# Patient Record
Sex: FEMALE | Race: WHITE | Employment: OTHER | URBAN - METROPOLITAN AREA
[De-identification: names, ages, dates, MRNs, and addresses within clinical notes are randomized per-mention and may not be internally consistent; named-entity substitution may affect disease eponyms.]

---

## 2017-01-26 ENCOUNTER — APPOINTMENT (OUTPATIENT)
Dept: MRI IMAGING | Facility: CLINIC | Age: 82
End: 2017-01-26
Attending: EMERGENCY MEDICINE
Payer: MEDICARE

## 2017-01-26 ENCOUNTER — APPOINTMENT (OUTPATIENT)
Dept: GENERAL RADIOLOGY | Facility: CLINIC | Age: 82
End: 2017-01-26
Attending: EMERGENCY MEDICINE
Payer: MEDICARE

## 2017-01-26 ENCOUNTER — HOSPITAL ENCOUNTER (EMERGENCY)
Facility: CLINIC | Age: 82
Discharge: HOME | End: 2017-01-26
Attending: FAMILY MEDICINE | Admitting: EMERGENCY MEDICINE
Payer: MEDICARE

## 2017-01-26 ENCOUNTER — CARE COORDINATION (OUTPATIENT)
Dept: CARE COORDINATION | Facility: CLINIC | Age: 82
End: 2017-01-26

## 2017-01-26 ENCOUNTER — TELEPHONE (OUTPATIENT)
Dept: INTERNAL MEDICINE | Facility: CLINIC | Age: 82
End: 2017-01-26

## 2017-01-26 VITALS
HEART RATE: 76 BPM | TEMPERATURE: 98 F | BODY MASS INDEX: 31.54 KG/M2 | OXYGEN SATURATION: 89 % | DIASTOLIC BLOOD PRESSURE: 96 MMHG | RESPIRATION RATE: 20 BRPM | SYSTOLIC BLOOD PRESSURE: 191 MMHG | WEIGHT: 178 LBS | HEIGHT: 63 IN

## 2017-01-26 DIAGNOSIS — H54.7 VISUAL LOSS: ICD-10-CM

## 2017-01-26 DIAGNOSIS — I10 BENIGN ESSENTIAL HYPERTENSION: ICD-10-CM

## 2017-01-26 DIAGNOSIS — E08.319 DIABETIC RETINOPATHY ASSOCIATED WITH DIABETES MELLITUS DUE TO UNDERLYING CONDITION, MACULAR EDEMA PRESENCE UNSPECIFIED, UNSPECIFIED RETINOPATHY SEVERITY: ICD-10-CM

## 2017-01-26 LAB
ALBUMIN SERPL-MCNC: 3.7 G/DL (ref 3.4–5)
ALP SERPL-CCNC: 62 U/L (ref 40–150)
ALT SERPL W P-5'-P-CCNC: 19 U/L (ref 0–50)
ANION GAP SERPL CALCULATED.3IONS-SCNC: 6 MMOL/L (ref 3–14)
AST SERPL W P-5'-P-CCNC: 28 U/L (ref 0–45)
BASOPHILS # BLD AUTO: 0 10E9/L (ref 0–0.2)
BASOPHILS NFR BLD AUTO: 0.4 %
BILIRUB SERPL-MCNC: 0.3 MG/DL (ref 0.2–1.3)
BUN SERPL-MCNC: 40 MG/DL (ref 7–30)
CALCIUM SERPL-MCNC: 9.4 MG/DL (ref 8.5–10.1)
CHLORIDE SERPL-SCNC: 99 MMOL/L (ref 94–109)
CO2 SERPL-SCNC: 35 MMOL/L (ref 20–32)
CREAT SERPL-MCNC: 1.79 MG/DL (ref 0.52–1.04)
CRP SERPL-MCNC: <2.9 MG/L (ref 0–8)
DIFFERENTIAL METHOD BLD: NORMAL
EOSINOPHIL # BLD AUTO: 0.2 10E9/L (ref 0–0.7)
EOSINOPHIL NFR BLD AUTO: 2.2 %
ERYTHROCYTE [DISTWIDTH] IN BLOOD BY AUTOMATED COUNT: 12.6 % (ref 10–15)
ERYTHROCYTE [SEDIMENTATION RATE] IN BLOOD BY WESTERGREN METHOD: 13 MM/H (ref 0–30)
GFR SERPL CREATININE-BSD FRML MDRD: 27 ML/MIN/1.7M2
GLUCOSE SERPL-MCNC: 140 MG/DL (ref 70–99)
HCT VFR BLD AUTO: 44.1 % (ref 35–47)
HGB BLD-MCNC: 14.8 G/DL (ref 11.7–15.7)
IMM GRANULOCYTES # BLD: 0 10E9/L (ref 0–0.4)
IMM GRANULOCYTES NFR BLD: 0 %
INR PPP: 0.93 (ref 0.86–1.14)
LYMPHOCYTES # BLD AUTO: 1.9 10E9/L (ref 0.8–5.3)
LYMPHOCYTES NFR BLD AUTO: 24.9 %
MCH RBC QN AUTO: 31.3 PG (ref 26.5–33)
MCHC RBC AUTO-ENTMCNC: 33.6 G/DL (ref 31.5–36.5)
MCV RBC AUTO: 93 FL (ref 78–100)
MONOCYTES # BLD AUTO: 0.7 10E9/L (ref 0–1.3)
MONOCYTES NFR BLD AUTO: 9.7 %
NEUTROPHILS # BLD AUTO: 4.8 10E9/L (ref 1.6–8.3)
NEUTROPHILS NFR BLD AUTO: 62.8 %
PLATELET # BLD AUTO: 160 10E9/L (ref 150–450)
POTASSIUM SERPL-SCNC: 4.3 MMOL/L (ref 3.4–5.3)
PROT SERPL-MCNC: 7.6 G/DL (ref 6.8–8.8)
RBC # BLD AUTO: 4.73 10E12/L (ref 3.8–5.2)
SODIUM SERPL-SCNC: 140 MMOL/L (ref 133–144)
WBC # BLD AUTO: 7.6 10E9/L (ref 4–11)

## 2017-01-26 PROCEDURE — 86140 C-REACTIVE PROTEIN: CPT | Performed by: EMERGENCY MEDICINE

## 2017-01-26 PROCEDURE — 85652 RBC SED RATE AUTOMATED: CPT | Performed by: EMERGENCY MEDICINE

## 2017-01-26 PROCEDURE — 70551 MRI BRAIN STEM W/O DYE: CPT

## 2017-01-26 PROCEDURE — 85610 PROTHROMBIN TIME: CPT | Performed by: EMERGENCY MEDICINE

## 2017-01-26 PROCEDURE — 99285 EMERGENCY DEPT VISIT HI MDM: CPT | Mod: 25 | Performed by: EMERGENCY MEDICINE

## 2017-01-26 PROCEDURE — 25000132 ZZH RX MED GY IP 250 OP 250 PS 637: Mod: GY | Performed by: EMERGENCY MEDICINE

## 2017-01-26 PROCEDURE — A9270 NON-COVERED ITEM OR SERVICE: HCPCS | Mod: GY | Performed by: EMERGENCY MEDICINE

## 2017-01-26 PROCEDURE — 71020 XR CHEST 2 VW: CPT | Mod: TC

## 2017-01-26 PROCEDURE — 93010 ELECTROCARDIOGRAM REPORT: CPT | Performed by: EMERGENCY MEDICINE

## 2017-01-26 PROCEDURE — 93005 ELECTROCARDIOGRAM TRACING: CPT

## 2017-01-26 PROCEDURE — 99285 EMERGENCY DEPT VISIT HI MDM: CPT | Mod: 25

## 2017-01-26 PROCEDURE — 80053 COMPREHEN METABOLIC PANEL: CPT | Performed by: EMERGENCY MEDICINE

## 2017-01-26 PROCEDURE — 85025 COMPLETE CBC W/AUTO DIFF WBC: CPT | Performed by: EMERGENCY MEDICINE

## 2017-01-26 PROCEDURE — 70544 MR ANGIOGRAPHY HEAD W/O DYE: CPT

## 2017-01-26 PROCEDURE — 70547 MR ANGIOGRAPHY NECK W/O DYE: CPT

## 2017-01-26 RX ORDER — CLONIDINE HYDROCHLORIDE 0.1 MG/1
0.1 TABLET ORAL ONCE
Status: COMPLETED | OUTPATIENT
Start: 2017-01-26 | End: 2017-01-26

## 2017-01-26 RX ADMIN — CLONIDINE HYDROCHLORIDE 0.1 MG: 0.1 TABLET ORAL at 20:17

## 2017-01-26 ASSESSMENT — ENCOUNTER SYMPTOMS
CONSTIPATION: 1
NAUSEA: 0
NECK PAIN: 0
WEAKNESS: 0
HEADACHES: 0
ABDOMINAL PAIN: 0
SPEECH DIFFICULTY: 0
DIARRHEA: 0

## 2017-01-26 NOTE — ED AVS SNAPSHOT
[facility] Emergency Department    [address] [place] [address]    Phone:  [phone]    Fax:  [phone]                                       Smiley Acuña   MRN: [id number]    Department:  [facility] Emergency Department   Date of Visit:  1/26/2017           After Visit Summary Signature Page     I have received my discharge instructions, and my questions have been answered. I have discussed any challenges I see with this plan with the nurse or doctor.    ..........................................................................................................................................  Patient/Patient Representative Signature      ..........................................................................................................................................  Patient Representative Print Name and Relationship to Patient    ..................................................               ................................................  Date                                            Time    ..........................................................................................................................................  Reviewed by Signature/Title    ...................................................              ..............................................  Date                                                            Time

## 2017-01-26 NOTE — ED PROVIDER NOTES
History     Chief Complaint   Patient presents with     Vision Loss Left Eye     The history is provided by the patient and the spouse.     Smiley Acuña is a 82 year old female with a history of Type II diabetes who presents to the emergency department with increased left vision loss. She states that 2 years ago her primary care provider diagnosed her legally blind from her diabetes. Patient reports that since that time she has had intermittent worsened vision loss. At times she can see shapes and colors but other times she cannot. Patient states she cannot read at all and glasses do not help her symptoms. She also notes constipation but denies headache, ringing ears, hearing loss, slurred speech, neck pain, chest pain, abdominal pain, nausea, diarrhea, urinary symptoms, change in weakness to arms or legs. Patient's  called her home health nurse and told her about these symptoms and was told to look for a facial droop.  believes he saw a minor left sided facial droop but believed this was normal. The home health nurse recommended they be seen. Patient states she is short of breath but says this is a chronic issue. Patient has not seen an eye doctor for 2-3 years. She denies history of stroke.  Patient denies slurred speech.  She is able ambulate in the department without assistance.  No history of TIA or CVA.  Patient is not on aspirin due to previous subdural bleed.    I have reviewed the Medications, Allergies, Past Medical and Surgical History, and Social History in the Epic system.    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)     Unspecified osteomyelitis, other specified site     Degenerative disc disease     Subdural hematoma (H)     Hyperlipidemia LDL goal <100     Estrogen deficiency     Osteoporosis     Advanced directives,  counseling/discussion     Peripheral autonomic neuropathy due to diabetes mellitus (H)     CKD (chronic kidney disease) stage 4, GFR 15-29 ml/min (H)     Femoral distal fracture (H)     Hip pain     Left upper arm pain     UTI (urinary tract infection)     Anemia     Thrombocytopenia (H)     Atelectasis     Hypoxia     Health Care Home     GERD (gastroesophageal reflux disease)     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)     Dermatophytosis of nail     Corns and callosities     History of amputation of lesser toe (H)     Obesity (BMI 30-39.9)     Primary insomnia     Essential hypertension with goal blood pressure less than 130/80     Past Medical History   Diagnosis Date     Unspecified essential hypertension      Other and unspecified hyperlipidemia      Type II or unspecified type diabetes mellitus without mention of complication, not stated as uncontrolled      Chronic ischemic heart disease, unspecified      2-vessel CAD     Renal failure, unspecified      chronic renal disease     Syncope and collapse 4/8/2005     Admit     Depressive disorder, not elsewhere classified      Anemia, unspecified      Unspecified sleep apnea      Septicemia due to Escherichia coli (E. coli) (H) 05/03/2007     Urinary tract infection, site not specified 4/4/2008     UTI with mild sepsis. Admitted.     Abnormality of gait      Degenerative disc disease      lumbar     Syncope and collapse 05/26/10     D/C 05/27/10 to [facility]     Closed fracture of patella 06/21/10     D/C 06/23/10     Traumatic subdural hematomas 05/27/10     Coronary artery disease      History of blood transfusion        Past Surgical History   Procedure Laterality Date     Hc amputation toe, mtp jt  2000     Second toe amputation     C appendectomy       C anesth,upper leg surgery        ugi endoscopy, simple exam  04/27/2005      colonoscopy w/wo brush/wash  12/05/2005     Internal hemorrhoids.  Diverticulosis-limited/left colon.   Otherwise normal to cecum.     Hc ugi endoscopy diag w or w/o brush/wash  12/05/2005     Hiatus hernia.  Otherwise normal.     C bx synovium interphalang jt       Craniotomy  05/28/10     [facility] repair rotator cuff,acute  11/21/2002     Rotator Cuff Repair; Right     Hc excision lesion/tendon-sheath/capsule, foot  04/30/07     right foot.     Hc excision lesion/tendon-sheath/capsule, foot  8/14/2007     Recurrent ganglion cyst - right ankle     Bunionectomy rt/lt  10/24/07     Bunionectomy right foot. Arthrodesis IP joint, right hallux.     C open tx femoral supracondylar fracture w extension  12/14/12     right LE       Family History   Problem Relation Age of Onset     DIABETES Mother      OSTEOPOROSIS Mother      DIABETES Brother      C.A.D. Brother      DIABETES Brother        Social History   Substance Use Topics     Smoking status: Never Smoker      Smokeless tobacco: Never Used     Alcohol Use: No        Immunization History   Administered Date(s) Administered     Hepatitis B 01/21/2004, 02/18/2004, 07/20/2004     Influenza (High Dose) 3 valent vaccine 11/19/2010, 10/26/2011, 09/11/2012, 11/05/2013, 10/15/2014, 10/21/2015, 09/07/2016     Influenza (IIV3) 11/19/1997, 10/19/1998, 10/04/1999, 11/24/2000, 11/06/2001, 10/28/2002, 10/31/2003, 10/20/2004, 11/03/2005, 11/14/2006, 11/19/2007, 11/19/2008, 12/02/2009     Pneumococcal 23 valent 01/21/2004     TD (ADULT, 7+) 07/19/1999          Allergies   Allergen Reactions     Lisinopril      upper resp symptoms, cough       Current Outpatient Prescriptions   Medication Sig Dispense Refill     traZODone (DESYREL) 50 MG tablet Take 1 tablet by mouth. at bedtime. 90 tablet 3     HYDROcodone-acetaminophen (NORCO) 5-325 MG per tablet Take 1 tablet by mouth every 6 hours as needed for pain 30 tablet 0     albuterol (PROAIR HFA, PROVENTIL HFA, VENTOLIN HFA) 108 (90 BASE) MCG/ACT inhaler Inhale 2 puffs into the lungs every 6 hours as needed for shortness of  "breath / dyspnea or wheezing Ventolin inhaler 1 Inhaler 5     hydrALAZINE (APRESOLINE) 25 MG tablet Take 1 tablet (25 mg) by mouth 3 times daily (Patient taking differently: Take 25 mg by mouth 3 times daily Spouse gives pt half a tablet if her BP is not elevated.) 270 tablet 1     insulin glargine (LANTUS) 100 UNIT/ML vial 18 units once a day 3 Month 3     insulin aspart (NOVOLOG VIAL) 100 UNITS/ML VIAL 5 units before breakfast, 7 units before lunch, 7 units before dinner 3 Month 3     cholecalciferol (VITAMIN D) 400 UNIT TABS Take 2 tablets by mouth daily.       MULTIVITAMINS OR TABS 1 TABLET DAILY 30 0     insulin syringe-needle U-100 (BD INSULIN SYRINGE ULTRAFINE) 30G X 1/2\" 0.5 ML Use one syringe 4 times daily or as directed. 100 each 3     gabapentin (NEURONTIN) 400 MG capsule Take 2 capsules (800 mg) by mouth 3 times daily (Patient taking differently: Take 400 mg by mouth 3 times daily Pt taking 400 mg in AM, 400 mg at noon and 800 mg at HS) 540 capsule 2     simvastatin (ZOCOR) 80 MG tablet Take 1 tablet (80 mg) by mouth At Bedtime at bedtime. 90 tablet 3     betamethasone dipropionate (DIPROSONE) 0.05 % lotion Apply topically to scalp once daily 60 mL 1     Spacer/Aero-Holding Chambers (OPTIHALER) BOGDAN As directed 1 Device 0     glucose blood VI test strips (ONE TOUCH TEST STRIPS) strip test 4x daily (has One Touch Ultra 2 machine 100 strip 1yr     LIFESCAN FINEPOINT LANCETS MISC 1 Device by Lancet route 4 times daily. 100 each prn     ASPIRIN NOT PRESCRIBED, INTENTIONAL, by Other route continuous prn. 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    0     Review of Systems   HENT: Negative for ear pain and hearing loss.    Eyes: Positive for visual disturbance.   Cardiovascular: Negative for chest pain.   Gastrointestinal: Positive for constipation. Negative for nausea, abdominal pain and diarrhea.   Genitourinary: Negative.    Musculoskeletal: Negative for neck pain.   Neurological: Negative for speech " "difficulty, weakness and headaches.   All other systems reviewed and are negative.      Physical Exam   BP: (!) 229/133 mmHg  Pulse: 78  Temp: 97  F (36.1  C)  Resp: 17  Height: 160 cm (5' 3\")  Weight: 80.74 kg (178 lb)  SpO2: 93 %  Physical Exam general pleasant and cooperative female in mild distress due to her visual issues.  HEENT shows pupils to be equal and reactive to light and accommodation.  Extraocular motions are intact.  Due to papillary constriction I cannot get a good funduscopic evaluation.  Patient does not have temporal tenderness.  Patient is able to make out shapes and identify number of fingers in front of her face.  There is a subtle droop of the left angle of her mouth otherwise no facial asymmetry.  Patient's uvula is midline.  Gag is intact.  No deviation of the tongue.  We were able to obtain a previous 's license from 2008 which showed a very similar facial asymmetry at the angle of her mouth.  Neck revealed no bruits or stridor.  There is no meningismus.  Lungs were clear without adventitious sounds.  Cardiac regular rate without murmur.  Abdomen was distended but had active bowel sounds and was nontender to palpation.  Extremities reveal no pitting edema, calf or thigh tenderness.  Neurologically cranial nerves II through XII were intact except smell which was not checked and her above-noted visual issues.  No focal motor findings are noted except her subtle weakness of the flexion of her right hip.  She attributes this to previous hip fracture and surgery ×2.  This is not worsened baseline.  No acute sensory changes are noted.  She was able ambulate without assistance.    ED Course   Procedures             EKG Interpretation:      Interpreted by Jaime Au  Time reviewed: 17:45  Symptoms at time of EKG: Visual change   Rhythm: normal sinus   Rate: Normal  Axis: Left Axis Deviation  Ectopy: none  Conduction: 1st degree AV block  ST Segments/ T Waves: No ST-T wave changes  Q " Waves: none  Comparison to prior: Unchanged from 12/12    Clinical Impression: normal EKG    Results for orders placed or performed during the hospital encounter of 01/26/17   XR Chest 2 Views    Narrative    CHEST TWO VIEWS    1/26/2017 7:46 PM     INDICATION: Possible CVA.    COMPARISON: 8/7/2015.      Impression    IMPRESSION: No acute infiltrates or significant change. There are a  few scattered linear opacities which are likely due to scarring or  fibrosis.  Heart size within normal limits. Calcified tortuous aorta.  Old rib fracture deformities. Degenerative changes and kyphosis  thoracic spine.    MR Head w/o Contrast Angiogram    Narrative    MRA ANGIOGRAM HEAD WITHOUT CONTRAST  1/26/2017  7:43 PM    HISTORY: Decreased visual acuity in left eye with left facial droop.    TECHNIQUE: 3D time-of-flight MR angiography was performed through the  [place] [facility] [place].    FINDINGS: The distal internal carotid arteries, basilar artery, and  proximal anterior, middle, and posterior cerebral arteries are patent.  There is no evidence for any large vessel occlusion or stenosis. There  is no evidence for a saccular aneurysm.      Impression    IMPRESSION: Negative MR angiography of the [place].    LARON HOWARD MD   MR Brain w/o Contrast    Narrative    MRI BRAIN WITHOUT CONTRAST  1/26/2017 7:42 PM    HISTORY:  Left eye decreased visual acuity with left facial droop.    TECHNIQUE:  Multiplanar, multisequence MRI of the brain without  gadolinium IV contrast material.    COMPARISON: 3/29/2012.    FINDINGS: There is some frontal metallic artifact consistent with  previous craniotomy. Moderate cerebral atrophy is present along with  some patchy white matter changes. These are not associated with any  mass effect. Diffusion-weighted images are normal. There is no  evidence for acute infarct or intracranial hemorrhage. No focal mass  lesions are evident. Vascular structures are patent at the skull base.      Impression     IMPRESSION:  1. Cerebral atrophy with chronic white matter changes most likely due  to chronic small vessel ischemic disease.  2. Previous frontal craniotomy.  3. No evidence for intracranial hemorrhage, acute infarct, or any  focal mass lesions.    LARON HOWARD MD   MRA Neck w/o Contrast Angiogram    Narrative    MRA ANGIOGRAM NECK WITHOUT CONTRAST 1/26/2017 7:43 PM     HISTORY: Evaluate for dissection, vertebrobasilar flow, carotid  stenosis.    TECHNIQUE: MR angiography was performed through the neck without  contrast. Carotid stenoses were evaluated by comparing the caliber of  the proximal internal carotid artery to the caliber of the distal  internal carotid artery.    FINDINGS: Both carotid bifurcations are widely patent without any  stenosis. There is no evidence for dissection. Both vertebral arteries  show antegrade flow and are also without evidence of stenosis or  dissection on this study.      Impression    IMPRESSION: Negative MR angiography of the neck without contrast.      LARON HOWARD MD     *Note: Due to a large number of results and/or encounters for the requested time period, some results have not been displayed. A complete set of results can be found in Results Review.                 Critical Care time:  none               Results for orders placed or performed during the hospital encounter of 01/26/17 (from the past 24 hour(s))   CBC with platelets differential   Result Value Ref Range    WBC 7.6 4.0 - 11.0 10e9/L    RBC Count 4.73 3.8 - 5.2 10e12/L    Hemoglobin 14.8 11.7 - 15.7 g/dL    Hematocrit 44.1 35.0 - 47.0 %    MCV 93 78 - 100 fl    MCH 31.3 26.5 - 33.0 pg    MCHC 33.6 31.5 - 36.5 g/dL    RDW 12.6 10.0 - 15.0 %    Platelet Count 160 150 - 450 10e9/L    Diff Method Automated Method     % Neutrophils 62.8 %    % Lymphocytes 24.9 %    % Monocytes 9.7 %    % Eosinophils 2.2 %    % Basophils 0.4 %    % Immature Granulocytes 0.0 %    Absolute Neutrophil 4.8 1.6 - 8.3 10e9/L    Absolute  Lymphocytes 1.9 0.8 - 5.3 10e9/L    Absolute Monocytes 0.7 0.0 - 1.3 10e9/L    Absolute Eosinophils 0.2 0.0 - 0.7 10e9/L    Absolute Basophils 0.0 0.0 - 0.2 10e9/L    Abs Immature Granulocytes 0.0 0 - 0.4 10e9/L   Comprehensive metabolic panel   Result Value Ref Range    Sodium 140 133 - 144 mmol/L    Potassium 4.3 3.4 - 5.3 mmol/L    Chloride 99 94 - 109 mmol/L    Carbon Dioxide 35 (H) 20 - 32 mmol/L    Anion Gap 6 3 - 14 mmol/L    Glucose 140 (H) 70 - 99 mg/dL    Urea Nitrogen 40 (H) 7 - 30 mg/dL    Creatinine 1.79 (H) 0.52 - 1.04 mg/dL    GFR Estimate 27 (L) >60 mL/min/1.7m2    GFR Estimate If Black 33 (L) >60 mL/min/1.7m2    Calcium 9.4 8.5 - 10.1 mg/dL    Bilirubin Total 0.3 0.2 - 1.3 mg/dL    Albumin 3.7 3.4 - 5.0 g/dL    Protein Total 7.6 6.8 - 8.8 g/dL    Alkaline Phosphatase 62 40 - 150 U/L    ALT 19 0 - 50 U/L    AST 28 0 - 45 U/L   INR   Result Value Ref Range    INR 0.93 0.86 - 1.14   CRP inflammation   Result Value Ref Range    CRP Inflammation <2.9 0.0 - 8.0 mg/L   Erythrocyte sedimentation rate auto   Result Value Ref Range    Sed Rate 13 0 - 30 mm/h   MR Brain w/o Contrast    Narrative    MRI BRAIN WITHOUT CONTRAST  1/26/2017 7:42 PM    HISTORY:  Left eye decreased visual acuity with left facial droop.    TECHNIQUE:  Multiplanar, multisequence MRI of the brain without  gadolinium IV contrast material.    COMPARISON: 3/29/2012.    FINDINGS: There is some frontal metallic artifact consistent with  previous craniotomy. Moderate cerebral atrophy is present along with  some patchy white matter changes. These are not associated with any  mass effect. Diffusion-weighted images are normal. There is no  evidence for acute infarct or intracranial hemorrhage. No focal mass  lesions are evident. Vascular structures are patent at the skull base.      Impression    IMPRESSION:  1. Cerebral atrophy with chronic white matter changes most likely due  to chronic small vessel ischemic disease.  2. Previous frontal  craniotomy.  3. No evidence for intracranial hemorrhage, acute infarct, or any  focal mass lesions.    LARON HOWARD MD   MR Head w/o Contrast Angiogram    Narrative    MRA ANGIOGRAM HEAD WITHOUT CONTRAST  1/26/2017  7:43 PM    HISTORY: Decreased visual acuity in left eye with left facial droop.    TECHNIQUE: 3D time-of-flight MR angiography was performed through the  [place] [facility] [place].    FINDINGS: The distal internal carotid arteries, basilar artery, and  proximal anterior, middle, and posterior cerebral arteries are patent.  There is no evidence for any large vessel occlusion or stenosis. There  is no evidence for a saccular aneurysm.      Impression    IMPRESSION: Negative MR angiography of the [place].    LARON HOWARD MD   MRA Neck w/o Contrast Angiogram    Narrative    MRA ANGIOGRAM NECK WITHOUT CONTRAST 1/26/2017 7:43 PM     HISTORY: Evaluate for dissection, vertebrobasilar flow, carotid  stenosis.    TECHNIQUE: MR angiography was performed through the neck without  contrast. Carotid stenoses were evaluated by comparing the caliber of  the proximal internal carotid artery to the caliber of the distal  internal carotid artery.    FINDINGS: Both carotid bifurcations are widely patent without any  stenosis. There is no evidence for dissection. Both vertebral arteries  show antegrade flow and are also without evidence of stenosis or  dissection on this study.      Impression    IMPRESSION: Negative MR angiography of the neck without contrast.      LARON HOWARD MD   XR Chest 2 Views    Narrative    CHEST TWO VIEWS    1/26/2017 7:46 PM     INDICATION: Possible CVA.    COMPARISON: 8/7/2015.      Impression    IMPRESSION: No acute infiltrates or significant change. There are a  few scattered linear opacities which are likely due to scarring or  fibrosis.  Heart size within normal limits. Calcified tortuous aorta.  Old rib fracture deformities. Degenerative changes and kyphosis  thoracic spine.      *Note: Due  to a large number of results and/or encounters for the requested time period, some results have not been displayed. A complete set of results can be found in Results Review.       Medications   cloNIDine (CATAPRES) tablet 0.1 mg (0.1 mg Oral Given 1/26/17 2017)     IV was established and blood was obtained.  Recheck for blood pressure showed improvement at 190/104  Patient's blood pressure rebounded over the 210-114 range.  She was then given oral clonidine.  Her blood pressure slowly decreased.  It was 204/89.  She had no recurrent symptomology or headache.  No monitor blood pressure home.  Assessments & Plan (with Medical Decision Making)   Patient is a 82-year-old female brought for evaluation with concerns for possible TIA or stroke.  Patient has had 2+ year history of waxing and waning visual difficulty.  Today the vision in her left eye was worsened. After consultation with the nurse line there was concerns for possible stroke is the patient had a slight droop of her mouth on the left.  Upon arrival this has persisted but  thinks this is normal for her.  She denies any change in her hearing.  She has no other facial droop or asymmetry.  No difficulty with speech or swallowing.  Able to obtain a 's license from 2008 and her facial asymmetry looks similar to then.  Neck reveals no meningismus or bruits.  Lungs reveal no difficult adventitious sounds.  Cardiac regular rate without murmur.  Abdomen was benign.  Neurologically her cranial nerves are intact except smell and her visual acuity described above.  Could not obtain a good funduscopic examination due to pupillary constriction.  Cannot rule out detachment.  She had no focal motor or sensory findings except slight weakness of her right lower extremity where she's had 2 previous surgeries.  She is able ambulate without assistance.  We did do an MRI/MRA of the head and neck.  This was done without contrast as she had renal insufficiency.  This did  not show any acute abnormality or stroke.  Patient is not on antiplatelet therapy on purpose due to previous subdural bleed.  Patient does have hypertension but typically controlled with oral meds.  She was initially hypertensive upon arrival but improved without treatment.  Patient is a diabetic, but her blood sugar was 140 so doubt hypoglycemia causing her symptoms.  I suspect she has diabetic retinopathy and she has not seen a ophthalmologist for 2-3 years so we'll have her follow up for reassessment and evaluation.  We did check an EKG which was unchanged from previously and the patient was not in A. fib or other arrhythmia.  She did not have temporal tenderness and had a normal sed rate and CRP so doubt temporal arteritis.  Patient is given a handout on diabetic retinopathy.  She'll follow up with ophthalmology.  Reasons to return for reassessment were discussed.  I have reviewed the nursing notes.    I have reviewed the findings, diagnosis, plan and need for follow up with the patient.    New Prescriptions    No medications on file       Final diagnoses:   Visual loss   Diabetic retinopathy associated with diabetes mellitus due to underlying condition, macular edema presence unspecified, unspecified retinopathy severity (H)   Benign essential hypertension   This document serves as a record of services personally performed by Jaime Au MD. It was created on their behalf by Beth Hampton, a trained medical scribe. The creation of this record is based on the provider's personal observations and the statements of the patient. This document has been checked and approved by the attending provider.     Note: Chart documentation done in part with Dragon Voice Recognition software. Although reviewed after completion, some word and grammatical errors may remain.    1/26/2017   [facility] EMERGENCY DEPARTMENT      Jaime Au MD  01/26/17 2010    Jaime Au MD  01/26/17 [id number]

## 2017-01-26 NOTE — PROGRESS NOTES
Clinic Care Coordination Contact 1/26/17   Care Team Conversations-Social Work    THis writer contacted the pt to inquire if the representative from [place] [employer] and the [place] [facility] were able to connect with her and set up a meeting time. Pt was slightly confused by this question and quickly clarified that she remembered what I was talking about it. She was unsure if they had contacted her and than explained that she isn't feeling well as her eyesight is poor today. She said she is not feeling well. This writer asked her if I could connect her with an RN to triage her symptoms. She was agreeable. Her  was home with her as well.    SW will plan on contacting her next week to discuss the referrals that were made last month.    Dorota Lee, [facility]  Care Coordinator Social Work    [facility]  [phone]  1/26/2017 4:31 PM

## 2017-01-26 NOTE — ED NOTES
Diagnosed 2 yrs ago with almost being legally blind but says her vision in her left eye is more painful and decreased from usual.  Home health nurse told her to come in.  Denies any other symptoms.  Chronic arthritis pain

## 2017-01-26 NOTE — ED AVS SNAPSHOT
[facility] Emergency Department    [address] [place] [address]    Phone:  [phone]    Fax:  [phone]                                       Smiley Acuña   MRN: [id number]    Department:  [facility] Emergency Department   Date of Visit:  1/26/2017           Patient Information     Date Of Birth          1935        Your diagnoses for this visit were:     Visual loss     Diabetic retinopathy associated with diabetes mellitus due to underlying condition, macular edema presence unspecified, unspecified retinopathy severity (H)     Benign essential hypertension        You were seen by Kye Flores MD and Jaime Au MD.      Follow-up Information     Follow up with Margarito Hoffman MD.    Specialty:  Internal Medicine    Why:  As needed    Contact information:    [facility]  [address] [place] [id number] [phone]        Discharge References/Attachments     DIABETIC RETINOPATHY (ENGLISH)      24 Hour Appointment Hotline       To make an appointment at any [facility], call [phone] ([phone]). If you don't have a family doctor or clinic, we will help you find one. [place] clinics are conveniently located to serve the needs of you and your family.             Review of your medicines      Our records show that you are taking the medicines listed below. If these are incorrect, please call your family doctor or clinic.        Dose / Directions Last dose taken    albuterol 108 (90 BASE) MCG/ACT Inhaler   Commonly known as:  PROAIR HFA/PROVENTIL HFA/VENTOLIN HFA   Dose:  2 puff   Quantity:  1 Inhaler        Inhale 2 puffs into the lungs every 6 hours as needed for shortness of breath / dyspnea or wheezing Ventolin inhaler   Refills:  5        ASPIRIN NOT PRESCRIBED   Commonly known as:  INTENTIONAL        by Other route continuous prn. 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   Refills:  0        betamethasone  "dipropionate 0.05 % lotion   Commonly known as:  DIPROSONE   Quantity:  60 mL        Apply topically to scalp once daily   Refills:  1        blood glucose monitoring test strip   Commonly known as:  ONE TOUCH TEST STRIPS   Quantity:  100 strip        test 4x daily (has One Touch Ultra 2 machine   Refills:  1yr        cholecalciferol 400 UNIT Tabs tablet   Commonly known as:  vitamin D   Dose:  2 tablet        Take 2 tablets by mouth daily.   Refills:  0        gabapentin 400 MG capsule   Commonly known as:  NEURONTIN   Dose:  800 mg   Quantity:  540 capsule        Take 2 capsules (800 mg) by mouth 3 times daily   Refills:  2        hydrALAZINE 25 MG tablet   Commonly known as:  APRESOLINE   Dose:  25 mg   Quantity:  270 tablet        Take 1 tablet (25 mg) by mouth 3 times daily   Refills:  1        HYDROcodone-acetaminophen 5-325 MG per tablet   Commonly known as:  NORCO   Dose:  1 tablet   Quantity:  30 tablet        Take 1 tablet by mouth every 6 hours as needed for pain   Refills:  0        insulin aspart 100 UNITS/ML injection   Commonly known as:  NovoLOG VIAL   Quantity:  3 Month        5 units before breakfast, 7 units before lunch, 7 units before dinner   Refills:  3        insulin glargine 100 UNIT/ML injection   Commonly known as:  LANTUS   Quantity:  3 Month        18 units once a day   Refills:  3        insulin syringe-needle U-100 30G X 1/2\" 0.5 ML   Commonly known as:  BD insulin syringe ULTRAFINE   Quantity:  100 each        Use one syringe 4 times daily or as directed.   Refills:  3        LIFESCAN FINEPOINT LANCETS Misc   Dose:  1 Device   Quantity:  100 each        1 Device by Lancet route 4 times daily.   Refills:  prn        multivitamin per tablet   Quantity:  30        1 TABLET DAILY   Refills:  0        OPTIHALER Kerry   Quantity:  1 Device        As directed   Refills:  0        simvastatin 80 MG tablet   Commonly known as:  ZOCOR   Dose:  80 mg   Quantity:  90 tablet        Take 1 tablet " "(80 mg) by mouth At Bedtime at bedtime.   Refills:  3        traZODone 50 MG tablet   Commonly known as:  DESYREL   Quantity:  90 tablet        Take 1 tablet by mouth. at bedtime.   Refills:  3                Procedures and tests performed during your visit     CBC with platelets differential    CRP inflammation    Comprehensive metabolic panel    EKG 12-lead, tracing only    Erythrocyte sedimentation rate auto    INR    MR Brain w/o Contrast    MR Head w/o Contrast Angiogram    MRA Neck w/o Contrast Angiogram    XR Chest 2 Views      Orders Needing Specimen Collection     None      Pending Results     Date and Time Order Name Status Description    2017 1730 XR Chest 2 Views Preliminary             Pending Culture Results     No orders found from 2017 to 2017.            Thank you for choosing [facility]       Thank you for choosing [facility] for your care. Our goal is always to provide you with excellent care. Hearing back from our patients is one way we can continue to improve our services. Please take a few minutes to complete the written survey that you may receive in the mail after you visit with us. Thank you!        [employer] Information     [employer] lets you send messages to your doctor, view your test results, renew your prescriptions, schedule appointments and more. To sign up, go to [email or web address] . Click on \"Log in\" on the left side of the screen, which will take you to the Welcome page. Then click on \"Sign up Now\" on the right side of the page.     You will be asked to enter the access code listed below, as well as some personal information. Please follow the directions to create your username and password.     Your access code is: [email or web address]  Expires: 3/5/2017  2:01 PM     Your access code will  in 90 days. If you need help or a new code, please call your [facility] clinic or [phone].        Care EveryWhere ID     This is your Care EveryWhere ID. This could be used by other " organizations to access your [place] medical records  [id number]        After Visit Summary       This is your record. Keep this with you and show to your community pharmacist(s) and doctor(s) at your next visit.

## 2017-01-26 NOTE — TELEPHONE ENCOUNTER
Dorota, Care Coordinator, is calling RN due to concern that patient is slurring her speech, having confusion and getting angry with herself which is not her normal behavior.  She would like RN to call and triage patient.    NURSING ASSESSMENT:  Description:  RN calls to talk to patient.  Her speech is a bit slurred.  She is reporting she cannot see out of her left eye.  Phone is passed to .  When she is asked to smile, he is reporting the left side of her mouth is lower than the right side.  He states she is fine, he does not want to take her to the ED.  He states she is like this off and on.  Phone is passed back to patient and she is informed RN can call for an ambulance if  does not want to take her to the ED.  She states no, she thinks she can get him to take her to the ED.  She is informed these are all signs of a stroke and she needs to be seen right away.  Onset/duration:  today  Precip. factors:  none    Last exam/Treatment:  12/5/16  Allergies:   Allergies   Allergen Reactions     Lisinopril      upper resp symptoms, cough       NURSING PLAN: Nursing advice to patient to ED for further evaluation    RECOMMENDED DISPOSITION:  To ED, another person to drive - Unsure if  will drive her in.  Will route to late nurse to check if she comes in.  If not, we may need to do a welfare check by the police  Will comply with recommendation: No- Barriers to comply with plan of care see above.  If further questions/concerns or if symptoms do not improve, worsen or new symptoms develop, call your PCP or [facility] as soon as possible.      Guideline used:  confusion  Telephone Triage Protocols for Nurses, Fifth Edition, Melissa Han RN

## 2017-01-27 DIAGNOSIS — E11.49 DIABETES MELLITUS TYPE 2 WITH NEUROLOGICAL MANIFESTATIONS (H): Primary | ICD-10-CM

## 2017-01-27 RX ORDER — INSULIN GLARGINE 100 [IU]/ML
INJECTION, SOLUTION SUBCUTANEOUS
Qty: 30 ML | Refills: 3 | Status: ON HOLD | OUTPATIENT
Start: 2017-01-27 | End: 2018-01-24

## 2017-01-27 NOTE — TELEPHONE ENCOUNTER
[place]         Last Written Prescription Date: 1/11/2016   Last Fill Quantity: 3 months, # refills: 3  Last Office Visit with [facility], [facility] or  Health prescribing provider:  12/5/2016        BP Readings from Last 3 Encounters:   01/26/17 191/96   12/05/16 132/66   09/07/16 152/66     MICROL      131   1/5/2016  No results found for this basename: microalbumin  CREATININE   Date Value Ref Range Status   01/26/2017 1.79* 0.52 - 1.04 mg/dL Final   07/13/2005 1.77* 0.60 - 1.60 mg/dL Final   ]  GFR ESTIMATE   Date Value Ref Range Status   01/26/2017 27* >60 mL/min/1.7m2 Final     Comment:     Non  GFR Calc   09/07/2016 27* >60 mL/min/1.7m2 Final     Comment:     Non  GFR Calc   09/01/2016 25* >60 mL/min/1.7m2 Final     Comment:     Non  GFR Calc     GFR ESTIMATE IF BLACK   Date Value Ref Range Status   01/26/2017 33* >60 mL/min/1.7m2 Final     Comment:      GFR Calc   09/07/2016 33* >60 mL/min/1.7m2 Final     Comment:      GFR Calc   09/01/2016 30* >60 mL/min/1.7m2 Final     Comment:      GFR Calc     CHOL      147   3/29/2016  HDL       54   3/29/2016  LDL       62   3/29/2016  TRIG      156   3/29/2016  CHOLHDLRATIO      3.0   3/11/2014  AST       28   1/26/2017  ALT       19   1/26/2017  A1C      7.0   9/7/2016  A1C      6.6   3/2/2016  A1C      6.9   8/5/2015  A1C      7.7   11/3/2014  A1C      7.1   6/24/2014  POTASSIUM   Date Value Ref Range Status   01/26/2017 4.3 3.4 - 5.3 mmol/L Final     Novolog         Last Written Prescription Date: 1/11/2016  Last Fill Quantity: 3 months, # refills: 3  Last Office Visit with [facility], [facility] or [facility] prescribing provider:  12/5/2016        BP Readings from Last 3 Encounters:   01/26/17 191/96   12/05/16 132/66   09/07/16 152/66     MICROL      131   1/5/2016  No results found for this basename: microalbumin  CREATININE   Date Value Ref Range Status   01/26/2017 1.79* 0.52 - 1.04 mg/dL  Final   07/13/2005 1.77* 0.60 - 1.60 mg/dL Final   ]  GFR ESTIMATE   Date Value Ref Range Status   01/26/2017 27* >60 mL/min/1.7m2 Final     Comment:     Non  GFR Calc   09/07/2016 27* >60 mL/min/1.7m2 Final     Comment:     Non  GFR Calc   09/01/2016 25* >60 mL/min/1.7m2 Final     Comment:     Non  GFR Calc     GFR ESTIMATE IF BLACK   Date Value Ref Range Status   01/26/2017 33* >60 mL/min/1.7m2 Final     Comment:      GFR Calc   09/07/2016 33* >60 mL/min/1.7m2 Final     Comment:      GFR Calc   09/01/2016 30* >60 mL/min/1.7m2 Final     Comment:      GFR Calc     CHOL      147   3/29/2016  HDL       54   3/29/2016  LDL       62   3/29/2016  TRIG      156   3/29/2016  CHOLHDLRATIO      3.0   3/11/2014  AST       28   1/26/2017  ALT       19   1/26/2017  A1C      7.0   9/7/2016  A1C      6.6   3/2/2016  A1C      6.9   8/5/2015  A1C      7.7   11/3/2014  A1C      7.1   6/24/2014  POTASSIUM   Date Value Ref Range Status   01/26/2017 4.3 3.4 - 5.3 mmol/L Final     Unable to approve per medication refill protocol. Forwarded to prescriber due to blood pressure.  Patient was in Emergency room last night.    Nancy Cochran [facility]  [phone]

## 2017-01-30 ENCOUNTER — CARE COORDINATION (OUTPATIENT)
Dept: CARE COORDINATION | Facility: CLINIC | Age: 82
End: 2017-01-30

## 2017-01-30 NOTE — PROGRESS NOTES
Clinic Care Coordination Contact 1/30/17  Care Team Conversations-Social Work    F/u call made to pt to see how she was feeling after her ED visit. Pt states she is feeling okay. We talked about the ref[place] this writer placed approx 1 month ago [place] and [place] [facility] [place]. Pt states she [place] Priya from [place] [facility] [place] but did not talk extensively with her as she was not feeling well at the time. She said that a person from the [facility] came to their home and talked with them about available services, however her  took notes and has not followed up with any of it. She said he has become increasingly forgetful and doesn't have great follow through anymore.     This writer explained that she will contact both parties and have them reach out to the pt again. We also will be scheduling an apt with her PCP to check in with him about her decrease in vision. SW will f/u with the pt in approx 1 month.     MARILYNN Nieves  Care Coordinator Social Work    [facility] [place] [facility] [place]  [phone]  1/30/2017 11:43 AM

## 2017-01-30 NOTE — PROGRESS NOTES
Clinic Care Coordination Contact 1/30/17  Care Team Conversations-Social Work    Spoke with Guadalupe Flores from [facility] who states the pt has turned in the appropriate paper work to apply for MA. It is currently being processed. Phone call also made to Priya with [place] [facility] who states she will call the pt today as they have not had a chance to meet yet.     Dorota Lee [facility]  Care Coordinator Social Work    [facility] [place] [facility] [place]  [phone]  1/30/2017 12:00 PM

## 2017-01-31 DIAGNOSIS — M54.6 PAIN IN THORACIC SPINE: Primary | ICD-10-CM

## 2017-01-31 RX ORDER — HYDROCODONE BITARTRATE AND ACETAMINOPHEN 5; 325 MG/1; MG/1
1 TABLET ORAL EVERY 6 HOURS PRN
Qty: 30 TABLET | Refills: 0 | Status: SHIPPED | OUTPATIENT
Start: 2017-01-31 | End: 2017-03-25

## 2017-01-31 NOTE — TELEPHONE ENCOUNTER
[place]  Last Written Prescription Date: 11/28/16  Last Fill Quantity: 30 # refills: 0  Last Office Visit with [facility], [facility] or  Health prescribing provider: 12/5/16                                  Next 5 appointments (look out 90 days)     Feb 03, 2017  1:30 PM   [facility]    [address] [place] [id number] [phone]                  Keesha Chatterjee  Pharmacy [facility].  [facility]  [phone]

## 2017-02-01 ENCOUNTER — CARE COORDINATION (OUTPATIENT)
Dept: CARE COORDINATION | Facility: CLINIC | Age: 82
End: 2017-02-01

## 2017-02-01 NOTE — PROGRESS NOTES
Clinic Care Coordination Contact 2/1/17  [facility]-Social Work    Referral Source: Care Team  Clinical Data: Care Coordinator Outreach  Outreach attempted x 1.  No answer on pt's phone. This writer attempted to contact her to remind her of her upcoming apt as there were two apt's scheduled by this writer and the pt.   Plan: Care Coordinator will try to reach patient again in 1-2 business days.    MARILYNN Nieves  Care Coordinator Social Work    [facility]  [phone]  2/1/2017 1:38 PM

## 2017-02-03 ENCOUNTER — OFFICE VISIT (OUTPATIENT)
Dept: INTERNAL MEDICINE | Facility: CLINIC | Age: 82
End: 2017-02-03
Payer: MEDICARE

## 2017-02-03 VITALS
BODY MASS INDEX: 31.54 KG/M2 | HEART RATE: 80 BPM | TEMPERATURE: 97.6 F | WEIGHT: 178 LBS | DIASTOLIC BLOOD PRESSURE: 84 MMHG | RESPIRATION RATE: 16 BRPM | OXYGEN SATURATION: 93 % | SYSTOLIC BLOOD PRESSURE: 136 MMHG

## 2017-02-03 DIAGNOSIS — H54.7 VISION LOSS: ICD-10-CM

## 2017-02-03 DIAGNOSIS — E11.319 DIABETIC RETINOPATHY ASSOCIATED WITH TYPE 2 DIABETES MELLITUS, MACULAR EDEMA PRESENCE UNSPECIFIED, UNSPECIFIED RETINOPATHY SEVERITY: ICD-10-CM

## 2017-02-03 DIAGNOSIS — I10 ESSENTIAL HYPERTENSION WITH GOAL BLOOD PRESSURE LESS THAN 130/80: Primary | ICD-10-CM

## 2017-02-03 PROCEDURE — 99214 OFFICE O/P EST MOD 30 MIN: CPT | Performed by: INTERNAL MEDICINE

## 2017-02-03 ASSESSMENT — PAIN SCALES - GENERAL: PAINLEVEL: NO PAIN (0)

## 2017-02-03 NOTE — MR AVS SNAPSHOT
"              After Visit Summary   2/3/2017    Smiley Acuña    MRN: [id number]           Patient Information     Date Of Birth          1935        Visit Information        Provider Department      2/3/2017 1:30 PM Margarito Hoffman MD [facility]         Follow-ups after your visit        Your next 10 appointments already scheduled     Feb 03, 2017  1:30 PM   Office Visit with Margarito Hoffman MD   [facility] ([facility])    [address] [place] [id number] [phone]           Bring a current list of meds and any records pertaining to this visit.  For Physicals, please bring immunization records and any forms needing to be filled out.  Please arrive 10 minutes early to complete paperwork.              Who to contact     If you have questions or need follow up information about today's clinic visit or your schedule please contact [facility] directly at [phone].  Normal or non-critical lab and imaging results will be communicated to you by MyChart, letter or phone within 4 business days after the clinic has received the results. If you do not hear from us within 7 days, please contact the clinic through [employer] or phone. If you have a critical or abnormal lab result, we will notify you by phone as soon as possible.  Submit refill requests through [employer] or call your pharmacy and they will forward the refill request to us. Please allow 3 business days for your refill to be completed.          Additional Information About Your Visit        [employer] Information     [employer] lets you send messages to your doctor, view your test results, renew your prescriptions, schedule appointments and more. To sign up, go to [email or web address] . Click on \"Log in\" on the left side of the screen, which will take you to the Welcome page. Then click on \"Sign up Now\" on the right side of the page.     You will be asked to enter the access " code listed below, as well as some personal information. Please follow the directions to create your username and password.     Your access code is: [email or web address]  Expires: 3/5/2017  2:01 PM     Your access code will  in 90 days. If you need help or a new code, please call your [place] clinic or [phone].        Care EveryWhere ID     This is your Care EveryWhere ID. This could be used by other organizations to access your [place] medical records  [id number]        Your Vitals Were     Pulse Temperature Respirations Pulse Oximetry          80 97.6  F (36.4  C) (Temporal) 16 93%         Blood Pressure from Last 3 Encounters:   17 136/84   17 191/96   16 132/66    Weight from Last 3 Encounters:   17 178 lb (80.74 kg)   17 178 lb (80.74 kg)   16 179 lb (81.194 kg)              Today, you had the following     No orders found for display         Today's Medication Changes          These changes are accurate as of: 2/3/17  1:27 PM.  If you have any questions, ask your nurse or doctor.               These medicines have changed or have updated prescriptions.        Dose/Directions    gabapentin 400 MG capsule   Commonly known as:  NEURONTIN   This may have changed:    - how much to take  - additional instructions   Used for:  Pain in thoracic spine        Dose:  800 mg   Take 2 capsules (800 mg) by mouth 3 times daily   Quantity:  540 capsule   Refills:  2       hydrALAZINE 25 MG tablet   Commonly known as:  APRESOLINE   This may have changed:  additional instructions   Used for:  Hypertension goal BP (blood pressure) < 130/80        Dose:  25 mg   Take 1 tablet (25 mg) by mouth 3 times daily   Quantity:  270 tablet   Refills:  1                Primary Care Provider Office Phone # Fax #    Margarito Hoffman [phone]       [facility] [address] [place] [address]        Thank you!     Thank you for choosing [facility] [place]   "for your care. Our goal is always to provide you with excellent care. Hearing back from our patients is one way we can continue to improve our services. Please take a few minutes to complete the written survey that you may receive in the mail after your visit with us. Thank you!             Your Updated Medication List - Protect others around you: Learn how to safely use, store and throw away your medicines at www.disposemymeds.org.          This list is accurate as of: 2/3/17  1:27 PM.  Always use your most recent med list.                   Brand Name Dispense Instructions for use    albuterol 108 (90 BASE) MCG/ACT Inhaler    PROAIR HFA/PROVENTIL HFA/VENTOLIN HFA    1 Inhaler    Inhale 2 puffs into the lungs every 6 hours as needed for shortness of breath / dyspnea or wheezing Ventolin inhaler       ASPIRIN NOT PRESCRIBED    INTENTIONAL     by Other route continuous prn. 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       betamethasone dipropionate 0.05 % lotion    DIPROSONE    60 mL    Apply topically to scalp once daily       blood glucose monitoring test strip    ONE TOUCH TEST STRIPS    100 strip    test 4x daily (has One Touch Ultra 2 machine       cholecalciferol 400 UNIT Tabs tablet    vitamin D     Take 2 tablets by mouth daily.       gabapentin 400 MG capsule    NEURONTIN    540 capsule    Take 2 capsules (800 mg) by mouth 3 times daily       hydrALAZINE 25 MG tablet    APRESOLINE    270 tablet    Take 1 tablet (25 mg) by mouth 3 times daily       HYDROcodone-acetaminophen 5-325 MG per tablet    NORCO    30 tablet    Take 1 tablet by mouth every 6 hours as needed for pain       insulin aspart 100 UNITS/ML injection    NovoLOG VIAL    30 mL    5 units before breakfast, 7 units before lunch, 7 units before dinner       insulin glargine 100 UNIT/ML injection    LANTUS    30 mL    18 units once a day       insulin syringe-needle U-100 30G X 1/2\" 0.5 ML    BD insulin syringe ULTRAFINE    100 each    Use one " syringe 4 times daily or as directed.       ImpactRx LANCETS Misc     100 each    1 Device by Lancet route 4 times daily.       multivitamin per tablet     30    1 TABLET DAILY       JEOVANNYER Kerry     1 Device    As directed       simvastatin 80 MG tablet    ZOCOR    90 tablet    Take 1 tablet (80 mg) by mouth At Bedtime at bedtime.       traZODone 50 MG tablet    DESYREL    90 tablet    Take 1 tablet by mouth. at bedtime.

## 2017-02-03 NOTE — PROGRESS NOTES
SUBJECTIVE:                                                    Smiley Acuña is a 82 year old female who presents to clinic today for the following health issues:      ED/UC Followup:    Facility:  [facility]  Date of visit: 1/26/17  Reason for visit: vision loss  Current Status: follow up     She had an ER visit for loss of vision and question of a facial droop as well.  Nurse had called her and thought her speech was slurred but maybe was just the phone. She was evaluated and had a high blood pressure, been ok otherwise. She had a MRI but no MRA with the kidney disease. She was nervous at the ER.   thought she was fine.      She is feeling fine.  Left eye visual loss is not new. Doesn't want to see the eye doctor as she didn't think there were options.      Sugars are up and  range.     Past Medical History   Diagnosis Date     Unspecified essential hypertension      Other and unspecified hyperlipidemia      Type II or unspecified type diabetes mellitus without mention of complication, not stated as uncontrolled      Chronic ischemic heart disease, unspecified      2-vessel CAD     Renal failure, unspecified      chronic renal disease     Syncope and collapse 4/8/2005     Admit     Depressive disorder, not elsewhere classified      Anemia, unspecified      Unspecified sleep apnea      Septicemia due to Escherichia coli (E. coli) (H) 05/03/2007     Urinary tract infection, site not specified 4/4/2008     UTI with mild sepsis. Admitted.     Abnormality of gait      Degenerative disc disease      lumbar     Syncope and collapse 05/26/10     D/C 05/27/10 to [facility]     Closed fracture of patella 06/21/10     D/C 06/23/10     Traumatic subdural hematomas 05/27/10     Coronary artery disease      History of blood transfusion      Current Outpatient Prescriptions   Medication     HYDROcodone-acetaminophen (NORCO) 5-325 MG per tablet     insulin glargine (LANTUS) 100 UNIT/ML injection      "insulin aspart (NOVOLOG VIAL) 100 UNITS/ML injection     traZODone (DESYREL) 50 MG tablet     gabapentin (NEURONTIN) 400 MG capsule     simvastatin (ZOCOR) 80 MG tablet     albuterol (PROAIR HFA, PROVENTIL HFA, VENTOLIN HFA) 108 (90 BASE) MCG/ACT inhaler     hydrALAZINE (APRESOLINE) 25 MG tablet     betamethasone dipropionate (DIPROSONE) 0.05 % lotion     cholecalciferol (VITAMIN D) 400 UNIT TABS     MULTIVITAMINS OR TABS     insulin syringe-needle U-100 (BD INSULIN SYRINGE ULTRAFINE) 30G X 1/2\" 0.5 ML     Spacer/Aero-Holding Chambers (OPTIHALER) BOGDAN     glucose blood VI test strips (ONE TOUCH TEST STRIPS) strip     [employer] LANCETS MISC     ASPIRIN NOT PRESCRIBED, INTENTIONAL,     No current facility-administered medications for this visit.     Social History   Substance Use Topics     Smoking status: Never Smoker      Smokeless tobacco: Never Used     Alcohol Use: No     Review of Systems  Constitutional-No fevers, chills, or weight changes..  Eyes-chronic blindness-nothing new.  ENT-No earpain, sore throat, voice changes or rhinitis.  Cardiac-No chest pain or palpitations.  Respiratory-No cough, sob, or hemoptysis.  GI-No nausea, vomitting, diarrhea, constipation, or blood in the stool.  Neuro-No numbness, tingling, or weakness.    Physical Exam  /84 mmHg  Pulse 80  Temp(Src) 97.6  F (36.4  C) (Temporal)  Resp 16  Wt 178 lb (80.74 kg)  SpO2 93%  General Appearance-healthy, alert, no distress  Head-Normocephalic. No masses, lesions, tenderness or abnormalities  Cardiac-regular rate and rhythm  with normal S1, S2 ; no murmur, rub or gallops  Lungs-clear to auscultation  Neurological-Cranial nerves 2-12 intact    ASSESSMENT:  Patient with multiple medical issues, she does have diabetic retinopathy and was told she was legally blind, therefore never went back to opthmalogy.  Now she was sent to the ER over the phone for some slurred speech. They mentioned vision loss, she had a negative MRI and " was other wise ok. BP was up at first. No findings for a stroke. Vision is chronically been gone, will see opthamology here once more to make sure there is nothing else to be done. BP is ok today. Sugars have been good or stable at home.  No other changes.      Electronically signed by Margarito Hoffman MD      Electronically signed by Margarito Hoffman MD

## 2017-02-03 NOTE — NURSING NOTE
"Chief Complaint   Patient presents with     ER F/U       Initial /84 mmHg  Pulse 80  Temp(Src) 97.6  F (36.4  C) (Temporal)  Resp 16  Wt 178 lb (80.74 kg)  SpO2 93% Estimated body mass index is 31.54 kg/(m^2) as calculated from the following:    Height as of 1/26/17: 5' 3\" (1.6 m).    Weight as of this encounter: 178 lb (80.74 kg).  BP completed using cuff size: aysha Veronica MA    "

## 2017-02-14 DIAGNOSIS — I10 HYPERTENSION GOAL BP (BLOOD PRESSURE) < 130/80: ICD-10-CM

## 2017-02-15 NOTE — TELEPHONE ENCOUNTER
Hydralazine      Last Written Prescription Date: 2/3/16  Last Fill Quantity: 270,  # refills: 1   Last Office Visit with G, [facility] or [facility] prescribing provider: 2/3/17

## 2017-02-17 RX ORDER — HYDRALAZINE HYDROCHLORIDE 25 MG/1
25 TABLET, FILM COATED ORAL 3 TIMES DAILY
Qty: 270 TABLET | Refills: 1 | Status: ON HOLD | OUTPATIENT
Start: 2017-02-17 | End: 2018-01-24

## 2017-02-17 NOTE — TELEPHONE ENCOUNTER
Prescription approved per [facility].  Marleen Veronica RN

## 2017-02-27 ENCOUNTER — CARE COORDINATION (OUTPATIENT)
Dept: FAMILY MEDICINE | Facility: CLINIC | Age: 82
End: 2017-02-27

## 2017-02-27 NOTE — PROGRESS NOTES
Clinic Care Coordination Contact 2/27/17  Care Team Conversations-    This writer contacted the pt this afternoon. Pt states she is feeling well. We discussed if she had heard anyting back from [facility] yet as an application was being processed as of 1/30/17. Pt states she has not heard anything. Phone call made to Guadalupe Flores [phone] with [facility] to inquire on the pt's MA application. Guadalupe states they are missing a few documents from the pt. They mailed a letter a while back and have not heard anything. This writer called the pt back and updated her with this info. She was given the phone number to [facility] and directed to call and speak with the Financial worker to ask directly what items were needed to complete her application. Pt is agreeable. This writer asked her if she felt it would be helpful to have her children helping with some of her medical information, etc. She stated she did, but wondered how much help they could offer. She will discuss this with her kids and let me know the next time we talk if they want to. We will have them sign an JONO if so.     Pt states she has made an eye apt at a place in [place] for a couple of weeks from now. She states she has heard good things about them and is feeling hopeful they can help her to see a bit better than what she currently is. Pt also has an apt with Priya, with [facility], next month.     SS will plan on contacting the pt in a couple of weeks to hear how her eye apt went and if she was able to get paperwork in to the [facility] as well as talking with her kids.     Dorota Lee, [facility]  Care Coordinator Social Work    [facility] [place] and [place]  [phone]  2/27/2017 3:13 PM

## 2017-02-28 DIAGNOSIS — N18.4 CHRONIC KIDNEY DISEASE, STAGE IV (SEVERE) (H): Primary | ICD-10-CM

## 2017-02-28 LAB
ALBUMIN SERPL-MCNC: 3.6 G/DL (ref 3.4–5)
ANION GAP SERPL CALCULATED.3IONS-SCNC: 6 MMOL/L (ref 3–14)
BUN SERPL-MCNC: 39 MG/DL (ref 7–30)
CALCIUM SERPL-MCNC: 9.5 MG/DL (ref 8.5–10.1)
CHLORIDE SERPL-SCNC: 102 MMOL/L (ref 94–109)
CO2 SERPL-SCNC: 34 MMOL/L (ref 20–32)
CREAT SERPL-MCNC: 1.73 MG/DL (ref 0.52–1.04)
CREAT UR-MCNC: 34 MG/DL
GFR SERPL CREATININE-BSD FRML MDRD: 28 ML/MIN/1.7M2
HGB BLD-MCNC: 14.2 G/DL (ref 11.7–15.7)
MICROALBUMIN UR-MCNC: 127 MG/L
MICROALBUMIN/CREAT UR: 375.74 MG/G CR (ref 0–25)
PHOSPHATE SERPL-MCNC: 3.3 MG/DL (ref 2.5–4.5)
POTASSIUM SERPL-SCNC: 4.1 MMOL/L (ref 3.4–5.3)
PROT UR-MCNC: 0.19 G/L
PROT/CREAT 24H UR: 0.56 G/G CR (ref 0–0.2)
PTH-INTACT SERPL-MCNC: 34 PG/ML (ref 12–72)
SODIUM SERPL-SCNC: 142 MMOL/L (ref 133–144)

## 2017-02-28 PROCEDURE — 84156 ASSAY OF PROTEIN URINE: CPT | Performed by: INTERNAL MEDICINE

## 2017-02-28 PROCEDURE — 82310 ASSAY OF CALCIUM: CPT | Performed by: INTERNAL MEDICINE

## 2017-02-28 PROCEDURE — 84100 ASSAY OF PHOSPHORUS: CPT | Performed by: INTERNAL MEDICINE

## 2017-02-28 PROCEDURE — 36415 COLL VENOUS BLD VENIPUNCTURE: CPT | Performed by: INTERNAL MEDICINE

## 2017-02-28 PROCEDURE — 84520 ASSAY OF UREA NITROGEN: CPT | Performed by: INTERNAL MEDICINE

## 2017-02-28 PROCEDURE — 83970 ASSAY OF PARATHORMONE: CPT | Performed by: INTERNAL MEDICINE

## 2017-02-28 PROCEDURE — 85018 HEMOGLOBIN: CPT | Performed by: INTERNAL MEDICINE

## 2017-02-28 PROCEDURE — 80051 ELECTROLYTE PANEL: CPT | Performed by: INTERNAL MEDICINE

## 2017-02-28 PROCEDURE — 82040 ASSAY OF SERUM ALBUMIN: CPT | Performed by: INTERNAL MEDICINE

## 2017-02-28 PROCEDURE — 82565 ASSAY OF CREATININE: CPT | Performed by: INTERNAL MEDICINE

## 2017-02-28 PROCEDURE — 82043 UR ALBUMIN QUANTITATIVE: CPT | Performed by: INTERNAL MEDICINE

## 2017-03-03 ENCOUNTER — TRANSFERRED RECORDS (OUTPATIENT)
Dept: HEALTH INFORMATION MANAGEMENT | Facility: CLINIC | Age: 82
End: 2017-03-03

## 2017-03-07 ENCOUNTER — TRANSFERRED RECORDS (OUTPATIENT)
Dept: HEALTH INFORMATION MANAGEMENT | Facility: CLINIC | Age: 82
End: 2017-03-07

## 2017-03-17 ENCOUNTER — CARE COORDINATION (OUTPATIENT)
Dept: CARE COORDINATION | Facility: CLINIC | Age: 82
End: 2017-03-17

## 2017-03-17 NOTE — PROGRESS NOTES
"Clinic Care Coordination Contact 3/17/17  Care Team Conversations-Social Work    Phone call made to pt to f/u with our previous conversation. Pt states she had her eye apt on 3/3/17 and said it made her feel worse than before she went to it. The news she received was not what she wanted to hear as there was no \"solution\" to her visual impairment. Discussion about the paperwork needed to complete her MA application. She said at this time she is not interested in additional help in the home. Her primary focus was to get some adaptive equipment and resources to use and she was able to get them through the [facility]. She said they gave her a talking watch which she is really happy about. She explained that her  helps her a lot in the home as well as her sister and neighbors.     Discussion about whether or not she wanted CC to continue to follow her and assist her as needed. She asked that I contact he monthly. CC will continue to follow.     MARILYNN Nieves  Care Coordinator Social Work    [facility] [place]  [phone]  3/17/2017 2:54 PM            "

## 2017-03-25 DIAGNOSIS — M54.6 PAIN IN THORACIC SPINE: ICD-10-CM

## 2017-03-25 NOTE — TELEPHONE ENCOUNTER
Norco       Last Written Prescription Date: 01/31/2017  Last Fill Quantity: 30,  # refills: 0   Last Office Visit with [facility], [facility] or [facility] prescribing provider: 02/03/2017    Thanks  Quiana Ross [facility]   [phone]

## 2017-03-27 RX ORDER — HYDROCODONE BITARTRATE AND ACETAMINOPHEN 5; 325 MG/1; MG/1
1 TABLET ORAL EVERY 6 HOURS PRN
Qty: 30 TABLET | Refills: 0 | Status: SHIPPED | OUTPATIENT
Start: 2017-03-27 | End: 2017-05-30

## 2017-05-09 DIAGNOSIS — E11.9 TYPE 2 DIABETES, HBA1C GOAL < 8% (H): ICD-10-CM

## 2017-05-22 DIAGNOSIS — E78.5 HYPERLIPIDEMIA LDL GOAL <100: ICD-10-CM

## 2017-05-22 RX ORDER — SIMVASTATIN 80 MG
80 TABLET ORAL AT BEDTIME
Qty: 90 TABLET | Refills: 3 | Status: ON HOLD | OUTPATIENT
Start: 2017-05-22 | End: 2018-01-24

## 2017-05-22 NOTE — TELEPHONE ENCOUNTER
Simvastatin        Last Written Prescription Date: 05/31/2016  Last Fill Quantity: 90, # refills: 3    Last Office Visit with [facility], P or [facility] prescribing provider:  02/03/2017   Future Office Visit:      Cholesterol   Date Value Ref Range Status   03/29/2016 147 <200 mg/dL Final     HDL Cholesterol   Date Value Ref Range Status   03/29/2016 54 >49 mg/dL Final     LDL Cholesterol Calculated   Date Value Ref Range Status   03/29/2016 62 <100 mg/dL Final     Comment:     Desirable:       <100 mg/dl     Triglycerides   Date Value Ref Range Status   03/29/2016 156 (H) <150 mg/dL Final     Comment:     Borderline high:  150-199 mg/dl   High:             200-499 mg/dl   Very high:       >499 mg/dl   Fasting specimen       Cholesterol/HDL Ratio   Date Value Ref Range Status   03/11/2014 3.0 0.0 - 5.0 Final     ALT   Date Value Ref Range Status   01/26/2017 19 0 - 50 U/L Final    thanks  Quiana Ross [facility]   [phone]

## 2017-05-30 DIAGNOSIS — M54.6 PAIN IN THORACIC SPINE: ICD-10-CM

## 2017-05-30 NOTE — TELEPHONE ENCOUNTER
Hydrocodone/APAP 5/325mg      Last Written Prescription Date: 3/27/2017  Last Fill Quantity: 30,  # refills: 0   Last Office Visit with [facility], [facility] or [facility] prescribing provider: 2/3/2017                                             Please bring signed prescription to [facility] if approved.    Thank you,  Nancy Cochran, [facility]

## 2017-05-31 RX ORDER — HYDROCODONE BITARTRATE AND ACETAMINOPHEN 5; 325 MG/1; MG/1
1 TABLET ORAL EVERY 6 HOURS PRN
Qty: 30 TABLET | Refills: 0 | Status: SHIPPED | OUTPATIENT
Start: 2017-05-31 | End: 2017-07-10

## 2017-06-22 ENCOUNTER — CARE COORDINATION (OUTPATIENT)
Dept: CARE COORDINATION | Facility: CLINIC | Age: 82
End: 2017-06-22

## 2017-06-22 NOTE — PROGRESS NOTES
Clinic Care Coordination Contact 6/22/17  Care Team Conversations-SW    Phone call made to pt to discuss how she has been doing. Pt reports she and her  are doing well and have been getting out for walks. Discussion about her eyes and how she has been seeing lately. She said they are okay but has come to terms with that. She does not want to pursue any further eye apts as her last apt was disappointing to her.     [facility] will no longer follow. Pt was encouraged to reach out to me in the future should she ever need anything or have questions.    Dorota Lee, [facility]  Care Coordinator Social Work    [facility]  [phone]  6/22/2017 11:16 AM

## 2017-07-10 DIAGNOSIS — M54.6 PAIN IN THORACIC SPINE: ICD-10-CM

## 2017-07-10 RX ORDER — HYDROCODONE BITARTRATE AND ACETAMINOPHEN 5; 325 MG/1; MG/1
1 TABLET ORAL EVERY 6 HOURS PRN
Qty: 30 TABLET | Refills: 0 | Status: SHIPPED | OUTPATIENT
Start: 2017-07-10 | End: 2017-08-16

## 2017-07-10 NOTE — TELEPHONE ENCOUNTER
Norco      Last Written Prescription Date: 5/31/17  Last Fill Quantity: 30,  # refills: 0   Last Office Visit with G, P or [facility] prescribing provider: 2/3/17

## 2017-07-17 ENCOUNTER — TELEPHONE (OUTPATIENT)
Dept: FAMILY MEDICINE | Facility: CLINIC | Age: 82
End: 2017-07-17

## 2017-07-17 ENCOUNTER — OFFICE VISIT (OUTPATIENT)
Dept: INTERNAL MEDICINE | Facility: CLINIC | Age: 82
End: 2017-07-17
Payer: MEDICARE

## 2017-07-17 VITALS
HEART RATE: 84 BPM | BODY MASS INDEX: 32.24 KG/M2 | DIASTOLIC BLOOD PRESSURE: 82 MMHG | OXYGEN SATURATION: 89 % | RESPIRATION RATE: 16 BRPM | SYSTOLIC BLOOD PRESSURE: 138 MMHG | TEMPERATURE: 97.3 F | WEIGHT: 182 LBS

## 2017-07-17 DIAGNOSIS — I10 ESSENTIAL HYPERTENSION WITH GOAL BLOOD PRESSURE LESS THAN 130/80: ICD-10-CM

## 2017-07-17 DIAGNOSIS — N18.4 CKD (CHRONIC KIDNEY DISEASE) STAGE 4, GFR 15-29 ML/MIN (H): ICD-10-CM

## 2017-07-17 DIAGNOSIS — E11.49 DIABETES MELLITUS TYPE 2 WITH NEUROLOGICAL MANIFESTATIONS (H): ICD-10-CM

## 2017-07-17 DIAGNOSIS — B35.1 FUNGAL NAIL INFECTION: ICD-10-CM

## 2017-07-17 DIAGNOSIS — L84 CORNS AND CALLOSITIES: Primary | ICD-10-CM

## 2017-07-17 LAB
ANION GAP SERPL CALCULATED.3IONS-SCNC: 5 MMOL/L (ref 3–14)
BUN SERPL-MCNC: 35 MG/DL (ref 7–30)
CALCIUM SERPL-MCNC: 10 MG/DL (ref 8.5–10.1)
CHLORIDE SERPL-SCNC: 106 MMOL/L (ref 94–109)
CO2 SERPL-SCNC: 36 MMOL/L (ref 20–32)
CREAT SERPL-MCNC: 1.88 MG/DL (ref 0.52–1.04)
GFR SERPL CREATININE-BSD FRML MDRD: 26 ML/MIN/1.7M2
GLUCOSE SERPL-MCNC: 76 MG/DL (ref 70–99)
HBA1C MFR BLD: 6.3 % (ref 4.3–6)
POTASSIUM SERPL-SCNC: 4.5 MMOL/L (ref 3.4–5.3)
SODIUM SERPL-SCNC: 147 MMOL/L (ref 133–144)

## 2017-07-17 PROCEDURE — 83036 HEMOGLOBIN GLYCOSYLATED A1C: CPT | Performed by: INTERNAL MEDICINE

## 2017-07-17 PROCEDURE — 99213 OFFICE O/P EST LOW 20 MIN: CPT | Performed by: INTERNAL MEDICINE

## 2017-07-17 PROCEDURE — 36415 COLL VENOUS BLD VENIPUNCTURE: CPT | Performed by: INTERNAL MEDICINE

## 2017-07-17 PROCEDURE — 80048 BASIC METABOLIC PNL TOTAL CA: CPT | Performed by: INTERNAL MEDICINE

## 2017-07-17 ASSESSMENT — PAIN SCALES - GENERAL: PAINLEVEL: EXTREME PAIN (8)

## 2017-07-17 NOTE — NURSING NOTE
"Chief Complaint   Patient presents with     Toenail     check toes/toenails       Initial /82  Pulse 84  Temp 97.3  F (36.3  C) (Temporal)  Resp 16  Wt 182 lb (82.6 kg)  SpO2 (!) 89%  BMI 32.24 kg/m2 Estimated body mass index is 32.24 kg/(m^2) as calculated from the following:    Height as of 1/26/17: 5' 3\" (1.6 m).    Weight as of this encounter: 182 lb (82.6 kg).  Medication Reconciliation: complete  "

## 2017-07-17 NOTE — PROGRESS NOTES
SUBJECTIVE:                                                    Smiley Acuña is a 82 year old female who presents to clinic today for the following health issues:      Chief Complaint   Patient presents with     Toenail     check toes/toenails       Toenail with inflammation for the weekend, chronic issues. She can't see them.     tried to pare On some of the callus and clean the nails.  Worsening redness earlier, now improved    Past Medical History:   Diagnosis Date     Abnormality of gait      Anemia, unspecified      Chronic ischemic heart disease, unspecified     2-vessel CAD     Closed fracture of patella 06/21/10    D/C 06/23/10     Coronary artery disease      Degenerative disc disease     lumbar     Depressive disorder, not elsewhere classified      E coli septicemia 05/03/2007     History of blood transfusion      Other and unspecified hyperlipidemia      Renal failure, unspecified     chronic renal disease     Syncope and collapse 4/8/2005    Admit     Syncope and collapse 05/26/10    D/C 05/27/10 to [facility]     Traumatic subdural hematomas 05/27/10     Type II or unspecified type diabetes mellitus without mention of complication, not stated as uncontrolled      Unspecified essential hypertension      Unspecified sleep apnea      Urinary tract infection, site not specified 4/4/2008    UTI with mild sepsis. Admitted.     Current Outpatient Prescriptions   Medication     simvastatin (ZOCOR) 80 MG tablet     hydrALAZINE (APRESOLINE) 25 MG tablet     insulin glargine (LANTUS) 100 UNIT/ML injection     insulin aspart (NOVOLOG VIAL) 100 UNITS/ML injection     traZODone (DESYREL) 50 MG tablet     gabapentin (NEURONTIN) 400 MG capsule     albuterol (PROAIR HFA, PROVENTIL HFA, VENTOLIN HFA) 108 (90 BASE) MCG/ACT inhaler     cholecalciferol (VITAMIN D) 400 UNIT TABS     MULTIVITAMINS OR TABS     HYDROcodone-acetaminophen (NORCO) 5-325 MG per tablet     insulin syringe-needle U-100 (BD  "INSULIN SYRINGE ULTRAFINE) 30G X 1/2\" 0.5 ML     betamethasone dipropionate (DIPROSONE) 0.05 % lotion     Spacer/Aero-Holding Chambers (OPTIHALER) BOGDAN     glucose blood VI test strips (ONE TOUCH TEST STRIPS) strip     [employer] LANCETS MISC     ASPIRIN NOT PRESCRIBED, INTENTIONAL,     No current facility-administered medications for this visit.      Physical Exam  /82  Pulse 84  Temp 97.3  F (36.3  C) (Temporal)  Resp 16  Wt 182 lb (82.6 kg)  SpO2 (!) 89%  BMI 32.24 kg/m2  General Appearance-healthy, alert, no distress  Mild swelling of the toes and feet, severe thickened fungal nails, third nail and tip of the toe has callus formation      ASSESSMENT:  Patient who has chronic renal failure and diabetes. 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 She has severe onychomycosis and calluses on her feet. Has no obvious infection at this time. I did pare down a callous On her third toe from a callus and her  is done a lot of work on her toes.  We will check her A1c today which has been good in the 6-7 range and will refer her to podiatry for more definitive care of her feet especially with her being blind and compounded by her diabetes.    Electronically signed by Margarito Hoffman MD    "

## 2017-07-17 NOTE — MR AVS SNAPSHOT
"              After Visit Summary   2017    Smiley Acuña    MRN: [id number]           Patient Information     Date Of Birth          1935        Visit Information        Provider Department      2017 2:30 PM Margarito Hoffman MD [facility]         Follow-ups after your visit        Who to contact     If you have questions or need follow up information about today's clinic visit or your schedule please contact [facility] directly at [phone].  Normal or non-critical lab and imaging results will be communicated to you by MyChart, letter or phone within 4 business days after the clinic has received the results. If you do not hear from us within 7 days, please contact the clinic through [employer] or phone. If you have a critical or abnormal lab result, we will notify you by phone as soon as possible.  Submit refill requests through [employer] or call your pharmacy and they will forward the refill request to us. Please allow 3 business days for your refill to be completed.          Additional Information About Your Visit        [facility] Information     [employer] lets you send messages to your doctor, view your test results, renew your prescriptions, schedule appointments and more. To sign up, go to [email or web address] . Click on \"Log in\" on the left side of the screen, which will take you to the Welcome page. Then click on \"Sign up Now\" on the right side of the page.     You will be asked to enter the access code listed below, as well as some personal information. Please follow the directions to create your username and password.     Your access code is: [email or web address]  Expires: 10/15/2017  2:41 PM     Your access code will  in 90 days. If you need help or a new code, please call your [facility] or [phone].        Care EveryWhere ID     This is your Care EveryWhere ID. This could be used by other organizations to access your [facility] medical " records  [id number]        Your Vitals Were     Pulse Temperature Respirations Pulse Oximetry BMI (Body Mass Index)       84 97.3  F (36.3  C) (Temporal) 16 89% 32.24 kg/m2        Blood Pressure from Last 3 Encounters:   07/17/17 138/82   02/03/17 136/84   01/26/17 (!) 191/96    Weight from Last 3 Encounters:   07/17/17 182 lb (82.6 kg)   02/03/17 178 lb (80.7 kg)   01/26/17 178 lb (80.7 kg)              Today, you had the following     No orders found for display         Today's Medication Changes          These changes are accurate as of: 7/17/17  2:41 PM.  If you have any questions, ask your nurse or doctor.               These medicines have changed or have updated prescriptions.        Dose/Directions    gabapentin 400 MG capsule   Commonly known as:  NEURONTIN   This may have changed:    - how much to take  - additional instructions   Used for:  Pain in thoracic spine        Dose:  800 mg   Take 2 capsules (800 mg) by mouth 3 times daily   Quantity:  540 capsule   Refills:  2                Primary Care Provider Office Phone # Fax #    Margarito Hoffman [phone]       [facility] [address] [place] [address]        Equal Access to Services     KRISTIE ASHER AH: Hadii michaela whitaker hadasho Soomaali, waaxda luqadaha, qaybta kaalmada adeegyada, aracely oates. So [facility] [phone].    ATENCIÓN: Si habla español, tiene a rodriguez disposición servicios gratuitos de asistencia lingüística. Llame al [phone].    We comply with applicable federal civil rights laws and [place] laws. We do not discriminate on the basis of race, color, national origin, age, disability sex, sexual orientation or gender identity.            Thank you!     Thank you for choosing [facility]  for your care. Our goal is always to provide you with excellent care. Hearing back from our patients is one way we can continue to improve our services. Please take a few minutes  to complete the written survey that you may receive in the mail after your visit with us. Thank you!             Your Updated Medication List - Protect others around you: Learn how to safely use, store and throw away your medicines at www.disposemymeds.org.          This list is accurate as of: 7/17/17  2:41 PM.  Always use your most recent med list.                   Brand Name Dispense Instructions for use Diagnosis    albuterol 108 (90 BASE) MCG/ACT Inhaler    PROAIR HFA/PROVENTIL HFA/VENTOLIN HFA    1 Inhaler    Inhale 2 puffs into the lungs every 6 hours as needed for shortness of breath / dyspnea or wheezing Ventolin inhaler    Bronchitis       ASPIRIN NOT PRESCRIBED    INTENTIONAL     by Other route continuous prn. 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    Type 2 diabetes, HbA1C goal < 8% (H)       betamethasone dipropionate 0.05 % lotion    DIPROSONE    60 mL    Apply topically to scalp once daily    Itching       blood glucose monitoring test strip    ONE TOUCH TEST STRIPS    100 strip    test 4x daily (has One Touch Ultra 2 machine    Type 2 diabetes, HbA1C goal < 8% (H)       cholecalciferol 400 UNIT Tabs tablet    vitamin D     Take 2 tablets by mouth daily.        gabapentin 400 MG capsule    NEURONTIN    540 capsule    Take 2 capsules (800 mg) by mouth 3 times daily    Pain in thoracic spine       hydrALAZINE 25 MG tablet    APRESOLINE    270 tablet    Take 1 tablet (25 mg) by mouth 3 times daily    Hypertension goal BP (blood pressure) < 130/80       HYDROcodone-acetaminophen 5-325 MG per tablet    NORCO    30 tablet    Take 1 tablet by mouth every 6 hours as needed for pain    Pain in thoracic spine       insulin aspart 100 UNITS/ML injection    NovoLOG VIAL    30 mL    5 units before breakfast, 7 units before lunch, 7 units before dinner    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)       insulin glargine 100 UNIT/ML injection    LANTUS    30 mL    18 units once a day     "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)       insulin syringe-needle U-100 30G X 1/2\" 0.5 ML    BD insulin syringe ULTRAFINE    100 each    Use one syringe 4 times daily or as directed.    Type 2 diabetes, HbA1C goal < 8% (H)       LIFESCAN FINEPOINT LANCETS Misc     100 each    1 Device by Lancet route 4 times daily.    Type 2 diabetes, HbA1C goal < 8% (H)       multivitamin per tablet     30    1 TABLET DAILY        OPTIHALER Kerry     1 Device    As directed    Bronchitis, Cough, SOB (shortness of breath)       simvastatin 80 MG tablet    ZOCOR    90 tablet    Take 1 tablet (80 mg) by mouth At Bedtime at bedtime.    Hyperlipidemia LDL goal <100       traZODone 50 MG tablet    DESYREL    90 tablet    Take 1 tablet by mouth. at bedtime.    Primary insomnia         "

## 2017-07-19 ENCOUNTER — OFFICE VISIT (OUTPATIENT)
Dept: PODIATRY | Facility: CLINIC | Age: 82
End: 2017-07-19
Payer: MEDICARE

## 2017-07-19 VITALS — TEMPERATURE: 97.2 F | BODY MASS INDEX: 35.44 KG/M2 | HEIGHT: 63 IN | WEIGHT: 200 LBS

## 2017-07-19 DIAGNOSIS — Z89.422 HISTORY OF AMPUTATION OF LESSER TOE, LEFT (H): ICD-10-CM

## 2017-07-19 DIAGNOSIS — B35.1 DERMATOPHYTOSIS OF NAIL: Primary | ICD-10-CM

## 2017-07-19 DIAGNOSIS — E11.40 TYPE 2 DIABETES MELLITUS WITH DIABETIC NEUROPATHY, WITHOUT LONG-TERM CURRENT USE OF INSULIN (H): ICD-10-CM

## 2017-07-19 PROCEDURE — 11721 DEBRIDE NAIL 6 OR MORE: CPT | Mod: Q7 | Performed by: PODIATRIST

## 2017-07-19 PROCEDURE — 99213 OFFICE O/P EST LOW 20 MIN: CPT | Mod: 25 | Performed by: PODIATRIST

## 2017-07-19 ASSESSMENT — PAIN SCALES - GENERAL: PAINLEVEL: NO PAIN (0)

## 2017-07-19 NOTE — NURSING NOTE
"Chief Complaint   Patient presents with     Consult     callus medial Right 3rd toe, plantar Left met, thick toenails; new issue     Diabetes     type 2       Initial Temp 97.2  F (36.2  C) (Temporal)  Ht 5' 3\" (1.6 m)  Wt 200 lb (90.7 kg)  BMI 35.43 kg/m2 Estimated body mass index is 35.43 kg/(m^2) as calculated from the following:    Height as of this encounter: 5' 3\" (1.6 m).    Weight as of this encounter: 200 lb (90.7 kg).  BP completed using cuff size: NA (Not Taken)  Medication Reconciliation: complete    Maite Colin CMA, July 19, 2017    "

## 2017-07-19 NOTE — PROGRESS NOTES
"HPI:  Smiley Acuña is a 82 year old female who is seen in consultation at the request of Margarito Hoffman MD.    Pt presents for eval of:   (Onset, Location, L/R, Character, Treatments, Injury if yes)    DM Type 2 and patient is legally blind     Ongoing over the years, thick toenails, discolored, callus medial Right 3rd toe and plantar Left met   trims nails but getting to hard for him to cut    Retired.    Weight management plan: Patient was referred to their PCP to discuss a diet and exercise plan.    PATIENT HISTORY:  Smiley Acuña is a 79 year old female who presents with 10 painful thick long yellow nails and painful callous plantar left foot. Also complains a previous amputation and previous hyperkeratotic skin.      EXAM:Vitals: Temp 97.2  F (36.2  C) (Temporal)  Ht 5' 3\" (1.6 m)  Wt 200 lb (90.7 kg)  BMI 35.43 kg/m2  BMI= Body mass index is 35.43 kg/(m^2).    General appearance: Patient is alert and fully cooperative with history & exam.  No sign of distress is noted during the visit.     Psychiatric: Affect is pleasant & appropriate.  Patient appears motivated to improve health.     Respiratory: Breathing is regular & unlabored while sitting.     HEENT: Hearing is intact to spoken word.  Speech is clear.  No gross evidence of visual impairment that would impact ambulation.     Vascular: DP 1/4 & PT 0/4 left & right.  CFT delayed with dependent rubor noted about the digits.  Diminished hair growth distal to mid tibia and no hair about the foot and toes.  Temperature changes noted, warm to cool proximal to distal.  Hemosiderin pigmentation noted with multiple varicosities legs and feet bilateral. Generalized edema bilateral legs and feet.  Pt denies claudication history.     Neurologic: Normal plantar response bilateral.   Loss of protective threshold plus 0/10 applications of a 5.07 monofilament.  Pt admits burning and paraesthesias about the feet and toes with palpation.   "   Dermatologic:  Diminished texture turgor and tone about the integument.  Skin is thin.  Previous ulceration is resolved at this time. All 10 nails are thick and elongated lysing and painful with palpation and debridement.  No ulcerations are noted at this time.     Musculoskeletal: Patient is ambulatory without assistive device or brace.  History of left hallux amputation.    Hemoglobin A1C (%)   Date Value   07/17/2017 6.3 (H)   09/07/2016 7.0 (H)   03/02/2016 6.6 (H)   08/05/2015 6.9 (H)   11/03/2014 7.7 (H)   06/24/2014 7.1 (H)   12/10/2013 7.8 (H)   04/16/2013 6.7 (H)     Creatinine (mg/dL)   Date Value   07/17/2017 1.88 (H)   02/28/2017 1.73 (H)   01/26/2017 1.79 (H)   09/07/2016 1.79 (H)   09/01/2016 1.93 (H)   01/05/2016 1.58 (H)         ASSESSMENT:       ICD-10-CM    1. Dermatophytosis of nail B35.1    2. History of amputation of lesser toe, left (H) Z89.422    3. Type 2 diabetes mellitus with diabetic neuropathy, without long-term current use of insulin (H) E11.40         PLAN:      1/22/15 RX for new DM shoes   4/15 new ulceration plantar left first metatarsal head resolved quickly  7/9/15  I debrided all 10 nails manually and mechanically.  I debrided one symptomatic hyperkeratotic lesion to the level of the dermis. No dressing applied  Follow-up 3 months or as needed.    10/26/15  I debrided all 10 nails manually and mechanically  Diabetic shoes are in good repair  Follow-up in 3 months or as needed with history of diabetic neuropathy and amputation     2/1/16  I debrided all 10 nails manually and mechanically  Diabetic shoes are in good repair  Follow-up in 3 months or as needed with history of diabetic neuropathy and amputation     7/19/2017  I debrided 8 nails   Diabetic shoes in good repair last replaced 2/17  Diabetic foot exam performed today. Evaluated diabetic shoe gear. Reviewed potential risks and complications for her.  Written diabetic foot care instructions dispensed.   All questions were  answered  Follow up once yearly or as needed.     Martin Mix DPM

## 2017-07-19 NOTE — PATIENT INSTRUCTIONS
"DIABETES AND YOUR FEET    What effect does diabetes have on the feet?  Diabetes can result in several problems in the feet including contractures of the tendons leading to deformities and reduced function of the bones, skin ulcers or open sores on pressure points or prominent deformities, reduced sensation, reduced blood flow and thus reduced oxygen and immune cells to the tiny vessels in our feet. This all leads to higher risk of hospitalization, infections, and amputations.     What is neuropathy?  Neuropathy is a term used to describe a loss of nerve function.  Patients with diabetes are at risk of developing neuropathy if their sugars continue to run high and are above the normal value of 140.  The elevated blood sugar in the body enters the nerves causing it to swell and impair nerve function.  The higher the blood sugar and the longer it is elevated, the more damage is done to nerves.  This damage is permanent and irreversible.  These damaged sensory nerves can then cause reduced feeling or cause pain.  Damaged motor nerves can reduce blood flow and white blood cells into into your foot, skin and bones reducing your ability to heal a small problem. And neuropathy can cause tendons to become unbalanced and contribute to the formation of deformity and contractures in our feet. Often times, neuropathy can be prevented by controlling your blood sugar.  Your risk of developing neuropathy goes up dramatically as your hemoglobin A1C raises above 7.5.      How do I know if I have neuropathy?  When a person develops neuropathy, they usually begin to feel numbness or tingling in their feet and sometimes in their legs.  Other symptoms may include painful burning or hot feet, tingling, electrical sensations or feeling like insects or ants are crawling on your feet or legs.  If blood sugar remains above 140  for long periods of time, neuropathy can also occur in the hands.  When a person loses their \"protective threshold\" " or ability to detect a 5.07 [place] [employer] monofilament is when they have elevated risk for developing foot deformity, contractures, foot infections, amputations, Charcot arthropathy, or other complications. Keep your hemoglobin A1C below 7.5 to reduce this risk.    What is vascular disease?  Peripheral vascular disease is a term used to describe a loss or decrease in circulation (blood flow).  There is a problem in getting blood, immune cells, and oxygen to areas that need it.  Similar to neuropathy, sugars can build up in the walls of the arteries (blood vessels) and cause them to become swollen, thickened and hardened.  This decreases the amount of blood that can go to an area that needs it.  Though this is common in the legs of diabetic patients, it can also affect other arteries (blood vessels) in the body such as in the heart, kidney, eyes, and the blood flow into bones.  It is often seen first in the small vessels of her body notably our feet and toes.    How do I know if I have vascular disease?  In the legs, vascular disease usually results in cramping.  Patients who develop leg cramps after walking the same distance every time (i.e. One block, half a mile, ect.) need to let their doctors know so that their circulation may be checked.  Cramps causing severe pain in the feet and/or legs while sleeping and the cramps go away when you stand or hang your legs off the side of the bed, may also be a sign of poor blood circulation.  Occasional cramping in cold weather or on rare occasions with activity may not be due to poor circulation, but you should inform your doctor.    How can these problems be prevented?  The key to prevention is good blood sugar control all day every day.  Inadequate blood sugar control is the most common way patients experience these problems. Reducing, controlling and measuring your daily consumption of sugar or carbohydrates is essential to understanding and managing diabetes.   Physical activity (exercise) is a very good way to help decrease your blood sugars.  Exercise can lower your blood sugar, blood pressure, and cholesterol.  It also reduces your risk for heart disease and stroke, relieves stress, and strengthens your heart, muscles and bones. Physical activity also increases your balance and reduces development of contractures and foot deformities over time. In addition, regular activity helps insulin work better, improves your blood circulation, and keeps your joints flexible.  If you're trying to lose weight, a combination of exercise and wise food choices can help you reach your target weight and maintain it.  Activity and exercise alone can not make up for poor diet choices, eating too much, or eating too many sugars or carbohydrates.  Ask your doctor for help when you are not meeting your blood sugar goals. Changes or increases in medication are powerful tools in reducing your blood sugar.    Know your blood sugar and hemoglobin A1C trend.  Upon first diagnosis or during acute illness, checking your blood sugar 4 times a day can help you understand how your diet, activity, and lifestyle affect your blood sugar.  Monitoring your hemoglobin A1C can help you understand how well you are managing blood sugar over the long run.  Your hemoglobin A1C tells you what your blood sugar averages all day, every day, over the past 90 days.       To experience the lowest risk of complications associated with diabetes such as neuropathy, loss of blood flow, bone or joint infection, charcot arthropathy, or amputation, the American Diabetes Association recommends a target hemoglobin A1C of less than 7.0%, while the American Association of Clinical Endocrinologists' recommendation is 6.5% or less.  Both organizations advise that the goals be individualized based on patient factors such as other health conditions, history of hypoglycemia, education, and life expectancy.  A patients risk of  experiencing complications associated with diabetes is only slightly elevated with a hemoglobin A1C above 6.0.  However, this risk goes up exponentially when the hemoglobin A1C is above 7.5.  The longer the hemoglobin A1C is elevated, the more risk that patient will experience in their lifetime. The damage that occurs to nerves, blood vessels, tendons, bones and body organs, while their hemoglobin A1C is elevated is mostly irreversible and worsens with each additional time period of elevated hemoglobin A1C.     You must understand and manage your disease.  Your health insurance or medical team cannot manage this disease for you.  When you take responsibility for understanding and managing your disease, you can expect to experience fewer problems associated with diabetes in your lifetime.  You will  Also experience a higher quality of life and health and reduced cost of health care.    Diabetic Foot Care Recommendations  The following are recommendations for avoiding serious foot problems or injury    DO'S  1. Be aware of your hemoglobin A1C and continue to follow up with your medical team for adjustments in your lifestyle and medication until your reach your A1C goal.  Keep this below 7.5 to reduce your risk of developing complications associated with diabetes.    2.  Wash your feet with lukewarm water and a mild soap and then dry them thoroughly, especially between the toes.  Gently floss your towel or washcloth between each toe at every bath.  Soaking your feet in water cannot clean dead skin, debris, and bacteria from your feet and is not necessary.   3. Examine your feet daily looking for cuts, corns, blisters, cracks, ect..., especially after wearing new shoes or increased or changed activities.  Make sure to look between your toes.  If you cannot see the bottom of your feet, set a mirror on the floor and hold your foot over it, or ask a family member to examine your feet for you daily.  Contact your doctor  immediately if new problems are noted or if sores are not healing.  4.  Immediately apply moisturizer cream such as Cetaphil to the tops and bottoms of your feet, avoiding areas between the toes.  Apply sunscreen or cover your feet if they will be exposed to extended sunlight.  5.Use clean comfortable shoes.  Socks should not have thick seams or cut off the circulation around the leg.  Break in new shoes slowly and rotate with older shoes until broken in.  Check the inside of your shoes with your hand to look for areas of irritation or objects that may have fallen into your shoes.    6. Keep slippers by the side of your bed for use during the night.  7. Shoes should be fitted by a professional and should not cause areas of irritation.  Check your feet regularly when wearing a new pair of shoes and replace them as needed.  8.  Talk to your doctor about proper exercise.  Exercise and stretching stimulate blood flow to your feet and maintain proper glucose levels.  Use it or lose it!  9.  Monitor your blood glucose level and your hemoglobin A1C.  Notify your doctor immediately if your blood sugar is abnormally high or low.  10.  Cut your nails straight across, but then gently round any sharp edges with a nail file.  If you have neuropathy, peripheral vascular disease or cannot see that well to trim your own toenails, see a medical professional for care.    DONT'S  1.  Do not soak your feet if you have an open sore or your provider has informed you that you have neuropathy or loss of protective threshold.  Use only lukewarm water and always check the temperature with your hand as hot water can easily burn your feet.    2.  Never use a hot water bottle or heating pad on your feet.  Also do not apply hot or cold compresses to your feet.  With decreased sensation, you could burn or freeze your feet.  Do not rest your feet near a heat source such as a heater or heat register.    3.  Do not apply any of these to your  feet:    - over the counter medicine for corns or warts    -  Harsh chemicals like boric acid    -  Do not self-treat corns, cuts, blisters or infections.  Always consult your doctor.   4.  Do not wear sandals, slippers or walk barefoot, especially on harsh surfaces.  5.  If you smoke, stop!!!

## 2017-07-19 NOTE — MR AVS SNAPSHOT
After Visit Summary   7/19/2017    Smiley Acuña    MRN: [id number]           Patient Information     Date Of Birth          1935        Visit Information        Provider Department      7/19/2017 2:30 PM Martin Mix DPM [facility]        Care Instructions    DIABETES AND YOUR FEET    What effect does diabetes have on the feet?  Diabetes can result in several problems in the feet including contractures of the tendons leading to deformities and reduced function of the bones, skin ulcers or open sores on pressure points or prominent deformities, reduced sensation, reduced blood flow and thus reduced oxygen and immune cells to the tiny vessels in our feet. This all leads to higher risk of hospitalization, infections, and amputations.     What is neuropathy?  Neuropathy is a term used to describe a loss of nerve function.  Patients with diabetes are at risk of developing neuropathy if their sugars continue to run high and are above the normal value of 140.  The elevated blood sugar in the body enters the nerves causing it to swell and impair nerve function.  The higher the blood sugar and the longer it is elevated, the more damage is done to nerves.  This damage is permanent and irreversible.  These damaged sensory nerves can then cause reduced feeling or cause pain.  Damaged motor nerves can reduce blood flow and white blood cells into into your foot, skin and bones reducing your ability to heal a small problem. And neuropathy can cause tendons to become unbalanced and contribute to the formation of deformity and contractures in our feet. Often times, neuropathy can be prevented by controlling your blood sugar.  Your risk of developing neuropathy goes up dramatically as your hemoglobin A1C raises above 7.5.      How do I know if I have neuropathy?  When a person develops neuropathy, they usually begin to feel numbness or tingling in their feet and sometimes in their  "legs.  Other symptoms may include painful burning or hot feet, tingling, electrical sensations or feeling like insects or ants are crawling on your feet or legs.  If blood sugar remains above 140  for long periods of time, neuropathy can also occur in the hands.  When a person loses their \"protective threshold\" or ability to detect a 5.07 [place] Lexi monofilament is when they have elevated risk for developing foot deformity, contractures, foot infections, amputations, Charcot arthropathy, or other complications. Keep your hemoglobin A1C below 7.5 to reduce this risk.    What is vascular disease?  Peripheral vascular disease is a term used to describe a loss or decrease in circulation (blood flow).  There is a problem in getting blood, immune cells, and oxygen to areas that need it.  Similar to neuropathy, sugars can build up in the walls of the arteries (blood vessels) and cause them to become swollen, thickened and hardened.  This decreases the amount of blood that can go to an area that needs it.  Though this is common in the legs of diabetic patients, it can also affect other arteries (blood vessels) in the body such as in the heart, kidney, eyes, and the blood flow into bones.  It is often seen first in the small vessels of her body notably our feet and toes.    How do I know if I have vascular disease?  In the legs, vascular disease usually results in cramping.  Patients who develop leg cramps after walking the same distance every time (i.e. One block, half a mile, ect.) need to let their doctors know so that their circulation may be checked.  Cramps causing severe pain in the feet and/or legs while sleeping and the cramps go away when you stand or hang your legs off the side of the bed, may also be a sign of poor blood circulation.  Occasional cramping in cold weather or on rare occasions with activity may not be due to poor circulation, but you should inform your doctor.    How can these problems be " prevented?  The key to prevention is good blood sugar control all day every day.  Inadequate blood sugar control is the most common way patients experience these problems. Reducing, controlling and measuring your daily consumption of sugar or carbohydrates is essential to understanding and managing diabetes.  Physical activity (exercise) is a very good way to help decrease your blood sugars.  Exercise can lower your blood sugar, blood pressure, and cholesterol.  It also reduces your risk for heart disease and stroke, relieves stress, and strengthens your heart, muscles and bones. Physical activity also increases your balance and reduces development of contractures and foot deformities over time. In addition, regular activity helps insulin work better, improves your blood circulation, and keeps your joints flexible.  If you're trying to lose weight, a combination of exercise and wise food choices can help you reach your target weight and maintain it.  Activity and exercise alone can not make up for poor diet choices, eating too much, or eating too many sugars or carbohydrates.  Ask your doctor for help when you are not meeting your blood sugar goals. Changes or increases in medication are powerful tools in reducing your blood sugar.    Know your blood sugar and hemoglobin A1C trend.  Upon first diagnosis or during acute illness, checking your blood sugar 4 times a day can help you understand how your diet, activity, and lifestyle affect your blood sugar.  Monitoring your hemoglobin A1C can help you understand how well you are managing blood sugar over the long run.  Your hemoglobin A1C tells you what your blood sugar averages all day, every day, over the past 90 days.       To experience the lowest risk of complications associated with diabetes such as neuropathy, loss of blood flow, bone or joint infection, charcot arthropathy, or amputation, the American Diabetes Association recommends a target hemoglobin A1C of  less than 7.0%, while the American Association of Clinical Endocrinologists' recommendation is 6.5% or less.  Both organizations advise that the goals be individualized based on patient factors such as other health conditions, history of hypoglycemia, education, and life expectancy.  A patients risk of experiencing complications associated with diabetes is only slightly elevated with a hemoglobin A1C above 6.0.  However, this risk goes up exponentially when the hemoglobin A1C is above 7.5.  The longer the hemoglobin A1C is elevated, the more risk that patient will experience in their lifetime. The damage that occurs to nerves, blood vessels, tendons, bones and body organs, while their hemoglobin A1C is elevated is mostly irreversible and worsens with each additional time period of elevated hemoglobin A1C.     You must understand and manage your disease.  Your health insurance or medical team cannot manage this disease for you.  When you take responsibility for understanding and managing your disease, you can expect to experience fewer problems associated with diabetes in your lifetime.  You will  Also experience a higher quality of life and health and reduced cost of health care.    Diabetic Foot Care Recommendations  The following are recommendations for avoiding serious foot problems or injury    DO'S  1. Be aware of your hemoglobin A1C and continue to follow up with your medical team for adjustments in your lifestyle and medication until your reach your A1C goal.  Keep this below 7.5 to reduce your risk of developing complications associated with diabetes.    2.  Wash your feet with lukewarm water and a mild soap and then dry them thoroughly, especially between the toes.  Gently floss your towel or washcloth between each toe at every bath.  Soaking your feet in water cannot clean dead skin, debris, and bacteria from your feet and is not necessary.   3. Examine your feet daily looking for cuts, corns, blisters,  cracks, ect..., especially after wearing new shoes or increased or changed activities.  Make sure to look between your toes.  If you cannot see the bottom of your feet, set a mirror on the floor and hold your foot over it, or ask a family member to examine your feet for you daily.  Contact your doctor immediately if new problems are noted or if sores are not healing.  4.  Immediately apply moisturizer cream such as Cetaphil to the tops and bottoms of your feet, avoiding areas between the toes.  Apply sunscreen or cover your feet if they will be exposed to extended sunlight.  5.Use clean comfortable shoes.  Socks should not have thick seams or cut off the circulation around the leg.  Break in new shoes slowly and rotate with older shoes until broken in.  Check the inside of your shoes with your hand to look for areas of irritation or objects that may have fallen into your shoes.    6. Keep slippers by the side of your bed for use during the night.  7. Shoes should be fitted by a professional and should not cause areas of irritation.  Check your feet regularly when wearing a new pair of shoes and replace them as needed.  8.  Talk to your doctor about proper exercise.  Exercise and stretching stimulate blood flow to your feet and maintain proper glucose levels.  Use it or lose it!  9.  Monitor your blood glucose level and your hemoglobin A1C.  Notify your doctor immediately if your blood sugar is abnormally high or low.  10.  Cut your nails straight across, but then gently round any sharp edges with a nail file.  If you have neuropathy, peripheral vascular disease or cannot see that well to trim your own toenails, see a medical professional for care.    DONT'S  1.  Do not soak your feet if you have an open sore or your provider has informed you that you have neuropathy or loss of protective threshold.  Use only lukewarm water and always check the temperature with your hand as hot water can easily burn your feet.    2.   "Never use a hot water bottle or heating pad on your feet.  Also do not apply hot or cold compresses to your feet.  With decreased sensation, you could burn or freeze your feet.  Do not rest your feet near a heat source such as a heater or heat register.    3.  Do not apply any of these to your feet:    - over the counter medicine for corns or warts    -  Harsh chemicals like boric acid    -  Do not self-treat corns, cuts, blisters or infections.  Always consult your doctor.   4.  Do not wear sandals, slippers or walk barefoot, especially on harsh surfaces.  5.  If you smoke, stop!!!          Follow-ups after your visit        Who to contact     If you have questions or need follow up information about today's clinic visit or your schedule please contact [facility] directly at [phone].  Normal or non-critical lab and imaging results will be communicated to you by [facility], letter or phone within 4 business days after the clinic has received the results. If you do not hear from us within 7 days, please contact the clinic through [facility] or phone. If you have a critical or abnormal lab result, we will notify you by phone as soon as possible.  Submit refill requests through [employer] or call your pharmacy and they will forward the refill request to us. Please allow 3 business days for your refill to be completed.          Additional Information About Your Visit        [employer] Information     [employer] lets you send messages to your doctor, view your test results, renew your prescriptions, schedule appointments and more. To sign up, go to [email or web address] . Click on \"Log in\" on the left side of the screen, which will take you to the Welcome page. Then click on \"Sign up Now\" on the right side of the page.     You will be asked to enter the access code listed below, as well as some personal information. Please follow the directions to create your username and password.     Your access code is: " [email or web address]  Expires: 10/15/2017  2:41 PM     Your access code will  in 90 days. If you need help or a new code, please call your [place] clinic or [phone].        Care EveryWhere ID     This is your Care EveryWhere ID. This could be used by other organizations to access your [place] medical records  [id number]        Your Vitals Were     Temperature Height BMI (Body Mass Index)             97.2  F (36.2  C) (Temporal) 1.6 m (5' 3\") 35.43 kg/m2          Blood Pressure from Last 3 Encounters:   17 138/82   17 136/84   17 (!) 191/96    Weight from Last 3 Encounters:   17 90.7 kg (200 lb)   17 82.6 kg (182 lb)   17 80.7 kg (178 lb)              Today, you had the following     No orders found for display         Today's Medication Changes          These changes are accurate as of: 17  2:58 PM.  If you have any questions, ask your nurse or doctor.               These medicines have changed or have updated prescriptions.        Dose/Directions    gabapentin 400 MG capsule   Commonly known as:  NEURONTIN   This may have changed:    - how much to take  - additional instructions   Used for:  Pain in thoracic spine        Dose:  800 mg   Take 2 capsules (800 mg) by mouth 3 times daily   Quantity:  540 capsule   Refills:  2                Primary Care Provider Office Phone # Fax #    Margarito Hoffman [phone]       [place] [id number] [place] [address]        Equal Access to Services     ERIC ASHER AH: Hadii michaela raeo Sosophy, waaxda luqadaha, qaybta kaalmada aracely huff . So [facility] [phone].    ATENCIÓN: Si habla español, tiene a rodriguez disposición servicios gratuitos de asistencia lingüística. Llame al [phone].    We comply with applicable federal civil rights laws and [place] laws. We do not discriminate on the basis of race, color, national origin, age, disability sex, " sexual orientation or gender identity.            Thank you!     Thank you for choosing [facility]  for your care. Our goal is always to provide you with excellent care. Hearing back from our patients is one way we can continue to improve our services. Please take a few minutes to complete the written survey that you may receive in the mail after your visit with us. Thank you!             Your Updated Medication List - Protect others around you: Learn how to safely use, store and throw away your medicines at www.disposemymeds.org.          This list is accurate as of: 7/19/17  2:58 PM.  Always use your most recent med list.                   Brand Name Dispense Instructions for use Diagnosis    albuterol 108 (90 BASE) MCG/ACT Inhaler    PROAIR HFA/PROVENTIL HFA/VENTOLIN HFA    1 Inhaler    Inhale 2 puffs into the lungs every 6 hours as needed for shortness of breath / dyspnea or wheezing Ventolin inhaler    Bronchitis       ASPIRIN NOT PRESCRIBED    INTENTIONAL     by Other route continuous prn. 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    Type 2 diabetes, HbA1C goal < 8% (H)       betamethasone dipropionate 0.05 % lotion    DIPROSONE    60 mL    Apply topically to scalp once daily    Itching       blood glucose monitoring test strip    ONE TOUCH TEST STRIPS    100 strip    test 4x daily (has One Touch Ultra 2 machine    Type 2 diabetes, HbA1C goal < 8% (H)       cholecalciferol 400 UNIT Tabs tablet    vitamin D     Take 2 tablets by mouth daily.        gabapentin 400 MG capsule    NEURONTIN    540 capsule    Take 2 capsules (800 mg) by mouth 3 times daily    Pain in thoracic spine       hydrALAZINE 25 MG tablet    APRESOLINE    270 tablet    Take 1 tablet (25 mg) by mouth 3 times daily    Hypertension goal BP (blood pressure) < 130/80       HYDROcodone-acetaminophen 5-325 MG per tablet    NORCO    30 tablet    Take 1 tablet by mouth every 6 hours as needed for pain    Pain in thoracic spine  "      insulin aspart 100 UNITS/ML injection    NovoLOG VIAL    30 mL    5 units before breakfast, 7 units before lunch, 7 units before dinner    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    Type 2 diabetes, HbA1C goal < 8% (H)       [employer] FINEPOINT LANCETS Misc     100 each    1 Device by Lancet route 4 times daily.    Type 2 diabetes, HbA1C goal < 8% (H)       multivitamin per tablet     30    1 TABLET DAILY        OPTIHALER Kerry     1 Device    As directed    Bronchitis, Cough, SOB (shortness of breath)       simvastatin 80 MG tablet    ZOCOR    90 tablet    Take 1 tablet (80 mg) by mouth At Bedtime at bedtime.    Hyperlipidemia LDL goal <100       traZODone 50 MG tablet    DESYREL    90 tablet    Take 1 tablet by mouth. at bedtime.    Primary insomnia         "

## 2017-08-16 DIAGNOSIS — M54.6 PAIN IN THORACIC SPINE: ICD-10-CM

## 2017-08-16 RX ORDER — HYDROCODONE BITARTRATE AND ACETAMINOPHEN 5; 325 MG/1; MG/1
1 TABLET ORAL EVERY 6 HOURS PRN
Qty: 30 TABLET | Refills: 0 | Status: SHIPPED | OUTPATIENT
Start: 2017-08-16 | End: 2017-09-27

## 2017-08-16 NOTE — TELEPHONE ENCOUNTER
Norco       Last Written Prescription Date: 07/10/2017  Last Fill Quantity: 30,  # refills: 0   Last Office Visit with [facility], [facility] or [facility] prescribing provider: 07/17/2017    Thanks  [facility]   [phone]

## 2017-09-27 DIAGNOSIS — M54.6 PAIN IN THORACIC SPINE: ICD-10-CM

## 2017-09-27 RX ORDER — HYDROCODONE BITARTRATE AND ACETAMINOPHEN 5; 325 MG/1; MG/1
1 TABLET ORAL EVERY 6 HOURS PRN
Qty: 30 TABLET | Refills: 0 | Status: SHIPPED | OUTPATIENT
Start: 2017-09-27 | End: 2017-11-08

## 2017-09-27 NOTE — TELEPHONE ENCOUNTER
Norco      Last Written Prescription Date: 8-16-17  Last Fill Quantity: 30,  # refills: 0   Last Office Visit with [facility], [facility] or [facility] prescribing provider: 7-17-17    Donovan Cisneros  Ph[facility]  On Behalf of [facility]

## 2017-10-09 ENCOUNTER — TELEPHONE (OUTPATIENT)
Dept: INTERNAL MEDICINE | Facility: CLINIC | Age: 82
End: 2017-10-09

## 2017-10-09 DIAGNOSIS — M54.6 PAIN IN THORACIC SPINE: ICD-10-CM

## 2017-10-09 RX ORDER — GABAPENTIN 400 MG/1
CAPSULE ORAL
Qty: 540 CAPSULE | Refills: 2 | Status: ON HOLD | OUTPATIENT
Start: 2017-10-09 | End: 2018-01-24

## 2017-10-09 NOTE — TELEPHONE ENCOUNTER
Would have her try a suppository to stimulate a BM, make sure she is still passing flatus and eating.  Can have next available opening schedule, sooner or ER if no BM with above measures.

## 2017-10-09 NOTE — TELEPHONE ENCOUNTER
Reason for call:  Patient reporting a symptom    Symptom or request: weakness in hands and legs. Pt requesting to speak to PL nurse.    Duration (how long have symptoms been present): 1 wk    Have you been treated for this before? No    Additional comments:     Phone Number patient can be reached at:      Best Time:      Can we leave a detailed message on this number:  YES    Call taken on 10/9/2017 at 9:12 AM by Kenya Dhillon

## 2017-10-09 NOTE — TELEPHONE ENCOUNTER
": 1935  PHONE #'s: [phone] (home)     PRESENTING PROBLEM:  Feels like she needs to poop.    NURSING ASSESSMENT  Description:  Denies rock hard or swollen abdomen. NO nausea, vomiting. Is hearing active bowels sounds\" My stomach gurgles.\" . Has hard time passing gas, but not eating much because she doesn't feel good. NO fever.   Onset/duration:  Feeling constipated for a few days, maybe 5 now??  Precip. factors:  Not active , NOT eating much. : I think I drink plenty of water. \"   Assoc. Sx:   NONE.   Improves/worsens Sx:  same  Pain scale (1-10)   3/10  Sx specific meds:  none  LMP/preg/breast feeding:  NA  Last exam/Tx:  Has NOT been seen for this.     RECOMMENDED DISPOSITION:  Home care advice - Dr. Hoffman would suggest you try a suppository. Lay on your left side with Right knee slightly pulled up , inserted the glycerin suppository with gloved finger. Insert as far up as finger length. Lay on left side for an additional 15 to 30 minutes. Give it time to dissolve.  Encouraged to drink warm water or beverage and eat high fiber foods, Try getting a little walk,even if inside to get things moving. She is in agreement with plan. Will call back  If no improvement or if she has fever, increased abdominal pain, swelling, NO active bowel sounds.   Will comply with recommendation: YES   If further questions/concerns or if Sx do not improve, worsen or new Sx develop, call your PCP or [facility] as soon as possible.    NOTES:  Disposition was determined by the first positive assessment question, therefore all previous assessment questions were negative.  Informed to check provider manual or call insurance company to assure coverage.    Guideline used:* Make sure diet is adequate in volume( quantity), bulk( high fiber), and fluids (6 to 8 [ 8ounce] glasses a day)  * Establish a regular time for privacy and elimination each day.  * Increase physical activity/exercise  * Try drinking a hot beverage " each morning , such as coffee, tea, or hot water with lemon.   *Adults may try OTC laxatives ( Metamucil, Milk of Magnesia, glycerin suppositories) or an enema if other measures are unsuccessful. Follow instructions on the label.  .  * For rectal pain due to constipation, sit in a warm bath for 20 minutes    Telephone Triage Protocols for Nurses, Fifth Edition, Melissa Osman RN

## 2017-10-09 NOTE — TELEPHONE ENCOUNTER
gabapentin (NEURONTIN) 400 MG capsule      Last Written Prescription Date:  9/20/16  Last Fill Quantity: 540,   # refills: 2  Last Office Visit with [facility], [facility] or [facility] prescribing provider: 7/17/17  Future Office visit:       Routing refill request to provider for review/approval because:  Drug not on the [facility], [facility] or [facility] refill protocol or controlled substance

## 2017-10-09 NOTE — TELEPHONE ENCOUNTER
"Smiley Acuña is a 82 year old female who calls with leg and arm pain.    NURSING ASSESSMENT:  Description:  Patient is reporting she has leg and hand pain for quite a while that comes and goes, but yesterday she states she could barely get up and walk around yesterday.  She states she went about 2 weeks without having a BM, then she had one, and now has not been able to have a BM for the past 5 days.  She states she is not weak, but just unable to get up and walk around due to the pain.  Patient is denying the following: abdominal pain, swelling, fever, vomiting, chest pain, problems breathing, inability to walk, redness, warmth to the area,  she has had no improvement with home care measures.  She states she has been taking OTC stool softeners and been drinking \"plenty of water\" to help this medication.  She is informed this message will be sent to PCP as she should be seen in 24 hours and PCP has no openings.  Patient understands and agrees to this plan.      Onset/duration:  1 week  Precip. factors:  Has had 1 BM in the past 2.5 weeks  Associated symptoms:  Pain in hands and legs - see bajamal  Improves/worsens symptoms:  worsening  Pain scale (0-10)   8/10  Last exam/Treatment:  7/17/17  Allergies:   Allergies   Allergen Reactions     Lisinopril      upper resp symptoms, cough       NURSING PLAN: Routed to provider Yes    RECOMMENDED DISPOSITION:  See in 24 hours   Will comply with recommendation: Yes  If further questions/concerns or if symptoms do not improve, worsen or new symptoms develop, call your PCP or [facility] as soon as possible.      Guideline used:  Weakness, Leg Pain/Swelling, & Constipation  Telephone Triage Protocols for Nurses, Fifth Edition, Melissa Han RN    "

## 2017-11-08 ENCOUNTER — ALLIED HEALTH/NURSE VISIT (OUTPATIENT)
Dept: FAMILY MEDICINE | Facility: CLINIC | Age: 82
End: 2017-11-08
Payer: MEDICARE

## 2017-11-08 DIAGNOSIS — M54.6 PAIN IN THORACIC SPINE: ICD-10-CM

## 2017-11-08 DIAGNOSIS — Z23 NEED FOR PROPHYLACTIC VACCINATION AND INOCULATION AGAINST INFLUENZA: Primary | ICD-10-CM

## 2017-11-08 PROCEDURE — G0008 ADMIN INFLUENZA VIRUS VAC: HCPCS

## 2017-11-08 PROCEDURE — 90662 IIV NO PRSV INCREASED AG IM: CPT

## 2017-11-08 RX ORDER — HYDROCODONE BITARTRATE AND ACETAMINOPHEN 5; 325 MG/1; MG/1
1 TABLET ORAL EVERY 6 HOURS PRN
Qty: 30 TABLET | Refills: 0 | Status: SHIPPED | OUTPATIENT
Start: 2017-11-08 | End: 2018-01-07

## 2017-11-08 NOTE — PROGRESS NOTES
Injectable Influenza Immunization Documentation    1.  Is the person to be vaccinated sick today?   No    2. Does the person to be vaccinated have an allergy to a component   of the vaccine?   No  Egg Allergy Algorithm Link    3. Has the person to be vaccinated ever had a serious reaction   to influenza vaccine in the past?   No    4. Has the person to be vaccinated ever had Guillain-Barré syndrome?   No    Form completed by Marisela Muñoz CMA   Prior to injection verified patient identity using patient's name and date of birth.

## 2017-11-08 NOTE — MR AVS SNAPSHOT
"              After Visit Summary   11/8/2017    Smiley Acuña    MRN: [id number]           Patient Information     Date Of Birth          1935        Visit Information        Provider Department      11/8/2017 1:00 PM NL FLOAT TEAM D [facility]        Today's Diagnoses     Need for prophylactic vaccination and inoculation against influenza    -  1       Follow-ups after your visit        Your next 10 appointments already scheduled     Nov 08, 2017  1:00 PM CST   Nurse Only with BIN GRANADO TEAM MURI[facility] ([facility])    [address] [place] [id number] [phone]              Who to contact     If you have questions or need follow up information about today's clinic visit or your schedule please contact [facility] directly at [phone].  Normal or non-critical lab and imaging results will be communicated to you by Plexisofthart, letter or phone within 4 business days after the clinic has received the results. If you do not hear from us within 7 days, please contact the clinic through Plexisofthart or phone. If you have a critical or abnormal lab result, we will notify you by phone as soon as possible.  Submit refill requests through rVita or call your pharmacy and they will forward the refill request to us. Please allow 3 business days for your refill to be completed.          Additional Information About Your Visit        MyChart Information     rVita lets you send messages to your doctor, view your test results, renew your prescriptions, schedule appointments and more. To sign up, go to [email or web address] . Click on \"Log in\" on the left side of the screen, which will take you to the Welcome page. Then click on \"Sign up Now\" on the right side of the page.     You will be asked to enter the access code listed below, as well as some personal information. Please follow the directions to create your username and " password.     Your access code is: [email or web address]  Expires: 2018 12:19 PM     Your access code will  in 90 days. If you need help or a new code, please call your [facility] or [phone].        Care EveryWhere ID     This is your Care EveryWhere ID. This could be used by other organizations to access your [facility] medical records  [id number]         Blood Pressure from Last 3 Encounters:   17 138/82   17 136/84   17 (!) 191/96    Weight from Last 3 Encounters:   17 200 lb (90.7 kg)   17 182 lb (82.6 kg)   17 178 lb (80.7 kg)              We Performed the Following     ADMIN INFLUENZA (For MEDICARE Patients ONLY) []     FLU VACCINE, INCREASED ANTIGEN, PRESV FREE, AGE 65+ [96894]     Vaccine Administration, Initial [87615]        Primary Care Provider Office Phone # Fax #    Margarito Hoffman [phone]       [facility] [address] [place] [address]        Equal Access to Services     [facility] : Hadii aad ku hadasho Soomaali, waaxda luqadaha, qaybta kaalmada adeegyada, aracely chanelin hayquentinn annelise hodge . So [facility] [phone].    ATENCIÓN: Si habla español, tiene a rodriguez disposición servicios gratuitos de asistencia lingüística. Llame al [phone].    We comply with applicable federal civil rights laws and [place] laws. We do not discriminate on the basis of race, color, national origin, age, disability, sex, sexual orientation, or gender identity.            Thank you!     Thank you for choosing [facility]  for your care. Our goal is always to provide you with excellent care. Hearing back from our patients is one way we can continue to improve our services. Please take a few minutes to complete the written survey that you may receive in the mail after your visit with us. Thank you!             Your Updated Medication List - Protect others around you: Learn how to safely use, store and throw away  "your medicines at www.disposemymeds.org.          This list is accurate as of: 11/8/17 12:19 PM.  Always use your most recent med list.                   Brand Name Dispense Instructions for use Diagnosis    albuterol 108 (90 BASE) MCG/ACT Inhaler    PROAIR HFA/PROVENTIL HFA/VENTOLIN HFA    1 Inhaler    Inhale 2 puffs into the lungs every 6 hours as needed for shortness of breath / dyspnea or wheezing Ventolin inhaler    Bronchitis       ASPIRIN NOT PRESCRIBED    INTENTIONAL     by Other route continuous prn. 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    Type 2 diabetes, HbA1C goal < 8% (H)       betamethasone dipropionate 0.05 % lotion    DIPROSONE    60 mL    Apply topically to scalp once daily    Itching       blood glucose monitoring test strip    ONETOUCH TEST STRIPS    100 strip    test 4x daily (has One Touch Ultra 2 machine    Type 2 diabetes, HbA1C goal < 8% (H)       cholecalciferol 400 UNIT Tabs tablet    vitamin D3     Take 2 tablets by mouth daily.        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    Type 2 " diabetes, HbA1C goal < 8% (H)       LIFESCAN FINEPOINT LANCETS Misc     100 each    1 Device by Lancet route 4 times daily.    Type 2 diabetes, HbA1C goal < 8% (H)       multivitamin per tablet     30    1 TABLET DAILY        OPTIHALER Kerry     1 Device    As directed    Bronchitis, Cough, SOB (shortness of breath)       simvastatin 80 MG tablet    ZOCOR    90 tablet    Take 1 tablet (80 mg) by mouth At Bedtime at bedtime.    Hyperlipidemia LDL goal <100       traZODone 50 MG tablet    DESYREL    90 tablet    Take 1 tablet by mouth. at bedtime.    Primary insomnia

## 2017-11-08 NOTE — TELEPHONE ENCOUNTER
norco   Last Written Prescription Date:  9/27/17  Last Fill Quantity: 30,  # refills: 0   Last Office Visit with G, P or [facility] prescribing provider: 7/8/17

## 2017-11-10 DIAGNOSIS — E11.49 DIABETES MELLITUS TYPE 2 WITH NEUROLOGICAL MANIFESTATIONS (H): Primary | ICD-10-CM

## 2017-11-12 RX ORDER — LANCETS 28 GAUGE
EACH MISCELLANEOUS
Qty: 100 EACH | Refills: 2 | Status: SHIPPED | OUTPATIENT
Start: 2017-11-12 | End: 2019-09-04

## 2017-11-13 ENCOUNTER — TELEPHONE (OUTPATIENT)
Dept: INTERNAL MEDICINE | Facility: CLINIC | Age: 82
End: 2017-11-13

## 2017-11-13 NOTE — TELEPHONE ENCOUNTER
Reason for Call:  Other call back    Detailed comments: Xin is calling inquiring about getting a handicap sticker for Smiley. Told Xin that she can print it off online and  the form at [facility], fill out their portion and then drop it off at clinic for doctor fill his portion. She stated why can't the doctor print off, fill out their portion. Please call Xin and advise if this can be done or not. Or does pt need a visit? Pt has appt 11/28 with Dalton.     Phone Number Patient can be reached at: Other phone number:      Best Time: anytime    Can we leave a detailed message on this number? NO    Call taken on 11/13/2017 at 1:19 PM by Josy Prasad

## 2017-12-08 ENCOUNTER — OFFICE VISIT (OUTPATIENT)
Dept: INTERNAL MEDICINE | Facility: CLINIC | Age: 82
End: 2017-12-08
Payer: MEDICARE

## 2017-12-08 VITALS
DIASTOLIC BLOOD PRESSURE: 70 MMHG | RESPIRATION RATE: 16 BRPM | SYSTOLIC BLOOD PRESSURE: 136 MMHG | OXYGEN SATURATION: 91 % | BODY MASS INDEX: 31.18 KG/M2 | HEART RATE: 84 BPM | TEMPERATURE: 97.4 F | WEIGHT: 176 LBS

## 2017-12-08 DIAGNOSIS — Z23 NEED FOR PNEUMOCOCCAL VACCINATION: ICD-10-CM

## 2017-12-08 DIAGNOSIS — L57.0 AK (ACTINIC KERATOSIS): ICD-10-CM

## 2017-12-08 DIAGNOSIS — E11.49 DIABETES MELLITUS TYPE 2 WITH NEUROLOGICAL MANIFESTATIONS (H): Primary | ICD-10-CM

## 2017-12-08 DIAGNOSIS — I10 ESSENTIAL HYPERTENSION WITH GOAL BLOOD PRESSURE LESS THAN 130/80: ICD-10-CM

## 2017-12-08 DIAGNOSIS — N18.4 CKD (CHRONIC KIDNEY DISEASE) STAGE 4, GFR 15-29 ML/MIN (H): ICD-10-CM

## 2017-12-08 LAB
ALBUMIN SERPL-MCNC: 3.5 G/DL (ref 3.4–5)
ALP SERPL-CCNC: 57 U/L (ref 40–150)
ALT SERPL W P-5'-P-CCNC: 20 U/L (ref 0–50)
ANION GAP SERPL CALCULATED.3IONS-SCNC: 4 MMOL/L (ref 3–14)
AST SERPL W P-5'-P-CCNC: 22 U/L (ref 0–45)
BILIRUB SERPL-MCNC: 0.4 MG/DL (ref 0.2–1.3)
BUN SERPL-MCNC: 40 MG/DL (ref 7–30)
CALCIUM SERPL-MCNC: 9.7 MG/DL (ref 8.5–10.1)
CHLORIDE SERPL-SCNC: 103 MMOL/L (ref 94–109)
CO2 SERPL-SCNC: 33 MMOL/L (ref 20–32)
CREAT SERPL-MCNC: 2.03 MG/DL (ref 0.52–1.04)
GFR SERPL CREATININE-BSD FRML MDRD: 23 ML/MIN/1.7M2
GLUCOSE SERPL-MCNC: 85 MG/DL (ref 70–99)
HBA1C MFR BLD: 6.2 % (ref 4.3–6)
POTASSIUM SERPL-SCNC: 4.3 MMOL/L (ref 3.4–5.3)
PROT SERPL-MCNC: 7.7 G/DL (ref 6.8–8.8)
SODIUM SERPL-SCNC: 140 MMOL/L (ref 133–144)

## 2017-12-08 PROCEDURE — 99214 OFFICE O/P EST MOD 30 MIN: CPT | Mod: 25 | Performed by: INTERNAL MEDICINE

## 2017-12-08 PROCEDURE — 80053 COMPREHEN METABOLIC PANEL: CPT | Performed by: INTERNAL MEDICINE

## 2017-12-08 PROCEDURE — 17003 DESTRUCT PREMALG LES 2-14: CPT | Performed by: INTERNAL MEDICINE

## 2017-12-08 PROCEDURE — 90670 PCV13 VACCINE IM: CPT | Performed by: INTERNAL MEDICINE

## 2017-12-08 PROCEDURE — 83036 HEMOGLOBIN GLYCOSYLATED A1C: CPT | Performed by: INTERNAL MEDICINE

## 2017-12-08 PROCEDURE — G0009 ADMIN PNEUMOCOCCAL VACCINE: HCPCS | Performed by: INTERNAL MEDICINE

## 2017-12-08 PROCEDURE — 17000 DESTRUCT PREMALG LESION: CPT | Performed by: INTERNAL MEDICINE

## 2017-12-08 PROCEDURE — 36415 COLL VENOUS BLD VENIPUNCTURE: CPT | Performed by: INTERNAL MEDICINE

## 2017-12-08 RX ORDER — INSULIN GLARGINE 100 [IU]/ML
18 INJECTION, SOLUTION SUBCUTANEOUS EVERY MORNING
Qty: 15 ML | Refills: 3 | Status: ON HOLD | OUTPATIENT
Start: 2017-12-08 | End: 2018-01-24

## 2017-12-08 ASSESSMENT — PAIN SCALES - GENERAL: PAINLEVEL: NO PAIN (0)

## 2017-12-08 NOTE — MR AVS SNAPSHOT
"              After Visit Summary   12/8/2017    Smiley Acuña    MRN: [id number]           Patient Information     Date Of Birth          1935        Visit Information        Provider Department      12/8/2017 9:30 AM Margarito Hoffman MD [facility]         Follow-ups after your visit        Your next 10 appointments already scheduled     Dec 08, 2017  9:30 AM CST   Office Visit with Margarito Hoffman MD   [facility] ([facility])    [address] [place] [id number] [phone]           Bring a current list of meds and any records pertaining to this visit. For Physicals, please bring immunization records and any forms needing to be filled out. Please arrive 10 minutes early to complete paperwork.              Who to contact     If you have questions or need follow up information about today's clinic visit or your schedule please contact [facility] directly at [phone].  Normal or non-critical lab and imaging results will be communicated to you by MyChart, letter or phone within 4 business days after the clinic has received the results. If you do not hear from us within 7 days, please contact the clinic through Endocytehart or phone. If you have a critical or abnormal lab result, we will notify you by phone as soon as possible.  Submit refill requests through [facility] or call your pharmacy and they will forward the refill request to us. Please allow 3 business days for your refill to be completed.          Additional Information About Your Visit        Endocytehart Information     [facility] lets you send messages to your doctor, view your test results, renew your prescriptions, schedule appointments and more. To sign up, go to [email or web address] . Click on \"Log in\" on the left side of the screen, which will take you to the Welcome page. Then click on \"Sign up Now\" on the right side of the page.     You will be asked to enter the " access code listed below, as well as some personal information. Please follow the directions to create your username and password.     Your access code is: [email or web address]  Expires: 2018 12:19 PM     Your access code will  in 90 days. If you need help or a new code, please call your [facility] or [phone].        Care EveryWhere ID     This is your Care EveryWhere ID. This could be used by other organizations to access your [facility] medical records  [id number]        Your Vitals Were     Pulse Temperature Respirations Pulse Oximetry BMI (Body Mass Index)       84 97.4  F (36.3  C) (Temporal) 16 91% 31.18 kg/m2        Blood Pressure from Last 3 Encounters:   17 164/84   17 138/82   17 136/84    Weight from Last 3 Encounters:   17 176 lb (79.8 kg)   17 200 lb (90.7 kg)   17 182 lb (82.6 kg)              Today, you had the following     No orders found for display       Primary Care Provider Office Phone # Fax #    Margarito Hoffman [phone]       [facility] [address] [place] [address]        Equal Access to Services     [facility]: Hadii aad ku hadasho Soomaali, waaxda luqadaha, qaybta kaalmada adeegyada, waxay idiin hayquentinn annelise gonzalez lagume oates. So [facility] [phone].    ATENCIÓN: Si habla español, tiene a rodriguez disposición servicios gratuitos de asistencia lingüística. Llame al [phone].    We comply with applicable federal civil rights laws and [place] laws. We do not discriminate on the basis of race, color, national origin, age, disability, sex, sexual orientation, or gender identity.            Thank you!     Thank you for choosing [facility]  for your care. Our goal is always to provide you with excellent care. Hearing back from our patients is one way we can continue to improve our services. Please take a few minutes to complete the written survey that you may receive in the mail after your visit  with us. Thank you!             Your Updated Medication List - Protect others around you: Learn how to safely use, store and throw away your medicines at www.disposemymeds.org.          This list is accurate as of: 12/8/17  9:25 AM.  Always use your most recent med list.                   Brand Name Dispense Instructions for use Diagnosis    albuterol 108 (90 BASE) MCG/ACT Inhaler    PROAIR HFA/PROVENTIL HFA/VENTOLIN HFA    1 Inhaler    Inhale 2 puffs into the lungs every 6 hours as needed for shortness of breath / dyspnea or wheezing Ventolin inhaler    Bronchitis       ASPIRIN NOT PRESCRIBED    INTENTIONAL     by Other route continuous prn. 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    Type 2 diabetes, HbA1C goal < 8% (H)       betamethasone dipropionate 0.05 % lotion    DIPROSONE    60 mL    Apply topically to scalp once daily    Itching       * blood glucose monitoring test strip    ONETOUCH TEST STRIPS    100 strip    test 4x daily (has One Touch Ultra 2 machine    Type 2 diabetes, HbA1C goal < 8% (H)       * blood glucose monitoring test strip    no brand specified    100 each    Use to test blood sugar two times daily or as directed.  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    Type 2 diabetes, HbA1C goal < 8% (H)       * LIFESCAN FINEPOINT LANCETS Misc     100 each    1 Device by Lancet route 4 times daily.    Type 2 diabetes, HbA1C goal < 8% (H)       * blood glucose monitoring lancets     100 each    Use to test blood sugar two times daily or as directed.    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    Hyperlipidemia LDL goal <100       traZODone 50 MG tablet    DESYREL    90 tablet    Take 1 tablet by mouth. at bedtime.    Primary insomnia       * Notice:  This list has 4 medication(s) that are the same as other medications prescribed for you. Read the directions carefully, and ask your doctor or other care provider to review them with you.      "

## 2017-12-08 NOTE — PROGRESS NOTES
SUBJECTIVE:   Smiley Acuña is a 82 year old female who presents to clinic today for the following health issues:    Chief Complaint   Patient presents with     Recheck Medication     Diabetes  Lipids  CKD     She has been busy doing stuff.  Right leg she has broken twice and it bothers her quite a bit.  Taking some norco once in a while, 1-2 a week.  Uses tylenol.      Sugars have been pretty good.  Taking lantus and novolog now.  Insurance wants to change her to Basaglar in pen form.      Blood pressure is low at home was 90 range today they say. She is on hydralazine, varies hydralazine from half to whole depending on the reading. No pills today so bp could be up from that.    Past Medical History:   Diagnosis Date     Abnormality of gait      Anemia, unspecified      Chronic ischemic heart disease, unspecified     2-vessel CAD     Closed fracture of patella 06/21/10    D/C 06/23/10     Coronary artery disease      Degenerative disc disease     lumbar     Depressive disorder, not elsewhere classified      History of blood transfusion      Other and unspecified hyperlipidemia      Renal failure, unspecified     chronic renal disease     Septicemia due to Escherichia coli (E. coli)(038.42) (H) 05/03/2007     Syncope and collapse 4/8/2005    Admit     Syncope and collapse 05/26/10    D/C 05/27/10 to [facility]     Traumatic subdural hematomas 05/27/10     Type II or unspecified type diabetes mellitus without mention of complication, not stated as uncontrolled      Unspecified essential hypertension      Unspecified sleep apnea      Urinary tract infection, site not specified 4/4/2008    UTI with mild sepsis. Admitted.     Current Outpatient Prescriptions   Medication     BASAGLAR 100 UNIT/ML injection     insulin pen needle (B-D U/F) 31G X 8 MM     HYDROcodone-acetaminophen (NORCO) 5-325 MG per tablet     gabapentin (NEURONTIN) 400 MG capsule     simvastatin (ZOCOR) 80 MG tablet     hydrALAZINE  "(APRESOLINE) 25 MG tablet     insulin glargine (LANTUS) 100 UNIT/ML injection     insulin aspart (NOVOLOG VIAL) 100 UNITS/ML injection     traZODone (DESYREL) 50 MG tablet     albuterol (PROAIR HFA, PROVENTIL HFA, VENTOLIN HFA) 108 (90 BASE) MCG/ACT inhaler     betamethasone dipropionate (DIPROSONE) 0.05 % lotion     cholecalciferol (VITAMIN D) 400 UNIT TABS     MULTIVITAMINS OR TABS     blood glucose monitoring (NO BRAND SPECIFIED) test strip     blood glucose monitoring (PRODIGY TWIST TOP 28G) lancets     insulin syringe-needle U-100 (BD INSULIN SYRINGE ULTRAFINE) 30G X 1/2\" 0.5 ML     Spacer/Aero-Holding Chambers (OPTIHALER) BOGDAN     glucose blood VI test strips (ONE TOUCH TEST STRIPS) strip     [employer] FINEPOINT LANCETS MISC     ASPIRIN NOT PRESCRIBED, INTENTIONAL,     No current facility-administered medications for this visit.      Social History   Substance Use Topics     Smoking status: Never Smoker     Smokeless tobacco: Never Used     Alcohol use No     Review of Systems  Constitutional-No fevers, chills, or weight changes..  Cardiac-No chest pain or palpitations.  Respiratory-No cough, sob, or hemoptysis.  GI-No nausea, vomitting, diarrhea, constipation, or blood in the stool.  Musculoskeletal-chronic right leg pains    Physical Exam  /70  Pulse 84  Temp 97.4  F (36.3  C) (Temporal)  Resp 16  Wt 176 lb (79.8 kg)  SpO2 91%  BMI 31.18 kg/m2  General Appearance-healthy, alert, no distress  Cardiac-regular rate and rhythm  with normal S1, S2 ; no murmur, rub or gallops  Lungs-clear to auscultation  GI-Soft, nontender.  Normal bowel sounds.  No hepatosplenomegaly or abnormal masses  Extremities-no peripheral edema, peripheral pulses normal  Skin-multiple actinic keratosis on her face, nose and one on the left arm.    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 Due to insurance will " change from lantus to Basaglar for insulin, will have to learn how to use the kwik pen, needles prescribed as well.    Plan: BASAGLAR 100 UNIT/ML injection, insulin pen         needle (B-D U/F) 31G X 8 MM, Hemoglobin A1c,         Comprehensive metabolic panel            (N18.4) CKD (chronic kidney disease) stage 4, GFR 15-29 ml/min (H)  Comment: will check renal function, has chronic kidney disease stable at stage 4.   Plan: Hemoglobin A1c, Comprehensive metabolic panel            (I10) Essential hypertension with goal blood pressure less than 130/80  Comment: better on recheck, no medication changes, she has followed and had low readings at home   Plan: Hemoglobin A1c, Comprehensive metabolic panel            (Z23) Need for pneumococcal vaccination  Comment: updated today.   Plan: Pneumococcal vaccine 13 valent PCV13 IM         (Prevnar) [09872], ADMIN MEDICARE: Pneumococcal        Vaccine ()            (L57.0) AK (actinic keratosis)  Comment: premalignat lesions on her face, will treat with cryotherapy on the face and left arm.   Plan: DESTRUCT PREMALIGNANT LESION, FIRST, DESTRUCT         PREMALIGNANT LESION, 2-14            Procedure Note-cryotherapy  Verbal consent  Using liquid nitrogen I froze two lesions on her forehead, two lesions on her nose and right cheek and 1 lesion on her left arm, all actinic kerratosis.  No complications,   Aftercare instructions given to the patient.      .

## 2017-12-08 NOTE — PROGRESS NOTES
Screening Questionnaire for Adult Immunization    Are you sick today?   No   Do you have allergies to medications, food, a vaccine component or latex?   Yes   Have you ever had a serious reaction after receiving a vaccination?   No   Do you have a long-term health problem with heart disease, lung disease, asthma, kidney disease, metabolic disease (e.g. diabetes), anemia, or other blood disorder?   Yes   Do you have cancer, leukemia, HIV/AIDS, or any other immune system problem?   No   In the past 3 months, have you taken medications that affect  your immune system, such as prednisone, other steroids, or anticancer drugs; drugs for the treatment of rheumatoid arthritis, Crohn s disease, or psoriasis; or have you had radiation treatments?   No   Have you had a seizure, or a brain or other nervous system problem?   No   During the past year, have you received a transfusion of blood or blood     products, or been given immune (gamma) globulin or antiviral drug?   No   For women: Are you pregnant or is there a chance you could become        pregnant during the next month?   No   Have you received any vaccinations in the past 4 weeks?   No     Immunization questionnaire answers were all negative.        Per orders of Dr. Margarito Hoffman, injection of prevnar given by Vy Veronica. Patient instructed to remain in clinic for 15 minutes afterwards, and to report any adverse reaction to me immediately.       Screening performed by Vy Veronica on 12/8/2017 at 9:55 AM.

## 2017-12-08 NOTE — NURSING NOTE
"Chief Complaint   Patient presents with     Recheck Medication     Diabetes  Lipids  CKD       Initial /84  Pulse 84  Temp 97.4  F (36.3  C) (Temporal)  Resp 16  Wt 176 lb (79.8 kg)  SpO2 91%  BMI 31.18 kg/m2 Estimated body mass index is 31.18 kg/(m^2) as calculated from the following:    Height as of 7/19/17: 5' 3\" (1.6 m).    Weight as of this encounter: 176 lb (79.8 kg).  Medication Reconciliation: complete    "

## 2018-01-07 DIAGNOSIS — M54.6 PAIN IN THORACIC SPINE: ICD-10-CM

## 2018-01-08 RX ORDER — HYDROCODONE BITARTRATE AND ACETAMINOPHEN 5; 325 MG/1; MG/1
1 TABLET ORAL EVERY 6 HOURS PRN
Qty: 30 TABLET | Refills: 0 | Status: ON HOLD | OUTPATIENT
Start: 2018-01-08 | End: 2018-01-24

## 2018-01-08 NOTE — TELEPHONE ENCOUNTER
Signed original RX delivered to [facility]. Abi,

## 2018-01-08 NOTE — TELEPHONE ENCOUNTER
KATINACO      Last Written Prescription Date:  11/08/17  Last Fill Quantity: 30,   # refills: 0  Last Office Visit: 12/08/17  Future Office visit:       Routing refill request to provider for review/approval because:  Drug not on the [facility], [facility] or [facility] refill protocol or controlled substance

## 2018-01-21 ENCOUNTER — HOSPITAL ENCOUNTER (INPATIENT)
Facility: CLINIC | Age: 83
LOS: 3 days | Discharge: NURSING, REHAB OR LONG-TERM CARE | DRG: 689 | End: 2018-01-24
Attending: PHYSICIAN ASSISTANT | Admitting: FAMILY MEDICINE
Payer: MEDICARE

## 2018-01-21 ENCOUNTER — APPOINTMENT (OUTPATIENT)
Dept: GENERAL RADIOLOGY | Facility: CLINIC | Age: 83
DRG: 689 | End: 2018-01-21
Attending: PHYSICIAN ASSISTANT
Payer: MEDICARE

## 2018-01-21 DIAGNOSIS — R09.02 HYPOXIA: ICD-10-CM

## 2018-01-21 DIAGNOSIS — J18.9 PNEUMONIA DUE TO INFECTIOUS ORGANISM, UNSPECIFIED LATERALITY, UNSPECIFIED PART OF LUNG: ICD-10-CM

## 2018-01-21 DIAGNOSIS — M54.6 PAIN IN THORACIC SPINE: ICD-10-CM

## 2018-01-21 DIAGNOSIS — N30.00 ACUTE CYSTITIS WITHOUT HEMATURIA: ICD-10-CM

## 2018-01-21 DIAGNOSIS — N39.0 E. COLI UTI: Primary | ICD-10-CM

## 2018-01-21 DIAGNOSIS — I10 HYPERTENSION GOAL BP (BLOOD PRESSURE) < 130/80: ICD-10-CM

## 2018-01-21 DIAGNOSIS — R53.1 WEAKNESS: ICD-10-CM

## 2018-01-21 DIAGNOSIS — R29.6 RECURRENT FALLS: ICD-10-CM

## 2018-01-21 DIAGNOSIS — B96.20 E. COLI UTI: Primary | ICD-10-CM

## 2018-01-21 DIAGNOSIS — E78.5 HYPERLIPIDEMIA LDL GOAL <100: ICD-10-CM

## 2018-01-21 DIAGNOSIS — J18.9 PNEUMONIA: ICD-10-CM

## 2018-01-21 DIAGNOSIS — E11.49 DIABETES MELLITUS TYPE 2 WITH NEUROLOGICAL MANIFESTATIONS (H): ICD-10-CM

## 2018-01-21 DIAGNOSIS — N18.4 CKD (CHRONIC KIDNEY DISEASE) STAGE 4, GFR 15-29 ML/MIN (H): ICD-10-CM

## 2018-01-21 PROBLEM — M62.81 GENERALIZED MUSCLE WEAKNESS: Status: ACTIVE | Noted: 2018-01-21

## 2018-01-21 LAB
ALBUMIN SERPL-MCNC: 3.1 G/DL (ref 3.4–5)
ALBUMIN UR-MCNC: 30 MG/DL
ALP SERPL-CCNC: 59 U/L (ref 40–150)
ALT SERPL W P-5'-P-CCNC: 17 U/L (ref 0–50)
ANION GAP SERPL CALCULATED.3IONS-SCNC: 4 MMOL/L (ref 3–14)
APPEARANCE UR: ABNORMAL
AST SERPL W P-5'-P-CCNC: 23 U/L (ref 0–45)
BACTERIA #/AREA URNS HPF: ABNORMAL /HPF
BASE EXCESS BLDV CALC-SCNC: 10 MMOL/L
BASOPHILS # BLD AUTO: 0 10E9/L (ref 0–0.2)
BASOPHILS NFR BLD AUTO: 0.5 %
BILIRUB SERPL-MCNC: 0.4 MG/DL (ref 0.2–1.3)
BILIRUB UR QL STRIP: NEGATIVE
BUN SERPL-MCNC: 44 MG/DL (ref 7–30)
CALCIUM SERPL-MCNC: 9.5 MG/DL (ref 8.5–10.1)
CHLORIDE SERPL-SCNC: 101 MMOL/L (ref 94–109)
CO2 SERPL-SCNC: 34 MMOL/L (ref 20–32)
COLOR UR AUTO: YELLOW
CREAT SERPL-MCNC: 1.95 MG/DL (ref 0.52–1.04)
CRP SERPL-MCNC: 103 MG/L (ref 0–8)
DIFFERENTIAL METHOD BLD: NORMAL
EOSINOPHIL # BLD AUTO: 0.3 10E9/L (ref 0–0.7)
EOSINOPHIL NFR BLD AUTO: 3.8 %
ERYTHROCYTE [DISTWIDTH] IN BLOOD BY AUTOMATED COUNT: 12.9 % (ref 10–15)
FLUAV+FLUBV AG SPEC QL: NEGATIVE
FLUAV+FLUBV AG SPEC QL: NEGATIVE
GFR SERPL CREATININE-BSD FRML MDRD: 25 ML/MIN/1.7M2
GLUCOSE BLDC GLUCOMTR-MCNC: 85 MG/DL (ref 70–99)
GLUCOSE SERPL-MCNC: 86 MG/DL (ref 70–99)
GLUCOSE UR STRIP-MCNC: NEGATIVE MG/DL
HBA1C MFR BLD: 6.2 % (ref 4.3–6)
HCO3 BLDV-SCNC: 37 MMOL/L (ref 21–28)
HCT VFR BLD AUTO: 42.4 % (ref 35–47)
HGB BLD-MCNC: 13.4 G/DL (ref 11.7–15.7)
HGB UR QL STRIP: NEGATIVE
HYALINE CASTS #/AREA URNS LPF: 4 /LPF (ref 0–2)
IMM GRANULOCYTES # BLD: 0 10E9/L (ref 0–0.4)
IMM GRANULOCYTES NFR BLD: 0.3 %
KETONES UR STRIP-MCNC: NEGATIVE MG/DL
LACTATE BLD-SCNC: 1 MMOL/L (ref 0.7–2)
LEUKOCYTE ESTERASE UR QL STRIP: ABNORMAL
LYMPHOCYTES # BLD AUTO: 1.7 10E9/L (ref 0.8–5.3)
LYMPHOCYTES NFR BLD AUTO: 22.7 %
MCH RBC QN AUTO: 30.5 PG (ref 26.5–33)
MCHC RBC AUTO-ENTMCNC: 31.6 G/DL (ref 31.5–36.5)
MCV RBC AUTO: 96 FL (ref 78–100)
MONOCYTES # BLD AUTO: 0.6 10E9/L (ref 0–1.3)
MONOCYTES NFR BLD AUTO: 7.9 %
NEUTROPHILS # BLD AUTO: 4.9 10E9/L (ref 1.6–8.3)
NEUTROPHILS NFR BLD AUTO: 64.8 %
NITRATE UR QL: POSITIVE
NT-PROBNP SERPL-MCNC: 582 PG/ML (ref 0–1800)
O2/TOTAL GAS SETTING VFR VENT: 28 %
PCO2 BLDV: 59 MM HG (ref 40–50)
PH BLDV: 7.4 PH (ref 7.32–7.43)
PH UR STRIP: 5 PH (ref 5–7)
PLATELET # BLD AUTO: 154 10E9/L (ref 150–450)
PO2 BLDV: 28 MM HG (ref 25–47)
POTASSIUM SERPL-SCNC: 4.7 MMOL/L (ref 3.4–5.3)
PROT SERPL-MCNC: 7.5 G/DL (ref 6.8–8.8)
RBC # BLD AUTO: 4.4 10E12/L (ref 3.8–5.2)
RBC #/AREA URNS AUTO: 1 /HPF (ref 0–2)
SODIUM SERPL-SCNC: 139 MMOL/L (ref 133–144)
SOURCE: ABNORMAL
SP GR UR STRIP: 1.01 (ref 1–1.03)
SPECIMEN SOURCE: NORMAL
SQUAMOUS #/AREA URNS AUTO: 1 /HPF (ref 0–1)
UROBILINOGEN UR STRIP-MCNC: 0 MG/DL (ref 0–2)
WBC # BLD AUTO: 7.6 10E9/L (ref 4–11)
WBC #/AREA URNS AUTO: 35 /HPF (ref 0–2)
WBC CLUMPS #/AREA URNS HPF: PRESENT /HPF

## 2018-01-21 PROCEDURE — 99285 EMERGENCY DEPT VISIT HI MDM: CPT | Mod: 25 | Performed by: EMERGENCY MEDICINE

## 2018-01-21 PROCEDURE — 87086 URINE CULTURE/COLONY COUNT: CPT | Performed by: PHYSICIAN ASSISTANT

## 2018-01-21 PROCEDURE — 81001 URINALYSIS AUTO W/SCOPE: CPT | Performed by: PHYSICIAN ASSISTANT

## 2018-01-21 PROCEDURE — 94640 AIRWAY INHALATION TREATMENT: CPT | Performed by: EMERGENCY MEDICINE

## 2018-01-21 PROCEDURE — 12000000 ZZH R&B MED SURG/OB

## 2018-01-21 PROCEDURE — 71045 X-RAY EXAM CHEST 1 VIEW: CPT | Mod: TC

## 2018-01-21 PROCEDURE — A9270 NON-COVERED ITEM OR SERVICE: HCPCS | Mod: GY | Performed by: FAMILY MEDICINE

## 2018-01-21 PROCEDURE — 25000132 ZZH RX MED GY IP 250 OP 250 PS 637: Mod: GY | Performed by: FAMILY MEDICINE

## 2018-01-21 PROCEDURE — 96367 TX/PROPH/DG ADDL SEQ IV INF: CPT | Performed by: EMERGENCY MEDICINE

## 2018-01-21 PROCEDURE — 83036 HEMOGLOBIN GLYCOSYLATED A1C: CPT | Performed by: FAMILY MEDICINE

## 2018-01-21 PROCEDURE — 86140 C-REACTIVE PROTEIN: CPT | Performed by: PHYSICIAN ASSISTANT

## 2018-01-21 PROCEDURE — 72170 X-RAY EXAM OF PELVIS: CPT | Mod: TC

## 2018-01-21 PROCEDURE — 83880 ASSAY OF NATRIURETIC PEPTIDE: CPT | Performed by: PHYSICIAN ASSISTANT

## 2018-01-21 PROCEDURE — 83605 ASSAY OF LACTIC ACID: CPT | Performed by: PHYSICIAN ASSISTANT

## 2018-01-21 PROCEDURE — 87040 BLOOD CULTURE FOR BACTERIA: CPT | Performed by: PHYSICIAN ASSISTANT

## 2018-01-21 PROCEDURE — 87804 INFLUENZA ASSAY W/OPTIC: CPT | Performed by: PHYSICIAN ASSISTANT

## 2018-01-21 PROCEDURE — 82803 BLOOD GASES ANY COMBINATION: CPT | Performed by: PHYSICIAN ASSISTANT

## 2018-01-21 PROCEDURE — 96365 THER/PROPH/DIAG IV INF INIT: CPT | Performed by: EMERGENCY MEDICINE

## 2018-01-21 PROCEDURE — 96361 HYDRATE IV INFUSION ADD-ON: CPT | Performed by: EMERGENCY MEDICINE

## 2018-01-21 PROCEDURE — 80053 COMPREHEN METABOLIC PANEL: CPT | Performed by: PHYSICIAN ASSISTANT

## 2018-01-21 PROCEDURE — 25000128 H RX IP 250 OP 636: Performed by: PHYSICIAN ASSISTANT

## 2018-01-21 PROCEDURE — 25000125 ZZHC RX 250: Performed by: PHYSICIAN ASSISTANT

## 2018-01-21 PROCEDURE — 87186 SC STD MICRODIL/AGAR DIL: CPT | Performed by: PHYSICIAN ASSISTANT

## 2018-01-21 PROCEDURE — 00000146 ZZHCL STATISTIC GLUCOSE BY METER IP

## 2018-01-21 PROCEDURE — 99207 ZZC CDG-MDM COMPONENT: MEETS MODERATE - UP CODED: CPT | Performed by: FAMILY MEDICINE

## 2018-01-21 PROCEDURE — 85025 COMPLETE CBC W/AUTO DIFF WBC: CPT | Performed by: PHYSICIAN ASSISTANT

## 2018-01-21 PROCEDURE — 87088 URINE BACTERIA CULTURE: CPT | Performed by: PHYSICIAN ASSISTANT

## 2018-01-21 PROCEDURE — 99223 1ST HOSP IP/OBS HIGH 75: CPT | Mod: AI | Performed by: FAMILY MEDICINE

## 2018-01-21 RX ORDER — ACETAMINOPHEN 325 MG/1
650 TABLET ORAL EVERY 4 HOURS PRN
Status: DISCONTINUED | OUTPATIENT
Start: 2018-01-21 | End: 2018-01-24 | Stop reason: HOSPADM

## 2018-01-21 RX ORDER — TRAZODONE HYDROCHLORIDE 50 MG/1
50 TABLET, FILM COATED ORAL AT BEDTIME
Status: DISCONTINUED | OUTPATIENT
Start: 2018-01-21 | End: 2018-01-24 | Stop reason: HOSPADM

## 2018-01-21 RX ORDER — LIDOCAINE 40 MG/G
CREAM TOPICAL
Status: DISCONTINUED | OUTPATIENT
Start: 2018-01-21 | End: 2018-01-24 | Stop reason: HOSPADM

## 2018-01-21 RX ORDER — HYDRALAZINE HYDROCHLORIDE 25 MG/1
25 TABLET, FILM COATED ORAL 3 TIMES DAILY
Status: DISCONTINUED | OUTPATIENT
Start: 2018-01-21 | End: 2018-01-22

## 2018-01-21 RX ORDER — ONDANSETRON 2 MG/ML
4 INJECTION INTRAMUSCULAR; INTRAVENOUS EVERY 6 HOURS PRN
Status: DISCONTINUED | OUTPATIENT
Start: 2018-01-21 | End: 2018-01-24 | Stop reason: HOSPADM

## 2018-01-21 RX ORDER — ONDANSETRON 4 MG/1
4 TABLET, ORALLY DISINTEGRATING ORAL EVERY 6 HOURS PRN
Status: DISCONTINUED | OUTPATIENT
Start: 2018-01-21 | End: 2018-01-24 | Stop reason: HOSPADM

## 2018-01-21 RX ORDER — DEXTROSE MONOHYDRATE 25 G/50ML
25-50 INJECTION, SOLUTION INTRAVENOUS
Status: DISCONTINUED | OUTPATIENT
Start: 2018-01-21 | End: 2018-01-24 | Stop reason: HOSPADM

## 2018-01-21 RX ORDER — HYDROCODONE BITARTRATE AND ACETAMINOPHEN 5; 325 MG/1; MG/1
1 TABLET ORAL EVERY 6 HOURS PRN
Status: DISCONTINUED | OUTPATIENT
Start: 2018-01-21 | End: 2018-01-24 | Stop reason: HOSPADM

## 2018-01-21 RX ORDER — IPRATROPIUM BROMIDE AND ALBUTEROL SULFATE 2.5; .5 MG/3ML; MG/3ML
3 SOLUTION RESPIRATORY (INHALATION) ONCE
Status: COMPLETED | OUTPATIENT
Start: 2018-01-21 | End: 2018-01-21

## 2018-01-21 RX ORDER — SODIUM CHLORIDE 9 MG/ML
1000 INJECTION, SOLUTION INTRAVENOUS CONTINUOUS
Status: DISCONTINUED | OUTPATIENT
Start: 2018-01-21 | End: 2018-01-22

## 2018-01-21 RX ORDER — NALOXONE HYDROCHLORIDE 0.4 MG/ML
.1-.4 INJECTION, SOLUTION INTRAMUSCULAR; INTRAVENOUS; SUBCUTANEOUS
Status: DISCONTINUED | OUTPATIENT
Start: 2018-01-21 | End: 2018-01-24 | Stop reason: HOSPADM

## 2018-01-21 RX ORDER — GABAPENTIN 400 MG/1
400 CAPSULE ORAL 2 TIMES DAILY
Status: DISCONTINUED | OUTPATIENT
Start: 2018-01-22 | End: 2018-01-23

## 2018-01-21 RX ORDER — NICOTINE POLACRILEX 4 MG
15-30 LOZENGE BUCCAL
Status: DISCONTINUED | OUTPATIENT
Start: 2018-01-21 | End: 2018-01-24 | Stop reason: HOSPADM

## 2018-01-21 RX ORDER — GABAPENTIN 400 MG/1
800 CAPSULE ORAL AT BEDTIME
Status: DISCONTINUED | OUTPATIENT
Start: 2018-01-21 | End: 2018-01-23

## 2018-01-21 RX ORDER — ALBUTEROL SULFATE 0.83 MG/ML
2.5 SOLUTION RESPIRATORY (INHALATION)
Status: DISCONTINUED | OUTPATIENT
Start: 2018-01-21 | End: 2018-01-24 | Stop reason: HOSPADM

## 2018-01-21 RX ADMIN — CEFTRIAXONE 1 G: 1 INJECTION, SOLUTION INTRAVENOUS at 18:55

## 2018-01-21 RX ADMIN — HYDRALAZINE HYDROCHLORIDE 25 MG: 25 TABLET ORAL at 22:20

## 2018-01-21 RX ADMIN — TRAZODONE HYDROCHLORIDE 50 MG: 50 TABLET ORAL at 22:20

## 2018-01-21 RX ADMIN — IPRATROPIUM BROMIDE AND ALBUTEROL SULFATE 3 ML: .5; 3 SOLUTION RESPIRATORY (INHALATION) at 18:58

## 2018-01-21 RX ADMIN — AZITHROMYCIN MONOHYDRATE 500 MG: 500 INJECTION, POWDER, LYOPHILIZED, FOR SOLUTION INTRAVENOUS at 19:45

## 2018-01-21 RX ADMIN — SODIUM CHLORIDE 1000 ML: 9 INJECTION, SOLUTION INTRAVENOUS at 17:42

## 2018-01-21 RX ADMIN — SODIUM CHLORIDE 1000 ML: 9 INJECTION, SOLUTION INTRAVENOUS at 19:17

## 2018-01-21 RX ADMIN — GABAPENTIN 800 MG: 400 CAPSULE ORAL at 22:20

## 2018-01-21 ASSESSMENT — ACTIVITIES OF DAILY LIVING (ADL)
DRESS: 0-->INDEPENDENT
RETIRED_EATING: 0-->INDEPENDENT
RETIRED_COMMUNICATION: 0-->UNDERSTANDS/COMMUNICATES WITHOUT DIFFICULTY
FALL_HISTORY_WITHIN_LAST_SIX_MONTHS: YES
SWALLOWING: 0-->SWALLOWS FOODS/LIQUIDS WITHOUT DIFFICULTY
TOILETING: 1-->ASSISTIVE EQUIPMENT
TRANSFERRING: 1-->ASSISTIVE EQUIPMENT
NUMBER_OF_TIMES_PATIENT_HAS_FALLEN_WITHIN_LAST_SIX_MONTHS: 1
BATHING: 0-->INDEPENDENT
COGNITION: 0 - NO COGNITION ISSUES REPORTED
WHICH_OF_THE_ABOVE_FUNCTIONAL_RISKS_HAD_A_RECENT_ONSET_OR_CHANGE?: AMBULATION
AMBULATION: 1-->ASSISTIVE EQUIPMENT

## 2018-01-21 ASSESSMENT — ENCOUNTER SYMPTOMS
CONSTIPATION: 1
SHORTNESS OF BREATH: 1
FEVER: 0
ABDOMINAL PAIN: 0
COUGH: 1
MYALGIAS: 1
VOMITING: 0
CHILLS: 1
NAUSEA: 0
NUMBNESS: 0
WEAKNESS: 1

## 2018-01-21 NOTE — IP AVS SNAPSHOT
"[address] MEDICAL SURGICAL: [phone]                                              INTERAGENCY TRANSFER FORM - PHYSICIAN ORDERS   2018                    Hospital Admission Date: 2018  [address]   : 1935  Sex: Female        Attending Provider: Jose Horvath MD     Allergies:  Lisinopril    Infection:  None   Service:  HOSPITALIST    Ht:  1.626 m (5' 4\")   Wt:  82 kg (180 lb 12.4 oz)   Admission Wt:  78.8 kg (173 lb 11.6 oz)    BMI:  31.03 kg/m 2   BSA:  1.92 m 2            Patient PCP Information     Provider PCP Type    Margarito Hoffman MD General      ED Clinical Impression     Diagnosis Description Comment Added By Time Added    Pneumonia [J18.9] Pneumonia [J18.9]  Daly Flores PA-C 2018  6:39 PM    Hypoxia [R09.02] Hypoxia [R09.02]  Daly Flores PA-C 2018  6:39 PM    Acute cystitis without hematuria [N30.00] Acute cystitis without hematuria [N30.00]  Daly Flores PA-C 2018  6:39 PM    Weakness [R53.1] Weakness [R53.1]  Daly Flores PA-C 2018  6:40 PM    Recurrent falls [R29.6] Recurrent falls [R29.6]  Daly Flores PA-C 2018  6:40 PM      Hospital Problems as of 2018              Priority Class Noted POA    Hyperlipidemia LDL goal <100 Low  10/31/2010 Yes    Advanced directives, counseling/discussion Low  3/27/2012 Yes    Peripheral autonomic neuropathy due to diabetes mellitus (H) Medium  2012 Yes    Hypercapnia High  2012 Yes    CKD (chronic kidney disease) stage 4, GFR 15-29 ml/min (H) Low  2012 Yes    Thrombocytopenia (H) Medium  2012 Yes    Acute encephalopathy Low  2012 Yes    Atelectasis - left base Medium  2012 Yes    Hypoxia Medium  2012 Yes    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 coli UTI Medium  1/21/2018 Yes    Generalized muscle weakness Medium  1/21/2018 Yes    Dementia - suspected Medium  1/23/2018 Yes    Hypoglycemia Medium  1/23/2018 No      Non-Hospital Problems as of 1/24/2018              Priority Class Noted    Generalized osteoarthrosis, unspecified site Medium  7/20/2004    Renal failure Medium  9/9/2004    Atherosclerosis of native arteries of the extremities with ulceration (H) Medium  7/14/2005    Unspecified osteomyelitis, other specified site Medium  8/31/2005    Degenerative disc disease Medium  Unknown    Subdural hematoma (H) Medium  6/18/2010    Estrogen deficiency Medium  12/12/2011    Osteoporosis Medium  12/22/2011    Femoral distal fracture (H) High  12/12/2012    Hip pain Low  12/12/2012    Left upper arm pain Low  12/12/2012    Anemia Medium  12/13/2012    Health Care Home Medium  12/6/2013    GERD (gastroesophageal reflux disease) Medium  12/31/2013    Dermatophytosis of nail Medium  2/6/2014    Corns and callosities Medium  2/6/2014    History of amputation of lesser toe (H) Medium  2/6/2014    Obesity (BMI 30-39.9) Medium  10/28/2015    Primary insomnia Medium  1/11/2016      Code Status History     Date Active Date Inactive Code Status Order ID Comments User Context    1/24/2018  8:36 AM  Full Code [id number]  Fermin Florentino MD Outpatient    1/21/2018  9:10 PM 1/24/2018  8:36 AM Full Code [id number]  Jose Horvath MD Inpatient    12/28/2012  1:05 PM 12/29/2012  2:46 PM DNR/DNI [id number]  Martha Zavala RN Inpatient    12/28/2012 12:31 PM 12/28/2012  1:05 PM DNR/DNI [id number]  Jacinto Morales MD Outpatient    12/21/2012  1:48 PM 12/28/2012 12:31 PM DNR/DNI [id number]  Letty Quigley MD Inpatient    12/12/2012 12:14 PM 12/19/2012  5:06 PM Full Code [id number]  Katie Dawson RN Inpatient         Medication Review      START taking        Dose / Directions Comments    acetaminophen 325 MG tablet   Commonly known as:  TYLENOL    Used for:  Pain in thoracic spine        Dose:  650 mg   Take 2 tablets (650 mg) by mouth every 4 hours as needed for mild pain   Quantity:  100 tablet   Refills:  0        amLODIPine 5 MG tablet   Commonly known as:  NORVASC   Used for:  Hypertension goal BP (blood pressure) < 130/80        Dose:  5 mg   Take 1 tablet (5 mg) by mouth daily   Quantity:  30 tablet   Refills:  0        cefdinir 300 MG capsule   Commonly known as:  OMNICEF   Indication:  Urinary Tract Infection        Dose:  300 mg   Start taking on:  1/25/2018   Take 1 capsule (300 mg) by mouth daily for 7 days   Refills:  0        insulin aspart 100 UNIT/ML injection   Commonly known as:  NovoLOG PEN   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  For  - 239 give 2 units.  For  - 289 give 3 units.  For  - 339 give 4 units.  For - 399 give 5 units.  For -449 give 6 units For  or higher give 7 units.   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 If we are uncertain of the size of tablets/capsules you have at home, strength may be listed as something that might have changed.        Dose / Directions Comments    gabapentin 300 MG capsule    Commonly known as:  NEURONTIN   This may have changed:  See the new instructions.   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)        Dose:  300 mg   Take 1 capsule (300 mg) by mouth 2 times daily   Quantity:  90 capsule   Refills:  0        hydrALAZINE 25 MG tablet   Commonly known as:  APRESOLINE   This may have changed:  how much to take   Used for:  Hypertension goal BP (blood pressure) < 130/80        Dose:  50 mg   Take 2 tablets (50 mg) by mouth 3 times daily   Quantity:  270 tablet   Refills:  1        HYDROcodone-acetaminophen 5-325 MG per tablet   Commonly known as:  NORCO   This may have changed:  reasons to take this   Used for:  Pain in thoracic spine        Dose:  1 tablet   Take 1 tablet by mouth every 6 hours as needed for moderate to severe pain   Quantity:  30 tablet   Refills:  0        simvastatin 80 MG tablet   Commonly known as:  ZOCOR   This may have changed:  additional instructions   Used for:  Hyperlipidemia LDL goal <100        Dose:  80 mg   Take 1 tablet (80 mg) by mouth At Bedtime   Quantity:  90 tablet   Refills:  3          CONTINUE these medications which have NOT CHANGED        Dose / Directions Comments    albuterol 108 (90 BASE) MCG/ACT Inhaler   Commonly known as:  PROAIR HFA/PROVENTIL HFA/VENTOLIN HFA   Used for:  Bronchitis        Dose:  2 puff   Inhale 2 puffs into the lungs every 6 hours as needed for shortness of breath / dyspnea or wheezing Ventolin inhaler   Quantity:  1 Inhaler   Refills:  5    Profile       ASPIRIN NOT PRESCRIBED   Commonly known as:  INTENTIONAL   Used for:  Type 2 diabetes, HbA1C goal < 8% (H)        by Other route continuous prn. 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  [employer] Autocode test strips   Quantity:  100 each   Refills:  2        cholecalciferol 400 UNIT Tabs tablet   Commonly known as:  vitamin D3        Dose:  2 tablet   Take 2 tablets by mouth daily.   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   Quantity:  100 each   Refills:  prn        insulin syringe-needle U-100 30G X 1/2\" 0.5 ML   Commonly known as:  BD insulin syringe ULTRAFINE   Used for:  Type 2 diabetes, HbA1C goal < 8% (H)        Use one syringe 4 times daily or as directed.   Quantity:  100 each   Refills:  11        * AtaxionCAN FINEPOINT LANCETS Misc   Used for:  Type 2 diabetes, HbA1C goal < 8% (H)        Dose:  1 Device   1 Device by Lancet route 4 times daily.   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   Quantity:  100 each   Refills:  2        multivitamin per tablet        1 TABLET DAILY   Quantity:  30   Refills:  0        OPTIHALER Kerry   Used for:  Bronchitis, Cough, SOB (shortness of breath)        As directed   Quantity:  1 Device   Refills:  0        traZODone 50 MG tablet   Commonly known as:  DESYREL   Used for:  Primary insomnia        TAKE ONE TABLET BY MOUTH AT BEDTIME   Quantity:  90 tablet   Refills:  3        * Notice:  This list has 4 medication(s) " that are the same as other medications prescribed for you. Read the directions carefully, and ask your doctor or other care provider to review them with you.      STOP taking     BASAGLAR 100 UNIT/ML injection           insulin aspart 100 UNITS/ML injection   Commonly known as:  NovoLOG VIAL   Replaced by:  insulin aspart 100 UNIT/ML injection           insulin glargine 100 UNIT/ML injection   Commonly known as:  LANTUS   Replaced by:  insulin glargine 100 UNIT/ML injection                   After Care     Activity - Up with assistive device           Advance Diet as Tolerated       Follow this diet upon discharge: Orders Placed This Encounter      Moderate Consistent CHO Diet       General info for SNF       Length of Stay Estimate: Short Term Care: Estimated # of Days <30  Condition at Discharge: Stable  Level of care:skilled   Rehabilitation Potential: Fair  Admission H&P remains valid and up-to-date: Yes  Recent Chemotherapy: N/A  Use Nursing Home Standing Orders: Yes       Glucose monitor nursing POCT       Before meals and at bedtime             Procedures     Oxygen - Nasal cannula       2 Lpm by nasal cannula to keep O2 sats 90% or greater.             Referrals     Occupational Therapy Adult Consult       Evaluate and treat as clinically indicated.    Reason:  E coli UTI, weakness, confusion, ?dementia       Physical Therapy Adult Consult       Evaluate and treat as clinically indicated.    Reason:  E coli UTI, weakness             Follow-Up Appointment Instructions     Future Labs/Procedures    Follow Up and recommended labs and tests     Comments:    [facility] with [facility].  The following labs/tests are recommended: gabapentin level in 1 week (due to dose reduction during hospital stay).      Follow-Up Appointment Instructions     Follow Up and recommended labs and tests       Follow up with jail physician.  The following labs/tests are recommended: gabapentin level in 1 week (due  to dose reduction during hospital stay).             Statement of Approval     Ordered          01/24/18 0846  I have reviewed and agree with all the recommendations and orders detailed in this document.  EFFECTIVE NOW     Approved and electronically signed by:  Fermin Florentino MD

## 2018-01-21 NOTE — IP AVS SNAPSHOT
"` `           [address] MEDICAL SURGICAL: [phone]                                              INTERAGENCY TRANSFER FORM - NURSING   2018                    Hospital Admission Date: 2018  [address]   : 1935  Sex: Female        Attending Provider: Jose Horvath MD     Allergies:  Lisinopril    Infection:  None   Service:  HOSPITALIST    Ht:  1.626 m (5' 4\")   Wt:  82 kg (180 lb 12.4 oz)   Admission Wt:  78.8 kg (173 lb 11.6 oz)    BMI:  31.03 kg/m 2   BSA:  1.92 m 2            Patient PCP Information     Provider PCP Type    Margarito Hoffman MD General      Current Code Status     Date Active Code Status Order ID Comments User Context       Prior      Code Status History     Date Active Date Inactive Code Status Order ID Comments User Context    2018  8:36 AM  Full Code [id number]  Fermin Florentino MD Outpatient    2018  9:10 PM 2018  8:36 AM Full Code [id number]  Jose Horvath MD Inpatient    2012  1:05 PM 2012  2:46 PM DNR/DNI [id number]  Martha Zavala RN Inpatient    2012 12:31 PM 2012  1:05 PM DNR/DNI [id number]  Jacinto Morales MD Outpatient    2012  1:48 PM 2012 12:31 PM DNR/DNI [id number]  Letty Quigley MD Inpatient    2012 12:14 PM 2012  5:06 PM Full Code [id number]  Katie Dawson, PRATIMA Inpatient      Advance Directives        Scanned docmt in ACP Activity?           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 coli UTI Medium  1/21/2018 Yes    Generalized muscle weakness Medium  1/21/2018 Yes    Dementia - suspected Medium  1/23/2018 Yes    Hypoglycemia Medium  1/23/2018 No      Non-Hospital Problems as of 1/24/2018              Priority Class Noted    Generalized osteoarthrosis, unspecified site Medium  7/20/2004    Renal failure Medium  9/9/2004    Atherosclerosis of native arteries of the extremities with ulceration (H) Medium  7/14/2005    Unspecified osteomyelitis, other specified site Medium  8/31/2005    Degenerative disc disease Medium  Unknown    Subdural hematoma (H) Medium  6/18/2010    Estrogen deficiency Medium  12/12/2011    Osteoporosis Medium  12/22/2011    Femoral distal fracture (H) High  12/12/2012    Hip pain Low  12/12/2012    Left upper arm pain Low  12/12/2012    Anemia Medium  12/13/2012    Health Care Home Medium  12/6/2013    GERD (gastroesophageal reflux disease) Medium  12/31/2013    Dermatophytosis of nail Medium  2/6/2014    Corns and callosities Medium  2/6/2014    History of amputation of lesser toe (H) Medium  2/6/2014    Obesity (BMI 30-39.9) Medium  10/28/2015    Primary insomnia Medium  1/11/2016      Immunizations     Name Date      HepB 07/20/04     HepB 02/18/04     HepB 01/21/04     Influenza (High Dose) 3 valent vaccine 11/08/17     Influenza (High Dose) 3 valent vaccine 09/07/16     Influenza (High Dose) 3 valent vaccine 10/21/15     Influenza (High Dose) 3 valent vaccine 10/15/14     Influenza (High Dose) 3 valent vaccine 11/05/13     Influenza (High Dose) 3 valent vaccine 09/11/12     Influenza (High Dose) 3 valent vaccine 10/26/11     Influenza (High Dose) 3 valent vaccine 11/19/10     Influenza (IIV3) PF 12/02/09      Influenza (IIV3) PF 11/19/08     Influenza (IIV3) PF 11/19/07     Influenza (IIV3) PF 11/14/06     Influenza (IIV3) PF 11/03/05     Influenza (IIV3) PF 10/20/04     Influenza (IIV3) PF 10/31/03     Influenza (IIV3) PF 10/28/02     Influenza (IIV3) PF 11/06/01     Influenza (IIV3) PF 11/24/00     Influenza (IIV3) PF 10/04/99     Influenza (IIV3) PF 10/19/98     Influenza (IIV3) PF 11/19/97     Pneumo Conj 13-V (2010&after) 12/08/17     Pneumococcal 23 valent 01/21/04     TD (ADULT, 7+) 07/19/99          END      ASSESSMENT     Discharge Profile Flowsheet     EXPECTED DISCHARGE     Last Bowel Movement  01/23/18 01/23/18 1609    Expected Discharge Date  01/24/18 01/24/18 1025   Passing flatus  yes 01/24/18 0122    DISCHARGE NEEDS ASSESSMENT     COMMUNICATION ASSESSMENT      Concerns To Be Addressed  denies needs/concerns at this time 06/22/17 1112   Patient's communication style  spoken language (English or Bilingual) 01/21/18 1703    Concerns Comments  Patient happy to be going to [facility] for rehabilitation 06/22/17 1112   FINAL RESOURCES      Patient/family verbalizes understanding of discharge plan recommendations?  Yes 01/24/18 1025   Resources List  Skilled Nursing Facility;Home Care 01/22/18 0958    Medical Team notified of plan?  yes 01/24/18 1025   Skilled Nursing Facility  [facility] [place] [phone], Fax: [phone] 01/23/18 1401    Readmission Within The Last 30 Days  no previous admission in last 30 days 01/22/18 0958   PAS Number  [id number] 01/23/18 1439    Anticipated Changes Related to Illness  inability to care for self 01/22/18 0958   SKIN      Equipment Currently Used at Home  walker, rolling 01/23/18 1035   Inspection of bony prominences  Full 01/24/18 0122    Transportation Available  van, wheelchair accessible ([employer] ) 01/24/18 1025   Skin WDL  ex;characteristics 01/24/18 0122    # of Referrals Placed by [facility]  Internal Clinic Care Coordination 01/22/18 0958   Skin  "Temperature  warm 01/24/18 1029    Key Recommendations  Short term rehab placement  01/24/18 1025   Skin Moisture  dry 01/24/18 1029    Does Patient Need a Referral for Clinic CC  Yes 01/22/18 0958   Skin Elasticity  slow return to original state 01/23/18 1609    Confirm patient is eligible for [facility] telemonitoring  No - b/c of payor 01/22/18 0958   Skin Integrity  bruise(s) 01/24/18 1029    Discussed w/pt use of [facility] telemonitoring and told [facility] staff would call within 72 hours  No 01/22/18 0958   SAFETY      Is patient in another telemonitoring program  No 01/22/18 0958   Safety WDL  ex 01/24/18 1029    Equipment Used at Home  rolling walker;standard walker (multiple walkers which she uses, cane - not used) 12/24/12 1057   All Alarms  alarm(s) activated and audible 01/24/18 1029    GASTROINTESTINAL (ADULT,PEDIATRIC,OB)     Additional Documentation  -- (fall risk) 01/21/18 2306    GI WDL  WDL 01/24/18 1029                      Assessment WDL (Within Defined Limits) Definitions           Safety WDL     Effective: 09/28/15    Row Information: <b>WDL Definition:</b> Bed in low position, wheels locked; call light in reach; upper side rails up x 2; ID band on<br> <font color=\"gray\"><i>Item=AS safety wdl>>List=AS safety wdl>>Version=F14</i></font>      Skin WDL     Effective: 09/28/15    Row Information: <b>WDL Definition:</b> Warm; dry; intact; elastic; without discoloration; pressure points without redness<br> <font color=\"gray\"><i>Item=AS skin wdl>>List=AS skin wdl>>Version=F14</i></font>      Vitals     Vital Signs Flowsheet     VITAL SIGNS     Comfort  negligible pain 01/21/18 2129    Temp  98.7  F (37.1  C) 01/24/18 0731   HEIGHT AND WEIGHT      Temp src  Oral 01/24/18 0731   Height  1.626 m (5' 4\") 01/21/18 2115    Resp  18 01/24/18 0731   Height Method  Stated 01/21/18 2115    Pulse  79 01/24/18 0731   Height Method  Estimated 01/21/18 1708    Heart Rate  79 01/24/18 0731   Weight  82 kg (180 lb 12.4 oz) " 01/21/18 2129    Pulse/Heart Rate Source  Monitor 01/24/18 0731   Weight Method  Bed scale 01/21/18 2129    BP  182/88 01/24/18 0731   BSA (Calculated - sq m)  1.92 01/21/18 2129    BP Location  Right arm 01/24/18 0731   BMI (Calculated)  31.1 01/21/18 2129    OXYGEN THERAPY     POSITIONING      SpO2  92 % 01/24/18 0731   Body Position  independently positioning 01/24/18 1029    O2 Device  Nasal cannula 01/24/18 0731   Chair  Upright in chair 01/23/18 1609    Oxygen Delivery  2 LPM 01/24/18 0731   DAILY CARE      PAIN/COMFORT     Activity Management  activity adjusted per tolerance 01/24/18 1029    Patient Currently in Pain  denies 01/23/18 1140   Activity Assistance Provided  assistance, 1 person 01/24/18 1029    Preferred Pain Scale  CAPA (Clinically Aligned Pain Assessment) ([facility] Adults Only) 01/22/18 0352   Additional Documentation  Activity Device Assistance (Row) 01/22/18 0041    CLINICALLY ALIGNED PAIN ASSESSMENT (CAPA) ([facility] ADULTS ONLY)                   Patient Lines/Drains/Airways Status    Active LINES/DRAINS/AIRWAYS     None            Patient Lines/Drains/Airways Status    Active PICC/CVC     None            Intake/Output Detail Report     Date Intake     Output Net    Shift P.O. I.V. IV Piggyback Total Urine Total       Noc 01/22/18 2300 - 01/23/18 0659 120 -- -- 120 -- -- 120    Day 01/23/18 0700 - 01/23/18 1459 240 -- -- 240 -- -- 240    Lizz 01/23/18 1500 - 01/23/18 2259 250 -- -- 250 -- -- 250    Noc 01/23/18 2300 - 01/24/18 0659 -- -- -- -- -- -- 0    Day 01/24/18 0700 - 01/24/18 1459 240 -- -- 240 250 250 -10      Last Void/BM       Most Recent Value    Urine Occurrence 1 at 01/24/2018 0502    Stool Occurrence 1 at 01/22/2018 1950      Case Management/Discharge Planning     Case Management/Discharge Planning Flowsheet     REFERRAL INFORMATION     Expected Discharge Date  01/24/18 01/24/18 1025    Admission Type  inpatient 01/22/18 0958    ASSESSMENT/CONCERNS TO BE ADDRESSED      Arrived From  admitted as an inpatient 01/22/18 0958   Concerns To Be Addressed  denies needs/concerns at this time 06/22/17 1112    Referral Source  interdisciplinary rounds 01/22/18 0958   Concerns Comments  Patient happy to be going to [facility] for rehabilitation 06/22/17 1112    # of Referrals Placed by CTS  Internal Clinic Care Coordination 01/22/18 0958   DISCHARGE PLANNING      Reason For Consult  discharge planning 01/22/18 0958   Patient/family verbalizes understanding of discharge plan recommendations?  Yes 01/24/18 1025    Record Reviewed  clinical discipline documentation;history and physical;medical record;patient profile;plan of care 01/22/18 0958   Medical Team notified of plan?  yes 01/24/18 1025    CTS Assigned to Case  [place] 01/22/18 0958   Readmission Within The Last 30 Days  no previous admission in last 30 days 01/22/18 0958    Primary Care [facility] Name  [facility] 01/22/18 0958   Anticipated Changes Related to Illness  inability to care for self 01/22/18 0958    Primary Care MD Name  Dr Hoffman 01/22/18 0958   Transportation Available  van, wheelchair accessible ([employer] ) 01/24/18 1025    LIVING ENVIRONMENT     Key Recommendations  Short term rehab placement  01/24/18 1025    Lives With  spouse 01/23/18 1030   Does Patient Need a Referral for Clinic CC  Yes 01/22/18 0958    Living Arrangements  house 01/23/18 1030   Skilled Nursing Facility  [facility] 12/19/12 1118    Provides Primary Care For  no one, unable/limited ability to care for self 01/22/18 0958   Equipment Used at Home  rolling walker;standard walker (multiple walkers which she uses, cane - not used) 12/24/12 1057    Primary Care Provided By  spouse/significant other 01/22/18 0958   FINAL NOTE      Quality Of Family Relationships  supportive;involved 01/22/18 0958   Final Note  Patient d/c to [facility] per [facility]  01/24/18 1025    Able to Return to Prior Living  Arrangements  other (see comments) (need PT eval for mobility) 01/22/18 0958   FINAL RESOURCES      HOME SAFETY     Equipment Currently Used at Home  walker, rolling 01/23/18 1035    Patient Feels Safe Living in Home?  yes 01/22/18 0958   Resources List  Skilled Nursing Facility;Home Care 01/22/18 0958    ASSESSMENT OF FAMILY/SOCIAL SUPPORT     Skilled Nursing Facility  [facility] [phone], Fax: [phone] 01/23/18 1408    Marital Status   01/22/18 0958   PAS Number  [id number] 01/23/18 1439    Who is your support system?   01/22/18 0958   ABUSE RISK SCREEN      Spouse's Name  Fermin 01/22/18 0958   QUESTION TO PATIENT:  Has a member of your family or a partner(now or in the past) intimidated, hurt, manipulated, or controlled you in any way?  patient declined to answer or is unable to answer (family present) 01/21/18 1711    Description of Support System  Supportive;Involved 01/22/18 0958   QUESTION TO PATIENT: Do you feel safe going back to the place where you are living?  patient declined to answer or is unable to answer 01/21/18 1711    Support Assessment  Lacks adequate physical care (patient need to be mobile to home) 01/22/18 0958   OBSERVATION: Is there reason to believe there has been maltreatment of a vulnerable adult (ie. Physical/Sexual/Emotional abuse, self neglect, lack of adequate food, shelter, medical care, or financial exploitation)?  no 01/21/18 1711    Quality of Family Relationships  supportive;involved 01/22/18 0958   (R) MENTAL HEALTH SUICIDE RISK      COPING/STRESS     Are you depressed or being treated for depression?  No 01/21/18 2127    Major Change/Loss/Stressor  none 01/21/18 2123   HOMICIDE RISK      EXPECTED DISCHARGE     Feels Like Hurting Others  no 01/21/18 1711

## 2018-01-21 NOTE — IP AVS SNAPSHOT
MRN:[id number]                      After Visit Summary   1/21/2018    Smiley Acuña    MRN: [id number]           Thank you!     Thank you for choosing [facility] for your care. Our goal is always to provide you with excellent care. Hearing back from our patients is one way we can continue to improve our services. Please take a few minutes to complete the written survey that you may receive in the mail after you visit with us. Thank you!        Patient Information     Date Of Birth          1935        Designated Caregiver       Most Recent Value    Caregiver    Will someone help with your care after discharge? no      About your hospital stay     You were admitted on:  January 21, 2018 You last received care in the:  [address] [facility]    You were discharged on:  January 24, 2018       Who to Call     For medical emergencies, please call 911.  For non-urgent questions about your medical care, please call your primary care provider or clinic, [phone]          Attending Provider     Provider Specialty    Daly Flores PA-C Physician Assistant    Jose Horvath MD [facility]       Primary Care Provider Office Phone # Fax #    Margarito Hoffman [phone]      After Care Instructions     Activity - Up with assistive device           Advance Diet as Tolerated       Follow this diet upon discharge: Orders Placed This Encounter      Moderate Consistent CHO Diet            General info for SNF       Length of Stay Estimate: Short Term Care: Estimated # of Days <30  Condition at Discharge: Stable  Level of care:skilled   Rehabilitation Potential: Fair  Admission H&P remains valid and up-to-date: Yes  Recent Chemotherapy: N/A  Use Nursing Home Standing Orders: Yes            Glucose monitor nursing POCT       Before meals and at bedtime            Oxygen - Nasal cannula       2 Lpm by nasal cannula to keep O2 sats 90% or greater.       "            Follow-up Appointments     Follow Up and recommended labs and tests       Follow up with [facility] physician.  The following labs/tests are recommended: gabapentin level in 1 week (due to dose reduction during hospital stay).                  Additional Services     Occupational Therapy Adult Consult       Evaluate and treat as clinically indicated.    Reason:  E coli UTI, weakness, confusion, ?dementia            Physical Therapy Adult Consult       Evaluate and treat as clinically indicated.    Reason:  E coli UTI, weakness                  Pending Results     Date and Time Order Name Status Description    1/23/2018 0001 Gabapentin level In process     1/21/2018 1723 Blood culture Preliminary     1/21/2018 1723 Blood culture Preliminary             Statement of Approval     Ordered          01/24/18 0846  I have reviewed and agree with all the recommendations and orders detailed in this document.  EFFECTIVE NOW     Approved and electronically signed by:  Fermin Florentino MD             Admission Information     Date & Time Provider Department Dept. Phone    1/21/2018 Jose Horvath MD [address] [facility] [phone]      Your Vitals Were     Blood Pressure Pulse Temperature Respirations Height Weight    182/88 (BP Location: Right arm) 79 98.7  F (37.1  C) (Oral) 18 1.626 m (5' 4\") 82 kg (180 lb 12.4 oz)    Pulse Oximetry BMI (Body Mass Index)                92% 31.03 kg/m2          [employer] Information     [employer] lets you send messages to your doctor, view your test results, renew your prescriptions, schedule appointments and more. To sign up, go to [email or web address] . Click on \"Log in\" on the left side of the screen, which will take you to the Welcome page. Then click on \"Sign up Now\" on the right side of the page.     You will be asked to enter the access code listed below, as well as some personal information. Please follow the directions to create your username " and password.     Your access code is: [email or web address]  Expires: 2018 12:19 PM     Your access code will  in 90 days. If you need help or a new code, please call your [facility] clinic or [phone].        Care EveryWhere ID     This is your Care EveryWhere ID. This could be used by other organizations to access your [facility] medical records  [id number]        Equal Access to Services     KRISTIE ASHER : Hadii michaela ku hadasho Soomaali, waaxda luqadaha, qaybta kaalmada adeegyada, aracely cummings alparosy castelan jhonkaylyn hodge . So [facility] [phone].    ATENCIÓN: Si habla espruby, tiene a rodriguez disposición servicios gratuitos de asistencia lingüística. Llame al [phone].    We comply with applicable federal civil rights laws and [place] laws. We do not discriminate on the basis of race, color, national origin, age, disability, sex, sexual orientation, or gender identity.               Review of your medicines      START taking        Dose / Directions    acetaminophen 325 MG tablet   Commonly known as:  TYLENOL   Used for:  Pain in thoracic spine        Dose:  650 mg   Take 2 tablets (650 mg) by mouth every 4 hours as needed for mild pain   Quantity:  100 tablet   Refills:  0       amLODIPine 5 MG tablet   Commonly known as:  NORVASC   Used for:  Hypertension goal BP (blood pressure) < 130/80        Dose:  5 mg   Take 1 tablet (5 mg) by mouth daily   Quantity:  30 tablet   Refills:  0       cefdinir 300 MG capsule   Commonly known as:  OMNICEF   Indication:  Urinary Tract Infection        Dose:  300 mg   Start taking on:  2018   Take 1 capsule (300 mg) by mouth daily for 7 days   Refills:  0       insulin aspart 100 UNIT/ML injection   Commonly known as:  NovoLOG PEN   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  For   - 239 give 2 units.  For  - 289 give 3 units.  For  - 339 give 4 units.  For - 399 give 5 units.  For -449 give 6 units For  or higher give 7 units.   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 If we are uncertain of the size of tablets/capsules you have at home, strength may be listed as something that might have changed.        Dose / Directions    gabapentin 300 MG capsule   Commonly known as:  NEURONTIN   This may have changed:  See the new instructions.   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)        Dose:  300 mg   Take 1 capsule (300 mg) by mouth 2 times daily   Quantity:  90 capsule   Refills:  0       hydrALAZINE 25 MG tablet   Commonly known as:  APRESOLINE   This may have changed:  how much to take   Used for:  Hypertension goal BP (blood pressure) < 130/80        Dose:  50 mg   Take 2 tablets (50 mg) by mouth 3 times daily   Quantity:  270 tablet   Refills:  1       HYDROcodone-acetaminophen 5-325 MG per tablet   Commonly known as:  NORCO   This may have changed:  reasons to take this   Used for:  Pain in thoracic spine        Dose:  1 tablet   Take 1 tablet by mouth every 6 hours as needed for moderate to severe pain   Quantity:  30 tablet   Refills:  0       simvastatin 80 MG tablet   Commonly known as:  ZOCOR   This may have changed:  additional  "instructions   Used for:  Hyperlipidemia LDL goal <100        Dose:  80 mg   Take 1 tablet (80 mg) by mouth At Bedtime   Quantity:  90 tablet   Refills:  3         CONTINUE these medicines which have NOT CHANGED        Dose / Directions    albuterol 108 (90 BASE) MCG/ACT Inhaler   Commonly known as:  PROAIR HFA/PROVENTIL HFA/VENTOLIN HFA   Used for:  Bronchitis        Dose:  2 puff   Inhale 2 puffs into the lungs every 6 hours as needed for shortness of breath / dyspnea or wheezing Ventolin inhaler   Quantity:  1 Inhaler   Refills:  5       ASPIRIN NOT PRESCRIBED   Commonly known as:  INTENTIONAL   Used for:  Type 2 diabetes, HbA1C goal < 8% (H)        by Other route continuous prn. 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  Prodigy Autocode test strips   Quantity:  100 each   Refills:  2       cholecalciferol 400 UNIT Tabs tablet   Commonly known as:  vitamin D3        Dose:  2 tablet   Take 2 tablets by mouth daily.   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   Quantity:  100 each   Refills:  prn       insulin syringe-needle U-100 30G X 1/2\" 0.5 ML   Commonly known as:  BD insulin syringe ULTRAFINE   Used for:  Type 2 diabetes, HbA1C " goal < 8% (H)        Use one syringe 4 times daily or as directed.   Quantity:  100 each   Refills:  11       * LIFESCAN FINEPOINT LANCETS Misc   Used for:  Type 2 diabetes, HbA1C goal < 8% (H)        Dose:  1 Device   1 Device by Lancet route 4 times daily.   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   Quantity:  100 each   Refills:  2       multivitamin per tablet        1 TABLET DAILY   Quantity:  30   Refills:  0       OPTIHALER Kerry   Used for:  Bronchitis, Cough, SOB (shortness of breath)        As directed   Quantity:  1 Device   Refills:  0       traZODone 50 MG tablet   Commonly known as:  DESYREL   Used for:  Primary insomnia        TAKE ONE TABLET BY MOUTH AT BEDTIME   Quantity:  90 tablet   Refills:  3       * Notice:  This list has 4 medication(s) that are the same as other medications prescribed for you. Read the directions carefully, and ask your doctor or other care provider to review them with you.      STOP taking     BASAGLAR 100 UNIT/ML injection           insulin aspart 100 UNITS/ML injection   Commonly known as:  NovoLOG VIAL   Replaced by:  insulin aspart 100 UNIT/ML injection           insulin glargine 100 UNIT/ML injection   Commonly known as:  LANTUS   Replaced by:  insulin glargine 100 UNIT/ML injection                Where to get your medicines      Some of these will need a paper prescription and others can be bought over the counter. Ask your nurse if you have questions.     Bring a paper prescription for each of these medications     HYDROcodone-acetaminophen 5-325 MG per tablet       You don't need a prescription for these medications     acetaminophen 325 MG tablet    amLODIPine 5 MG tablet    cefdinir 300 MG capsule    gabapentin 300 MG capsule    hydrALAZINE 25 MG tablet    insulin aspart 100 UNIT/ML injection     insulin glargine 100 UNIT/ML injection    simvastatin 80 MG tablet    torsemide 10 MG tablet                Protect others around you: Learn how to safely use, store and throw away your medicines at www.disposemymeds.org.        ANTIBIOTIC INSTRUCTION     You've Been Prescribed an Antibiotic - Now What?  Your healthcare team thinks that you or your loved one might have an infection. Some infections can be treated with antibiotics, which are powerful, life-saving drugs. Like all medications, antibiotics have side effects and should only be used when necessary. There are some important things you should know about your antibiotic treatment.      Your healthcare team may run tests before you start taking an antibiotic.    Your team may take samples (e.g., from your blood, urine or other areas) to run tests to look for bacteria. These test can be important to determine if you need an antibiotic at all and, if you do, which antibiotic will work best.      Within a few days, your healthcare team might change or even stop your antibiotic.    Your team may start you on an antibiotic while they are working to find out what is making you sick.    Your team might change your antibiotic because test results show that a different antibiotic would be better to treat your infection.    In some cases, once your team has more information, they learn that you do not need an antibiotic at all. They may find out that you don't have an infection, or that the antibiotic you're taking won't work against your infection. For example, an infection caused by a virus can't be treated with antibiotics. Staying on an antibiotic when you don't need it is more likely to be harmful than helpful.      You may experience side effects from your antibiotic.    Like all medications, antibiotics have side effects. Some of these can be serious.    Let you healthcare team know if you have any known allergies when you are admitted to the hospital.    One  significant side effect of nearly all antibiotics is the risk of severe and sometimes deadly diarrhea caused by Clostridium difficile (C. Difficile). This occurs when a person takes antibiotics because some good germs are destroyed. Antibiotic use allows C. diificile to take over, putting patients at high risk for this serious infection.    As a patient or caregiver, it is important to understand your or your loved one's antibiotic treatment. It is especially important for caregivers to speak up when patients can't speak for themselves. Here are some important questions to ask your healthcare team.    What infection is this antibiotic treating and how do you know I have that infection?    What side effects might occur from this antibiotic?    How long will I need to take this antibiotic?    Is it safe to take this antibiotic with other medications or supplements (e.g., vitamins) that I am taking?     Are there any special directions I need to know about taking this antibiotic? For example, should I take it with food?    How will I be monitored to know whether my infection is responding to the antibiotic?    What tests may help to make sure the right antibiotic is prescribed for me?      Information provided by:  www.cdc.gov/getsmart  U.S. Department of Health and Human Services  Centers for disease Control and Prevention  National Center for Emerging and Zoonotic Infectious Diseases  Division of Healthcare Quality Promotion        Information about OPIOIDS     PRESCRIPTION OPIOIDS: WHAT YOU NEED TO KNOW    Prescription opioids can be used to help relieve moderate to severe pain and are often prescribed following a surgery or injury, or for certain health conditions. These medications can be an important part of treatment but also come with serious risks. It is important to work with your health care provider to make sure you are getting the safest, most effective care.    WHAT ARE THE RISKS AND SIDE EFFECTS OF  OPIOID USE?  Prescription opioids carry serious risks of addiction and overdose, especially with prolonged use. An opioid overdose, often marked by slowed breathing can cause sudden death. The use of prescription opioids can have a number of side effects as well, even when taken as directed:      Tolerance - meaning you might need to take more of a medication for the same pain relief    Physical dependence - meaning you have symptoms of withdrawal when a medication is stopped    Increased sensitivity to pain    Constipation    Nausea, vomiting, and dry mouth    Sleepiness and dizziness    Confusion    Depression    Low levels of testosterone that can result in lower sex drive, energy, and strength    Itching and sweating    RISKS ARE GREATER WITH:    History of drug misuse, substance use disorder, or overdose    Mental health conditions (such as depression or anxiety)    Sleep apnea    Older age (65 years or older)    Pregnancy    Avoid alcohol while taking prescription opioids.   Also, unless specifically advised by your health care provider, medications to avoid include:    Benzodiazepines (such as Xanax or Valium)    Muscle relaxants (such as Soma or Flexeril)    Hypnotics (such as Ambien or Lunesta)    Other prescription opioids    KNOW YOUR OPTIONS:  Talk to your health care provider about ways to manage your pain that do not involve prescription opioids. Some of these options may actually work better and have fewer risks and side effects:    Pain relievers such as acetaminophen, ibuprofen, and naproxen    Some medications that are also used for depression or seizures    Physical therapy and exercise    Cognitive behavioral therapy, a psychological, goal-directed approach, in which patients learn how to modify physical, behavioral, and emotional triggers of pain and stress    IF YOU ARE PRESCRIBED OPIOIDS FOR PAIN:    Never take opioids in greater amounts or more often than prescribed    Follow up with your  primary health care provider and work together to create a plan on how to manage your pain.    Talk about ways to help manage your pain that do not involve prescription opioids    Talk about all concerns and side effects    Help prevent misuse and abuse    Never sell or share prescription opioids    Never use another person's prescription opioids    Store prescription opioids in a secure place and out of reach of others (this may include visitors, children, friends, and family)    Visit www.cdc.gov/drugoverdose to learn about risks of opioid abuse and overdose    If you believe you may be struggling with addiction, tell your health care provider and ask for guidance or call [facility]'s National Helpline at 3-468-778-HELP    LEARN MORE / www.cdc.gov/drugoverdose/prescribing/guideline.html    Safely dispose of unused prescription opioids: Find your local drug take-back programs and more information about the importance of safe disposal at www.doseofreality.mn.gov             Medication List: This is a list of all your medications and when to take them. Check marks below indicate your daily home schedule. Keep this list as a reference.      Medications           Morning Afternoon Evening Bedtime As Needed    acetaminophen 325 MG tablet   Commonly known as:  TYLENOL   Take 2 tablets (650 mg) by mouth every 4 hours as needed for mild pain                                albuterol 108 (90 BASE) MCG/ACT Inhaler   Commonly known as:  PROAIR HFA/PROVENTIL HFA/VENTOLIN HFA   Inhale 2 puffs into the lungs every 6 hours as needed for shortness of breath / dyspnea or wheezing Ventolin inhaler                                amLODIPine 5 MG tablet   Commonly known as:  NORVASC   Take 1 tablet (5 mg) by mouth daily   Last time this was given:  5 mg on 1/24/2018 10:00 AM                                ASPIRIN NOT PRESCRIBED   Commonly known as:  INTENTIONAL   by Other route continuous prn. Not prescribed due to subdural hematoma  history.                                betamethasone dipropionate 0.05 % lotion   Commonly known as:  DIPROSONE   Apply topically to scalp once daily                                * blood glucose monitoring test strip   Commonly known as:  ONETOUCH TEST STRIPS   test 4x daily (has One Touch Ultra 2 machine                                * blood glucose monitoring test strip   Commonly known as:  no brand specified   Use to test blood sugar two times daily or as directed.  [employer] Autocode test strips                                cefdinir 300 MG capsule   Commonly known as:  OMNICEF   Take 1 capsule (300 mg) by mouth daily for 7 days   Start taking on:  1/25/2018   Last time this was given:  300 mg on 1/24/2018 10:00 AM                                cholecalciferol 400 UNIT Tabs tablet   Commonly known as:  vitamin D3   Take 2 tablets by mouth daily.                                gabapentin 300 MG capsule   Commonly known as:  NEURONTIN   Take 1 capsule (300 mg) by mouth 2 times daily   Last time this was given:  300 mg on 1/24/2018 10:01 AM                                hydrALAZINE 25 MG tablet   Commonly known as:  APRESOLINE   Take 2 tablets (50 mg) by mouth 3 times daily   Last time this was given:  50 mg on 1/24/2018 10:01 AM                                HYDROcodone-acetaminophen 5-325 MG per tablet   Commonly known as:  NORCO   Take 1 tablet by mouth every 6 hours as needed for moderate to severe pain                                insulin aspart 100 UNIT/ML injection   Commonly known as:  NovoLOG PEN   Inject 1-7 Units Subcutaneous 3 times daily (before meals) For  - 189 give 1 unit.  For  - 239 give 2 units.  For  - 289 give 3 units.  For  - 339 give 4 units.  For - 399 give 5 units.  For -449 give 6 units For  or higher give 7 units.   Last time this was given:  7 Units on 1/23/2018 12:19 PM                                insulin glargine 100 UNIT/ML  "injection   Commonly known as:  LANTUS   Inject 10 Units Subcutaneous every morning   Last time this was given:  10 Units on 1/24/2018  8:00 AM                                insulin pen needle 31G X 8 MM   Commonly known as:  B-D U/F   Use 1 daily or as directed.                                insulin syringe-needle U-100 30G X 1/2\" 0.5 ML   Commonly known as:  BD insulin syringe ULTRAFINE   Use one syringe 4 times daily or as directed.                                * LIFESCAN FINEPOINT LANCETS Misc   1 Device by Lancet route 4 times daily.                                * blood glucose monitoring lancets   Use to test blood sugar two times daily or as directed.                                multivitamin per tablet   1 TABLET DAILY                                OPTIHALER Kerry   As directed                                simvastatin 80 MG tablet   Commonly known as:  ZOCOR   Take 1 tablet (80 mg) by mouth At Bedtime                                torsemide 10 MG tablet   Commonly known as:  DEMADEX   Take 1 tablet (10 mg) by mouth daily   Last time this was given:  10 mg on 1/24/2018 10:02 AM                                traZODone 50 MG tablet   Commonly known as:  DESYREL   TAKE ONE TABLET BY MOUTH AT BEDTIME   Last time this was given:  50 mg on 1/23/2018  9:43 PM                                * Notice:  This list has 4 medication(s) that are the same as other medications prescribed for you. Read the directions carefully, and ask your doctor or other care provider to review them with you.              More Information        Pneumonia (Adult)  Pneumonia is an infection deep within the lungs. It is in the small air sacs (alveoli). Pneumonia may be caused by a virus or bacteria. Pneumonia caused by bacteria is usually treated with an antibiotic. Severe cases may need to be treated in the hospital. Milder cases can be treated at home. Symptoms usually start to get better during the first 2 days of " treatment.    Home care  Follow these guidelines when caring for yourself at home:    Rest at home for the first 2 to 3 days, or until you feel stronger. Don t let yourself get overly tired when you go back to your activities.    Stay away from cigarette smoke - yours or other people s.    You may use acetaminophen or ibuprofen to control fever or pain, unless another medicine was prescribed. If you have chronic liver or kidney disease, talk with your healthcare provider before using these medicines. Also talk with your provider if you ve had a stomach ulcer or gastrointestinal bleeding. Don t give aspirin to anyone younger than 18 years of age who is ill with a fever. It may cause severe liver damage.    Your appetite may be poor, so a light diet is fine.    Drink 6 to 8 glasses of fluids every day to make sure you are getting enough fluids. Beverages can include water, sport drinks, sodas without caffeine, juices, tea, or soup. Fluids will help loosen secretions in the lung. This will make it easier for you to cough up the phlegm (sputum). If you also have heart or kidney disease, check with your healthcare provider before you drink extra fluids.    Take antibiotic medicine prescribed until it is all gone, even if you are feeling better after a few days.  Follow-up care  Follow up with your healthcare provider in the next 2 to 3 days, or as advised. This is to be sure the medicine is helping you get better.  If you are 65 or older, you should get a pneumococcal vaccine and a yearly flu (influenza) shot. You should also get these vaccines if you have chronic lung disease like asthma, emphysema, or COPD. Recently, a second type of pneumonia vaccine has become available for everyone over 65 years old. This is in addition to the previous vaccine. Ask your provider about this.  When to seek medical advice  Call your healthcare provider right away if any of these occur:    You don t get better within the first 48 hours  of treatment    Shortness of breath gets worse    Rapid breathing (more than 25 breaths per minute)    Coughing up blood    Chest pain gets worse with breathing    Fever of 100.4 F (38 C) or higher that doesn t get better with fever medicine    Weakness, dizziness, or fainting that gets worse    Thirst or dry mouth that gets worse    Sinus pain, headache, or a stiff neck    Chest pain not caused by coughing  Date Last Reviewed: 1/1/2017 2000-2017 [employer]. [address] [place] [address]. All rights reserved. This information is not intended as a substitute for professional medical care. Always follow your healthcare professional's instructions.

## 2018-01-21 NOTE — LETTER
Transition Communication Hand-off for Care Transitions to Next Level of Care Provider    Name: Smiley Acuña  MRN #: [id number]  Primary Care Provider: Margarito Hoffman  Primary Care MD Name: Dr Hoffman  Primary Clinic: [facility] [address] [place] [address]  Primary Care Clinic Name: [facility] [place]  Reason for Hospitalization:  Pneumonia [J18.9]  Weakness [R53.1]  Hypoxia [R09.02]  Acute cystitis without hematuria [N30.00]  Recurrent falls [R29.6]  Admit Date/Time: 1/21/2018  5:01 PM  Discharge Date: 1/24/2018  Payor Source: Payor: MEDICARE / Plan: MEDICARE / Product Type: Medicare /     Readmission Assessment Measure (CARLINE) Risk Score/category:  Average     Reason for Communication Hand-off Referral: Admission diagnoses: PN  Fragility  Other Patient going to [facility] [place] for short trem rehab     Discharge Plan:  Discharge Plan:       Most Recent Value    Disposition Comments [facility] [place]            Concern for non-adherence with plan of care:   Y/N: None   Discharge Needs Assessment:  Needs       Most Recent Value    Anticipated Changes Related to Illness inability to care for self    Equipment Currently Used at Home walker, rolling    Transportation Available van, wheelchair accessible [Handi- Van ]    # of Referrals Placed by [facility] Care Coordination    Skilled Nursing Facility [facility] [phone], Fax: [phone]    PAS Number [id number]          Already enrolled in Tele-monitoring program and name of program:  No  Follow-up specialty is recommended: No    Follow-up plan:  No future appointments.    Any outstanding tests or procedures:    Procedures     Future Labs/Procedures    Oxygen - Nasal cannula     Comments:    2 Lpm by nasal cannula to keep O2 sats 90% or greater.          Referrals     Future Labs/Procedures    Occupational Therapy Adult Consult     Comments:    Evaluate and treat as clinically indicated.    Reason:  E coli UTI, weakness, confusion, ?dementia     Physical Therapy Adult Consult     Comments:    Evaluate and treat as clinically indicated.    Reason:  E coli UTI, weakness            Key Recommendations: Key Recommendations: Short term rehab placement     Zoraida Yang  Social Work student     Sandra Byrne   Co-signer     AVS/Discharge Summary is the source of truth; this is a helpful guide for improved communication of patient story

## 2018-01-21 NOTE — IP AVS SNAPSHOT
` `     [address] SURGICAL: [phone]            Medication Administration Report for Smiley Acuña as of 01/24/18 1056   Legend:    Given Hold Not Given Due Canceled Entry Other Actions    Time Time (Time) Time  Time-Action       Inactive    Active    Linked        Medications 01/18/18 01/19/18 01/20/18 01/21/18 01/22/18 01/23/18 01/24/18    acetaminophen (TYLENOL) tablet 650 mg  Dose: 650 mg  Freq: EVERY 4 HOURS PRN Route: PO  PRN Reason: mild pain  Start: 01/21/18 2110   Admin Instructions: Alternate ibuprofen (if ordered) with acetaminophen.  Maximum acetaminophen dose from all sources = 75 mg/kg/day not to exceed 4 grams/day.               albuterol neb solution 2.5 mg  Dose: 2.5 mg  Freq: EVERY 2 HOURS PRN Route: NEBULIZATION  PRN Reason: other  PRN Comment: dyspnea  Start: 01/21/18 2110        0401 (2.5 mg)-Given             amLODIPine (NORVASC) tablet 5 mg  Dose: 5 mg  Freq: DAILY Route: PO  Start: 01/23/18 0900   Admin Instructions: Hold for SBP < 100          0823 (5 mg)-Given        1000 (5 mg)-Given           cefdinir (OMNICEF) capsule 300 mg  Dose: 300 mg  Freq: DAILY Route: PO  Indications of Use: URINARY TRACT INFECTION  Start: 01/23/18 0930         0941 (300 mg)-Given        1000 (300 mg)-Given           gabapentin (NEURONTIN) capsule 300 mg  Dose: 300 mg  Freq: 2 TIMES DAILY Route: PO  Start: 01/24/18 0900          1001 (300 mg)-Given       [ ] 1200           glucose 40 % gel 15-30 g  Dose: 15-30 g  Freq: EVERY 15 MIN PRN Route: PO  PRN Reason: low blood sugar  Start: 01/21/18 2110   Admin Instructions: Give 15 g for BG 51 to 69 mg/dL IF patient is conscious and able to swallow. Give 30 g for BG less than or equal to 50 mg/dL IF patient is conscious and able to swallow. Do NOT give glucose gel via enteral tube.  IF patient has enteral tube: give apple juice 120 mL (4 oz or 15 g of CHO) via enteral tube for BG 51 to 69 mg/dL.  Give apple juice 240 mL (8 oz or 30 g of  CHO) via enteral tube for BG less than or equal to 50 mg/dL.    ~Oral gel is preferable for conscious and able to swallow patient.   ~IF gel unavailable or patient refuses may provide apple juice 120 mL (4 oz or 15 g of CHO). Document juice on I and O flowsheet.         2052 (30 g)-Given       2120 (15 g)-Given            Or  dextrose 50 % injection 25-50 mL  Dose: 25-50 mL  Freq: EVERY 15 MIN PRN Route: IV  PRN Reason: low blood sugar  Start: 01/21/18 2110   Admin Instructions: Use if have IV access, BG less than 70 mg/dL and meet dose criteria below:  Dose if conscious and alert (or disorientated) and NPO = 25 mL  Dose if unconscious / not alert = 50 mL  Vesicant. For ordered doses up to 25 mg, give IV Push undiluted. Give each 5g over 1 minute.                            Or  glucagon injection 1 mg  Dose: 1 mg  Freq: EVERY 15 MIN PRN Route: SC  PRN Reason: low blood sugar  PRN Comment: May repeat x 1 only  Start: 01/21/18 2110   Admin Instructions: May give SQ or IM. ONLY use glucagon IF patient has NO IV access AND is UNABLE to swallow AND blood glucose is LESS than or EQUAL to 50 mg/dL.  Give IV Push over 1 minute. Reconstitute with 1mL sterile water.                             hydrALAZINE (APRESOLINE) tablet 50 mg  Dose: 50 mg  Freq: 3 TIMES DAILY Route: PO  Start: 01/22/18 2100 2009 (50 mg)-Given        0824 (50 mg)-Given       1411 (50 mg)-Given       2143 (50 mg)-Given        1001 (50 mg)-Given       [ ] 1400       [ ] 2100           HYDROcodone-acetaminophen (NORCO) 5-325 MG per tablet 1 tablet  Dose: 1 tablet  Freq: EVERY 6 HOURS PRN Route: PO  PRN Reason: moderate to severe pain  Start: 01/21/18 2110   Admin Instructions: Maximum acetaminophen dose from all sources= 75 mg/kg/day not to exceed 4 grams               insulin aspart (NovoLOG) inj (RAPID ACTING)  Dose: 1-5 Units  Freq: AT BEDTIME Route: SC  Start: 01/21/18 2115   Admin Instructions: MEDIUM INSULIN RESISTANCE DOSING    Do Not give  Bedtime Correction Insulin if BG less than  200.   For  - 249 give 1 units.   For  - 299 give 2 units.   For  - 349 give 3 units.   For  -399 give 4 units.   For BG greater than or equal to 400 give 5 units.  Notify provider if glucose greater than or equal to 350 mg/dL after administration of correction dose.  If given at mealtime, must be administered 5 min before meal or immediately after.        (2218)-Not Given [C]        (2242)-Not Given        (2138)-Not Given [C]        [ ] 2100           insulin aspart (NovoLOG) inj (RAPID ACTING)  Dose: 1-7 Units  Freq: 3 TIMES DAILY BEFORE MEALS Route: SC  Start: 01/22/18 0800   Admin Instructions: Correction Scale - MEDIUM INSULIN RESISTANCE DOSING     Do Not give Correction Insulin if Pre-Meal BG less than 140.   For Pre-Meal  - 189 give 1 unit.   For Pre-Meal  - 239 give 2 units.   For Pre-Meal  - 289 give 3 units.   For Pre-Meal  - 339 give 4 units.   For Pre-Meal - 399 give 5 units.   For Pre-Meal -449 give 6 units  For Pre-Meal BG greater than or equal to 450 give 7 units.   To be given with prandial insulin, and based on pre-meal blood glucose.    Notify provider if glucose greater than or equal to 350 mg/dL after administration of correction dose.  If given at mealtime, must be administered 5 min before meal or immediately after.         (0722)-Not Given       1217 (2 Units)-Given       (1652)-Not Given        (0734)-Not Given       (1143)-Not Given       (1700)-Not Given        (0756)-Not Given       [ ] 1200       [ ] 1700           insulin glargine (LANTUS) injection 10 Units  Dose: 10 Units  Freq: EVERY MORNING BEFORE BREAKFAST Route: SC  Start: 01/23/18 0745         0937 (10 Units)-Given        0800 (10 Units)-Given           lidocaine (LMX4) kit  Freq: EVERY 1 HOUR PRN Route: Top  PRN Reason: pain  PRN Comment: with VAD insertion or accessing implanted port.  Start: 01/21/18 2110   Admin  "Instructions: Do NOT give if patient has a history of allergy to any local anesthetic or any \"emily\" product.   Apply 30 minutes prior to VAD insertion or port access.  MAX Dose:  2.5 g (  of 5 g tube)               lidocaine 1 % 1 mL  Dose: 1 mL  Freq: EVERY 1 HOUR PRN Route: OTHER  PRN Comment: mild pain with VAD insertion or accessing implanted port  Start: 01/21/18 2110   Admin Instructions: Do NOT give if patient has a history of allergy to any local anesthetic or any \"emily\" product. MAX dose 1 mL subcutaneous OR intradermal in divided doses.               melatonin tablet 1 mg  Dose: 1 mg  Freq: AT BEDTIME PRN Route: PO  PRN Reason: sleep  Start: 01/21/18 2110   Admin Instructions: Do not give unless at least 6 hours of uninterrupted sleep is expected.               naloxone (NARCAN) injection 0.1-0.4 mg  Dose: 0.1-0.4 mg  Freq: EVERY 2 MIN PRN Route: IV  PRN Reason: opioid reversal  Start: 01/21/18 2110   Admin Instructions: For respiratory rate LESS than or EQUAL to 8.  Partial reversal dose:  0.1 mg titrated q 2 minutes for Analgesia Side Effects Monitoring Sedation Level of 3 (frequently drowsy, arousable, drifts to sleep during conversation).Full reversal dose:  0.4 mg bolus for Analgesia Side Effects Monitoring Sedation Level of 4 (somnolent, minimal or no response to stimulation).  For ordered doses up to 2mg give IVP. Give each 0.4mg over 15 seconds in emergency situations. For non-emergent situations further dilute in 9mL of NS to facilitate titration of response.               ondansetron (ZOFRAN-ODT) ODT tab 4 mg  Dose: 4 mg  Freq: EVERY 6 HOURS PRN Route: PO  PRN Reasons: nausea,vomiting  Start: 01/21/18 2110   Admin Instructions: This is Step 1 of nausea and vomiting management.  If nausea not resolved in 15 minutes, go to Step 2 prochlorperazine (COMPAZINE). Do not push through foil backing. Peel back foil and gently remove. Place on tongue immediately. Administration with liquid " unnecessary  With dry hands, peel back foil backing and gently remove tablet; do not push oral disintegrating tablet through foil backing; administer immediately on tongue and oral disintegrating tablet dissolves in seconds; then swallow with saliva; liquid not required.              Or  ondansetron (ZOFRAN) injection 4 mg  Dose: 4 mg  Freq: EVERY 6 HOURS PRN Route: IV  PRN Reasons: nausea,vomiting  Start: 01/21/18 2110   Admin Instructions: This is Step 1 of nausea and vomiting management.  If nausea not resolved in 15 minutes, go to Step 2 prochlorperazine (COMPAZINE).  Irritant. For ordered doses up to 4 mg, give IV Push undiluted over 2-5 minutes.               sodium chloride (PF) 0.9% PF flush 3 mL  Dose: 3 mL  Freq: EVERY 8 HOURS Route: IK  Start: 01/21/18 2115   Admin Instructions: And Q1H PRN, to lock peripheral IV dormant line.        (2209)-Not Given        (0428)-Not Given       (1218)-Not Given       1530 (3 mL)-Given       2008 (3 mL)-Given       (2242)-Not Given [C]        0404 (3 mL)-Given       (0627)-Not Given [C]       1413 (3 mL)-Given       2144 (3 mL)-Given               [ ] 1400       [ ] 2200           sodium chloride (PF) 0.9% PF flush 3 mL  Dose: 3 mL  Freq: EVERY 1 HOUR PRN Route: IK  PRN Reason: line flush  PRN Comment: for peripheral IV flush post IV meds  Start: 01/21/18 2110              torsemide (DEMADEX) tablet 10 mg  Dose: 10 mg  Freq: DAILY Route: PO  Start: 01/22/18 1530        1531 (10 mg)-Given        0824 (10 mg)-Given        1002 (10 mg)-Given           traZODone (DESYREL) tablet 50 mg  Dose: 50 mg  Freq: AT BEDTIME Route: PO  Start: 01/21/18 2115       2220 (50 mg)-Given        2009 (50 mg)-Given        2143 (50 mg)-Given        [ ] 2100          Completed Medications  Medications 01/18/18 01/19/18 01/20/18 01/21/18 01/22/18 01/23/18 01/24/18         Dose: 1,000 mL  Freq: ONCE Route: IV  Last Dose: Stopped (01/21/18 1854)  Start: 01/21/18 1724   End: 01/21/18 1854       1747  (1,000 mL)-New Bag       1854-Stopped                Dose: 3 mL  Freq: ONCE Route: NEBULIZATION  Start: 01/21/18 1841   End: 01/21/18 1858   Admin Instructions: For shortness of breath        1858 (3 mL)-Given             Discontinued Medications  Medications 01/18/18 01/19/18 01/20/18 01/21/18 01/22/18 01/23/18 01/24/18         Rate: 125 mL/hr Dose: 1000 mL  Freq: CONTINUOUS Route: IV  Last Dose: Stopped (01/22/18 1500)  Start: 01/21/18 1724   End: 01/22/18 1224   Admin Instructions: Administer after the bolus.        1917 (1,000 mL)-New Bag       2054-ED Infusing on Admission/transfer        0401 (1,000 mL)-New Bag       1210 (1,000 mL)-New Bag       1224-Med Discontinued  1500-Stopped       1224-Med Discontinued  1521-Stopped               Dose: 500 mg  Freq: EVERY 24 HOURS Route: IV  Indications of Use: COMMUNITY ACQUIRED PNEUMONIA  Last Dose: Stopped (01/21/18 2045)  Start: 01/21/18 1842   End: 01/22/18 1224       1945 (500 mg)-New Bag       2045-Stopped [C]        1224-Med Discontinued           Dose: 1 g  Freq: EVERY 24 HOURS Route: IV  Indications of Use: COMMUNITY ACQUIRED PNEUMONIA,URINARY TRACT INFECTION  Last Dose: 1 g (01/22/18 1803)  Start: 01/21/18 1825   End: 01/23/18 0917       1855 (1 g)-New Bag       1925-Stopped        1803 (1 g)-New Bag        0917-Med Discontinued          Dose: 400 mg  Freq: 2 TIMES DAILY Route: PO  Start: 01/22/18 0900   End: 01/23/18 1423        0859 (400 mg)-Given       1217 (400 mg)-Given        0823 (400 mg)-Given       1153 (400 mg)-Given       1423-Med Discontinued          Dose: 800 mg  Freq: AT BEDTIME Route: PO  Start: 01/21/18 2115   End: 01/23/18 1423       2220 (800 mg)-Given        2008 (800 mg)-Given        1423-Med Discontinued          Dose: 25 mg  Freq: 3 TIMES DAILY Route: PO  Start: 01/21/18 2115   End: 01/22/18 1515       2220 (25 mg)-Given        0859 (25 mg)-Given       1431 (25 mg)-Given       1515-Med Discontinued           Dose: 7 Units  Freq: DAILY  WITH SUPPER Route: SC  Start: 01/21/18 2115   End: 01/23/18 1705   Admin Instructions: Do not give if pre-prandial glucose is less than 60 mg/dL.  If given at mealtime, must be administered 5 min before meal or immediately after.        (2209)-Not Given [C]        1758 (7 Units)-Given        1705-Med Discontinued  (1706)-Not Given              Dose: 7 Units  Freq: DAILY WITH LUNCH Route: SC  Start: 01/22/18 1200   End: 01/23/18 1705   Admin Instructions: Do not give if pre-prandial glucose is less than 60 mg/dL.  If given at mealtime, must be administered 5 min before meal or immediately after.         1218 (7 Units)-Given        1219 (7 Units)-Given       1705-Med Discontinued          Dose: 5 Units  Freq: EVERY MORNING BEFORE BREAKFAST Route: SC  Start: 01/22/18 0730   End: 01/23/18 1705   Admin Instructions: Do not give if pre-prandial glucose is less than 60 mg/dL.  If given at mealtime, must be administered 5 min before meal or immediately after.         1017 (5 Units)-Given        0942 (5 Units)-Given       1705-Med Discontinued          Dose: 18 Units  Freq: EVERY MORNING BEFORE BREAKFAST Route: SC  Start: 01/22/18 0730   End: 01/23/18 0740        0900 (18 Units)-Given        (0730)-Not Given       0740-Med Discontinued     Medications 01/18/18 01/19/18 01/20/18 01/21/18 01/22/18 01/23/18 01/24/18

## 2018-01-21 NOTE — IP AVS SNAPSHOT
` ` Patient Information     Patient Name Sex     Smiley Acuña ([id number]) Female 1935       Room Bed    [address] [facility]-      Patient Demographics     Address Phone    [address]  [place] [place] [address] [phone] (Home)      Patient Ethnicity & Race     Ethnic Group Patient Race    American White      Emergency Contact(s)     Name Relation Home Work Mobile    BETTY Acuña Spouse [phone]      GRISELDA THOMPSON Daughter [phone]      Documents on File        Status Date Received Description       Documents for the Patient    Privacy Notice - [place] Received 08     Insurance Card Received () 10/25/10     Face Sheet Received () 10/09/08     Clinical Unknown   TRANSFERRED RECORDS    Insurance Card Received () 06     Insurance Card Received () 10/25/10     External Medication Information Consent Accepted () 09     Insurance Card Received () 09     Face Sheet Received () 09     External Medication Information Consent Accepted () 07/07/10     Patient ID Received 11 lifetime [place] ID     Consent for Services - Hospital/Clinic Received () 11/19/10     HIM JONO Authorization   DR. DUBON - 10/28/10    HIM JONO Authorization  12/17/10 [facility] 9-28-10    [facility] JONO Authorization - File Only  11 GREAT STEPS - 11    HIM JONO Authorization  11 PRGX    Consent for Services - Hospital/Clinic Received () 11     Other Received 11 [employer] cob    Insurance Card Received () 11     Other Received 11 BCBS COB FORM    External Medication Information Consent Accepted () 10/26/11     HIM JONO Authorization   MARCIAL STEPS 12    Consent for Services - Hospital/Clinic Received () 12     Consent for Services - Hospital/Clinic Received () 12     Advance Directives and Living Will Received 03     [facility] JONO  Authorization  12 RAC - 8/15/12    External Medication Information Consent Accepted 12     Insurance Card Received () 12 bcbs    Consent to Communicate   PHI - 12    Advance Directives and Living Will Received 12 ADVANCED PLANNING QRRX-15-29-12    Consent for EHR Access  13 Copied from existing Consent for services - C/HOD collected on 2012    [facility] Specified Other       Insurance Card Received () 13 bcbs    Insurance Card Received () 13 medicare    Speech Therapy Certification Received 13     Business/Insurance/Care Coordination/Health Form - Patient.1  13 ADULT REHABILITATION ATTENDANCE POLICY    Consent for Services - Hospital/Clinic Received () 13     Insurance Card Received () 14 BCBS    Consent for Services - Hospital/Clinic Received () 14     Consent for Services - Hospital/Clinic Received () 16     Consent for Services/Privacy Notice - Hospital/Clinic Received () 16     Insurance Card Received () 16 bcbs    Physical Therapy Certification Received 16     Business/Insurance/Care Coordination/Health Form - Patient  10/11/16 ADULT REHABILITATION ATTENDANCE POLICY    HIM JONO Authorization  17 [facility]    Insurance Card Received 17 Medicare    Insurance Card Received 17 BCBS    Insurance Card Received 17 RX Cards    Consent for Services/Privacy Notice - Hospital/Clinic Received 17     Care Everywhere Prospective Auth Received 17     Insurance Card Received 18 BCBS    Insurance Card Received 18 RX Card    Insurance Card  (Deleted)         Documents for the Encounter    CMS IM for Patient Signature Received 18     Discharge Attachment   ADULT, PNEUMONIA (ENGLISH)      Admission Information     Attending Provider Admitting Provider Admission Type Admission Date/Time    Yuliet  MD Yuliet Kern Wilfred Edwin, MD Emergency 01/21/18  1701    Discharge Date Hospital Service Auth/Cert Status Service Area     Hospitalist [facility]    Unit Room/Bed Admission Status       PH 2A MEDICAL SURGICAL 251/251-01 Admission (Confirmed)       Admission     Complaint    UTI (urinary tract infection)      Hospital Account     Name Acct ID Class Status Primary Coverage    Smiley Acuña [id number] Inpatient Open MEDICARE - MEDICARE            Guarantor Account (for Hospital Account #[id number])     Name Relation to Pt Service Area Active? Acct Type    Smiley Acuña  FCS Yes Personal/Family    Address Phone          [address] [address] [phone](H)              Coverage Information (for Hospital Account #[id number])     1. MEDICARE/MEDICARE     F/O Payor/Plan Precert #    MEDICARE/MEDICARE     Subscriber Subscriber #    Smiley Acuña BRISEYDA [id number]    Address Phone    [employer]  [address]  [place] [address] [phone]          2. BC[place]     F/O Payor/Plan Precert #    [place]     Subscriber Subscriber #    Smiley Acuña BRISEYDA [id number]    Address Phone    [address] [address]  [place], [place] [id number] [phone]

## 2018-01-21 NOTE — IP AVS SNAPSHOT
"          [address] SURGICAL: [phone]                                              INTERAGENCY TRANSFER FORM - LAB / IMAGING / EKG / EMG RESULTS   2018                    Hospital Admission Date: 2018  [address]   : 1935  Sex: Female        Attending Provider: Jose Horvath MD     Allergies:  Lisinopril    Infection:  None   Service:  HOSPITALIST    Ht:  1.626 m (5' 4\")   Wt:  82 kg (180 lb 12.4 oz)   Admission Wt:  78.8 kg (173 lb 11.6 oz)    BMI:  31.03 kg/m 2   BSA:  1.92 m 2            Patient PCP Information     Provider PCP Type    Margarito Hoffman MD General         Lab Results - 3 Days      Glucose by meter [[id number]] (Abnormal)  Resulted: 18 075, Result status: Final result    Ordering provider: Jose Horvath MD  18 075 Resulting lab: POINT OF CARE TEST, GLUCOSE    Specimen Information    Type Source Collected On     18 075          Components       Value Reference Range Flag Lab   Glucose 117 70 - 99 mg/dL H 170            Blood culture [[id number]]  Resulted: 18, Result status: Preliminary result    Ordering provider: Daly Flores PA-C  18 1723 Resulting lab: [facility]    Specimen Information    Type Source Collected On   Blood Arm, Forearm Only, Right 18 1735   Comment:  Right Arm          Components       Value Reference Range Flag Lab   Specimen Description Blood Right Arm      Culture Micro No growth after 3 days   [facility]            Blood culture [[id number]]  Resulted: 18, Result status: Preliminary result    Ordering provider: Daly Flores PA-C  18 1723 Resulting lab: [facility]    Specimen Information    Type Source Collected On   Blood Arm, Forearm Only, Left 18 1750   Comment:  Left Arm          Components       Value Reference Range Flag Lab   Specimen Description Blood Left Arm      Culture " Micro No growth after 3 days   [facility]            Basic metabolic panel [[id number]] (Abnormal)  Resulted: 01/24/18 0622, Result status: Final result    Ordering provider: Fermin Florentino MD  01/24/18 0000 Resulting lab: [facility]    Specimen Information    Type Source Collected On   Blood  01/24/18 0552          Components       Value Reference Range Flag Lab   Sodium 142 133 - 144 mmol/L  [facility]   Potassium 4.0 3.4 - 5.3 mmol/L  [facility]   Chloride 103 94 - 109 mmol/L  [facility]   Carbon Dioxide 31 20 - 32 mmol/L  [facility]   Anion Gap 8 3 - 14 mmol/L  [facility]   Glucose 97 70 - 99 mg/dL  [facility]   Urea Nitrogen 38 7 - 30 mg/dL H [facility]   Creatinine 1.62 0.52 - 1.04 mg/dL H [facility]   GFR Estimate 30 >60 mL/min/1.7m2 L [facility]   Comment:  Non  GFR Calc   GFR Estimate If Black 37 >60 mL/min/1.7m2 L [facility]   Comment:  African American GFR Calc   Calcium 8.6 8.5 - 10.1 mg/dL  [facility]            Glucose by meter [[id number]] (Abnormal)  Resulted: 01/24/18 0126, Result status: Final result    Ordering provider: Jose Horvath MD  01/24/18 0122 Resulting lab: POINT OF CARE TEST, GLUCOSE    Specimen Information    Type Source Collected On     01/24/18 0122          Components       Value Reference Range Flag Lab   Glucose 121 70 - 99 mg/dL H 170            Glucose by meter [[id number]] (Abnormal)  Resulted: 01/23/18 2040, Result status: Final result    Ordering provider: Jose Horvath MD  01/23/18 2036 Resulting lab: POINT OF CARE TEST, GLUCOSE    Specimen Information    Type Source Collected On     01/23/18 2036          Components       Value Reference Range Flag Lab   Glucose 165 70 - 99 mg/dL H 170            Glucose by meter [[id number]]  Resulted: 01/23/18 1626, Result status: Final result    Ordering provider: Jose Horvath MD  01/23/18 1622 Resulting lab: POINT OF CARE TEST, GLUCOSE    Specimen Information    Type Source Collected On     01/23/18 1622           Components       Value Reference Range Flag Lab   Glucose 81 70 - 99 mg/dL  170            Respiratory Virus Panel by PCR [[id number]]  Resulted: 01/23/18 1456, Result status: Final result    Ordering provider: Jose Horvath MD  01/21/18 2110 Resulting lab: [facility]    Specimen Information    Type Source Collected On   Nares  01/22/18 1115          Components       Value Reference Range Flag Lab   Respiratory Virus Source Nares   [facility]   Influenza A Negative NEG^Negative  75   Influenza A, H1 Negative NEG^Negative  75   Influenza A, H3 Negative NEG^Negative  75   Influenza A 2009 H1N1 Negative NEG^Negative  75   Influenza B Negative NEG^Negative  75   Respiratory Syncytial Virus A Negative NEG^Negative  75   Respiratory Syncytial Virus B Negative NEG^Negative  75   Parainfluenza Virus 1 Negative NEG^Negative  75   Parainfluenza Virus 2 Negative NEG^Negative  75   Parainfluenza Virus 3 Negative NEG^Negative  75   Human Metapneumovirus Negative NEG^Negative  75   Human Rhinovirus Negative NEG^Negative  75   Adenovirus Species B/E Negative NEG^Negative  75   Adenovirus Species C Negative NEG^Negative  75   Respiratory Virus Comment --   75   Comment:         The test detects Influenza A (H1 and H3), Influenza A 2009 H1N1, Influenza B,   Respiratory Syncytial Virus A, Respiratory Syncytial Virus B, Parainfluenza   Virus (1, 2 and 3), Human Metapneumovirus, Human Rhinovirus (HRV), Adenovirus   B/E and Adenovirus C.  The assay has received FDA approval for the testing of nasopharyngeal (NP)   swabs only. The [facility] at [facility] [place], has validated the performance   characteristics of the [employer] Respiratory Viral Panel for NP swabs, bronchial   alveolar lavage/wash, nasopharyngeal aspirate/wash and nasal wash.      Result:         The [employer] Respiratory Viral Panel (RVP) is a qualitative, multiplex,  diagnostic test for   simultaneous detection and identification of multiple respiratory viral nucleic acids in   individuals exhibiting signs and symptoms of respiratory infection. It uses innovative   eSensor technology to provide sensitive and specific respiratory virus detection.              Glucose by meter [[id number]] (Abnormal)  Resulted: 01/23/18 1145, Result status: Final result    Ordering provider: Jose Horvath MD  01/23/18 1140 Resulting lab: POINT OF CARE TEST, GLUCOSE    Specimen Information    Type Source Collected On     01/23/18 1140          Components       Value Reference Range Flag Lab   Glucose 130 70 - 99 mg/dL H 170            Glucose by meter [[id number]] (Abnormal)  Resulted: 01/23/18 0735, Result status: Final result    Ordering provider: Jose Horvath MD  01/23/18 0733 Resulting lab: POINT OF CARE TEST, GLUCOSE    Specimen Information    Type Source Collected On     01/23/18 0733          Components       Value Reference Range Flag Lab   Glucose 116 70 - 99 mg/dL H 170            Basic metabolic panel [[id number]] (Abnormal)  Resulted: 01/23/18 0607, Result status: Final result    Ordering provider: Fermin Florentino MD  01/23/18 0001 Resulting lab: [facility]    Specimen Information    Type Source Collected On   Blood  01/23/18 0527          Components       Value Reference Range Flag Lab   Sodium 142 133 - 144 mmol/L  [facility]   Potassium 4.4 3.4 - 5.3 mmol/L  [facility]   Chloride 106 94 - 109 mmol/L  [facility]   Carbon Dioxide 31 20 - 32 mmol/L  [facility]   Anion Gap 5 3 - 14 mmol/L  [facility]   Glucose 78 70 - 99 mg/dL  [facility]   Urea Nitrogen 30 7 - 30 mg/dL  [facility]   Creatinine 1.60 0.52 - 1.04 mg/dL H [facility]   GFR Estimate 31 >60 mL/min/1.7m2 L [facility]   Comment:  Non  GFR Calc   GFR Estimate If Black 37 >60 mL/min/1.7m2 L [facility]   Comment:  African American GFR Calc   Calcium 8.0 8.5 - 10.1 mg/dL L [facility]            Gabapentin level  [[id number]]  Resulted: 01/23/18 0545, Result status: In process    Ordering provider: Fermin Florentino MD  01/23/18 0001 Resulting lab: [facility]    Specimen Information    Type Source Collected On   Blood  01/23/18 0527            Glucose by meter [[id number]]  Resulted: 01/23/18 0410, Result status: Final result    Ordering provider: Jose Horvath MD  01/23/18 0403 Resulting lab: POINT OF CARE TEST, GLUCOSE    Specimen Information    Type Source Collected On     01/23/18 0403          Components       Value Reference Range Flag Lab   Glucose 94 70 - 99 mg/dL  170            Glucose by meter [[id number]]  Resulted: 01/23/18 0211, Result status: Final result    Ordering provider: Jose Horvath MD  01/23/18 0201 Resulting lab: POINT OF CARE TEST, GLUCOSE    Specimen Information    Type Source Collected On     01/23/18 0201          Components       Value Reference Range Flag Lab   Glucose 92 70 - 99 mg/dL  170            Methicillin resistant staph aureus cult [[id number]]  Resulted: 01/23/18 0108, Result status: Final result    Ordering provider: Jose Horvath MD  01/22/18 0056 Resulting lab: [facility]    Specimen Information    Type Source Collected On   Nose  01/22/18 0059          Components       Value Reference Range Flag Lab   Specimen Description Nose      Culture Micro No MRSA isolated   [facility] Lab            Glucose by meter [[id number]] (Abnormal)  Resulted: 01/22/18 2146, Result status: Final result    Ordering provider: Jose Horvath MD  01/22/18 2139 Resulting lab: POINT OF CARE TEST, GLUCOSE    Specimen Information    Type Source Collected On     01/22/18 2139          Components       Value Reference Range Flag Lab   Glucose 145 70 - 99 mg/dL H 170            Urine Culture [[id number]] (Abnormal)  Resulted: 01/22/18 2137, Result status: Final result    Ordering provider: Daly Flores PA-C  01/21/18 1723 Resulting lab: [facility]  [facility]    Specimen Information    Type Source Collected On   Catheterized Urine Urine catheter 01/21/18 1800          Components       Value Reference Range Flag Lab   Specimen Description Catheterized Urine      Special Requests Specimen received in preservative   75   Culture Micro --  A 75   Result:         >100,000 colonies/mL  Escherichia coli              Glucose by meter [[id number]] (Abnormal)  Resulted: 01/22/18 2121, Result status: Final result    Ordering provider: Jose Horvath MD  01/22/18 2115 Resulting lab: POINT OF CARE TEST, GLUCOSE    Specimen Information    Type Source Collected On     01/22/18 2115          Components       Value Reference Range Flag Lab   Glucose 67 70 - 99 mg/dL L 170            Glucose by meter [[id number]] (Abnormal)  Resulted: 01/22/18 2121, Result status: Final result    Ordering provider: Jose Horvath MD  01/22/18 2049 Resulting lab: POINT OF CARE TEST, GLUCOSE    Specimen Information    Type Source Collected On     01/22/18 2049          Components       Value Reference Range Flag Lab   Glucose 46 70 - 99 mg/dL    Comment:  /RN Notified            Glucose by meter [[id number]] (Abnormal)  Resulted: 01/22/18 2121, Result status: Final result    Ordering provider: Jose Horvath MD  01/22/18 1637 Resulting lab: POINT OF CARE TEST, GLUCOSE    Specimen Information    Type Source Collected On     01/22/18 1637          Components       Value Reference Range Flag Lab   Glucose 109 70 - 99 mg/dL H 170            Glucose by meter [[id number]] (Abnormal)  Resulted: 01/22/18 1131, Result status: Final result    Ordering provider: Jose Horvath MD  01/22/18 1126 Resulting lab: POINT OF CARE TEST, GLUCOSE    Specimen Information    Type Source Collected On     01/22/18 1126          Components       Value Reference Range Flag Lab   Glucose 209 70 - 99 mg/dL H 170            Glucose by meter [[id number]]  Resulted: 01/22/18 0725, Result  status: Final result    Ordering provider: Jose Horvath MD  01/22/18 0720 Resulting lab: POINT OF CARE TEST, GLUCOSE    Specimen Information    Type Source Collected On     01/22/18 0720          Components       Value Reference Range Flag Lab   Glucose 95 70 - 99 mg/dL  170            Basic metabolic panel [[id number]] (Abnormal)  Resulted: 01/22/18 0658, Result status: Final result    Ordering provider: Jose Horvath MD  01/22/18 0000 Resulting lab: [facility]    Specimen Information    Type Source Collected On   Blood  01/22/18 0628          Components       Value Reference Range Flag Lab   Sodium 142 133 - 144 mmol/L  [facility]   Potassium 4.3 3.4 - 5.3 mmol/L  [facility]   Chloride 107 94 - 109 mmol/L  [facility]   Carbon Dioxide 30 20 - 32 mmol/L  [facility]   Anion Gap 5 3 - 14 mmol/L  [facility]   Glucose 95 70 - 99 mg/dL  [facility]   Urea Nitrogen 35 7 - 30 mg/dL H [facility]   Creatinine 1.64 0.52 - 1.04 mg/dL H [facility]   GFR Estimate 30 >60 mL/min/1.7m2 L [facility]   Comment:  Non  GFR Calc   GFR Estimate If Black 36 >60 mL/min/1.7m2 L [facility]   Comment:  African American GFR Calc   Calcium 8.2 8.5 - 10.1 mg/dL L [facility]            Blood gas venous [[id number]] (Abnormal)  Resulted: 01/22/18 0646, Result status: Final result    Ordering provider: Jose Horvath MD  01/22/18 0000 Resulting lab: [facility]    Specimen Information    Type Source Collected On   Blood  01/22/18 0628          Components       Value Reference Range Flag Lab   Ph Venous 7.41 7.32 - 7.43 pH  [facility]   PCO2 Venous 50 40 - 50 mm Hg  [facility]   PO2 Venous 51 25 - 47 mm Hg H [facility]   Bicarbonate Venous 31 21 - 28 mmol/L H [facility]   Base Excess Venous 5.5 mmol/L  [facility]   Comment:  Abnormal Result, Ref range: -7.7 to 1.9   FIO2 35   [facility]            CBC with platelets [[id number]] (Abnormal)  Resulted: 01/22/18 0642, Result status: Final result    Ordering provider:  Jose Horvath MD  01/22/18 0000 Resulting lab: [facility]    Specimen Information    Type Source Collected On   Blood  01/22/18 0628          Components       Value Reference Range Flag Lab   WBC 7.5 4.0 - 11.0 10e9/L  [facility]   RBC Count 3.87 3.8 - 5.2 10e12/L  [facility]   Hemoglobin 11.9 11.7 - 15.7 g/dL  [facility]   Hematocrit 37.6 35.0 - 47.0 %  [facility]   MCV 97 78 - 100 fl  [facility]   MCH 30.7 26.5 - 33.0 pg  [facility]   MCHC 31.6 31.5 - 36.5 g/dL  [facility]   RDW 12.8 10.0 - 15.0 %  [facility]   Platelet Count 134 150 - 450 10e9/L L [facility]            Glucose by meter [[id number]]  Resulted: 01/22/18 0246, Result status: Final result    Ordering provider: Jose Horvath MD  01/22/18 0243 Resulting lab: POINT OF CARE TEST, GLUCOSE    Specimen Information    Type Source Collected On     01/22/18 0243          Components       Value Reference Range Flag Lab   Glucose 78 70 - 99 mg/dL  170            Glucose by meter [[id number]]  Resulted: 01/21/18 2231, Result status: Final result    Ordering provider: Jose Horvath MD  01/21/18 2216 Resulting lab: POINT OF CARE TEST, GLUCOSE    Specimen Information    Type Source Collected On     01/21/18 2216          Components       Value Reference Range Flag Lab   Glucose 85 70 - 99 mg/dL  170            Hemoglobin A1c [[id number]] (Abnormal)  Resulted: 01/21/18 2121, Result status: Final result    Ordering provider: Jose Horvath MD  01/21/18 2110 Resulting lab: [facility]    Specimen Information    Type Source Collected On   Blood  01/21/18 1735          Components       Value Reference Range Flag Lab   Hemoglobin A1C 6.2 4.3 - 6.0 % H [facility]            Nt probnp inpatient (BNP) [[id number]]  Resulted: 01/21/18 1932, Result status: Final result    Ordering provider: Daly Flores PA-C  01/21/18 1912 Resulting lab: [facility]    Specimen Information    Type Source Collected On   Blood   01/21/18 1735          Components       Value Reference Range Flag Lab   N-Terminal Pro BNP Inpatient 582 0 - 1800 pg/mL  59   Comment:            Reference range shown and results flagged as abnormal are suggested inpatient   cut points for confirming diagnosis if CHF in an acute setting. Establishing a   baseline value for each individual patient is useful for follow-up. An   inpatient or emergency department NT-proPBNP <300 pg/mL effectively rules out   acute CHF, with 99% negative predictive value.  The outpatient non-acute reference range for ruling out CHF is:   0-125 pg/mL (age 18 to less than 75)   0-450 pg/mL (age 75 yrs and older)              UA with Microscopic [[id number]] (Abnormal)  Resulted: 01/21/18 1819, Result status: Final result    Ordering provider: Daly Flores PA-C  01/21/18 1723 Resulting lab: [facility]    Specimen Information    Type Source Collected On   Catheterized Urine Urine catheter 01/21/18 1800          Components       Value Reference Range Flag Lab   Color Urine Yellow   [facility]   Appearance Urine Slightly Cloudy   [facility]   Glucose Urine Negative NEG^Negative mg/dL  [facility]   Bilirubin Urine Negative NEG^Negative  [facility]   Ketones Urine Negative NEG^Negative mg/dL  [facility]   Specific [place] Urine 1.013 1.003 - 1.035  [facility]   Blood Urine Negative NEG^Negative  [facility]   pH Urine 5.0 5.0 - 7.0 pH  [facility]   Protein Albumin Urine 30 NEG^Negative mg/dL A [facility]   Urobilinogen mg/dL 0.0 0.0 - 2.0 mg/dL  [facility]   Nitrite Urine Positive NEG^Negative A [facility]   Leukocyte Esterase Urine Moderate NEG^Negative A [facility]   Source Catheterized Urine   [facility]   WBC Urine 35 0 - 2 /HPF H [facility]   RBC Urine 1 0 - 2 /HPF  [facility]   WBC Clumps Present NEG^Negative /HPF A [facility]   Bacteria Urine Many NEG^Negative /HPF A [facility]   Squamous Epithelial /HPF Urine 1 0 - 1 /HPF  [facility]   Hyaline Casts 4 0 - 2 /LPF H [facility]            Influenza A/B antigen  [[id number]]  Resulted: 01/21/18 1806, Result status: Final result    Ordering provider: Daly Flores PA-C  01/21/18 1723 Resulting lab: [facility]    Specimen Information    Type Source Collected On   Nares Nasopharyngeal 01/21/18 1739          Components       Value Reference Range Flag Lab   Influenza A/B Agn Specimen Nares   [facility]   Influenza A Negative NEG^Negative  [facility]   Influenza B Negative NEG^Negative  [facility]   Comment:         Test results must be correlated with clinical data. If necessary, results   should be confirmed by a molecular assay or viral culture.              Comprehensive metabolic panel [[id number]] (Abnormal)  Resulted: 01/21/18 1800, Result status: Final result    Ordering provider: Daly Flores PA-C  01/21/18 1723 Resulting lab: [facility]    Specimen Information    Type Source Collected On   Blood  01/21/18 1735          Components       Value Reference Range Flag Lab   Sodium 139 133 - 144 mmol/L  [facility]   Potassium 4.7 3.4 - 5.3 mmol/L  [facility]   Chloride 101 94 - 109 mmol/L  [facility]   Carbon Dioxide 34 20 - 32 mmol/L H [facility]   Anion Gap 4 3 - 14 mmol/L  [facility]   Glucose 86 70 - 99 mg/dL  [facility]   Urea Nitrogen 44 7 - 30 mg/dL H [facility]   Creatinine 1.95 0.52 - 1.04 mg/dL H [facility]   GFR Estimate 25 >60 mL/min/1.7m2 L [facility]   Comment:  Non  GFR Calc   GFR Estimate If Black 30 >60 mL/min/1.7m2 L [facility]   Comment:  African American GFR Calc   Calcium 9.5 8.5 - 10.1 mg/dL  [facility]   Bilirubin Total 0.4 0.2 - 1.3 mg/dL  [facility]   Albumin 3.1 3.4 - 5.0 g/dL L [facility]   Protein Total 7.5 6.8 - 8.8 g/dL  [facility]   Alkaline Phosphatase 59 40 - 150 U/L  [facility]   ALT 17 0 - 50 U/L  [facility]   AST 23 0 - 45 U/L  [facility]            CRP inflammation [[id number]] (Abnormal)  Resulted: 01/21/18 1800, Result status: Final result    Ordering provider: Daly Flores PA-C  01/21/18 1723 Resulting lab:  [facility]    Specimen Information    Type Source Collected On   Blood  01/21/18 1735          Components       Value Reference Range Flag Lab   CRP Inflammation 103.0 0.0 - 8.0 mg/L H [facility]            CBC with platelets differential [[id number]]  Resulted: 01/21/18 1743, Result status: Final result    Ordering provider: Daly Flores PA-C  01/21/18 1723 Resulting lab: [facility]    Specimen Information    Type Source Collected On   Blood  01/21/18 1735          Components       Value Reference Range Flag Lab   WBC 7.6 4.0 - 11.0 10e9/L  [facility]   RBC Count 4.40 3.8 - 5.2 10e12/L  [facility]   Hemoglobin 13.4 11.7 - 15.7 g/dL  [facility]   Hematocrit 42.4 35.0 - 47.0 %  [facility]   MCV 96 78 - 100 fl  [facility]   MCH 30.5 26.5 - 33.0 pg  [facility]   MCHC 31.6 31.5 - 36.5 g/dL  [facility]   RDW 12.9 10.0 - 15.0 %  [facility]   Platelet Count 154 150 - 450 10e9/L  [facility]   Diff Method Automated Method   [facility]   % Neutrophils 64.8 %  [facility]   % Lymphocytes 22.7 %  [facility]   % Monocytes 7.9 %  [facility]   % Eosinophils 3.8 %  [facility]   % Basophils 0.5 %  [facility]   % Immature Granulocytes 0.3 %  [facility]   Absolute Neutrophil 4.9 1.6 - 8.3 10e9/L  [facility]   Absolute Lymphocytes 1.7 0.8 - 5.3 10e9/L  [facility]   Absolute Monocytes 0.6 0.0 - 1.3 10e9/L  [facility]   Absolute Eosinophils 0.3 0.0 - 0.7 10e9/L  [facility]   Absolute Basophils 0.0 0.0 - 0.2 10e9/L  [facility]   Abs Immature Granulocytes 0.0 0 - 0.4 10e9/L  [facility]            Lactic acid whole blood [[id number]]  Resulted: 01/21/18 1742, Result status: Final result    Ordering provider: Daly Flores PA-C  01/21/18 1723 Resulting lab: [facility]    Specimen Information    Type Source Collected On   Blood  01/21/18 2522          Components       Value Reference Range Flag Lab   Lactic Acid 1.0 0.7 - 2.0 mmol/L  [facility]            Blood gas venous [[id number]] (Abnormal)  Resulted: 01/21/18 1742, Result  status: Final result    Ordering provider: Daly Flores PA-C  01/21/18 1723 Resulting lab: [facility]    Specimen Information    Type Source Collected On   Blood  01/21/18 1735          Components       Value Reference Range Flag Lab   Ph Venous 7.40 7.32 - 7.43 pH  [facility]   PCO2 Venous 59 40 - 50 mm Hg H [facility]   PO2 Venous 28 25 - 47 mm Hg  [facility]   Bicarbonate Venous 37 21 - 28 mmol/L H [facility]   Base Excess Venous 10.0 mmol/L  [facility]   Comment:  Abnormal Result, Ref range: -7.7 to 1.9   FIO2 28   [facility]            Testing Performed By     Lab - Abbreviation Name Director Address Valid Date Range    22 - FN Lab [facility] Unknown [address] [place] [address] 05/08/15 1057 - Present    45 - [id number] MISYS Unknown Unknown 01/28/02 0000 - Present    59 - Unknown [facility] Unknown [address] [id number] 12/31/14 1006 - Present    75 - Unknown [facility] Unknown [address] [address] 01/15/15 1019 - Present    89 - Unknown POINT OF CARE TEST, GLUCOSE Unknown Unknown 10/31/11 1114 - Present            Unresulted Labs     None         Imaging Results - 3 Days      XR Pelvis Port 1/2 Views [[id number]]  Resulted: 01/21/18 1906, Result status: Final result    Ordering provider: Daly Flores PA-C  01/21/18 1723 Resulted by: Jarred Stover MD    Performed: 01/21/18 1823 - 01/21/18 1824 Resulting lab: RADIOLOGY RESULTS    Narrative:       PELVIS PORTABLE ONE TO TWO VIEWS  1/21/2018 6:24 PM     HISTORY: Pain after fall.    COMPARISON: Pelvis x-ray 4/27/2016.      Impression:       IMPRESSION: AP view of the pelvis is performed. Pelvic ring appears  intact. Intramedullary anay right femur is unchanged. Hips bilaterally  are located without evidence of fracture on this single view.    JARRED STOVER MD      XR Chest Port 1 View [[id number]]  Resulted: 01/21/18 1906,  Result status: Final result    Ordering provider: Daly Flores PA-C  01/21/18 1723 Resulted by: Jarred Stover MD    Performed: 01/21/18 1822 - 01/21/18 1823 Resulting lab: RADIOLOGY RESULTS    Narrative:       CHEST PORTABLE ONE VIEW  1/21/2018 6:23 PM     HISTORY: Cough.    COMPARISON: Chest x-ray 1/26/2017.      Impression:       IMPRESSION: Portable chest. Atelectasis lateral left lung base is  stable. Lungs are otherwise clear. Possible trace left pleural  effusion. Heart is normal in size. No pneumothorax.         JARRED STOVER MD      Testing Performed By     Lab - Abbreviation Name Director Address Valid Date Range    104 - Rad Rslts RADIOLOGY RESULTS Unknown Unknown 02/16/05 1553 - Present            Encounter-Level Documents:     There are no encounter-level documents.      Order-Level Documents:     There are no order-level documents.

## 2018-01-21 NOTE — IP AVS SNAPSHOT
` `     [address] SURGICAL: [phone]                 INTERAGENCY TRANSFER FORM - NOTES (H&P, Discharge Summary, Consults, Procedures, Therapies)   2018                    Hospital Admission Date: 2018  CHASE ACUÑA   : 1935  Sex: Female        Patient PCP Information     Provider PCP Type    Margarito Hoffman MD General         History & Physicals      H&P by Jose Horvath MD at 2018  7:30 PM     Author:  Jose Horvath MD Service:  Hospitalist Author Type:  Physician    Filed:  2018  8:30 PM Date of Service:  2018  7:30 PM Creation Time:  2018  7:55 PM    Status:  Signed :  Jose Horvath MD (Physician)         [facility]    History and Physical  Hospitalist       Date of Admission:  2018  Date of Service (when I saw the patient):[WG1.1] 18[WG1.2]    Assessment & Plan   Chase Acuña is a 83 year old female who presents with[WG1.1] developing cough over the past 2 weeks associated with increased weakness.  Has fallen twice over the past 2 weeks and comes to the ER today with increased dyspnea.  On arrival she was hypertensive, afebrile with oxygen saturations at 87%.  Workup is showing a normal CBC, chest x-ray with some left lower lobe atelectasis and a UA with moderate LET and 35 white cells.  History significant for diabetes on insulin, CKD with stable renal function and hypertension secondary to diabetes and renal disease.  Patient will be admitted to inpatient status for treatment of her UTI with IV antibiotics with cultures of blood and urine pending.  On exam she is wheezing so we will add some nebulization treatments and will check a nasal swab with respiratory virus panel.  IV fluids tonight, supplemental oxygen as necessary.  Expect she will be in hospital for at least 2 days.[WG1.2]    Active Problems:    Acute respiratory failure with hypoxia (H) and  hypercapnea    Assessment:[WG1.3] Presenting with increased dyspnea associated with hypoxemia on arrival and blood gas showing hypercapnia.  No previous history of COPD and has used albuterol in the past with upper respiratory infections, but nothing chronically.  Chest x-ray is not showing an obvious infiltrate.  Clinically she sounds like she is having a viral process.[WG1.2]    Plan:[WG1.3] We will check a nasal swab for respiratory virus panel.  She will be covered for bacterial process with Rocephin and azithromycin pending culture results.  Will order nebulization treatments as needed and will supplement oxygen as needed.[WG1.2]    Acute cystitis without hematuria    Assessment:[WG1.3] Asymptomatic, his history of stress incontinence.  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  Consider PT consult later[WG1.2]    Hyperlipidemia LDL goal <100    Assessment:[WG1.3] Taking Zocor[WG1.2]    Plan:[WG1.3] Restart at discharge[WG1.2]     Advanced directives, counseling/discussion    Assessment:[WG1.3] Full code[WG1.2]    Plan:[WG1.3] Honor[WG1.2]  DVT Prophylaxis:[WG1.1] Pneumatic Compression Devices[WG1.2]  Code Status:[WG1.1] Full Code[WG1.2]    Disposition: Expected discharge in[WG1.1] 2-3[WG1.2] days once[WG1.1] feeling better, oxygen back to normal[WG1.2].    Jose Horvath MD    Primary Care Physician   Margarito Hoffman    Chief Complaint[WG1.1]   83-year-old female with increasing cough and shortness of breath associated with weakness    History is obtained from the patient, electronic health record and emergency department physician[WG1.2]    History of Present Illness   Smiley Acuña is a 83 year old female who presents with[WG1.1] increasing cough over the past 2 weeks.  It has been a dry, nonproductive cough.  It is been associated with increasing weakness with 2 spells of falling.  Having some pelvic pain as a result of one fall.  Denies head injury.  She has had chills without fever.  Denies chest pain, nausea, vomiting or diarrhea.  Oral intake has been diminished.  Denies urinary tract symptoms.  History significant for diabetes taking insulin.  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  She lives at home with her , normally uses a walker to get around.  Tonight after nearly falling, they called EMS and she was brought to the hospital.[WG1.2]    Past Medical History    I have reviewed this patient's medical history and updated it with pertinent information if needed.   Past Medical History:   Diagnosis Date     Abnormality of gait      Anemia, unspecified      Chronic ischemic heart disease, unspecified     2-vessel CAD     Closed fracture of patella 06/21/10    D/C 06/23/10     Coronary artery disease      Degenerative disc disease     lumbar     Depressive disorder, not elsewhere  classified      History of blood transfusion      Other and unspecified hyperlipidemia      Renal failure, unspecified     chronic renal disease     Septicemia due to Escherichia coli (E. coli)(038.42) (H) 05/03/2007     Syncope and collapse 4/8/2005    Admit     Syncope and collapse 05/26/10    D/C 05/27/10 to [facility]     Traumatic subdural hematomas 05/27/10     Type II or unspecified type diabetes mellitus without mention of complication, not stated as uncontrolled      Unspecified essential hypertension      Unspecified sleep apnea      Urinary tract infection, site not specified 4/4/2008    UTI with mild sepsis. Admitted.       Past Surgical History   I have reviewed this patient's surgical history and updated it with pertinent information if needed.  Past Surgical History:   Procedure Laterality Date     BUNIONECTOMY RT/LT  10/24/07    Bunionectomy right foot. Arthrodesis IP joint, right hallux.     C ANESTH,UPPER LEG SURGERY       C APPENDECTOMY       C BX SYNOVIUM INTERPHALANG JT       C OPEN TX FEMORAL SUPRACONDYLAR FRACTURE W EXTENSION  12/14/12    right LE     CRANIOTOMY  05/28/10    [facility]     HC AMPUTATION TOE, MTP JT  2000    Second toe amputation     HC COLONOSCOPY W/WO BRUSH/WASH  12/05/2005    Internal hemorrhoids.  Diverticulosis-limited/left colon.  Otherwise normal to cecum.     HC EXCISION LESION/TENDON-SHEATH/CAPSULE, FOOT  04/30/07    right foot.     HC EXCISION LESION/TENDON-SHEATH/CAPSULE, FOOT  8/14/2007    Recurrent ganglion cyst - right ankle      REPAIR ROTATOR CUFF,ACUTE  11/21/2002    Rotator Cuff Repair; Right     HC UGI ENDOSCOPY DIAG W OR W/O BRUSH/WASH  12/05/2005    Hiatus hernia.  Otherwise normal.      UGI ENDOSCOPY, SIMPLE EXAM  04/27/2005       Prior to Admission Medications   Prior to Admission Medications   Prescriptions Last Dose Informant Patient Reported? Taking?   ASPIRIN NOT PRESCRIBED, INTENTIONAL,   Yes No   Sig: by Other route continuous prn. Not  prescribed due to subdural hematoma history.     BASAGLAR 100 UNIT/ML injection   No No   Sig: Inject 18 Units Subcutaneous every morning   HYDROcodone-acetaminophen (NORCO) 5-325 MG per tablet 2018 at 2100  No Yes   Sig: Take 1 tablet by mouth every 6 hours as needed for pain   LIFESCAN FINEPOINT LANCETS MISC   No No   Si Device by Lancet route 4 times daily.   MULTIVITAMINS OR TABS 2018 at 0800  Yes Yes   Si TABLET DAILY   Spacer/Aero-Holding Chambers (OPTIHALER) BOGDAN   No No   Sig: As directed   albuterol (PROAIR HFA, PROVENTIL HFA, VENTOLIN HFA) 108 (90 BASE) MCG/ACT inhaler More than a month at Unknown time  No No   Sig: Inhale 2 puffs into the lungs every 6 hours as needed for shortness of breath / dyspnea or wheezing Ventolin inhaler   betamethasone dipropionate (DIPROSONE) 0.05 % lotion More than a month at Unknown time  No No   Sig: Apply topically to scalp once daily   blood glucose monitoring (NO BRAND SPECIFIED) test strip   No No   Sig: Use to test blood sugar two times daily or as directed.  Prodigy Autocode test strips   blood glucose monitoring (PRODIGY TWIST TOP 28G) lancets   No No   Sig: Use to test blood sugar two times daily or as directed.   cholecalciferol (VITAMIN D) 400 UNIT TABS 2018 at 0800  Yes Yes   Sig: Take 2 tablets by mouth daily.   gabapentin (NEURONTIN) 400 MG capsule 2018 at 1400  No Yes   Sig: TAKE TWO CAPSULES BY MOUTH THREE TIMES A DAY   glucose blood VI test strips (ONE TOUCH TEST STRIPS) strip   No No   Sig: test 4x daily (has One Touch Ultra 2 machine   hydrALAZINE (APRESOLINE) 25 MG tablet 2018 at 1200  No Yes   Sig: Take 1 tablet (25 mg) by mouth 3 times daily   insulin aspart (NOVOLOG VIAL) 100 UNITS/ML injection 2018 at 1200  No Yes   Si units before breakfast, 7 units before lunch, 7 units before dinner   insulin glargine (LANTUS) 100 UNIT/ML injection 2018 at 0800  No Yes   Si units once a day   insulin pen needle  "(B-D U/F) 31G X 8 MM   No No   Sig: Use 1 daily or as directed.   insulin syringe-needle U-100 (BD INSULIN SYRINGE ULTRAFINE) 30G X 1/2\" 0.5 ML   No No   Sig: Use one syringe 4 times daily or as directed.   simvastatin (ZOCOR) 80 MG tablet 1/20/2018 at 2100  No Yes   Sig: Take 1 tablet (80 mg) by mouth At Bedtime at bedtime.   traZODone (DESYREL) 50 MG tablet 1/20/2018 at 2100  No Yes   Sig: TAKE ONE TABLET BY MOUTH AT BEDTIME      Facility-Administered Medications: None     Allergies   Allergies   Allergen Reactions     Lisinopril      upper resp symptoms, cough       Social History   I have reviewed this patient's social history and updated it with pertinent information if needed. Smiley Acuña  reports that she has never smoked. She has never used smokeless tobacco. She reports that she does not drink alcohol or use illicit drugs.    Family History   I have reviewed this patient's family history and updated it with pertinent information if needed.   Family History   Problem Relation Age of Onset     DIABETES Brother      C.A.D. Brother      DIABETES Brother      DIABETES Mother      OSTEOPOROSIS Mother        Review of Systems[WG1.1]   The 10 point Review of Systems is negative other than noted in the HPI or here.[WG1.2]     Physical Exam   Temp: 98.9  F (37.2  C) Temp src: Oral BP: (!) 192/106   Heart Rate: 78 Resp: 20 SpO2: 97 % O2 Device: Nasal cannula Oxygen Delivery: 4 LPM  Vital Signs with Ranges  Temp:  [98.9  F (37.2  C)] 98.9  F (37.2  C)  Heart Rate:  [78-86] 78  Resp:  [20] 20  BP: (184-217)/() 192/106  SpO2:  [87 %-97 %] 97 %  173 lbs 11.56 oz    EXAM:[WG1.1]  Constitutional: alert, mild distress, cooperative and occasional dry cough, seems mildly dyspneic  Cardiovascular: PMI normal. No lifts, heaves, or thrills. RRR. No murmurs, clicks gallops or rub  Respiratory: Wheezes on each side, question crackles on the  left  Psychiatric: mentation appears normal and judgment and insight " intact  Head: Normocephalic. No masses, lesions, tenderness or abnormalities  Neck: Neck supple. No adenopathy. Thyroid symmetric, normal size,  ENT: ENT exam normal, no neck nodes or sinus tenderness  Abdomen: Abdomen soft, non-tender. BS normal. No masses, organomegaly  NEURO: Nonfocal, diminished sensation in the lower legs.  SKIN: no suspicious lesions or rashes  LYMPH: Normal cervical lymph nodes  JOINT/EXTREMITIES: extremities normal- no gross deformities noted and normal muscle tone[WG1.2]     Data   Data reviewed today:  I personally reviewed[WG1.1] the chest x-ray image(s) showing Possible atelectasis left lower lobe, no obvious infiltrate and the X-ray of the pelvis image(s) showing No fractures[WG1.2].    Recent Labs  Lab 01/21/18  1735   WBC 7.6   HGB 13.4   MCV 96         POTASSIUM 4.7   CHLORIDE 101   CO2 34*   BUN 44*   CR 1.95*   ANIONGAP 4   KEANU 9.5   GLC 86   ALBUMIN 3.1*   PROTTOTAL 7.5   BILITOTAL 0.4   ALKPHOS 59   ALT 17   AST 23       Recent Results (from the past 24 hour(s))   XR Chest Port 1 View    Narrative    CHEST PORTABLE ONE VIEW  1/21/2018 6:23 PM     HISTORY: Cough.    COMPARISON: Chest x-ray 1/26/2017.      Impression    IMPRESSION: Portable chest. Atelectasis lateral left lung base is  stable. Lungs are otherwise clear. Possible trace left pleural  effusion. Heart is normal in size. No pneumothorax.         CARMITA STOVER MD   XR Pelvis Port 1/2 Views    Narrative    PELVIS PORTABLE ONE TO TWO VIEWS  1/21/2018 6:24 PM     HISTORY: Pain after fall.    COMPARISON: Pelvis x-ray 4/27/2016.      Impression    IMPRESSION: AP view of the pelvis is performed. Pelvic ring appears  intact. Intramedullary anay right femur is unchanged. Hips bilaterally  are located without evidence of fracture on this single view.    CARMITA STOVER MD[WG1.1]          Revision History        User Key Date/Time User Provider Type Action    > WG1.3 1/21/2018  8:30 PM Jose Horvath MD Physician Sign      WG1.2 1/21/2018  8:17 PM Jose Horvath MD Physician      WG1.1 1/21/2018  7:55 PM Jose Horvath MD Physician                      Discharge Summaries      Discharge Summaries by Fermin Florentino MD at 1/24/2018  8:25 AM     Author:  Fermin Florentino MD Service:  Hospitalist Author Type:  Physician    Filed:  1/24/2018  9:16 AM Date of Service:  1/24/2018  8:25 AM Creation Time:  1/24/2018  8:46 AM    Status:  Signed :  Fermin Florentino MD (Physician)         [facility]    Discharge Summary  Hospitalist    Date of Admission:  1/21/2018  Date of Discharge:  1/24/2018  Discharging Provider: Fermin Florentino  Date of Service (when I saw the patient): 01/24/18    Discharge Diagnoses     E. 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 *      History of Present Illness   Smiley Acuña is an 83 year old female with[JK1.1] multiple complicated chronic medical problems including diabetes with severe stage IV chronic kidney disease[JK1.2] who presented with[JK1.1] history of worsening cough, shortness of breath, weakness, and 2 recent falls at home[JK1.2].[JK1.1]  Due to initial concern for possible infection and respiratory failure, she was hospitalized[JK1.2]. See admission history and physical for details.    Hospital Course   Smiley Acuña was admitted on 1/21/2018.  The following problems were addressed during her hospitalization:[JK1.1]    Problem  #1 E. coli urinary tract infection.[JK1.2]  Urinalysis demonstrated pyuria and urine culture grew E. coli.  She was regularly incontinent of urine but otherwise asymptomatic without overt UTI symptoms.  She did not have fever or leukocytosis.  Blood cultures were negative during her hospital stay.  Nasal culture for MRSA was negative.  Respiratory viral panel PCR testing was negative.  Chest x-ray demonstrated radiographic findings most consistent with atelectasis but no definite infiltrate.  After investigation, respiratory tract infection was not suspected.  She did not have clinical course concerning for sepsis.    Problem #2 hypoxia and hypercapnia and possible chronic respiratory failure due to obesity hypoventilation, atelectasis, and sedentary status.  She was severely hypoxic, and blood gas testing demonstrated hypercapnia although no acidosis due to compensatory metabolic alkalosis.  She did not exhibit respiratory distress although was confused.  She was treated with oxygen and maintained adequate oxygen saturations with oxygen therapy.  She did not require assisted ventilation.  She had some initial wheezing at presentation that was treated as needed with bronchodilators, but she does not carry a previous diagnosis of obstructive lung disease and her clinical course was not suspicious for COPD or asthma exacerbation.  After investigation, hypoxia and hypercapnia due to obesity hypoventilation, sedentary status, and atelectasis was suspected, possibly consistent with chronic respiratory failure, but her clinical course was not concerning for acute respiratory failure.    Problem #3 probable dementia and possible acute encephalopathy.  She was obviously confused throughout her hospital stay, and confusion was present at the time of admission.  From additional history of long-standing and progressively worsening memory problems provided by the patient and her family during her hospital stay, there was strong  clinical suspicion for previously undiagnosed dementia.  Memory problems persisted throughout her hospital stay such as difficulty for the patient to recognize close family members including her spouse.  Other signs of confusion did seem to improve as her acute and chronic medical problems including infection hypoglycemia,,  and hypoxia were treated.  She did not have obvious focal neurologic deficits or seizures.  Her behavior was cooperative.  After investigation, underlying dementia was strongly suspected.  She may have had a superimposed acute encephalopathy associated with UTI, hypoxia and possibly gabapentin toxicity.    Problem #4 diabetes with hypoglycemia.  Hemoglobin A1c was 6.2.  She developed significant hypoglycemia late in the day on her usual chronic doses of Lantus and NovoLog insulin.  Dose of Lantus insulin was decreased, and prandial dosing of NovoLog was discontinued and substituted with a correction scale.  After these dose adjustments in insulin therapy, blood sugars had ranged from  for 24 hours prior to discharge.    Problem #5 accelerated hypertension.  Systolic blood pressures were initially severely elevated.  She was not overtly symptomatic although mentation was difficult to assess because of suspected dementia and her possible acute encephalopathy and vision was difficult to assess because of her chronic visual disturbance.  Her dose of hydralazine was increased.  Amlodipine and torsemide were added because of her severe chronic kidney disease.  Blood pressures improved and ranged between 132//88 for the 24 hours prior to discharge after these adjustments.    Problem #6 possible acute kidney injury gabapentin toxicity, and severe stage IV chronic kidney disease.  Creatinine dropped from 1.95 to 1.62 during her hospital stay.  Renal function appeared to improve coinciding with treatment of UTI severe hypertension, and hypoxia.  Because of her severely impaired renal function,  "her previous high dose of gabapentin was decreased.  A serum gabapentin level was sent while she had been taking her previous high doses of gabapentin, but those test results are pending at the time of discharge.  A repeat gabapentin level in a week after this dose reduction was made was recommended after discharge.  It was possible that she had acute kidney injury due to UTI, accelerated hypertension, and hypoxia superimposed upon her stage IV chronic kidney disease.  She may also have had possible gabapentin toxicity contributing to acute encephalopathy although she did not have obvious symptoms of a movement disturbance.    Problem #7 generalized weakness.  She was quite weak.  She was evaluated and treated by physical therapy as she was treated for her acute medical problems.  Strength trended toward improvement, but discharged to a TCU for a trial of short-term rehabilitation was recommended recognizing that suspected dementia may limit her ability to undergo rehabilitation effectively.[JK1.3]    Fermin Florentino    Significant Results and Procedures   No procedures performed during this admission    Pending Results   These results will be followed up by NH provider  Unresulted Labs Ordered in the Past 30 Days of this Admission     Date and Time Order Name Status Description    1/23/2018 0001 Gabapentin level In process     1/21/2018 1723 Blood culture Preliminary     1/21/2018 1723 Blood culture Preliminary           Code Status   Full Code       Primary Care Physician   Margarito Hoffman    Physical Exam   180 lbs 12.44 oz  /88 (BP Location: Right arm)  Pulse 79  Temp 98.7  F (37.1  C) (Oral)  Resp 18  Ht 1.626 m (5' 4\")  Wt 82 kg (180 lb 12.4 oz)  SpO2 92%  BMI 31.03 kg/m2[JK1.1]    No acute distress sitting in a chair  Normal respiratory effort, severely diminished breath sounds throughout, few inspiratory crackles on the left, no wheezing  Regular heart rate and rhythm, good capillary " refill  Alert and maintains wakefulness and attention, memory problems apparent, explains that she did not recognize her brother last evening when he visited, able to follow simple instructions, speech is understandable, moving all limbs purposefully without involuntary movements[JK1.3]    Discharge Disposition   Discharged to nursing home  Condition at discharge: Stable    Consultations This Hospital Stay   CARE TRANSITION RN/SW IP CONSULT  PHYSICAL THERAPY ADULT IP CONSULT  PHYSICAL THERAPY ADULT IP CONSULT  OCCUPATIONAL THERAPY ADULT IP CONSULT    Time Spent on this Encounter[JK1.1]   I, Fermin Florentino, personally saw the patient today and spent greater than 30 minutes discharging this patient.[JK1.3]    Discharge Orders     General info for SNF   Length of Stay Estimate: Short Term Care: Estimated # of Days <30  Condition at Discharge: Stable  Level of care:skilled   Rehabilitation Potential: Fair  Admission H&P remains valid and up-to-date: Yes  Recent Chemotherapy: N/A  Use Nursing Home Standing Orders: Yes     Glucose monitor nursing POCT   Before meals and at bedtime     Follow Up and recommended labs and tests   Follow up with [facility] physician.  The following labs/tests are recommended: gabapentin level in 1 week (due to dose reduction during hospital stay).     Activity - Up with assistive device     Full Code     Physical Therapy Adult Consult   Evaluate and treat as clinically indicated.    Reason:  E coli UTI, weakness     Occupational Therapy Adult Consult   Evaluate and treat as clinically indicated.    Reason:  E coli UTI, weakness, confusion, ?dementia     Oxygen - Nasal cannula   2 Lpm by nasal cannula to keep O2 sats 90% or greater.     Advance Diet as Tolerated   Follow this diet upon discharge: Orders Placed This Encounter     Moderate Consistent CHO Diet       Discharge Medications   Current Discharge Medication List      START taking these medications    Details   acetaminophen  (TYLENOL) 325 MG tablet Take 2 tablets (650 mg) by mouth every 4 hours as needed for mild pain  Qty: 100 tablet    Associated Diagnoses: Pain in thoracic spine      insulin aspart (NOVOLOG PEN) 100 UNIT/ML injection Inject 1-7 Units Subcutaneous 3 times daily (before meals) For  - 189 give 1 unit.   For  - 239 give 2 units.   For  - 289 give 3 units.   For  - 339 give 4 units.   For - 399 give 5 units.   For -449 give 6 units  For  or higher give 7 units.    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 coli UTI      torsemide (DEMADEX) 10 MG tablet Take 1 tablet (10 mg) by mouth daily    Associated Diagnoses: Hypertension goal BP (blood pressure) < 130/80; CKD (chronic kidney disease) stage 4, GFR 15-29 ml/min (H)         CONTINUE these medications which have CHANGED    Details   HYDROcodone-acetaminophen (NORCO) 5-325 MG per tablet Take 1 tablet by mouth every 6 hours as needed for moderate to severe pain  Qty: 30 tablet, Refills: 0    Associated Diagnoses: Pain in thoracic spine      insulin glargine (LANTUS) 100 UNIT/ML injection Inject 10 Units Subcutaneous every morning    Associated Diagnoses: 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)      simvastatin (ZOCOR) 80 MG tablet Take 1 tablet (80 mg) by mouth At Bedtime  Qty: 90 tablet, Refills: 3    Associated Diagnoses: Hyperlipidemia LDL goal <100      hydrALAZINE (APRESOLINE) 25 MG tablet Take 2 tablets (50 mg) by mouth 3 times daily  Qty: 270 tablet, Refills: 1    Associated Diagnoses: Hypertension goal BP (blood pressure) < 130/80      gabapentin (NEURONTIN) 300 MG capsule  "Take 1 capsule (300 mg) by mouth 2 times daily  Qty: 90 capsule    Associated Diagnoses: 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      MULTIVITAMINS OR TABS 1 TABLET DAILY  Qty: 30, Refills: 0      insulin pen needle (B-D U/F) 31G X 8 MM Use 1 daily or as directed.  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 blood glucose monitoring (NO BRAND SPECIFIED) test strip Use to test blood sugar two times daily or as directed.  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 blood glucose monitoring (PRODIGY TWIST TOP 28G) lancets Use to test blood sugar two times daily or as directed.  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  Qty: 100 each, Refills: 11    Associated Diagnoses: Type 2 diabetes, HbA1C goal < 8% (H)      albuterol (PROAIR HFA, PROVENTIL HFA, VENTOLIN HFA) 108 (90 BASE) MCG/ACT inhaler Inhale 2 puffs into the lungs every 6 hours as needed for shortness of breath / dyspnea or wheezing Ventolin inhaler  Qty: 1 Inhaler, Refills: 5    Comments: Profile  Associated Diagnoses: Bronchitis      betamethasone dipropionate (DIPROSONE) 0.05 % lotion Apply topically to scalp once daily  Qty: 60 mL, Refills: 1    Associated Diagnoses: Itching      Spacer/Aero-Holding Chambers (OPTIHALER) BOGDAN As directed  Qty: 1 Device, Refills: 0    " Associated Diagnoses: Bronchitis; Cough; SOB (shortness of breath)      !! glucose blood VI test strips (ONE TOUCH TEST STRIPS) strip test 4x daily (has One Touch Ultra 2 machine  Qty: 100 strip, Refills: 1yr    Associated Diagnoses: Type 2 diabetes, HbA1C goal < 8% (H)      !! LIFESCAN FINEPOINT LANCETS MISC 1 Device by Lancet route 4 times daily.  Qty: 100 each, Refills: prn    Comments: Has One Touch Ultra 2 machine  Associated Diagnoses: Type 2 diabetes, HbA1C goal < 8% (H)      ASPIRIN NOT PRESCRIBED, INTENTIONAL, by Other route continuous prn. 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    Refills: 0    Associated Diagnoses: Type 2 diabetes, HbA1C goal < 8% (H)       !! - Potential duplicate medications found. Please discuss with provider.      STOP taking these medications       insulin aspart (NOVOLOG VIAL) 100 UNITS/ML injection Comments:   Reason for Stopping:             Allergies   Allergies   Allergen Reactions     Lisinopril      upper resp symptoms, cough     Data   Most Recent 3 CBC's:  Recent Labs   Lab Test  01/22/18   0628  01/21/18   1735  02/28/17   1015  01/26/17   1745   WBC  7.5  7.6   --   7.6   HGB  11.9  13.4  14.2  14.8   MCV  97  96   --   93   PLT  134*  154   --   160      Most Recent 3 BMP's:  Recent Labs   Lab Test  01/24/18   0552  01/23/18   0527  01/22/18   0628   NA  142  142  142   POTASSIUM  4.0  4.4  4.3   CHLORIDE  103  106  107   CO2  31  31  30   BUN  38*  30  35*   CR  1.62*  1.60*  1.64*   ANIONGAP  8  5  5   KEANU  8.6  8.0*  8.2*   GLC  97  78  95     Most Recent 2 LFT's:  Recent Labs   Lab Test  01/21/18   1735  12/08/17   1009   AST  23  22   ALT  17  20   ALKPHOS  59  57   BILITOTAL  0.4  0.4     Most Recent 6 Bacteria Isolates From Any Culture (See EPIC Reports for Culture Details):  Recent Labs   Lab Test  01/22/18   0059  01/21/18   1800  01/21/18   1750  01/21/18   1735  04/21/15   1642  07/31/14   1006   CULT  No MRSA isolated  >100,000  colonies/mL  Escherichia coli  *  No growth after 3 days  No growth after 3 days  Lab accident - specimen processed incorrectly  Urine may have been QNS for culture, was never received at [facility] to be set   up    <10,000 colonies/mL Mixed gram positive tulio     Most Recent TSH, T4 and A1c Labs:  Recent Labs   Lab Test  01/21/18   1735   03/02/16   1502   TSH   --    --   1.54   A1C  6.2*   < >  6.6*    < > = values in this interval not displayed.     Results for orders placed or performed during the hospital encounter of 01/21/18   XR Chest Port 1 View    Narrative    CHEST PORTABLE ONE VIEW  1/21/2018 6:23 PM     HISTORY: Cough.    COMPARISON: Chest x-ray 1/26/2017.      Impression    IMPRESSION: Portable chest. Atelectasis lateral left lung base is  stable. Lungs are otherwise clear. Possible trace left pleural  effusion. Heart is normal in size. No pneumothorax.         CARMITA STOVER MD   XR Pelvis Port 1/2 Views    Narrative    PELVIS PORTABLE ONE TO TWO VIEWS  1/21/2018 6:24 PM     HISTORY: Pain after fall.    COMPARISON: Pelvis x-ray 4/27/2016.      Impression    IMPRESSION: AP view of the pelvis is performed. Pelvic ring appears  intact. Intramedullary anay right femur is unchanged. Hips bilaterally  are located without evidence of fracture on this single view.    CARMITA STOVER MD     *Note: Due to a large number of results and/or encounters for the requested time period, some results have not been displayed. A complete set of results can be found in Results Review.[JK1.1]          Revision History        User Key Date/Time User Provider Type Action    > JK1.3 1/24/2018  9:16 AM Fermin Florentino MD Physician Sign     JK1.2 1/24/2018  8:49 AM Fermin Florentino MD Physician      JK1.1 1/24/2018  8:46 AM Fermin Florentino MD Physician                      Consult Notes      Consults by Guerline Cochran, RN at 1/22/2018  9:59 AM     Author:  Guerline Cochran, RN Service:  Nursing Author Type:      Filed:   1/22/2018 10:06 AM Date of Service:  1/22/2018  9:59 AM Creation Time:  1/22/2018  9:59 AM    Status:  Signed :  Guerline Cochran RN ()     Consult Orders:    1. Care Transition RN/SW IP Consult [[id number]] ordered by Fermin Florentino MD at 01/22/18 0959                Care Transition Initial Assessment - RN  Reason For Consult: discharge planning   Met with: Patient and Family.    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       Resources List: Skilled Nursing Facility, Home Care     Lives With: spouse  Living Arrangements: house  Quality Of Family Relationships: supportive, involved  Description of Support System: Supportive, Involved   Who is your support system?:    Support Assessment: Lacks adequate physical care (patient need to be mobile to home)   Insurance Concerns: No Insurance issues identified          This writer met with pt and her  Fermin in the room, introduced self and role. Patient currently lives at home with  in a house he is her primary care giver but unable to care for her is she can't get from point A to point B patient will have a PT eval today to see if we are looking at a TCU or HHC. Patient is pleasantly confused which according to  she is like that at home sometimes too. He  provides her with her medications when they are due, helps her with cares and getting to the bathroom. Patient appears to be very dependent on her  and does better when he is around. Will give a list of home care agencies and local TCU's after PT eval      PLAN    CTS to follow for TCU placement versus [facility]      Guerline Cochran CTS RN [facility] [phone] [facility] [phone]    Discharge Planner   Discharge Plans in progress: TBA  Barriers to discharge plan: mobilty  Follow up plan: Clinic follow up CC referral.       Entered by: Guerline Cochran 01/22/2018 9:59 AM[SN1.1]            Revision History        User Key Date/Time User Provider Type Action    > SN1.1 1/22/2018 10:06 AM Guerline Cochran RN Case Manager Sign                     Progress Notes - Physician (Notes from 01/21/18 through 01/24/18)      Progress Notes by Yohana Robert RN at 1/24/2018 10:41 AM     Author:  Yohana Robert, RN Service:  (none) Author Type:  Registered Nurse    Filed:  1/24/2018 10:52 AM Date of Service:  1/24/2018 10:41 AM Creation Time:  1/24/2018 10:41 AM    Status:  Signed :  Yohana Robert RN (Registered Nurse)         S-(situation): Patient discharged to [facility] via wheelchair with Handivan    B-(background): Admitted to hospital on 1/21/18 with Pneumonia and UTI    A-(assessment): Lungs diminished.  Up to chair with one assist and gait belt.  Denies pain.  Remains on oxygen 2L per NC.  Vitals stable.  Nursing report called to nurse Yulia at [facility].    R-(recommendations): Discharge instructions reviewed with [facility] nurse. Listed belongings gathered and returned to patient.          Discharge Nursing Criteria:     Care Plan and Patient education resolved: Yes    New Medications- pt has been educated about purpose and side effects: yes    Vaccines  Pneumonia Vaccine verified at discharge: Yes  Influenza status verified at discharge:  Yes      Intentionally Retained Items  Patient was  sent home with no retained items left in place.     MISC  Prescriptions if needed, hard copies sent with patient  NA  Home and hospital aquired medications returned to patient: NA  Medication Bin checked and emptied on discharge Yes  Patient reports post-discharge pain management plan is effective: Yes[KJ1.1]    Yohana Robert[KJ1.2]       Revision History        User Key Date/Time User Provider Type Action    > KJ1.2 1/24/2018 10:52 AM Yohana Robert, RN Registered Nurse Sign     KJ1.1 1/24/2018 10:41 AM Yohana Robert, RN Registered Nurse             Progress Notes by Guerline Cochran RN at 1/23/2018  1:52 PM     Author:  Dimas, Guerline, RN Service:  Nursing Author Type:      Filed:  1/23/2018  2:39 PM Date of Service:  1/23/2018  1:52 PM Creation Time:  1/23/2018  1:52 PM    Status:  Addendum :  Guerline Cochran RN ()         CTS update: Patient has been accepted at [facility] and will transport there when medically stable patient and  notified. YVETTE Cochran CTS RN[SN1.1]  PAS-RR    Per DHS regulation, CTS team completed and submitted PAS-RR to [place] [employer] on Aging Direct Connect via the [employer]. CTS team advised SNF and they are aware a PAS-RR has been submitted.     CTS team reviewed with pt or health care agent that they may be contacted for a follow up appointment within 10 days of hospital discharge if SNF stay is <30 days. Contact information for [employer] was also provided.     Pt or health care agent verbalized understanding.     PAS-RR # [id number][SN1.2]     Revision History        User Key Date/Time User Provider Type Action    > SN1.2 1/23/2018  2:39 PM Guerline Cochran RN Case Manager Addend     SN1.1 1/23/2018  1:54 PM Guerline Cochran RN Case Manager Sign            Progress Notes by Fermin Florentino MD at 1/23/2018 11:16 AM     Author:  Fermin Florentino MD Service:  Hospitalist Author Type:  Physician    Filed:  1/23/2018  2:24 PM Date of Service:  1/23/2018  11:16 AM Creation Time:  1/23/2018 11:16 AM    Status:  Addendum :  Fermin Florentino MD (Physician)         [facility]    Hospitalist Progress Note    Date of Service (when I saw the patient): 01/23/2018    Assessment & Plan   Smiley Acuña is a 83 year old female who was admitted on 1/21/2018.[JK1.1]  E. coli UTI with possible acute encephalopathy is suspected.  However, there is also increasing suspicion that she has underlying advanced dementia although it does not appear as though she has been formally diagnosed with dementia previously, so the extent to which she has an acute encephalopathy is more difficult to determine.  Pneumonia is not suspected.  Rather, atelectasis with hypoxia appears more likely.  She also had hypercapnia but did not have respiratory acidosis, so this is probably chronic, and acute respiratory failure is not suspected.  She has severe chronic kidney disease with stable renal function so far.  She had an episode of hypoglycemia late yesterday despite receiving her usual dose of Lantus insulin yesterday morning.  She is chronically mildly thrombocytopenic and is not having any bleeding.  She continues to be quite weak concerning for an acute functional decline associated with this acute illness.    Principal Problem:    E. 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 coli  UTI  Decrease morning Lantus insulin dose from 18 units to 10 units today and follow blood sugars closely, may need to adjust doses of NovoLog to optimize postprandial blood sugars[JK1.2]  Increase hydralazine dose from 25 to 50 mg 3 times a day  Add amlodipine 5 mg daily  Reduce gabapentin dose to 300 mg once or twice daily because of severe chronic kidney disease while awaiting serum gabapentin level based on her previous dosing[JK1.3]  Wean oxygen as tolerated to keep saturations 90% on higher  Physical therapy evaluation to assist with disposition planning, anticipate possible discharge to TCU for short-term rehabilitation although there is some question as to how well she would be able to participate in a rehabilitation program because of suspicion for significant underlying dementia[JK1.2]    DVT Prophylaxis:[JK1.1] Pneumatic Compression Devices[JK1.2]  Code Status: Full Code    Disposition: Expected discharge[JK1.1] tomorrow possibly to TCU[JK1.2] For attempted rehabilitation.    Fermin Florentino    Interval History[JK1.1]   Patient offers no complaints today.  She denies cough or dyspnea.  She denies abdominal pain.  She denies headache or chest pain.  She has been afebrile.  Vital signs have been stable except for severely elevated blood pressure readings.  Oxygenation has been stable although she continues to require oxygen.  She is voiding spontaneously remains incontinent of urine.  She is tolerating oral intake.  She continues to be very weak needing assistance to even stand.  Nursing reports that she appeared even more confused overnight last night and that her  reported that she is often confused at home, particularly at night.  While interviewing the patient with her  today, the patient turns to her  and asks him the name of her spouse that she does not appear to recognize him as her .  However, when he reminds her that he is her , she then redirects.[JK1.2]    -Data  reviewed today: I reviewed all new labs and imaging results over the last 24 hours. I personally reviewed[JK1.1] no images or EKG's today[JK1.2].    Physical Exam   Temp: 97  F (36.1  C) Temp src: Oral BP: (!) 189/93 Pulse: 74 Heart Rate: 76 Resp: 18 SpO2: 94 % O2 Device: Nasal cannula Oxygen Delivery: 2 LPM  Vitals:    01/21/18 1705 01/21/18 2115   Weight: 78.8 kg (173 lb 11.6 oz) 82 kg (180 lb 12.4 oz)     Vital Signs with Ranges  Temp:  [97  F (36.1  C)-98.6  F (37  C)] 97  F (36.1  C)  Pulse:  [74-80] 74  Heart Rate:  [76-82] 76  Resp:  [16-20] 18  BP: (146-195)/(60-99) 189/93  SpO2:  [85 %-96 %] 94 %  I/O last 3 completed shifts:  In: 2120 [P.O.:1020; I.V.:1100]  Out: -     Constitutional:[JK1.1] No acute distress sitting in a chair[JK1.2]  Respiratory:[JK1.1] Diminished breath sounds throughout, clear lungs[JK1.2]  Cardiovascular:[JK1.1] Regular rate and rhythm, normal capillary refill[JK1.2]  Neuro:[JK1.1] Alert and maintains wakefulness, obvious memory problems that she does not initially even recognize her [JK1.2]    Medications        insulin glargine  10 Units Subcutaneous QAM AC     amLODIPine  5 mg Oral Daily     cefdinir  300 mg Oral Daily     hydrALAZINE  50 mg Oral TID     torsemide  10 mg Oral Daily     gabapentin  400 mg Oral BID     gabapentin  800 mg Oral At Bedtime     traZODone  50 mg Oral At Bedtime     insulin aspart  5 Units Subcutaneous QAM AC     insulin aspart  7 Units Subcutaneous Daily with lunch     insulin aspart  7 Units Subcutaneous Daily with supper     insulin aspart  1-7 Units Subcutaneous TID AC     insulin aspart  1-5 Units Subcutaneous At Bedtime     sodium chloride (PF)  3 mL Intracatheter Q8H       Data   Data reviewed today:  I personally reviewed[JK1.1] no images or EKG's today[JK1.2].    Recent Labs  Lab 01/23/18  0527 01/22/18  0628 01/21/18  1735   WBC  --  7.5 7.6   HGB  --  11.9 13.4   MCV  --  97 96   PLT  --  134* 154    142 139   POTASSIUM 4.4 4.3 4.7    CHLORIDE 106 107 101   CO2 31 30 34*   BUN 30 35* 44*   CR 1.60* 1.64* 1.95*   ANIONGAP 5 5 4   KEANU 8.0* 8.2* 9.5   GLC 78 95 86   ALBUMIN  --   --  3.1*   PROTTOTAL  --   --  7.5   BILITOTAL  --   --  0.4   ALKPHOS  --   --  59   ALT  --   --  17   AST  --   --  23[JK1.1]     Urine culture grew E. coli susceptible to all antibiotics tested including cephalosporins  Blood cultures are negative so far  Respiratory virus PCR panel is pending  Nasal culture for MRSA was negative  Serum gabapentin level is pending  Blood sugars ranged from  over the last 24 hours[JK1.2]       Revision History        User Key Date/Time User Provider Type Action    > JK1.3 1/23/2018  2:24 PM Fermin Florentino MD Physician Addend     JK1.2 1/23/2018  2:22 PM Fermin Florentino MD Physician Sign     JK1.1 1/23/2018 11:16 AM Fermin Florentino MD Physician             Progress Notes by Guerline Cochran RN at 1/23/2018 11:35 AM     Author:  Dimas, Guerline, RN Service:  Nursing Author Type:      Filed:  1/23/2018 11:37 AM Date of Service:  1/23/2018 11:35 AM Creation Time:  1/23/2018 11:35 AM    Status:  Signed :  Guerline Cochran RN ()         CTS update; Patient has been accepted at [facility] for tomorrow private room in the [facility] and will be moved to TCU when a bed opens Writer informed patient,  and son Vernon about discharge plan for tomorrow. Family would still prefer [facility] if a bed opens up but is willing to go to [place]. Patient will need van transport and CTS will arrange. YVETTE Cochran CTS RN[SN1.1]     Revision History        User Key Date/Time User Provider Type Action    > SN1.1 1/23/2018 11:37 AM Guerline Cochran RN Case Manager Sign            Progress Notes by Ana Tran PT at 1/23/2018 11:09 AM     Author:  Ana Tran PT Service:  (none) Author Type:  Physical Therapist    Filed:  1/23/2018 11:10 AM Date of Service:  1/23/2018 11:09 AM Creation Time:  1/23/2018  11:09 AM    Status:  Signed :  Ana Tran, PT (Physical Therapist)          01/23/18 1028   Quick Adds   Type of Visit Initial PT Evaluation   Living Environment   Lives With spouse   Living Arrangements house   Home Accessibility stairs to enter home   Number of Stairs to Enter Home 7   Number of Stairs Within Home 0   Stair Railings at Home outside, present on right side   Living Environment Comment There is a gap between railing entering the house.  hand rails next to toilet.     Self-Care   Usual Activity Tolerance poor   Equipment Currently Used at Home walker, rolling   Activity/Exercise/Self-Care Comment [place] helps her get in/out of the shower/tub.   Functional Level Prior   Ambulation 1-->assistive equipment   Transferring 1-->assistive equipment   Toileting 1-->assistive equipment   Bathing 3-->assistive equipment and person   Dressing 2-->assistive person   Eating 0-->independent   Fall history within last six months yes   Number of times patient has fallen within last six months 3   Which of the above functional risks had a recent onset or change? ambulation;transferring;toileting;bathing   General Information   Onset of Illness/Injury or Date of Surgery - Date 01/21/18   Referring Physician Dr. Florentino   Patient/Family Goals Statement wants to get back to walking   Pertinent History of Current Problem (include personal factors and/or comorbidities that impact the POC) progressive illness with progressive weakness; hasn't been walking well for the last 4-5 weeks.  For the last week, spouse has helped her with transfers and had her sit on 4WW seat to get around the house.     Precautions/Limitations fall precautions   General Observations seated in recliner chair; chair alarm; spouse at side.    Cognitive Status Examination   Orientation person   Level of Consciousness confused   Pain Assessment   Patient Currently in Pain No   Range of Motion (ROM)   ROM Quick Adds No deficits were identified    Strength   Strength Comments 4/5 UE strength; LE Quad 3+/5   Transfer Skills   Transfer Transfer Skill: Sit to Stand;Transfer Safety Analysis Sit/Stand;Transfer Skill:  Stand to Sit;Transfer Safety Analysis Stand/Sit   Transfer Comments verbal cues to use arm rests and sequence transfer   Transfer Skill:  Sit to Stand   Level of [place]: Sit/Stand contact guard   Physical Assist/Nonphysical Assist: Sit/Stand supervision;verbal cues   Weightbearing Restrictions: Sit/Stand full weight-bearing   Assistive Device for Transfer: Sit/Stand standard walker   Transfer Safety Analysis Sit to Stand   Impairments Contributing to Impaired Transfers: Sit to Stand decreased strength   Transfer Skill: Stand to Sit   Level of [place]: Stand/Sit contact guard   Physical Assist/Nonphysical Assist: Stand/Sit verbal cues   Weight-Bearing Restrictions: Stand/Sit full weight-bearing   Assistive Device: Stand to Sit standard walker   Transfer Safety Analysis Stand To Sit   Transfer Safety Concerns Noted: Stand to Sit decreased sequencing ability   Impairments Contributing to Impaired Transfers: Stand to Sit decreased strength   Gait   Gait Gait Skill;Gait Analysis   Gait Comments standing at edge of chair march in place trial was successful with CGA, able to progress to few steps forward to assess weight shift and step length; pt needs assist sequencing walker and steps; pt has wheeled walker at home and standard walker was in the hospital room.    Gait Skills   Level of [place]: Gait minimum assist (75% patients effort)   Physical Assist/Nonphysical Assist: Gait verbal cues;1 person assist   Weight-Bearing Restrictions: Gait full weight-bearing   Assistive Device for Transfer: Gait standard walker   Gait Distance other (see comment)  (Pt took 5 steps forward; 5 steps back)   Gait Analysis   Gait Pattern Used swing-to gait   Gait Deviations Noted decreased step length;increased time in double stance;decreased  "weight-shifting ability   Impairments Contributing to Gait Deviations decreased strength   Balance   Balance Comments seated balance independent; standing balance requires assistive device   General Therapy Interventions   Planned Therapy Interventions gait training;strengthening;transfer training;progressive activity/exercise   Clinical Impression   Criteria for Skilled Therapeutic Intervention yes, treatment indicated   PT Diagnosis gait dysfunction; unsafe gait, unsafe transfers   Influenced by the following impairments decreased strength, confusion, poor sequencing ability   Functional limitations due to impairments safe independent transfer from chair or bed/chair transfer; safe independent functional gait distance   Clinical Presentation Stable/Uncomplicated   Clinical Presentation Rationale general weakness, confusion, decreased activity tolerance but able to perform activity with assist   Clinical Decision Making (Complexity) Low complexity   Therapy Frequency` daily   Predicted Duration of Therapy Intervention (days/wks) 2 days   Anticipated Discharge Disposition Transitional Care Facility   Risk & Benefits of therapy have been explained Yes   Patient, Family & other staff in agreement with plan of care Yes   Clinical Impression Comments spouse present during evaluation, helping to assist in answering questions about home environment   [facility] TM \"6 Clicks\"   2016, Trustees of [facility], under license to [employer].  All rights reserved.   6 Clicks Short Forms Basic Mobility Inpatient Short Form   [facility] AM-PAC  \"6 Clicks\" V.2 Basic Mobility Inpatient Short Form   1. Turning from your back to your side while in a flat bed without using bedrails? 3 - A Little   2. Moving from lying on your back to sitting on the side of a flat bed without using bedrails? 3 - A Little   3. Moving to and from a bed to a chair (including a wheelchair)? 3 - A Little   4. Standing up from a " chair using your arms (e.g., wheelchair, or bedside chair)? 3 - A Little   5. To walk in hospital room? 2 - A Lot   6. Climbing 3-5 steps with a railing? 1 - Total   Basic Mobility Raw Score (Score out of 24.Lower scores equate to lower levels of function) 15   Total Evaluation Time   Total Evaluation Time (Minutes) 25[NM1.1]        Revision History        User Key Date/Time User Provider Type Action    > NM1.1 1/23/2018 11:10 AM Ana Tran PT Physical Therapist Sign            Progress Notes by Miladys Bustillos RN at 1/22/2018  4:19 PM     Author:  Miladys Bustillos RN Service:  (none) Author Type:  Registered Nurse    Filed:  1/22/2018  4:21 PM Date of Service:  1/22/2018  4:19 PM Creation Time:  1/22/2018  4:19 PM    Status:  Signed :  Miladys Bustillos RN (Registered Nurse)         S-(situation): Note    B-(background): Resp    A-(assessment): Pt very confused at this time, trying to get out of her chair, not keeping her oxygen on, wanting to find her , wanting to leave.    R-(recommendations): Cont poc as ordered.[BK1.1]     Revision History        User Key Date/Time User Provider Type Action    > BK1.1 1/22/2018  4:21 PM Miladys Bustillos, RN Registered Nurse Sign            Progress Notes by Fermin Florentino MD at 1/22/2018 12:19 PM     Author:  Fermin Florentino MD Service:  Hospitalist Author Type:  Physician    Filed:  1/22/2018  2:33 PM Date of Service:  1/22/2018 12:19 PM Creation Time:  1/22/2018 12:19 PM    Status:  Signed :  Fermin Florentino MD (Physician)         [facility]    Hospitalist Progress Note    Date of Service (when I saw the patient): 01/22/2018    Assessment & Plan   Smiley Acuña is a 83 year old female who was admitted on 1/21/2018[JK1.1] with weakness and confusion consistent with an acute encephalopathy most likely due to infection.  She had pyuria on urinalysis with ongoing urinary incontinence, and urine culture is growing  gram-negative rods suspicious for UTI.  Although cough persists, pneumonia is neither suspected clinically nor radiographically.  Respiratory symptoms, mild hypercapnia without acute respiratory acidosis, and hypoxia without dyspnea along with radiographic findings are more suspicious for atelectasis rather than respiratory tract infection.  Clinical course has not been concerning for acute respiratory failure so far.  Blood sugars are running in the normal range on her usual doses of insulin.  Renal function is stable so far with underlying severe chronic kidney disease.    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)    Generalized muscle weakness    Hyperlipidemia LDL goal <100    Advanced directives, counseling/discussion    Essential hypertension with goal blood pressure less than 130/80    Continue empiric ceftriaxone but discontinue Zithromax due to decreased suspicion for pneumonia or other respiratory tract infection  Continue insulin at her usual doses  Continue chronic antihypertensive medications at their usual doses  Advance diet and activity as tolerated  PT and OT evaluations to assist with disposition planning[JK1.2]    DVT Prophylaxis:[JK1.1] Pneumatic Compression Devices[JK1.2]  Code Status: Full Code    Disposition: Expected discharge in[JK1.1] 1-2[JK1.2] days once[JK1.1] improved and culture results are available[JK1.2].    Fermin Florentino    Interval History[JK1.1]   Patient offers no complaints today.  She denies any pain or dyspnea.  She continues to cough which is mostly nonproductive.  Nursing reports that she has been quite confused intermittently and her  said that  this confusion is new and a change from her usual baseline.  She has been frequently incontinent of urine.  She has not been febrile.  Vital signs have been generally stable except for severely elevated blood pressure readings intermittently.  She also remains on oxygen therapy although her oxygen requirement appears to be improving.  She is tolerating good oral intake.  She has not yet been up and ambulating and seems quite weak per nursing.[JK1.2]    -Data reviewed today: I reviewed all new labs and imaging results over the last 24 hours. I personally reviewed[JK1.1] no images or EKG's today[JK1.2].    Physical Exam   Temp: 97.6  F (36.4  C) Temp src: Oral BP: 194/81 Pulse: 79 Heart Rate: 75 Resp: 20 SpO2: 91 % O2 Device: Nasal cannula Oxygen Delivery: 2 LPM  Vitals:    01/21/18 1705 01/21/18 2115   Weight: 78.8 kg (173 lb 11.6 oz) 82 kg (180 lb 12.4 oz)     Vital Signs with Ranges  Temp:  [97.5  F (36.4  C)-99.1  F (37.3  C)] 97.6  F (36.4  C)  Pulse:  [20-83] 79  Heart Rate:  [75-86] 75  Resp:  [16-20] 20  BP: (152-217)/() 194/81  SpO2:  [87 %-97 %] 91 %  I/O last 3 completed shifts:  In: 2285 [P.O.:60; I.V.:2225]  Out: -     Constitutional:[JK1.1] No acute distress well sitting in her chair[JK1.2]  Respiratory:[JK1.1] Diminished breath sounds at the bases, clear lung fields[JK1.2]  Cardiovascular:[JK1.1] Regular rate and rhythm[JK1.2]  Neuro:[JK1.1] Alert and maintains wakefulness and attention[JK1.2]    Medications     NaCl 1,000 mL (01/22/18 1210)       cefTRIAXone  1 g Intravenous Q24H     azithromycin  500 mg Intravenous Q24H     gabapentin  400 mg Oral BID     gabapentin  800 mg Oral At Bedtime     hydrALAZINE  25 mg Oral TID     traZODone  50 mg Oral At Bedtime     insulin glargine  18 Units Subcutaneous QAM AC     insulin aspart  5 Units Subcutaneous QAM AC     insulin aspart  7 Units Subcutaneous Daily with lunch     insulin aspart  7 Units Subcutaneous Daily with supper     insulin aspart  1-7  Units Subcutaneous TID AC     insulin aspart  1-5 Units Subcutaneous At Bedtime     sodium chloride (PF)  3 mL Intracatheter Q8H       Data   Data reviewed today:  I personally reviewed[JK1.1] no images or EKG's today[JK1.2].    Recent Labs  Lab 01/22/18  0628 01/21/18  1735   WBC 7.5 7.6   HGB 11.9 13.4   MCV 97 96   * 154    139   POTASSIUM 4.3 4.7   CHLORIDE 107 101   CO2 30 34*   BUN 35* 44*   CR 1.64* 1.95*   ANIONGAP 5 4   KEANU 8.2* 9.5   GLC 95 86   ALBUMIN  --  3.1*   PROTTOTAL  --  7.5   BILITOTAL  --  0.4   ALKPHOS  --  59   ALT  --  17   AST  --  23       Recent Results (from the past 24 hour(s))   XR Chest Port 1 View    Narrative    CHEST PORTABLE ONE VIEW  1/21/2018 6:23 PM     HISTORY: Cough.    COMPARISON: Chest x-ray 1/26/2017.      Impression    IMPRESSION: Portable chest. Atelectasis lateral left lung base is  stable. Lungs are otherwise clear. Possible trace left pleural  effusion. Heart is normal in size. No pneumothorax.         CARMITA STOVER MD   XR Pelvis Port 1/2 Views    Narrative    PELVIS PORTABLE ONE TO TWO VIEWS  1/21/2018 6:24 PM     HISTORY: Pain after fall.    COMPARISON: Pelvis x-ray 4/27/2016.      Impression    IMPRESSION: AP view of the pelvis is performed. Pelvic ring appears  intact. Intramedullary anay right femur is unchanged. Hips bilaterally  are located without evidence of fracture on this single view.    CARMITA STOVER MD[JK1.1]     Blood cultures are negative so far at less than 24 hours   urine culture is growing greater than 100,000 colonies per mL gram-negative rods on preliminary results  Viral respiratory panel by PCR is pending  Blood sugars have ranged 78-95 overnight[JK1.2]       Revision History        User Key Date/Time User Provider Type Action    > JK1.2 1/22/2018  2:33 PM Fermin Florentino MD Physician Sign     JK1.1 1/22/2018 12:19 PM Fermin Florentino MD Physician             Progress Notes by Guerline Cochran RN at 1/22/2018  1:46 PM     Author:   Guerline Cochran, RN Service:  Nursing Author Type:      Filed:  1/22/2018  1:50 PM Date of Service:  1/22/2018  1:46 PM Creation Time:  1/22/2018  1:46 PM    Status:  Signed :  Guerline Cochran RN ()         Writer spoke with patient's son Vernon with their permission about discharge plans and Dain @ [phone] feels that his Mom would benefit from a TCU stay to get stronger so his Dad is able to care for her. Choices are [place] [facility] (Main Phone: [phone] Admissions Phone: [phone] Fax: [phone]) [facility] [place]  (Phone: [phone] Fax: [phone]) [place] [facility] (Admissions Phone: [phone] Main Phone: [phone] Fax: [phone]) Writer will send referrals and await PT eval. CTS to follow aubrey Cochran CTS RN[SN1.1]       Revision History        User Key Date/Time User Provider Type Action    > SN1.1 1/22/2018  1:50 PM Guerline Cochran RN Case Manager Sign            Progress Notes by Sudihr Shearer at 1/22/2018 11:05 AM     Author:  Sudhir Shearer Service:  Spiritual Health Author Type:      Filed:  1/22/2018 11:06 AM Date of Service:  1/22/2018 11:05 AM Creation Time:  1/22/2018 11:05 AM    Status:  Signed :  Sudhir Shearer ()         SPIRITUAL HEALTH SERVICES  SPIRITUAL ASSESSMENT Progress Note  [facility]      During Rounding,  introduced himself to Smiley Acuña and informed her of his availability.    Sudhir Shearer M.Div., [facility]  Staff   Office tel: [phone][JV1.1]       Revision History        User Key Date/Time User Provider Type Action    > JV1.1 1/22/2018 11:06 AM Sudhir Shearer Sign            Progress Notes by Khang Morin RN at 1/21/2018  9:20 PM     Author:  Khang Morin RN Service:  (none) Author Type:  Registered Nurse    Filed:  1/21/2018  9:32 PM Date of Service:  1/21/2018  9:20 PM Creation Time:  1/21/2018  9:29 PM    Status:   Signed :  Khang Morin, RN (Registered Nurse)         Pt admitted to room 251 via cart from ED. VSS. Resp even et unlabored.[JL1.1]  Temp: 97.5  F (36.4  C) Temp src: Oral BP: 179/87 Pulse: 78 Heart Rate: 81 Resp: 19 SpO2: 91 % O2 Device: Nasal cannula Oxygen Delivery: 3 LPM[JL1.2]  No signs of distress noted.[JL1.1]        Revision History        User Key Date/Time User Provider Type Action    > JL1.2 1/21/2018  9:32 PM Khang Morin, RN Registered Nurse Sign     JL1.1 1/21/2018  9:29 PM Khang Morin RN Registered Nurse             ED Provider Notes by Daly Flores PA-C at 1/21/2018  5:01 PM     Author:  Daly Flores PA-C Service:  Emergency Medicine Author Type:  Physician Assistant    Filed:  1/21/2018  7:32 PM Date of Service:  1/21/2018  5:01 PM Creation Time:  1/21/2018  5:01 PM    Status:  Attested :  Daly Flores PA-C (Physician Assistant)    Cosigner:  Eduarda Ceballos MD at 1/21/2018  9:17 PM        Attestation signed by Eduarda Ceballos MD at 1/21/2018  9:17 PM        Physician Attestation   IEduarda, saw and evaluated Smiley Acuña as part of a shared visit.  I have reviewed and discussed with the advanced practice provider their history, physical and plan.    I personally reviewed the vital signs, medications, labs and imaging.    My key history or physical exam findings: This is an 83-year-old female with history of type 2 diabetes and chronic kidney disease presenting to the ED with cough.  Patient noted to be increasingly more weak also.  She was brought in by her .  On exam, patient noted to have decreased oxygen saturations, increased work of breathing, crackles and tight breath sounds.  Patient very fatigued.  Hypertension.  After nebulizer treatment, she had increasing rhonchi.  Labs and chest x-ray done.  Labs unremarkable except for evidence of possible early UTI.  Renal insufficiency near baseline.  Very  elevated CRP.  Chest x-ray without evidence for infiltrate.  However, patient seems dry and with history concerning for pneumonia.  Currently receiving some hydration.    Key management decisions made by me: Patient seen, evaluated, appropriately managed by Daly Flores PA-C.  Antibiotics were started, discussion with hospitalist for weakness and likely community-acquired pneumonia.  Patient stable in the ED.    Eduarda Ceballos  Date of Service (when I saw the patient): 01/21/18                                   History[RA1.1]     Chief Complaint   Patient presents with     Cough[RA1.2]     HPI  Smiley Acuña is a 83 year old female with a history of type 2 diabetes, CKD, who presents to the emergency department via EMS for a cough.  Patient is here with her .  She explains that for the last several weeks she has had a cough and nasal congestion.  She notes body aches and chills as well but no documented fever at home.  He notes shortness of breath related to coughing fits but otherwise is breathing okay.  She denies chest pain or chest tightness.  She denies abdominal pain, nausea or vomiting.  She is constipated, has not had a bowel movement in 3-4 days.  Over the last few days she is grown more weak and unsteady on her feet.  She has fallen while walking with her walker down to her buttocks a couple times. Denies hitting her head.  witnessed these falls. She has barely been able to stand from her chair to get up and move to sit on her walker.  Because of this worsening weakness, her  called EMS and they brought her here.  She denies underlying lung disease.    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 Provider to review       Dermatophytosis of nail 02/06/2014     Priority: Medium     Corns and callosities 02/06/2014     Priority: Medium     History of amputation of lesser toe (H) 02/06/2014     Priority: Medium     GERD (gastroesophageal reflux disease) 12/31/2013     Priority: Medium     Health Care Home 12/06/2013     Priority: Medium     State Tier Level:  unknown  Status:  Closed  Care Coordinator:  Carine Vasques RN BSN    See Letters for [facility]           Hypoxia 12/21/2012     Priority: Medium     Atelectasis 12/19/2012     Priority: Medium     Anemia 12/13/2012     Priority: Medium     Thrombocytopenia (H) 12/13/2012     Priority: Medium     Peripheral autonomic neuropathy due to diabetes mellitus (H) 07/13/2012     Priority: Medium     Osteoporosis 12/22/2011     Priority: Medium     Estrogen deficiency 12/12/2011     Priority: Medium     Subdural hematoma (H) 06/18/2010     Priority: Medium     Degenerative disc disease      Priority: Medium     lumbar       Unspecified osteomyelitis, other specified site 08/31/2005     Priority: Medium     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 Provider to review       Renal failure 09/09/2004     Priority: Medium     Problem list name updated by automated process. Provider to review       Generalized osteoarthrosis, unspecified site 07/20/2004     Priority: Medium     UTI (urinary tract infection) 12/13/2012     Priority: Low     CKD (chronic kidney disease) stage 4, GFR 15-29 ml/min (H) 12/12/2012     Priority: Low     Hip pain 12/12/2012     Priority: Low     Left upper arm pain 12/12/2012     Priority: Low     Advanced directives, counseling/discussion  03/27/2012     Priority: Low     Patient states has Advance Directive and will bring in a copy to clinic. 3/27/2012          Hyperlipidemia LDL goal <100 10/31/2010     Priority: Low[RA1.2]        Past Medical History:[RA1.1]    Past Medical History:   Diagnosis Date     Abnormality of gait      Anemia, unspecified      Chronic ischemic heart disease, unspecified      Closed fracture of patella 06/21/10     Coronary artery disease      Degenerative disc disease      Depressive disorder, not elsewhere classified      History of blood transfusion      Other and unspecified hyperlipidemia      Renal failure, unspecified      Septicemia due to Escherichia coli (E. coli)(038.42) (H) 05/03/2007     Syncope and collapse 4/8/2005     Syncope and collapse 05/26/10     Traumatic subdural hematomas 05/27/10     Type II or unspecified type diabetes mellitus without mention of complication, not stated as uncontrolled      Unspecified essential hypertension      Unspecified sleep apnea      Urinary tract infection, site not specified 4/4/2008[RA1.2]       Past Surgical History:[RA1.1]    Past Surgical History:   Procedure Laterality Date     BUNIONECTOMY RT/LT  10/24/07    Bunionectomy right foot. Arthrodesis IP joint, right hallux.     C ANESTH,UPPER LEG SURGERY       C APPENDECTOMY       C BX SYNOVIUM INTERPHALANG JT       C OPEN TX FEMORAL SUPRACONDYLAR FRACTURE W EXTENSION  12/14/12    right LE     CRANIOTOMY  05/28/10    [facility]     HC AMPUTATION TOE, MTP JT  2000    Second toe amputation     HC COLONOSCOPY W/WO BRUSH/WASH  12/05/2005    Internal hemorrhoids.  Diverticulosis-limited/left colon.  Otherwise normal to cecum.     HC EXCISION LESION/TENDON-SHEATH/CAPSULE, FOOT  04/30/07    right foot.     HC EXCISION LESION/TENDON-SHEATH/CAPSULE, FOOT  8/14/2007    Recurrent ganglion cyst - right ankle     HC REPAIR ROTATOR CUFF,ACUTE  11/21/2002    Rotator Cuff Repair; Right     HC UGI ENDOSCOPY DIAG W OR W/O BRUSH/WASH   "12/05/2005    Hiatus hernia.  Otherwise normal.      UGI ENDOSCOPY, SIMPLE EXAM  04/27/2005[RA1.2]       Family History:[RA1.1]    Family History   Problem Relation Age of Onset     DIABETES Brother      C.A.D. Brother      DIABETES Brother      DIABETES Mother      OSTEOPOROSIS Mother[RA1.2]        Social History:  Marital Status:   [2][RA1.1]  Social History   Substance Use Topics     Smoking status: Never Smoker     Smokeless tobacco: Never Used     Alcohol use No[RA1.2]        Medications:[RA1.1]      HYDROcodone-acetaminophen (NORCO) 5-325 MG per tablet   traZODone (DESYREL) 50 MG tablet   gabapentin (NEURONTIN) 400 MG capsule   simvastatin (ZOCOR) 80 MG tablet   hydrALAZINE (APRESOLINE) 25 MG tablet   insulin glargine (LANTUS) 100 UNIT/ML injection   insulin aspart (NOVOLOG VIAL) 100 UNITS/ML injection   cholecalciferol (VITAMIN D) 400 UNIT TABS   MULTIVITAMINS OR TABS   BASAGLAR 100 UNIT/ML injection   insulin pen needle (B-D U/F) 31G X 8 MM   blood glucose monitoring (NO BRAND SPECIFIED) test strip   blood glucose monitoring (PRODIGY TWIST TOP 28G) lancets   insulin syringe-needle U-100 (BD INSULIN SYRINGE ULTRAFINE) 30G X 1/2\" 0.5 ML   albuterol (PROAIR HFA, PROVENTIL HFA, VENTOLIN HFA) 108 (90 BASE) MCG/ACT inhaler   betamethasone dipropionate (DIPROSONE) 0.05 % lotion   Spacer/Aero-Holding Chambers (OPTIHALER) BOGDAN   glucose blood VI test strips (ONE TOUCH TEST STRIPS) strip   [employer] FINEPOINT LANCETS MISC   ASPIRIN NOT PRESCRIBED, INTENTIONAL,[RA1.2]         Review of Systems   Constitutional: Positive for chills. Negative for fever.   HENT: Positive for congestion.    Respiratory: Positive for cough and shortness of breath.    Cardiovascular: Negative for chest pain and leg swelling.   Gastrointestinal: Positive for constipation. Negative for abdominal pain, nausea and vomiting.   Musculoskeletal: Positive for myalgias.   Neurological: Positive for weakness. Negative for numbness.   All other " systems reviewed and are negative.      Physical Exam[RA1.1]   BP: (!) 217/103  Heart Rate: 81  Temp: 98.9  F (37.2  C)  Resp: 20  Weight: 78.8 kg (173 lb 11.6 oz)  SpO2: (!) 87 %[RA1.2]      Physical Exam   Constitutional: She appears well-developed and well-nourished.  Non-toxic appearance. No distress. Nasal cannula in place.   Obese, elderly female. Appears fatigued.   HENT:   Head: Normocephalic and atraumatic.[RA1.1]   Tacky mucous membranes[RA1.3]   Eyes: Conjunctivae are normal.   Cardiovascular: Normal rate, regular rhythm and normal heart sounds.   Occasional extrasystoles are present.   Pulmonary/Chest: No respiratory distress. She has[RA1.1] decreased breath sounds (throughout)[RA1.3]. She has no wheezes.   Crackles noted in bilateral bases, right greater than left   Abdominal: Soft. She exhibits no distension. There is no tenderness.   Musculoskeletal:[RA1.1]   Bilateral hips with mild tenderness.  No obvious bruising or deformity.[RA1.3]   Neurological: She is alert.   Skin: Skin is warm and dry. She is not diaphoretic.   Psychiatric: She has a normal mood and affect.   Nursing note and vitals reviewed.      ED Course[RA1.1]     ED Course[RA1.2]     Procedures[RA1.1]        Results for orders placed or performed during the hospital encounter of 01/21/18 (from the past 24 hour(s))   CBC with platelets differential   Result Value Ref Range    WBC 7.6 4.0 - 11.0 10e9/L    RBC Count 4.40 3.8 - 5.2 10e12/L    Hemoglobin 13.4 11.7 - 15.7 g/dL    Hematocrit 42.4 35.0 - 47.0 %    MCV 96 78 - 100 fl    MCH 30.5 26.5 - 33.0 pg    MCHC 31.6 31.5 - 36.5 g/dL    RDW 12.9 10.0 - 15.0 %    Platelet Count 154 150 - 450 10e9/L    Diff Method Automated Method     % Neutrophils 64.8 %    % Lymphocytes 22.7 %    % Monocytes 7.9 %    % Eosinophils 3.8 %    % Basophils 0.5 %    % Immature Granulocytes 0.3 %    Absolute Neutrophil 4.9 1.6 - 8.3 10e9/L    Absolute Lymphocytes 1.7 0.8 - 5.3 10e9/L    Absolute Monocytes 0.6 0.0  - 1.3 10e9/L    Absolute Eosinophils 0.3 0.0 - 0.7 10e9/L    Absolute Basophils 0.0 0.0 - 0.2 10e9/L    Abs Immature Granulocytes 0.0 0 - 0.4 10e9/L   Comprehensive metabolic panel   Result Value Ref Range    Sodium 139 133 - 144 mmol/L    Potassium 4.7 3.4 - 5.3 mmol/L    Chloride 101 94 - 109 mmol/L    Carbon Dioxide 34 (H) 20 - 32 mmol/L    Anion Gap 4 3 - 14 mmol/L    Glucose 86 70 - 99 mg/dL    Urea Nitrogen 44 (H) 7 - 30 mg/dL    Creatinine 1.95 (H) 0.52 - 1.04 mg/dL    GFR Estimate 25 (L) >60 mL/min/1.7m2    GFR Estimate If Black 30 (L) >60 mL/min/1.7m2    Calcium 9.5 8.5 - 10.1 mg/dL    Bilirubin Total 0.4 0.2 - 1.3 mg/dL    Albumin 3.1 (L) 3.4 - 5.0 g/dL    Protein Total 7.5 6.8 - 8.8 g/dL    Alkaline Phosphatase 59 40 - 150 U/L    ALT 17 0 - 50 U/L    AST 23 0 - 45 U/L   Lactic acid whole blood   Result Value Ref Range    Lactic Acid 1.0 0.7 - 2.0 mmol/L   Blood gas venous   Result Value Ref Range    Ph Venous 7.40 7.32 - 7.43 pH    PCO2 Venous 59 (H) 40 - 50 mm Hg    PO2 Venous 28 25 - 47 mm Hg    Bicarbonate Venous 37 (H) 21 - 28 mmol/L    Base Excess Venous 10.0 mmol/L    FIO2 28    CRP inflammation   Result Value Ref Range    CRP Inflammation 103.0 (H) 0.0 - 8.0 mg/L   Nt probnp inpatient (BNP)   Result Value Ref Range    N-Terminal Pro BNP Inpatient 582 0 - 1800 pg/mL   Influenza A/B antigen   Result Value Ref Range    Influenza A/B Agn Specimen Nares     Influenza A Negative NEG^Negative    Influenza B Negative NEG^Negative   UA with Microscopic   Result Value Ref Range    Color Urine Yellow     Appearance Urine Slightly Cloudy     Glucose Urine Negative NEG^Negative mg/dL    Bilirubin Urine Negative NEG^Negative    Ketones Urine Negative NEG^Negative mg/dL    Specific Gravity Urine 1.013 1.003 - 1.035    Blood Urine Negative NEG^Negative    pH Urine 5.0 5.0 - 7.0 pH    Protein Albumin Urine 30 (A) NEG^Negative mg/dL    Urobilinogen mg/dL 0.0 0.0 - 2.0 mg/dL    Nitrite Urine Positive (A) NEG^Negative     Leukocyte Esterase Urine Moderate (A) NEG^Negative    Source Catheterized Urine     WBC Urine 35 (H) 0 - 2 /HPF    RBC Urine 1 0 - 2 /HPF    WBC Clumps Present (A) NEG^Negative /HPF    Bacteria Urine Many (A) NEG^Negative /HPF    Squamous Epithelial /HPF Urine 1 0 - 1 /HPF    Hyaline Casts 4 (H) 0 - 2 /LPF   XR Chest Port 1 View    Narrative    CHEST PORTABLE ONE VIEW  1/21/2018 6:23 PM     HISTORY: Cough.    COMPARISON: Chest x-ray 1/26/2017.      Impression    IMPRESSION: Portable chest. Atelectasis lateral left lung base is  stable. Lungs are otherwise clear. Possible trace left pleural  effusion. Heart is normal in size. No pneumothorax.         CARMITA STOVER MD   XR Pelvis Port 1/2 Views    Narrative    PELVIS PORTABLE ONE TO TWO VIEWS  1/21/2018 6:24 PM     HISTORY: Pain after fall.    COMPARISON: Pelvis x-ray 4/27/2016.      Impression    IMPRESSION: AP view of the pelvis is performed. Pelvic ring appears  intact. Intramedullary anay right femur is unchanged. Hips bilaterally  are located without evidence of fracture on this single view.    CARMITA STOVER MD     *Note: Due to a large number of results and/or encounters for the requested time period, some results have not been displayed. A complete set of results can be found in Results Review.       Medications   0.9% sodium chloride BOLUS (0 mLs Intravenous Stopped 1/21/18 1854)     Followed by   0.9% sodium chloride infusion (1,000 mLs Intravenous New Bag 1/21/18 1917)   cefTRIAXone (ROCEPHIN) intermittent infusion 1 g (1 g Intravenous New Bag 1/21/18 1855)   azithromycin (ZITHROMAX) 500 mg in NaCl 0.9 % 250 mL intermittent infusion (not administered)   ipratropium - albuterol 0.5 mg/2.5 mg/3 mL (DUONEB) neb solution 3 mL (3 mLs Nebulization Given 1/21/18 1858)[RA1.2]        Assessments & Plan (with Medical Decision Making)  Smiley Acuña is a[RA1.1] 83 year old[RA1.4] who presented to the ED via EMS for a cough.  She has been symptomatic for several  weeks but has grown progressively weaker and has had a couple falls.  On arrival to the ED she was afebrile.  She was she was hypoxic in route with EMS that she was placed on 2 L nasal cannula.  O2 saturations here were in the low 90s.  Her blood pressure was elevated at 192/106, but heart rate was normal.  She had decreased breath sounds with crackles and bilateral bases.  No lower extremity edema.  IV access was obtained and she was administered fluids.  Initial labs showed a normal white count, but CRP markedly elevated at 103.  Venous gas showed a PCO2 of 59, and bicarb of 37.  Lactate was normal today.  BUN/creatinine at 44/1.95.  BUN was elevated from previous lab studies, concerning for dehydration.  Her influenza screen was negative and chest x-ray did not show an obvious pneumonia, however given her cough and hypoxia I do clinically suspect this is the case.  IV ceftriaxone and azithromycin ordered.  Urinalysis also showed signs of infection, but ceftriaxone should cover this as well. Blood culture and urine culture pending. This patient is acutely ill with pneumonia with hypoxia and UTI, causing generalized weakness. Will require hospital admission. I spoke with Dr. Eugene, hospitalist, who accepted patient onto his service.  Patient and  agreeable to this plan of care.  Patient was staffed in the ED with Dr. Ceballos.[RA1.3]     I have reviewed the nursing notes.    I have reviewed the findings, diagnosis, plan and need for follow up with the patient.[RA1.1]       ED to Inpatient Handoff:    Discussed with Dr. Eugene at 1830  Patient accepted for Inpatient Stay  Pending studies include cultures  Code Status: Not Addressed[RA1.3]           New Prescriptions    No medications on file       Final diagnoses:   Pneumonia   Hypoxia   Acute cystitis without hematuria   Weakness   Recurrent falls[RA1.2]     Note: Chart documentation done in part with Dragon Voice Recognition software. Although reviewed after  completion, some word and grammatical errors may remain.       1/21/2018   [facility] EMERGENCY DEPARTMENT[RA1.1]     Daly Flores PA-C  01/21/18 1932  [RA1.2]     Revision History        User Key Date/Time User Provider Type Action    > RA1.2 1/21/2018  7:32 PM Daly Flores PA-C Physician Assistant Sign     RA1.3 1/21/2018  6:48 PM Daly Flores PA-C Physician Assistant      RA1.4 1/21/2018  5:02 PM Daly Flores PA-C Physician Assistant      RA1.1 1/21/2018  5:01 PM Daly Flores PA-C Physician Assistant             ED Notes by Alisha Cowan RN at 1/21/2018  8:52 PM     Author:  Alisha Cowan RN Service:  Family Medicine Author Type:  Registered Nurse    Filed:  1/21/2018  8:53 PM Date of Service:  1/21/2018  8:52 PM Creation Time:  1/21/2018  8:53 PM    Status:  Signed :  Alisha Cowan RN (Registered Nurse)         ED Nursing criteria listed below was addressed during verbal handoff:     Abnormal vitals: Yes  Abnormal results: Yes  Med Reconciliation completed: Yes  Meds given in ED: Yes  Any Overdue Meds: N/A  Core Measures: N/A  Isolation: Yes ( droplet)  Special needs: Yes ( fall risk, confusion at times)  Skin assessment: Yes    Observation Patient  Education provided: N/A[MS1.1]     Revision History        User Key Date/Time User Provider Type Action    > MS1.1 1/21/2018  8:53 PM Alisha Cowan RN Registered Nurse Sign            ED Notes by Alisha Cowan RN at 1/21/2018  6:05 PM     Author:  Alisha Cowan RN Service:  Family Medicine Author Type:  Registered Nurse    Filed:  1/21/2018  6:06 PM Date of Service:  1/21/2018  6:05 PM Creation Time:  1/21/2018  6:06 PM    Status:  Signed :  Alisha Cowan RN (Registered Nurse)         Pt had a large coughing spell. Sats went down to 88% then came up to 90%. O2 turned up to 3 L NC. Sats up to 93%[MS1.1]     Revision History        User Key Date/Time User Provider  Type Action    > MS1.1 1/21/2018  6:06 PM Alisha Cowan RN Registered Nurse Sign            ED Notes by Alisha Cowan RN at 1/21/2018  6:02 PM     Author:  Alisha Cowan RN Service:  Family Medicine Author Type:  Registered Nurse    Filed:  1/21/2018  6:03 PM Date of Service:  1/21/2018  6:02 PM Creation Time:  1/21/2018  6:03 PM    Status:  Signed :  Alisha Cowan RN (Registered Nurse)         Quick fem cath done for urine spec. [place] well. Pt has stress incont. Pericare given[MS1.1]     Revision History        User Key Date/Time User Provider Type Action    > MS1.1 1/21/2018  6:03 PM Alisha Cowan RN Registered Nurse Sign            ED Notes by Alisha Cowan RN at 1/21/2018  5:35 PM     Author:  Alisha Cowan RN Service:  Family Medicine Author Type:  Registered Nurse    Filed:  1/21/2018  5:47 PM Date of Service:  1/21/2018  5:35 PM Creation Time:  1/21/2018  5:47 PM    Status:  Signed :  Alisha Cowan RN (Registered Nurse)         Labs drawn with IV insertion and sent by Quiana BRENNER[MS1.1]     Revision History        User Key Date/Time User Provider Type Action    > MS1.1 1/21/2018  5:47 PM Alisha Cowan RN Registered Nurse Sign            ED Notes by Lavern Colvin RN at 1/21/2018  5:04 PM     Author:  Lavern Colvin RN Service:  (none) Author Type:  Registered Nurse    Filed:  1/21/2018  5:05 PM Date of Service:  1/21/2018  5:04 PM Creation Time:  1/21/2018  5:04 PM    Status:  Signed :  Lavern Colvin RN (Registered Nurse)         Sick with cough for a cough for a couple weeks with increasing weakness.[CK1.1]     Revision History        User Key Date/Time User Provider Type Action    > CK1.1 1/21/2018  5:05 PM Lavern Colvin RN Registered Nurse Sign            ED Notes by Briana Zheng at 1/21/2018  5:01 PM     Author:  Briana Zheng Service:  (none) Author Type:  Nursing Sta Asst    Filed:  1/21/2018  5:01 PM Date of Service:   1/21/2018  5:01 PM Creation Time:  1/21/2018  5:01 PM    Status:  Signed :  Briana Zheng (Nursing Sta Asst)         Bed: ED11  Expected date: 1/21/18  Expected time: 5:00 PM  Means of arrival: Ambulance  Comments:     Revision History        User Key Date/Time User Provider Type Action    > LV1.1 1/21/2018  5:01 PM Briana Zheng Nursing Sta Asst Sign                  Procedure Notes     No notes of this type exist for this encounter.         Progress Notes - Therapies (Notes from 01/21/18 through 01/24/18)      Progress Notes by Ana Tran, PT at 1/23/2018 11:09 AM     Author:  Ana Tran PT Service:  (none) Author Type:  Physical Therapist    Filed:  1/23/2018 11:10 AM Date of Service:  1/23/2018 11:09 AM Creation Time:  1/23/2018 11:09 AM    Status:  Signed :  Ana Tran PT (Physical Therapist)          01/23/18 1028   Quick Adds   Type of Visit Initial PT Evaluation   Living Environment   Lives With spouse   Living Arrangements house   Home Accessibility stairs to enter home   Number of Stairs to Enter Home 7   Number of Stairs Within Home 0   Stair Railings at Home outside, present on right side   Living Environment Comment There is a gap between railing entering the house.  hand rails next to toilet.     Self-Care   Usual Activity Tolerance poor   Equipment Currently Used at Home walker, rolling   Activity/Exercise/Self-Care Comment [place] helps her get in/out of the shower/tub.   Functional Level Prior   Ambulation 1-->assistive equipment   Transferring 1-->assistive equipment   Toileting 1-->assistive equipment   Bathing 3-->assistive equipment and person   Dressing 2-->assistive person   Eating 0-->independent   Fall history within last six months yes   Number of times patient has fallen within last six months 3   Which of the above functional risks had a recent onset or change? ambulation;transferring;toileting;bathing   General Information   Onset of  Illness/Injury or Date of Surgery - Date 01/21/18   Referring Physician Dr. Florentino   Patient/Family Goals Statement wants to get back to walking   Pertinent History of Current Problem (include personal factors and/or comorbidities that impact the POC) progressive illness with progressive weakness; hasn't been walking well for the last 4-5 weeks.  For the last week, spouse has helped her with transfers and had her sit on 4WW seat to get around the house.     Precautions/Limitations fall precautions   General Observations seated in recliner chair; chair alarm; spouse at side.    Cognitive Status Examination   Orientation person   Level of Consciousness confused   Pain Assessment   Patient Currently in Pain No   Range of Motion (ROM)   ROM Quick Adds No deficits were identified   Strength   Strength Comments 4/5 UE strength; LE Quad 3+/5   Transfer Skills   Transfer Transfer Skill: Sit to Stand;Transfer Safety Analysis Sit/Stand;Transfer Skill:  Stand to Sit;Transfer Safety Analysis Stand/Sit   Transfer Comments verbal cues to use arm rests and sequence transfer   Transfer Skill:  Sit to Stand   Level of [place]: Sit/Stand contact guard   Physical Assist/Nonphysical Assist: Sit/Stand supervision;verbal cues   Weightbearing Restrictions: Sit/Stand full weight-bearing   Assistive Device for Transfer: Sit/Stand standard walker   Transfer Safety Analysis Sit to Stand   Impairments Contributing to Impaired Transfers: Sit to Stand decreased strength   Transfer Skill: Stand to Sit   Level of [place]: Stand/Sit contact guard   Physical Assist/Nonphysical Assist: Stand/Sit verbal cues   Weight-Bearing Restrictions: Stand/Sit full weight-bearing   Assistive Device: Stand to Sit standard walker   Transfer Safety Analysis Stand To Sit   Transfer Safety Concerns Noted: Stand to Sit decreased sequencing ability   Impairments Contributing to Impaired Transfers: Stand to Sit decreased strength   Gait   Gait Gait Skill;Gait  Analysis   Gait Comments standing at edge of chair march in place trial was successful with CGA, able to progress to few steps forward to assess weight shift and step length; pt needs assist sequencing walker and steps; pt has wheeled walker at home and standard walker was in the hospital room.    Gait Skills   Level of [place]: Gait minimum assist (75% patients effort)   Physical Assist/Nonphysical Assist: Gait verbal cues;1 person assist   Weight-Bearing Restrictions: Gait full weight-bearing   Assistive Device for Transfer: Gait standard walker   Gait Distance other (see comment)  (Pt took 5 steps forward; 5 steps back)   Gait Analysis   Gait Pattern Used swing-to gait   Gait Deviations Noted decreased step length;increased time in double stance;decreased weight-shifting ability   Impairments Contributing to Gait Deviations decreased strength   Balance   Balance Comments seated balance independent; standing balance requires assistive device   General Therapy Interventions   Planned Therapy Interventions gait training;strengthening;transfer training;progressive activity/exercise   Clinical Impression   Criteria for Skilled Therapeutic Intervention yes, treatment indicated   PT Diagnosis gait dysfunction; unsafe gait, unsafe transfers   Influenced by the following impairments decreased strength, confusion, poor sequencing ability   Functional limitations due to impairments safe independent transfer from chair or bed/chair transfer; safe independent functional gait distance   Clinical Presentation Stable/Uncomplicated   Clinical Presentation Rationale general weakness, confusion, decreased activity tolerance but able to perform activity with assist   Clinical Decision Making (Complexity) Low complexity   Therapy Frequency` daily   Predicted Duration of Therapy Intervention (days/wks) 2 days   Anticipated Discharge Disposition Transitional Care Facility   Risk & Benefits of therapy have been explained Yes  "  Patient, Family & other staff in agreement with plan of care Yes   Clinical Impression Comments spouse present during evaluation, helping to assist in answering questions about home environment   [facility] TM \"6 Clicks\"   2016, Trustees of [facility], under license to [employer].  All rights reserved.   6 Clicks Short Forms Basic Mobility Inpatient Short Form   [facility] AM-PAC  \"6 Clicks\" V.2 Basic Mobility Inpatient Short Form   1. Turning from your back to your side while in a flat bed without using bedrails? 3 - A Little   2. Moving from lying on your back to sitting on the side of a flat bed without using bedrails? 3 - A Little   3. Moving to and from a bed to a chair (including a wheelchair)? 3 - A Little   4. Standing up from a chair using your arms (e.g., wheelchair, or bedside chair)? 3 - A Little   5. To walk in hospital room? 2 - A Lot   6. Climbing 3-5 steps with a railing? 1 - Total   Basic Mobility Raw Score (Score out of 24.Lower scores equate to lower levels of function) 15   Total Evaluation Time   Total Evaluation Time (Minutes) 25[NM1.1]        Revision History        User Key Date/Time User Provider Type Action    > NM1.1 1/23/2018 11:10 AM Ana Tran, PT Physical Therapist Sign            "

## 2018-01-21 NOTE — ED PROVIDER NOTES
History     Chief Complaint   Patient presents with     Cough     HPI  Smiley Acuña is a 83 year old female with a history of type 2 diabetes, CKD, who presents to the emergency department via EMS for a cough.  Patient is here with her .  She explains that for the last several weeks she has had a cough and nasal congestion.  She notes body aches and chills as well but no documented fever at home.  He notes shortness of breath related to coughing fits but otherwise is breathing okay.  She denies chest pain or chest tightness.  She denies abdominal pain, nausea or vomiting.  She is constipated, has not had a bowel movement in 3-4 days.  Over the last few days she is grown more weak and unsteady on her feet.  She has fallen while walking with her walker down to her buttocks a couple times. Denies hitting her head.  witnessed these falls. She has barely been able to stand from her chair to get up and move to sit on her walker.  Because of this worsening weakness, her  called EMS and they brought her here.  She denies underlying lung disease.    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 Provider to review       Dermatophytosis of nail 02/06/2014     Priority: Medium     Corns and callosities 02/06/2014     Priority: Medium     History of amputation of lesser toe (H) 02/06/2014     Priority: Medium     GERD (gastroesophageal reflux disease) 12/31/2013     Priority: Medium     Health Care Home 12/06/2013     Priority: Medium     State Tier Level:   unknown  Status:  Closed  Care Coordinator:  Carine Vasques RN BSN    See Letters for [facility] Care Plan           Hypoxia 12/21/2012     Priority: Medium     Atelectasis 12/19/2012     Priority: Medium     Anemia 12/13/2012     Priority: Medium     Thrombocytopenia (H) 12/13/2012     Priority: Medium     Peripheral autonomic neuropathy due to diabetes mellitus (H) 07/13/2012     Priority: Medium     Osteoporosis 12/22/2011     Priority: Medium     Estrogen deficiency 12/12/2011     Priority: Medium     Subdural hematoma (H) 06/18/2010     Priority: Medium     Degenerative disc disease      Priority: Medium     lumbar       Unspecified osteomyelitis, other specified site 08/31/2005     Priority: Medium     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 Provider to review       Renal failure 09/09/2004     Priority: Medium     Problem list name updated by automated process. Provider to review       Generalized osteoarthrosis, unspecified site 07/20/2004     Priority: Medium     UTI (urinary tract infection) 12/13/2012     Priority: Low     CKD (chronic kidney disease) stage 4, GFR 15-29 ml/min (H) 12/12/2012     Priority: Low     Hip pain 12/12/2012     Priority: Low     Left upper arm pain 12/12/2012     Priority: Low     Advanced directives, counseling/discussion 03/27/2012     Priority: Low     Patient states has Advance Directive and will bring in a copy to clinic. 3/27/2012          Hyperlipidemia LDL goal <100 10/31/2010     Priority: Low        Past Medical History:    Past Medical History:   Diagnosis Date     Abnormality of gait      Anemia, unspecified      Chronic ischemic heart disease, unspecified      Closed fracture of patella 06/21/10     Coronary artery disease      Degenerative disc disease      Depressive disorder, not elsewhere classified      History of blood transfusion      Other and  unspecified hyperlipidemia      Renal failure, unspecified      Septicemia due to Escherichia coli (E. coli)(038.42) (H) 05/03/2007     Syncope and collapse 4/8/2005     Syncope and collapse 05/26/10     Traumatic subdural hematomas 05/27/10     Type II or unspecified type diabetes mellitus without mention of complication, not stated as uncontrolled      Unspecified essential hypertension      Unspecified sleep apnea      Urinary tract infection, site not specified 4/4/2008       Past Surgical History:    Past Surgical History:   Procedure Laterality Date     BUNIONECTOMY RT/LT  10/24/07    Bunionectomy right foot. Arthrodesis IP joint, right hallux.     C ANESTH,UPPER LEG SURGERY       C APPENDECTOMY       C BX SYNOVIUM INTERPHALANG JT       C OPEN TX FEMORAL SUPRACONDYLAR FRACTURE W EXTENSION  12/14/12    right LE     CRANIOTOMY  05/28/10    [facility]     HC AMPUTATION TOE, MTP JT  2000    Second toe amputation     HC COLONOSCOPY W/WO BRUSH/WASH  12/05/2005    Internal hemorrhoids.  Diverticulosis-limited/left colon.  Otherwise normal to cecum.     HC EXCISION LESION/TENDON-SHEATH/CAPSULE, FOOT  04/30/07    right foot.     HC EXCISION LESION/TENDON-SHEATH/CAPSULE, FOOT  8/14/2007    Recurrent ganglion cyst - right ankle     HC REPAIR ROTATOR CUFF,ACUTE  11/21/2002    Rotator Cuff Repair; Right     HC UGI ENDOSCOPY DIAG W OR W/O BRUSH/WASH  12/05/2005    Hiatus hernia.  Otherwise normal.      UGI ENDOSCOPY, SIMPLE EXAM  04/27/2005       Family History:    Family History   Problem Relation Age of Onset     DIABETES Brother      C.A.D. Brother      DIABETES Brother      DIABETES Mother      OSTEOPOROSIS Mother        Social History:  Marital Status:   [2]  Social History   Substance Use Topics     Smoking status: Never Smoker     Smokeless tobacco: Never Used     Alcohol use No        Medications:      HYDROcodone-acetaminophen (NORCO) 5-325 MG per tablet   traZODone (DESYREL) 50 MG tablet   gabapentin  "(NEURONTIN) 400 MG capsule   simvastatin (ZOCOR) 80 MG tablet   hydrALAZINE (APRESOLINE) 25 MG tablet   insulin glargine (LANTUS) 100 UNIT/ML injection   insulin aspart (NOVOLOG VIAL) 100 UNITS/ML injection   cholecalciferol (VITAMIN D) 400 UNIT TABS   MULTIVITAMINS OR TABS   BASAGLAR 100 UNIT/ML injection   insulin pen needle (B-D U/F) 31G X 8 MM   blood glucose monitoring (NO BRAND SPECIFIED) test strip   blood glucose monitoring (PRODIGY TWIST TOP 28G) lancets   insulin syringe-needle U-100 (BD INSULIN SYRINGE ULTRAFINE) 30G X 1/2\" 0.5 ML   albuterol (PROAIR HFA, PROVENTIL HFA, VENTOLIN HFA) 108 (90 BASE) MCG/ACT inhaler   betamethasone dipropionate (DIPROSONE) 0.05 % lotion   Spacer/Aero-Holding Chambers (OPTIHALER) BOGDAN   glucose blood VI test strips (ONE TOUCH TEST STRIPS) strip   Oh[employer] FINEPOINT LANCETS MISC   ASPIRIN NOT PRESCRIBED, INTENTIONAL,         Review of Systems   Constitutional: Positive for chills. Negative for fever.   HENT: Positive for congestion.    Respiratory: Positive for cough and shortness of breath.    Cardiovascular: Negative for chest pain and leg swelling.   Gastrointestinal: Positive for constipation. Negative for abdominal pain, nausea and vomiting.   Musculoskeletal: Positive for myalgias.   Neurological: Positive for weakness. Negative for numbness.   All other systems reviewed and are negative.      Physical Exam   BP: (!) 217/103  Heart Rate: 81  Temp: 98.9  F (37.2  C)  Resp: 20  Weight: 78.8 kg (173 lb 11.6 oz)  SpO2: (!) 87 %      Physical Exam   Constitutional: She appears well-developed and well-nourished.  Non-toxic appearance. No distress. Nasal cannula in place.   Obese, elderly female. Appears fatigued.   HENT:   Head: Normocephalic and atraumatic.   Tacky mucous membranes   Eyes: Conjunctivae are normal.   Cardiovascular: Normal rate, regular rhythm and normal heart sounds.   Occasional extrasystoles are present.   Pulmonary/Chest: No respiratory distress. She has " decreased breath sounds (throughout). She has no wheezes.   Crackles noted in bilateral bases, right greater than left   Abdominal: Soft. She exhibits no distension. There is no tenderness.   Musculoskeletal:   Bilateral hips with mild tenderness.  No obvious bruising or deformity.   Neurological: She is alert.   Skin: Skin is warm and dry. She is not diaphoretic.   Psychiatric: She has a normal mood and affect.   Nursing note and vitals reviewed.      ED Course     ED Course     Procedures        Results for orders placed or performed during the hospital encounter of 01/21/18 (from the past 24 hour(s))   CBC with platelets differential   Result Value Ref Range    WBC 7.6 4.0 - 11.0 10e9/L    RBC Count 4.40 3.8 - 5.2 10e12/L    Hemoglobin 13.4 11.7 - 15.7 g/dL    Hematocrit 42.4 35.0 - 47.0 %    MCV 96 78 - 100 fl    MCH 30.5 26.5 - 33.0 pg    MCHC 31.6 31.5 - 36.5 g/dL    RDW 12.9 10.0 - 15.0 %    Platelet Count 154 150 - 450 10e9/L    Diff Method Automated Method     % Neutrophils 64.8 %    % Lymphocytes 22.7 %    % Monocytes 7.9 %    % Eosinophils 3.8 %    % Basophils 0.5 %    % Immature Granulocytes 0.3 %    Absolute Neutrophil 4.9 1.6 - 8.3 10e9/L    Absolute Lymphocytes 1.7 0.8 - 5.3 10e9/L    Absolute Monocytes 0.6 0.0 - 1.3 10e9/L    Absolute Eosinophils 0.3 0.0 - 0.7 10e9/L    Absolute Basophils 0.0 0.0 - 0.2 10e9/L    Abs Immature Granulocytes 0.0 0 - 0.4 10e9/L   Comprehensive metabolic panel   Result Value Ref Range    Sodium 139 133 - 144 mmol/L    Potassium 4.7 3.4 - 5.3 mmol/L    Chloride 101 94 - 109 mmol/L    Carbon Dioxide 34 (H) 20 - 32 mmol/L    Anion Gap 4 3 - 14 mmol/L    Glucose 86 70 - 99 mg/dL    Urea Nitrogen 44 (H) 7 - 30 mg/dL    Creatinine 1.95 (H) 0.52 - 1.04 mg/dL    GFR Estimate 25 (L) >60 mL/min/1.7m2    GFR Estimate If Black 30 (L) >60 mL/min/1.7m2    Calcium 9.5 8.5 - 10.1 mg/dL    Bilirubin Total 0.4 0.2 - 1.3 mg/dL    Albumin 3.1 (L) 3.4 - 5.0 g/dL    Protein Total 7.5 6.8 - 8.8  g/dL    Alkaline Phosphatase 59 40 - 150 U/L    ALT 17 0 - 50 U/L    AST 23 0 - 45 U/L   Lactic acid whole blood   Result Value Ref Range    Lactic Acid 1.0 0.7 - 2.0 mmol/L   Blood gas venous   Result Value Ref Range    Ph Venous 7.40 7.32 - 7.43 pH    PCO2 Venous 59 (H) 40 - 50 mm Hg    PO2 Venous 28 25 - 47 mm Hg    Bicarbonate Venous 37 (H) 21 - 28 mmol/L    Base Excess Venous 10.0 mmol/L    FIO2 28    CRP inflammation   Result Value Ref Range    CRP Inflammation 103.0 (H) 0.0 - 8.0 mg/L   Nt probnp inpatient (BNP)   Result Value Ref Range    N-Terminal Pro BNP Inpatient 582 0 - 1800 pg/mL   Influenza A/B antigen   Result Value Ref Range    Influenza A/B Agn Specimen Nares     Influenza A Negative NEG^Negative    Influenza B Negative NEG^Negative   UA with Microscopic   Result Value Ref Range    Color Urine Yellow     Appearance Urine Slightly Cloudy     Glucose Urine Negative NEG^Negative mg/dL    Bilirubin Urine Negative NEG^Negative    Ketones Urine Negative NEG^Negative mg/dL    Specific Gravity Urine 1.013 1.003 - 1.035    Blood Urine Negative NEG^Negative    pH Urine 5.0 5.0 - 7.0 pH    Protein Albumin Urine 30 (A) NEG^Negative mg/dL    Urobilinogen mg/dL 0.0 0.0 - 2.0 mg/dL    Nitrite Urine Positive (A) NEG^Negative    Leukocyte Esterase Urine Moderate (A) NEG^Negative    Source Catheterized Urine     WBC Urine 35 (H) 0 - 2 /HPF    RBC Urine 1 0 - 2 /HPF    WBC Clumps Present (A) NEG^Negative /HPF    Bacteria Urine Many (A) NEG^Negative /HPF    Squamous Epithelial /HPF Urine 1 0 - 1 /HPF    Hyaline Casts 4 (H) 0 - 2 /LPF   XR Chest Port 1 View    Narrative    CHEST PORTABLE ONE VIEW  1/21/2018 6:23 PM     HISTORY: Cough.    COMPARISON: Chest x-ray 1/26/2017.      Impression    IMPRESSION: Portable chest. Atelectasis lateral left lung base is  stable. Lungs are otherwise clear. Possible trace left pleural  effusion. Heart is normal in size. No pneumothorax.         CARMITA STOVER MD   XR Pelvis Port 1/2 Views     Narrative    PELVIS PORTABLE ONE TO TWO VIEWS  1/21/2018 6:24 PM     HISTORY: Pain after fall.    COMPARISON: Pelvis x-ray 4/27/2016.      Impression    IMPRESSION: AP view of the pelvis is performed. Pelvic ring appears  intact. Intramedullary anay right femur is unchanged. Hips bilaterally  are located without evidence of fracture on this single view.    CARMITA STOVER MD     *Note: Due to a large number of results and/or encounters for the requested time period, some results have not been displayed. A complete set of results can be found in Results Review.       Medications   0.9% sodium chloride BOLUS (0 mLs Intravenous Stopped 1/21/18 1854)     Followed by   0.9% sodium chloride infusion (1,000 mLs Intravenous New Bag 1/21/18 1917)   cefTRIAXone (ROCEPHIN) intermittent infusion 1 g (1 g Intravenous New Bag 1/21/18 1855)   azithromycin (ZITHROMAX) 500 mg in NaCl 0.9 % 250 mL intermittent infusion (not administered)   ipratropium - albuterol 0.5 mg/2.5 mg/3 mL (DUONEB) neb solution 3 mL (3 mLs Nebulization Given 1/21/18 1858)        Assessments & Plan (with Medical Decision Making)  Smiley Acuña is a 83 year old who presented to the ED via EMS for a cough.  She has been symptomatic for several weeks but has grown progressively weaker and has had a couple falls.  On arrival to the ED she was afebrile.  She was she was hypoxic in route with EMS that she was placed on 2 L nasal cannula.  O2 saturations here were in the low 90s.  Her blood pressure was elevated at 192/106, but heart rate was normal.  She had decreased breath sounds with crackles and bilateral bases.  No lower extremity edema.  IV access was obtained and she was administered fluids.  Initial labs showed a normal white count, but CRP markedly elevated at 103.  Venous gas showed a PCO2 of 59, and bicarb of 37.  Lactate was normal today.  BUN/creatinine at 44/1.95.  BUN was elevated from previous lab studies, concerning for dehydration.  Her  influenza screen was negative and chest x-ray did not show an obvious pneumonia, however given her cough and hypoxia I do clinically suspect this is the case.  IV ceftriaxone and azithromycin ordered.  Urinalysis also showed signs of infection, but ceftriaxone should cover this as well. Blood culture and urine culture pending. This patient is acutely ill with pneumonia with hypoxia and UTI, causing generalized weakness. Will require hospital admission. I spoke with Dr. Eugene, hospitalist, who accepted patient onto his service.  Patient and  agreeable to this plan of care.  Patient was staffed in the ED with Dr. Ceballos.     I have reviewed the nursing notes.    I have reviewed the findings, diagnosis, plan and need for follow up with the patient.       ED to Inpatient Handoff:    Discussed with Dr. Eugene at 1830  Patient accepted for Inpatient Stay  Pending studies include cultures  Code Status: Not Addressed           New Prescriptions    No medications on file       Final diagnoses:   Pneumonia   Hypoxia   Acute cystitis without hematuria   Weakness   Recurrent falls     Note: Chart documentation done in part with Dragon Voice Recognition software. Although reviewed after completion, some word and grammatical errors may remain.       1/21/2018   [facility] EMERGENCY DEPARTMENT     Daly Flores PA-C  01/21/18 1932

## 2018-01-22 LAB
ANION GAP SERPL CALCULATED.3IONS-SCNC: 5 MMOL/L (ref 3–14)
BACTERIA SPEC CULT: ABNORMAL
BASE EXCESS BLDV CALC-SCNC: 5.5 MMOL/L
BUN SERPL-MCNC: 35 MG/DL (ref 7–30)
CALCIUM SERPL-MCNC: 8.2 MG/DL (ref 8.5–10.1)
CHLORIDE SERPL-SCNC: 107 MMOL/L (ref 94–109)
CO2 SERPL-SCNC: 30 MMOL/L (ref 20–32)
CREAT SERPL-MCNC: 1.64 MG/DL (ref 0.52–1.04)
ERYTHROCYTE [DISTWIDTH] IN BLOOD BY AUTOMATED COUNT: 12.8 % (ref 10–15)
GFR SERPL CREATININE-BSD FRML MDRD: 30 ML/MIN/1.7M2
GLUCOSE BLDC GLUCOMTR-MCNC: 109 MG/DL (ref 70–99)
GLUCOSE BLDC GLUCOMTR-MCNC: 145 MG/DL (ref 70–99)
GLUCOSE BLDC GLUCOMTR-MCNC: 209 MG/DL (ref 70–99)
GLUCOSE BLDC GLUCOMTR-MCNC: 46 MG/DL (ref 70–99)
GLUCOSE BLDC GLUCOMTR-MCNC: 67 MG/DL (ref 70–99)
GLUCOSE BLDC GLUCOMTR-MCNC: 78 MG/DL (ref 70–99)
GLUCOSE BLDC GLUCOMTR-MCNC: 95 MG/DL (ref 70–99)
GLUCOSE SERPL-MCNC: 95 MG/DL (ref 70–99)
HCO3 BLDV-SCNC: 31 MMOL/L (ref 21–28)
HCT VFR BLD AUTO: 37.6 % (ref 35–47)
HGB BLD-MCNC: 11.9 G/DL (ref 11.7–15.7)
Lab: ABNORMAL
MCH RBC QN AUTO: 30.7 PG (ref 26.5–33)
MCHC RBC AUTO-ENTMCNC: 31.6 G/DL (ref 31.5–36.5)
MCV RBC AUTO: 97 FL (ref 78–100)
O2/TOTAL GAS SETTING VFR VENT: 35 %
PCO2 BLDV: 50 MM HG (ref 40–50)
PH BLDV: 7.41 PH (ref 7.32–7.43)
PLATELET # BLD AUTO: 134 10E9/L (ref 150–450)
PO2 BLDV: 51 MM HG (ref 25–47)
POTASSIUM SERPL-SCNC: 4.3 MMOL/L (ref 3.4–5.3)
RBC # BLD AUTO: 3.87 10E12/L (ref 3.8–5.2)
SODIUM SERPL-SCNC: 142 MMOL/L (ref 133–144)
SPECIMEN SOURCE: ABNORMAL
WBC # BLD AUTO: 7.5 10E9/L (ref 4–11)

## 2018-01-22 PROCEDURE — 36415 COLL VENOUS BLD VENIPUNCTURE: CPT | Performed by: FAMILY MEDICINE

## 2018-01-22 PROCEDURE — 25000132 ZZH RX MED GY IP 250 OP 250 PS 637: Mod: GY | Performed by: PEDIATRICS

## 2018-01-22 PROCEDURE — 25000128 H RX IP 250 OP 636: Performed by: PHYSICIAN ASSISTANT

## 2018-01-22 PROCEDURE — 25000132 ZZH RX MED GY IP 250 OP 250 PS 637: Mod: GY | Performed by: FAMILY MEDICINE

## 2018-01-22 PROCEDURE — 00000146 ZZHCL STATISTIC GLUCOSE BY METER IP

## 2018-01-22 PROCEDURE — 25000125 ZZHC RX 250: Performed by: FAMILY MEDICINE

## 2018-01-22 PROCEDURE — A9270 NON-COVERED ITEM OR SERVICE: HCPCS | Mod: GY | Performed by: FAMILY MEDICINE

## 2018-01-22 PROCEDURE — 99232 SBSQ HOSP IP/OBS MODERATE 35: CPT | Performed by: PEDIATRICS

## 2018-01-22 PROCEDURE — 85027 COMPLETE CBC AUTOMATED: CPT | Performed by: FAMILY MEDICINE

## 2018-01-22 PROCEDURE — 25000131 ZZH RX MED GY IP 250 OP 636 PS 637: Mod: GY | Performed by: FAMILY MEDICINE

## 2018-01-22 PROCEDURE — A9270 NON-COVERED ITEM OR SERVICE: HCPCS | Mod: GY | Performed by: PEDIATRICS

## 2018-01-22 PROCEDURE — 87633 RESP VIRUS 12-25 TARGETS: CPT | Performed by: FAMILY MEDICINE

## 2018-01-22 PROCEDURE — 12000000 ZZH R&B MED SURG/OB

## 2018-01-22 PROCEDURE — 80048 BASIC METABOLIC PNL TOTAL CA: CPT | Performed by: FAMILY MEDICINE

## 2018-01-22 PROCEDURE — 82803 BLOOD GASES ANY COMBINATION: CPT | Performed by: FAMILY MEDICINE

## 2018-01-22 PROCEDURE — 87081 CULTURE SCREEN ONLY: CPT | Performed by: FAMILY MEDICINE

## 2018-01-22 RX ORDER — HYDRALAZINE HYDROCHLORIDE 25 MG/1
50 TABLET, FILM COATED ORAL 3 TIMES DAILY
Status: DISCONTINUED | OUTPATIENT
Start: 2018-01-22 | End: 2018-01-24 | Stop reason: HOSPADM

## 2018-01-22 RX ORDER — TORSEMIDE 10 MG/1
10 TABLET ORAL DAILY
Status: DISCONTINUED | OUTPATIENT
Start: 2018-01-22 | End: 2018-01-24 | Stop reason: HOSPADM

## 2018-01-22 RX ADMIN — HYDRALAZINE HYDROCHLORIDE 25 MG: 25 TABLET ORAL at 08:59

## 2018-01-22 RX ADMIN — HYDRALAZINE HYDROCHLORIDE 50 MG: 25 TABLET ORAL at 20:09

## 2018-01-22 RX ADMIN — GABAPENTIN 800 MG: 400 CAPSULE ORAL at 20:08

## 2018-01-22 RX ADMIN — HYDRALAZINE HYDROCHLORIDE 25 MG: 25 TABLET ORAL at 14:31

## 2018-01-22 RX ADMIN — ALBUTEROL SULFATE 2.5 MG: 2.5 SOLUTION RESPIRATORY (INHALATION) at 04:01

## 2018-01-22 RX ADMIN — TORSEMIDE 10 MG: 10 TABLET ORAL at 15:31

## 2018-01-22 RX ADMIN — GABAPENTIN 400 MG: 400 CAPSULE ORAL at 08:59

## 2018-01-22 RX ADMIN — DEXTROSE 30 G: 15 GEL ORAL at 20:52

## 2018-01-22 RX ADMIN — SODIUM CHLORIDE 1000 ML: 9 INJECTION, SOLUTION INTRAVENOUS at 12:10

## 2018-01-22 RX ADMIN — SODIUM CHLORIDE 1000 ML: 9 INJECTION, SOLUTION INTRAVENOUS at 04:01

## 2018-01-22 RX ADMIN — INSULIN GLARGINE 18 UNITS: 100 INJECTION, SOLUTION SUBCUTANEOUS at 09:00

## 2018-01-22 RX ADMIN — GABAPENTIN 400 MG: 400 CAPSULE ORAL at 12:17

## 2018-01-22 RX ADMIN — INSULIN ASPART 2 UNITS: 100 INJECTION, SOLUTION INTRAVENOUS; SUBCUTANEOUS at 12:17

## 2018-01-22 RX ADMIN — CEFTRIAXONE 1 G: 1 INJECTION, SOLUTION INTRAVENOUS at 18:03

## 2018-01-22 RX ADMIN — TRAZODONE HYDROCHLORIDE 50 MG: 50 TABLET ORAL at 20:09

## 2018-01-22 RX ADMIN — DEXTROSE 15 G: 15 GEL ORAL at 21:20

## 2018-01-22 NOTE — ED NOTES
Pt had a large coughing spell. Sats went down to 88% then came up to 90%. O2 turned up to 3 L NC. Sats up to 93%

## 2018-01-22 NOTE — PLAN OF CARE
"Problem: Patient Care Overview  Goal: Plan of Care/Patient Progress Review  Outcome: No Change  Vital signs stable. /74  Pulse 75  Temp 98.1  F (36.7  C) (Oral)  Resp 20  Ht 1.626 m (5' 4\")  Wt 82 kg (180 lb 12.4 oz)  SpO2 91%  BMI 31.03 kg/m2.  Afebrile. Frequent loose productive cough. Controlled well with neb treatment per MAR. Pt is weak with ambulation, 2 person assist with wheelchair. Tracy العلي used with BSC during shift. Pt is on 3.5L O2 NC. A&O to self, she is confused with NC and removes it from her nose and face multiple times per hour. Unable to use call light to make needs known.                 "

## 2018-01-22 NOTE — PROGRESS NOTES
[facility]    Hospitalist Progress Note    Date of Service (when I saw the patient): 01/22/2018    Assessment & Plan   Smiley Acuña is a 83 year old female who was admitted on 1/21/2018 with weakness and confusion consistent with an acute encephalopathy most likely due to infection.  She had pyuria on urinalysis with ongoing urinary incontinence, and urine culture is growing gram-negative rods suspicious for UTI.  Although cough persists, pneumonia is neither suspected clinically nor radiographically.  Respiratory symptoms, mild hypercapnia without acute respiratory acidosis, and hypoxia without dyspnea along with radiographic findings are more suspicious for atelectasis rather than respiratory tract infection.  Clinical course has not been concerning for acute respiratory failure so far.  Blood sugars are running in the normal range on her usual doses of insulin.  Renal function is stable so far with underlying severe chronic kidney disease.    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)    Generalized muscle weakness    Hyperlipidemia LDL goal <100    Advanced directives, counseling/discussion    Essential hypertension with goal blood pressure less than 130/80    Continue empiric ceftriaxone but discontinue Zithromax due to decreased suspicion for pneumonia or other respiratory tract infection  Continue insulin at her usual doses  Continue chronic antihypertensive medications at their usual doses  Advance diet and activity as tolerated  PT and OT evaluations to assist with disposition planning    DVT Prophylaxis: Pneumatic Compression Devices  Code Status:  Full Code    Disposition: Expected discharge in 1-2 days once improved and culture results are available.    Fermin Florentino    Interval History   Patient offers no complaints today.  She denies any pain or dyspnea.  She continues to cough which is mostly nonproductive.  Nursing reports that she has been quite confused intermittently and her  said that this confusion is new and a change from her usual baseline.  She has been frequently incontinent of urine.  She has not been febrile.  Vital signs have been generally stable except for severely elevated blood pressure readings intermittently.  She also remains on oxygen therapy although her oxygen requirement appears to be improving.  She is tolerating good oral intake.  She has not yet been up and ambulating and seems quite weak per nursing.    -Data reviewed today: I reviewed all new labs and imaging results over the last 24 hours. I personally reviewed no images or EKG's today.    Physical Exam   Temp: 97.6  F (36.4  C) Temp src: Oral BP: 194/81 Pulse: 79 Heart Rate: 75 Resp: 20 SpO2: 91 % O2 Device: Nasal cannula Oxygen Delivery: 2 LPM  Vitals:    01/21/18 1705 01/21/18 2115   Weight: 78.8 kg (173 lb 11.6 oz) 82 kg (180 lb 12.4 oz)     Vital Signs with Ranges  Temp:  [97.5  F (36.4  C)-99.1  F (37.3  C)] 97.6  F (36.4  C)  Pulse:  [20-83] 79  Heart Rate:  [75-86] 75  Resp:  [16-20] 20  BP: (152-217)/() 194/81  SpO2:  [87 %-97 %] 91 %  I/O last 3 completed shifts:  In: 2285 [P.O.:60; I.V.:2225]  Out: -     Constitutional: No acute distress well sitting in her chair  Respiratory: Diminished breath sounds at the bases, clear lung fields  Cardiovascular: Regular rate and rhythm  Neuro: Alert and maintains wakefulness and attention    Medications     NaCl 1,000 mL (01/22/18 1210)       cefTRIAXone  1 g Intravenous Q24H     azithromycin  500 mg Intravenous Q24H     gabapentin  400 mg Oral BID     gabapentin  800 mg Oral At Bedtime     hydrALAZINE  25 mg  Oral TID     traZODone  50 mg Oral At Bedtime     insulin glargine  18 Units Subcutaneous QAM AC     insulin aspart  5 Units Subcutaneous QAM AC     insulin aspart  7 Units Subcutaneous Daily with lunch     insulin aspart  7 Units Subcutaneous Daily with supper     insulin aspart  1-7 Units Subcutaneous TID AC     insulin aspart  1-5 Units Subcutaneous At Bedtime     sodium chloride (PF)  3 mL Intracatheter Q8H       Data   Data reviewed today:  I personally reviewed no images or EKG's today.    Recent Labs  Lab 01/22/18  0628 01/21/18  1735   WBC 7.5 7.6   HGB 11.9 13.4   MCV 97 96   * 154    139   POTASSIUM 4.3 4.7   CHLORIDE 107 101   CO2 30 34*   BUN 35* 44*   CR 1.64* 1.95*   ANIONGAP 5 4   KEANU 8.2* 9.5   GLC 95 86   ALBUMIN  --  3.1*   PROTTOTAL  --  7.5   BILITOTAL  --  0.4   ALKPHOS  --  59   ALT  --  17   AST  --  23       Recent Results (from the past 24 hour(s))   XR Chest Port 1 View    Narrative    CHEST PORTABLE ONE VIEW  1/21/2018 6:23 PM     HISTORY: Cough.    COMPARISON: Chest x-ray 1/26/2017.      Impression    IMPRESSION: Portable chest. Atelectasis lateral left lung base is  stable. Lungs are otherwise clear. Possible trace left pleural  effusion. Heart is normal in size. No pneumothorax.         CARMITA STOVER MD   XR Pelvis Port 1/2 Views    Narrative    PELVIS PORTABLE ONE TO TWO VIEWS  1/21/2018 6:24 PM     HISTORY: Pain after fall.    COMPARISON: Pelvis x-ray 4/27/2016.      Impression    IMPRESSION: AP view of the pelvis is performed. Pelvic ring appears  intact. Intramedullary anay right femur is unchanged. Hips bilaterally  are located without evidence of fracture on this single view.    CARMIAT STOVER MD     Blood cultures are negative so far at less than 24 hours   urine culture is growing greater than 100,000 colonies per mL gram-negative rods on preliminary results  Viral respiratory panel by PCR is pending  Blood sugars have ranged 78-95 overnight

## 2018-01-22 NOTE — PROGRESS NOTES
SPIRITUAL HEALTH SERVICES  SPIRITUAL ASSESSMENT Progress Note  [facility]      During Rounding,  introduced himself to Smiley Acuña and informed her of his availability.    Sudhir Shearer M.Div., [facility]  Staff   Office tel: [phone]

## 2018-01-22 NOTE — PROGRESS NOTES
Pt admitted to room 251 via cart from ED. VSS. Resp even et unlabored.  Temp: 97.5  F (36.4  C) Temp src: Oral BP: 179/87 Pulse: 78 Heart Rate: 81 Resp: 19 SpO2: 91 % O2 Device: Nasal cannula Oxygen Delivery: 3 LPM  No signs of distress noted.

## 2018-01-22 NOTE — ED NOTES
ED Nursing criteria listed below was addressed during verbal handoff:     Abnormal vitals: Yes  Abnormal results: Yes  Med Reconciliation completed: Yes  Meds given in ED: Yes  Any Overdue Meds: N/A  Core Measures: N/A  Isolation: Yes ( droplet)  Special needs: Yes ( fall risk, confusion at times)  Skin assessment: Yes    Observation Patient  Education provided: N/A

## 2018-01-22 NOTE — PROGRESS NOTES
S-(situation): Note    B-(background): Resp    A-(assessment): Pt very confused at this time, trying to get out of her chair, not keeping her oxygen on, wanting to find her , wanting to leave.    R-(recommendations): Cont poc as ordered.

## 2018-01-22 NOTE — PROGRESS NOTES
Writer spoke with patient's son Vernon with their permission about discharge plans and Dain @ [phone] feels that his Mom would benefit from a TCU stay to get stronger so his Dad is able to care for her. Choices are [place] [facility] (Main Phone: [phone] Admissions Phone: [phone] Fax: [phone]) [facility] [place]  (Phone: [phone] Fax: [phone]) [facility] (Admissions Phone: [phone] Main Phone: [phone] Fax: [phone]) Writer will send referrals and await PT jamal GARCÍA to follow aubrey Cochran CTS RN

## 2018-01-22 NOTE — CONSULTS
Care Transition Initial Assessment - RN  Reason For Consult: discharge planning   Met with: Patient and Family.    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       Resources List: Skilled Nursing Facility, Home Care     Lives With: spouse  Living Arrangements: house  Quality Of Family Relationships: supportive, involved  Description of Support System: Supportive, Involved   Who is your support system?:    Support Assessment: Lacks adequate physical care (patient need to be mobile to home)   Insurance Concerns: No Insurance issues identified          This writer met with pt and her  Fermin in the room, introduced self and role. Patient currently lives at home with  in a house he is her primary care giver but unable to care for her is she can't get from point A to point B patient will have a PT eval today to see if we are looking at a TCU or HHC. Patient is pleasantly confused which according to  she is like that at home sometimes too. He provides her with her medications when they are due, helps her with cares and getting to the bathroom. Patient appears to be very dependent on her  and does better when he is around. Will give a list of home care agencies and local TCU's after PT eval      PLAN    CTS to follow for TCU  placement versus [facility]      Guerline Cochran CTS RN [facility] [phone] [facility] [phone]    Discharge Planner   Discharge Plans in progress: TBA  Barriers to discharge plan: mobilty  Follow up plan: Clinic follow up CC referral.       Entered by: Guerline Cochran 01/22/2018 9:59 AM

## 2018-01-22 NOTE — H&P
[facility]    History and Physical  Hospitalist       Date of Admission:  1/21/2018  Date of Service (when I saw the patient): 01/21/18    Assessment & Plan   Smiley Acuña is a 83 year old female who presents with developing cough over the past 2 weeks associated with increased weakness.  Has fallen twice over the past 2 weeks and comes to the ER today with increased dyspnea.  On arrival she was hypertensive, afebrile with oxygen saturations at 87%.  Workup is showing a normal CBC, chest x-ray with some left lower lobe atelectasis and a UA with moderate LET and 35 white cells.  History significant for diabetes on insulin, CKD with stable renal function and hypertension secondary to diabetes and renal disease.  Patient will be admitted to inpatient status for treatment of her UTI with IV antibiotics with cultures of blood and urine pending.  On exam she is wheezing so we will add some nebulization treatments and will check a nasal swab with respiratory virus panel.  IV fluids tonight, supplemental oxygen as necessary.  Expect she will be in hospital for at least 2 days.    Active Problems:    Acute respiratory failure with hypoxia (H) and hypercapnea    Assessment: Presenting with increased dyspnea associated with hypoxemia on arrival and blood gas showing hypercapnia.  No previous history of COPD and has used albuterol in the past with upper respiratory infections, but nothing chronically.  Chest x-ray is not showing an obvious infiltrate.  Clinically she sounds like she is having a viral process.    Plan: We will check a nasal swab for respiratory virus panel.  She will be covered for bacterial process with Rocephin and azithromycin pending culture results.  Will order nebulization treatments as needed and will supplement oxygen as needed.    Acute cystitis without hematuria    Assessment: Asymptomatic, his history of stress incontinence.  Risk factors include diabetes     Plan: Cultures pending, is being covered with Rocephin.    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    Plan: Continue home dosing of insulin, follow sugars,    Essential hypertension with goal blood pressure less than 130/80    Assessment: Elevated in the emergency department    Plan: Restart her hydralazine, follow    Generalized muscle weakness    Assessment: Likely due to underlying infection    Plan: Follow.  Consider PT consult later    Hyperlipidemia LDL goal <100    Assessment: Taking Zocor    Plan: Restart at discharge    Advanced directives, counseling/discussion    Assessment: Full code    Plan: Honor  DVT Prophylaxis: Pneumatic Compression Devices  Code Status: Full Code    Disposition: Expected discharge in 2-3 days once feeling better, oxygen back to normal.    Jose Horvath MD    Primary Care Physician   Margarito Hoffman    Chief Complaint   83-year-old female with increasing cough and shortness of breath associated with weakness    History is obtained from the patient, electronic health record and emergency department physician    History of Present Illness   Smiley Acuña is a 83 year old female who presents with increasing cough over the past 2 weeks.  It has been a dry, nonproductive cough.  It is been associated with increasing weakness with 2 spells of falling.  Having some pelvic pain as a result of one fall.  Denies head injury.  She has had chills without fever.  Denies chest pain, nausea, vomiting or diarrhea.  Oral intake has been diminished.  Denies urinary tract  symptoms.  History significant for diabetes taking insulin.  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  She lives at home with her , normally uses a walker to get around.  Tonight after nearly falling, they called EMS and she was brought to the hospital.    Past Medical History    I have reviewed this patient's medical history and updated it with pertinent information if needed.   Past Medical History:   Diagnosis Date     Abnormality of gait      Anemia, unspecified      Chronic ischemic heart disease, unspecified     2-vessel CAD     Closed fracture of patella 06/21/10    D/C 06/23/10     Coronary artery disease      Degenerative disc disease     lumbar     Depressive disorder, not elsewhere classified      History of blood transfusion      Other and unspecified hyperlipidemia      Renal failure, unspecified     chronic renal disease     Septicemia due to Escherichia coli (E. coli)(038.42) (H) 05/03/2007     Syncope and collapse 4/8/2005    Admit     Syncope and collapse 05/26/10    D/C 05/27/10 to [facility]     Traumatic subdural hematomas 05/27/10     Type II or unspecified type diabetes mellitus without mention of complication, not stated as uncontrolled      Unspecified essential hypertension      Unspecified sleep apnea      Urinary tract infection, site not specified 4/4/2008    UTI with mild sepsis. Admitted.       Past Surgical History   I have reviewed this patient's surgical history and updated it with pertinent information if needed.  Past Surgical History:   Procedure Laterality Date     BUNIONECTOMY RT/LT  10/24/07    Bunionectomy right foot. Arthrodesis IP joint, right hallux.     C ANESTH,UPPER LEG SURGERY       C APPENDECTOMY       C BX SYNOVIUM INTERPHALANG JT       C OPEN TX FEMORAL SUPRACONDYLAR FRACTURE W EXTENSION  12/14/12    right LE     CRANIOTOMY   05/28/10    [facility] AMPUTATION TOE, MTP JT      Second toe amputation     HC COLONOSCOPY W/WO BRUSH/WASH  2005    Internal hemorrhoids.  Diverticulosis-limited/left colon.  Otherwise normal to cecum.     HC EXCISION LESION/TENDON-SHEATH/CAPSULE, FOOT  07    right foot.     HC EXCISION LESION/TENDON-SHEATH/CAPSULE, FOOT  2007    Recurrent ganglion cyst - right ankle     HC REPAIR ROTATOR CUFF,ACUTE  2002    Rotator Cuff Repair; Right     HC UGI ENDOSCOPY DIAG W OR W/O BRUSH/WASH  2005    Hiatus hernia.  Otherwise normal.     HC UGI ENDOSCOPY, SIMPLE EXAM  2005       Prior to Admission Medications   Prior to Admission Medications   Prescriptions Last Dose Informant Patient Reported? Taking?   ASPIRIN NOT PRESCRIBED, INTENTIONAL,   Yes No   Sig: by Other route continuous prn. 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     BASAGLAR 100 UNIT/ML injection   No No   Sig: Inject 18 Units Subcutaneous every morning   HYDROcodone-acetaminophen (NORCO) 5-325 MG per tablet 2018 at 2100  No Yes   Sig: Take 1 tablet by mouth every 6 hours as needed for pain   LIFESCAN FINEPOINT LANCETS MISC   No No   Si Device by Lancet route 4 times daily.   MULTIVITAMINS OR TABS 2018 at 0800  Yes Yes   Si TABLET DAILY   Spacer/Aero-Holding Chambers (OPTIHALER) BOGDAN   No No   Sig: As directed   albuterol (PROAIR HFA, PROVENTIL HFA, VENTOLIN HFA) 108 (90 BASE) MCG/ACT inhaler More than a month at Unknown time  No No   Sig: Inhale 2 puffs into the lungs every 6 hours as needed for shortness of breath / dyspnea or wheezing Ventolin inhaler   betamethasone dipropionate (DIPROSONE) 0.05 % lotion More than a month at Unknown time  No No   Sig: Apply topically to scalp once daily   blood glucose monitoring (NO BRAND SPECIFIED) test strip   No No   Sig: Use to test blood sugar two times daily or as directed.  Prodigy Autocode test strips   blood glucose monitoring (PRODIGY TWIST  "TOP 28G) lancets   No No   Sig: Use to test blood sugar two times daily or as directed.   cholecalciferol (VITAMIN D) 400 UNIT TABS 2018 at 0800  Yes Yes   Sig: Take 2 tablets by mouth daily.   gabapentin (NEURONTIN) 400 MG capsule 2018 at 1400  No Yes   Sig: TAKE TWO CAPSULES BY MOUTH THREE TIMES A DAY   glucose blood VI test strips (ONE TOUCH TEST STRIPS) strip   No No   Sig: test 4x daily (has One Touch Ultra 2 machine   hydrALAZINE (APRESOLINE) 25 MG tablet 2018 at 1200  No Yes   Sig: Take 1 tablet (25 mg) by mouth 3 times daily   insulin aspart (NOVOLOG VIAL) 100 UNITS/ML injection 2018 at 1200  No Yes   Si units before breakfast, 7 units before lunch, 7 units before dinner   insulin glargine (LANTUS) 100 UNIT/ML injection 2018 at 0800  No Yes   Si units once a day   insulin pen needle (B-D U/F) 31G X 8 MM   No No   Sig: Use 1 daily or as directed.   insulin syringe-needle U-100 (BD INSULIN SYRINGE ULTRAFINE) 30G X 1/2\" 0.5 ML   No No   Sig: Use one syringe 4 times daily or as directed.   simvastatin (ZOCOR) 80 MG tablet 2018 at 2100  No Yes   Sig: Take 1 tablet (80 mg) by mouth At Bedtime at bedtime.   traZODone (DESYREL) 50 MG tablet 2018 at 2100  No Yes   Sig: TAKE ONE TABLET BY MOUTH AT BEDTIME      Facility-Administered Medications: None     Allergies   Allergies   Allergen Reactions     Lisinopril      upper resp symptoms, cough       Social History   I have reviewed this patient's social history and updated it with pertinent information if needed. Smliey RIAN Arianne  reports that she has never smoked. She has never used smokeless tobacco. She reports that she does not drink alcohol or use illicit drugs.    Family History   I have reviewed this patient's family history and updated it with pertinent information if needed.   Family History   Problem Relation Age of Onset     DIABETES Brother      C.A.D. Brother      DIABETES Brother      DIABETES Mother      " OSTEOPOROSIS Mother        Review of Systems   The 10 point Review of Systems is negative other than noted in the HPI or here.     Physical Exam   Temp: 98.9  F (37.2  C) Temp src: Oral BP: (!) 192/106   Heart Rate: 78 Resp: 20 SpO2: 97 % O2 Device: Nasal cannula Oxygen Delivery: 4 LPM  Vital Signs with Ranges  Temp:  [98.9  F (37.2  C)] 98.9  F (37.2  C)  Heart Rate:  [78-86] 78  Resp:  [20] 20  BP: (184-217)/() 192/106  SpO2:  [87 %-97 %] 97 %  173 lbs 11.56 oz    EXAM:  Constitutional: alert, mild distress, cooperative and occasional dry cough, seems mildly dyspneic  Cardiovascular: PMI normal. No lifts, heaves, or thrills. RRR. No murmurs, clicks gallops or rub  Respiratory: Wheezes on each side, question crackles on the  left  Psychiatric: mentation appears normal and judgment and insight intact  Head: Normocephalic. No masses, lesions, tenderness or abnormalities  Neck: Neck supple. No adenopathy. Thyroid symmetric, normal size,  ENT: ENT exam normal, no neck nodes or sinus tenderness  Abdomen: Abdomen soft, non-tender. BS normal. No masses, organomegaly  NEURO: Nonfocal, diminished sensation in the lower legs.  SKIN: no suspicious lesions or rashes  LYMPH: Normal cervical lymph nodes  JOINT/EXTREMITIES: extremities normal- no gross deformities noted and normal muscle tone     Data   Data reviewed today:  I personally reviewed the chest x-ray image(s) showing Possible atelectasis left lower lobe, no obvious infiltrate and the X-ray of the pelvis image(s) showing No fractures.    Recent Labs  Lab 01/21/18  1735   WBC 7.6   HGB 13.4   MCV 96         POTASSIUM 4.7   CHLORIDE 101   CO2 34*   BUN 44*   CR 1.95*   ANIONGAP 4   KEANU 9.5   GLC 86   ALBUMIN 3.1*   PROTTOTAL 7.5   BILITOTAL 0.4   ALKPHOS 59   ALT 17   AST 23       Recent Results (from the past 24 hour(s))   XR Chest Port 1 View    Narrative    CHEST PORTABLE ONE VIEW  1/21/2018 6:23 PM     HISTORY: Cough.    COMPARISON: Chest x-ray  1/26/2017.      Impression    IMPRESSION: Portable chest. Atelectasis lateral left lung base is  stable. Lungs are otherwise clear. Possible trace left pleural  effusion. Heart is normal in size. No pneumothorax.         CARMITA STOVER MD   XR Pelvis Port 1/2 Views    Narrative    PELVIS PORTABLE ONE TO TWO VIEWS  1/21/2018 6:24 PM     HISTORY: Pain after fall.    COMPARISON: Pelvis x-ray 4/27/2016.      Impression    IMPRESSION: AP view of the pelvis is performed. Pelvic ring appears  intact. Intramedullary anay right femur is unchanged. Hips bilaterally  are located without evidence of fracture on this single view.    CARMITA STOVER MD

## 2018-01-23 ENCOUNTER — APPOINTMENT (OUTPATIENT)
Dept: PHYSICAL THERAPY | Facility: CLINIC | Age: 83
DRG: 689 | End: 2018-01-23
Attending: PEDIATRICS
Payer: MEDICARE

## 2018-01-23 PROBLEM — F03.90 DEMENTIA (H): Status: ACTIVE | Noted: 2018-01-23

## 2018-01-23 PROBLEM — N39.0 E. COLI UTI: Status: ACTIVE | Noted: 2018-01-21

## 2018-01-23 PROBLEM — E16.2 HYPOGLYCEMIA: Status: ACTIVE | Noted: 2018-01-23

## 2018-01-23 PROBLEM — B96.20 E. COLI UTI: Status: ACTIVE | Noted: 2018-01-21

## 2018-01-23 LAB
ANION GAP SERPL CALCULATED.3IONS-SCNC: 5 MMOL/L (ref 3–14)
BACTERIA SPEC CULT: NORMAL
BUN SERPL-MCNC: 30 MG/DL (ref 7–30)
CALCIUM SERPL-MCNC: 8 MG/DL (ref 8.5–10.1)
CHLORIDE SERPL-SCNC: 106 MMOL/L (ref 94–109)
CO2 SERPL-SCNC: 31 MMOL/L (ref 20–32)
CREAT SERPL-MCNC: 1.6 MG/DL (ref 0.52–1.04)
FLUAV H1 2009 PAND RNA SPEC QL NAA+PROBE: NEGATIVE
FLUAV H1 RNA SPEC QL NAA+PROBE: NEGATIVE
FLUAV H3 RNA SPEC QL NAA+PROBE: NEGATIVE
FLUAV RNA SPEC QL NAA+PROBE: NEGATIVE
FLUBV RNA SPEC QL NAA+PROBE: NEGATIVE
GFR SERPL CREATININE-BSD FRML MDRD: 31 ML/MIN/1.7M2
GLUCOSE BLDC GLUCOMTR-MCNC: 116 MG/DL (ref 70–99)
GLUCOSE BLDC GLUCOMTR-MCNC: 130 MG/DL (ref 70–99)
GLUCOSE BLDC GLUCOMTR-MCNC: 165 MG/DL (ref 70–99)
GLUCOSE BLDC GLUCOMTR-MCNC: 81 MG/DL (ref 70–99)
GLUCOSE BLDC GLUCOMTR-MCNC: 92 MG/DL (ref 70–99)
GLUCOSE BLDC GLUCOMTR-MCNC: 94 MG/DL (ref 70–99)
GLUCOSE SERPL-MCNC: 78 MG/DL (ref 70–99)
HADV DNA SPEC QL NAA+PROBE: NEGATIVE
HADV DNA SPEC QL NAA+PROBE: NEGATIVE
HMPV RNA SPEC QL NAA+PROBE: NEGATIVE
HPIV1 RNA SPEC QL NAA+PROBE: NEGATIVE
HPIV2 RNA SPEC QL NAA+PROBE: NEGATIVE
HPIV3 RNA SPEC QL NAA+PROBE: NEGATIVE
MICROBIOLOGIST REVIEW: NORMAL
POTASSIUM SERPL-SCNC: 4.4 MMOL/L (ref 3.4–5.3)
RHINOVIRUS RNA SPEC QL NAA+PROBE: NEGATIVE
RSV RNA SPEC QL NAA+PROBE: NEGATIVE
RSV RNA SPEC QL NAA+PROBE: NEGATIVE
SODIUM SERPL-SCNC: 142 MMOL/L (ref 133–144)
SPECIMEN SOURCE: NORMAL
SPECIMEN SOURCE: NORMAL

## 2018-01-23 PROCEDURE — 80048 BASIC METABOLIC PNL TOTAL CA: CPT | Performed by: PEDIATRICS

## 2018-01-23 PROCEDURE — 25000131 ZZH RX MED GY IP 250 OP 636 PS 637: Mod: GY | Performed by: PEDIATRICS

## 2018-01-23 PROCEDURE — 25000132 ZZH RX MED GY IP 250 OP 250 PS 637: Mod: GY | Performed by: FAMILY MEDICINE

## 2018-01-23 PROCEDURE — A9270 NON-COVERED ITEM OR SERVICE: HCPCS | Mod: GY | Performed by: PEDIATRICS

## 2018-01-23 PROCEDURE — 00000146 ZZHCL STATISTIC GLUCOSE BY METER IP

## 2018-01-23 PROCEDURE — 12000000 ZZH R&B MED SURG/OB

## 2018-01-23 PROCEDURE — 36415 COLL VENOUS BLD VENIPUNCTURE: CPT | Performed by: PEDIATRICS

## 2018-01-23 PROCEDURE — 25000132 ZZH RX MED GY IP 250 OP 250 PS 637: Mod: GY | Performed by: PEDIATRICS

## 2018-01-23 PROCEDURE — 80171 DRUG SCREEN QUANT GABAPENTIN: CPT | Performed by: PEDIATRICS

## 2018-01-23 PROCEDURE — 97161 PT EVAL LOW COMPLEX 20 MIN: CPT | Mod: GP | Performed by: PHYSICAL THERAPIST

## 2018-01-23 PROCEDURE — A9270 NON-COVERED ITEM OR SERVICE: HCPCS | Mod: GY | Performed by: FAMILY MEDICINE

## 2018-01-23 PROCEDURE — 40000193 ZZH STATISTIC PT WARD VISIT: Performed by: PHYSICAL THERAPIST

## 2018-01-23 PROCEDURE — 99233 SBSQ HOSP IP/OBS HIGH 50: CPT | Performed by: PEDIATRICS

## 2018-01-23 RX ORDER — AMLODIPINE BESYLATE 5 MG/1
5 TABLET ORAL DAILY
Status: DISCONTINUED | OUTPATIENT
Start: 2018-01-23 | End: 2018-01-24 | Stop reason: HOSPADM

## 2018-01-23 RX ORDER — GABAPENTIN 300 MG/1
300 CAPSULE ORAL 2 TIMES DAILY
Status: DISCONTINUED | OUTPATIENT
Start: 2018-01-24 | End: 2018-01-24 | Stop reason: HOSPADM

## 2018-01-23 RX ORDER — CEFDINIR 300 MG/1
300 CAPSULE ORAL DAILY
Status: DISCONTINUED | OUTPATIENT
Start: 2018-01-23 | End: 2018-01-24 | Stop reason: HOSPADM

## 2018-01-23 RX ADMIN — TORSEMIDE 10 MG: 10 TABLET ORAL at 08:24

## 2018-01-23 RX ADMIN — AMLODIPINE BESYLATE 5 MG: 5 TABLET ORAL at 08:23

## 2018-01-23 RX ADMIN — HYDRALAZINE HYDROCHLORIDE 50 MG: 25 TABLET ORAL at 08:24

## 2018-01-23 RX ADMIN — CEFDINIR 300 MG: 300 CAPSULE ORAL at 09:41

## 2018-01-23 RX ADMIN — HYDRALAZINE HYDROCHLORIDE 50 MG: 25 TABLET ORAL at 14:11

## 2018-01-23 RX ADMIN — INSULIN GLARGINE 10 UNITS: 100 INJECTION, SOLUTION SUBCUTANEOUS at 09:37

## 2018-01-23 RX ADMIN — GABAPENTIN 400 MG: 400 CAPSULE ORAL at 08:23

## 2018-01-23 RX ADMIN — TRAZODONE HYDROCHLORIDE 50 MG: 50 TABLET ORAL at 21:43

## 2018-01-23 RX ADMIN — GABAPENTIN 400 MG: 400 CAPSULE ORAL at 11:53

## 2018-01-23 RX ADMIN — HYDRALAZINE HYDROCHLORIDE 50 MG: 25 TABLET ORAL at 21:43

## 2018-01-23 NOTE — PROGRESS NOTES
CTS update; Patient has been accepted at [facility] for tomorrow private room in the LTC and will be moved to TCU when a bed opens Writer informed patient,  and son [place] about discharge plan for tomorrow. Family would still prefer [facility] if a bed opens up but is willing to go to [facility]. Patient will need van transport and CTS will arrange. YVETTE Cochran CTS RN

## 2018-01-23 NOTE — PLAN OF CARE
Problem: Patient Care Overview  Goal: Plan of Care/Patient Progress Review  Outcome: No Change  Pt AO only to self, petting a cat that is not in her room, calling out for her  at times.  Bed alarm on for safety.  Up with SBA of 2 and the nguyen steady to get to bedside commode - has usually been incontinent before she can get up.  Blood sugar 46 at hs - glucose gel given.  Recheck at 67 and additional glucose gel given and then BG up to 145.  Checked again at 0200 and 0400 for BG's of 92 and 94.  Remains hypertensive 180/80's.

## 2018-01-23 NOTE — PROGRESS NOTES
CTS update: Patient has been accepted at [facility] and will transport there when medically stable patient and  notified. YVETTE Cochran CTS RN  PAS-RR    Per DHS regulation, CTS team completed and submitted PAS-RR to [place] [employer]ging Direct Connect via the [employer]. CTS team advised SNF and they are aware a PAS-RR has been submitted.     CTS team reviewed with pt or health care agent that they may be contacted for a follow up appointment within 10 days of hospital discharge if SNF stay is <30 days. Contact information for [employer] was also provided.     Pt or health care agent verbalized understanding.     PAS-RR # [id number]

## 2018-01-23 NOTE — PLAN OF CARE
Problem: Patient Care Overview  Goal: Plan of Care/Patient Progress Review  Discharge Planner PT   Patient plan for discharge: undetermined  Current status: pt requiring CGA to stand from chair with verbal cues for sequencing.  Pt min A to ambulate 4-5 steps forward and back to chair with walker; needs verbal cues to prepare safely for transfer to chair.  spO2 94% on 2L at rest and with activity.   Pt home environment requires her to ascend 4-7 steps to get into the home. There is a railing on one section of the stairs on the right side.  Prior to hospitalization with progressive weakness, spouse Darien has been giving patient sponge baths as they were no longer able to get her into/out of the tub/shower.    Barriers to return to prior living situation: current level of assistance for functional mobility  Recommendations for discharge: TCU  Rationale for recommendations: pt requiring assistance for ambulation for very short distance; not tolerating longer functional distances that would be needed to be mobile within the home.        Entered by: Ana Tran 01/23/2018 11:12 AM

## 2018-01-23 NOTE — PROGRESS NOTES
[facility]    Hospitalist Progress Note    Date of Service (when I saw the patient): 01/23/2018    Assessment & Plan   Smiley Acuña is a 83 year old female who was admitted on 1/21/2018.  E. coli UTI with possible acute encephalopathy is suspected.  However, there is also increasing suspicion that she has underlying advanced dementia although it does not appear as though she has been formally diagnosed with dementia previously, so the extent to which she has an acute encephalopathy is more difficult to determine.  Pneumonia is not suspected.  Rather, atelectasis with hypoxia appears more likely.  She also had hypercapnia but did not have respiratory acidosis, so this is probably chronic, and acute respiratory failure is not suspected.  She has severe chronic kidney disease with stable renal function so far.  She had an episode of hypoglycemia late yesterday despite receiving her usual dose of Lantus insulin yesterday morning.  She is chronically mildly thrombocytopenic and is not having any bleeding.  She continues to be quite weak concerning for an acute functional decline associated with this acute illness.    Principal Problem:    E. 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 coli UTI  Decrease morning Lantus insulin dose from 18 units to 10 units today and follow blood sugars closely, may need to adjust  doses of NovoLog to optimize postprandial blood sugars  Increase hydralazine dose from 25 to 50 mg 3 times a day  Add amlodipine 5 mg daily  Reduce gabapentin dose to 300 mg once or twice daily because of severe chronic kidney disease while awaiting serum gabapentin level based on her previous dosing  Wean oxygen as tolerated to keep saturations 90% on higher  Physical therapy evaluation to assist with disposition planning, anticipate possible discharge to TCU for short-term rehabilitation although there is some question as to how well she would be able to participate in a rehabilitation program because of suspicion for significant underlying dementia    DVT Prophylaxis: Pneumatic Compression Devices  Code Status: Full Code    Disposition: Expected discharge tomorrow possibly to TCU For attempted rehabilitation.    Fermin Florentino    Interval History   Patient offers no complaints today.  She denies cough or dyspnea.  She denies abdominal pain.  She denies headache or chest pain.  She has been afebrile.  Vital signs have been stable except for severely elevated blood pressure readings.  Oxygenation has been stable although she continues to require oxygen.  She is voiding spontaneously remains incontinent of urine.  She is tolerating oral intake.  She continues to be very weak needing assistance to even stand.  Nursing reports that she appeared even more confused overnight last night and that her  reported that she is often confused at home, particularly at night.  While interviewing the patient with her  today, the patient turns to her  and asks him the name of her spouse that she does not appear to recognize him as her .  However, when he reminds her that he is her , she then redirects.    -Data reviewed today: I reviewed all new labs and imaging results over the last 24 hours. I personally reviewed no images or EKG's today.    Physical Exam   Temp: 97  F (36.1  C) Temp src: Oral  BP: (!) 189/93 Pulse: 74 Heart Rate: 76 Resp: 18 SpO2: 94 % O2 Device: Nasal cannula Oxygen Delivery: 2 LPM  Vitals:    01/21/18 1705 01/21/18 2115   Weight: 78.8 kg (173 lb 11.6 oz) 82 kg (180 lb 12.4 oz)     Vital Signs with Ranges  Temp:  [97  F (36.1  C)-98.6  F (37  C)] 97  F (36.1  C)  Pulse:  [74-80] 74  Heart Rate:  [76-82] 76  Resp:  [16-20] 18  BP: (146-195)/(60-99) 189/93  SpO2:  [85 %-96 %] 94 %  I/O last 3 completed shifts:  In: 2120 [P.O.:1020; I.V.:1100]  Out: -     Constitutional: No acute distress sitting in a chair  Respiratory: Diminished breath sounds throughout, clear lungs  Cardiovascular: Regular rate and rhythm, normal capillary refill  Neuro: Alert and maintains wakefulness, obvious memory problems that she does not initially even recognize her     Medications        insulin glargine  10 Units Subcutaneous QAM AC     amLODIPine  5 mg Oral Daily     cefdinir  300 mg Oral Daily     hydrALAZINE  50 mg Oral TID     torsemide  10 mg Oral Daily     gabapentin  400 mg Oral BID     gabapentin  800 mg Oral At Bedtime     traZODone  50 mg Oral At Bedtime     insulin aspart  5 Units Subcutaneous QAM AC     insulin aspart  7 Units Subcutaneous Daily with lunch     insulin aspart  7 Units Subcutaneous Daily with supper     insulin aspart  1-7 Units Subcutaneous TID AC     insulin aspart  1-5 Units Subcutaneous At Bedtime     sodium chloride (PF)  3 mL Intracatheter Q8H       Data   Data reviewed today:  I personally reviewed no images or EKG's today.    Recent Labs  Lab 01/23/18  0527 01/22/18  0628 01/21/18  1735   WBC  --  7.5 7.6   HGB  --  11.9 13.4   MCV  --  97 96   PLT  --  134* 154    142 139   POTASSIUM 4.4 4.3 4.7   CHLORIDE 106 107 101   CO2 31 30 34*   BUN 30 35* 44*   CR 1.60* 1.64* 1.95*   ANIONGAP 5 5 4   KEANU 8.0* 8.2* 9.5   GLC 78 95 86   ALBUMIN  --   --  3.1*   PROTTOTAL  --   --  7.5   BILITOTAL  --   --  0.4   ALKPHOS  --   --  59   ALT  --   --  17   AST  --   --  23      Urine culture grew E. coli susceptible to all antibiotics tested including cephalosporins  Blood cultures are negative so far  Respiratory virus PCR panel is pending  Nasal culture for MRSA was negative  Serum gabapentin level is pending  Blood sugars ranged from  over the last 24 hours

## 2018-01-23 NOTE — PROGRESS NOTES
01/23/18 1028   Quick Adds   Type of Visit Initial PT Evaluation   Living Environment   Lives With spouse   Living Arrangements house   Home Accessibility stairs to enter home   Number of Stairs to Enter Home 7   Number of Stairs Within Home 0   Stair Railings at Home outside, present on right side   Living Environment Comment There is a gap between railing entering the house.  hand rails next to toilet.     Self-Care   Usual Activity Tolerance poor   Equipment Currently Used at Home walker, rolling   Activity/Exercise/Self-Care Comment [place] helps her get in/out of the shower/tub.   Functional Level Prior   Ambulation 1-->assistive equipment   Transferring 1-->assistive equipment   Toileting 1-->assistive equipment   Bathing 3-->assistive equipment and person   Dressing 2-->assistive person   Eating 0-->independent   Fall history within last six months yes   Number of times patient has fallen within last six months 3   Which of the above functional risks had a recent onset or change? ambulation;transferring;toileting;bathing   General Information   Onset of Illness/Injury or Date of Surgery - Date 01/21/18   Referring Physician Dr. Florentino   Patient/Family Goals Statement wants to get back to walking   Pertinent History of Current Problem (include personal factors and/or comorbidities that impact the POC) progressive illness with progressive weakness; hasn't been walking well for the last 4-5 weeks.  For the last week, spouse has helped her with transfers and had her sit on 4WW seat to get around the house.     Precautions/Limitations fall precautions   General Observations seated in recliner chair; chair alarm; spouse at side.    Cognitive Status Examination   Orientation person   Level of Consciousness confused   Pain Assessment   Patient Currently in Pain No   Range of Motion (ROM)   ROM Quick Adds No deficits were identified   Strength   Strength Comments 4/5 UE strength; LE Quad 3+/5   Transfer Skills   Transfer  Transfer Skill: Sit to Stand;Transfer Safety Analysis Sit/Stand;Transfer Skill:  Stand to Sit;Transfer Safety Analysis Stand/Sit   Transfer Comments verbal cues to use arm rests and sequence transfer   Transfer Skill:  Sit to Stand   Level of D[place]: Sit/Stand contact guard   Physical Assist/Nonphysical Assist: Sit/Stand supervision;verbal cues   Weightbearing Restrictions: Sit/Stand full weight-bearing   Assistive Device for Transfer: Sit/Stand standard walker   Transfer Safety Analysis Sit to Stand   Impairments Contributing to Impaired Transfers: Sit to Stand decreased strength   Transfer Skill: Stand to Sit   Level of D[place]: Stand/Sit contact guard   Physical Assist/Nonphysical Assist: Stand/Sit verbal cues   Weight-Bearing Restrictions: Stand/Sit full weight-bearing   Assistive Device: Stand to Sit standard walker   Transfer Safety Analysis Stand To Sit   Transfer Safety Concerns Noted: Stand to Sit decreased sequencing ability   Impairments Contributing to Impaired Transfers: Stand to Sit decreased strength   Gait   Gait Gait Skill;Gait Analysis   Gait Comments standing at edge of chair march in place trial was successful with CGA, able to progress to few steps forward to assess weight shift and step length; pt needs assist sequencing walker and steps; pt has wheeled walker at home and standard walker was in the hospital room.    Gait Skills   Level of D[place]: Gait minimum assist (75% patients effort)   Physical Assist/Nonphysical Assist: Gait verbal cues;1 person assist   Weight-Bearing Restrictions: Gait full weight-bearing   Assistive Device for Transfer: Gait standard walker   Gait Distance other (see comment)  (Pt took 5 steps forward; 5 steps back)   Gait Analysis   Gait Pattern Used swing-to gait   Gait Deviations Noted decreased step length;increased time in double stance;decreased weight-shifting ability   Impairments Contributing to Gait Deviations decreased strength   Balance  "  Balance Comments seated balance independent; standing balance requires assistive device   General Therapy Interventions   Planned Therapy Interventions gait training;strengthening;transfer training;progressive activity/exercise   Clinical Impression   Criteria for Skilled Therapeutic Intervention yes, treatment indicated   PT Diagnosis gait dysfunction; unsafe gait, unsafe transfers   Influenced by the following impairments decreased strength, confusion, poor sequencing ability   Functional limitations due to impairments safe independent transfer from chair or bed/chair transfer; safe independent functional gait distance   Clinical Presentation Stable/Uncomplicated   Clinical Presentation Rationale general weakness, confusion, decreased activity tolerance but able to perform activity with assist   Clinical Decision Making (Complexity) Low complexity   Therapy Frequency` daily   Predicted Duration of Therapy Intervention (days/wks) 2 days   Anticipated Discharge Disposition Transitional Care Facility   Risk & Benefits of therapy have been explained Yes   Patient, Family & other staff in agreement with plan of care Yes   Clinical Impression Comments spouse present during evaluation, helping to assist in answering questions about home environment   [facility] TM \"6 Clicks\"   2016, Trustees of [facility], under license to [employer].  All rights reserved.   6 Clicks Short Forms Basic Mobility Inpatient Short Form   [facility]  \"6 Clicks\" V.2 Basic Mobility Inpatient Short Form   1. Turning from your back to your side while in a flat bed without using bedrails? 3 - A Little   2. Moving from lying on your back to sitting on the side of a flat bed without using bedrails? 3 - A Little   3. Moving to and from a bed to a chair (including a wheelchair)? 3 - A Little   4. Standing up from a chair using your arms (e.g., wheelchair, or bedside chair)? 3 - A Little   5. To walk in hospital " room? 2 - A Lot   6. Climbing 3-5 steps with a railing? 1 - Total   Basic Mobility Raw Score (Score out of 24.Lower scores equate to lower levels of function) 15   Total Evaluation Time   Total Evaluation Time (Minutes) 25

## 2018-01-23 NOTE — PLAN OF CARE
Problem: Respiratory Distress Syndrome (,NICU)  Goal: Signs and Symptoms of Listed Potential Problems Will be Absent, Minimized or Managed (Respiratory Distress Syndrome)  Signs and symptoms of listed potential problems will be absent, minimized or managed by discharge/transition of care (reference Respiratory Distress Syndrome (,NICU) CPG).   Outcome: No Change  Lungs remain diminished, sats on 2L per n/c in the 90's if pt removes her oxygen sats do drop to the 80's.  Denies pain.  Note some confusion late this afternoon which  says this happens frequently.  Eating fair.

## 2018-01-24 ENCOUNTER — APPOINTMENT (OUTPATIENT)
Dept: PHYSICAL THERAPY | Facility: CLINIC | Age: 83
DRG: 689 | End: 2018-01-24
Payer: MEDICARE

## 2018-01-24 ENCOUNTER — CARE COORDINATION (OUTPATIENT)
Dept: INTERNAL MEDICINE | Facility: CLINIC | Age: 83
End: 2018-01-24

## 2018-01-24 VITALS
WEIGHT: 180.78 LBS | DIASTOLIC BLOOD PRESSURE: 88 MMHG | SYSTOLIC BLOOD PRESSURE: 182 MMHG | RESPIRATION RATE: 18 BRPM | HEIGHT: 64 IN | TEMPERATURE: 98.7 F | BODY MASS INDEX: 30.86 KG/M2 | OXYGEN SATURATION: 92 % | HEART RATE: 79 BPM

## 2018-01-24 LAB
ANION GAP SERPL CALCULATED.3IONS-SCNC: 8 MMOL/L (ref 3–14)
BUN SERPL-MCNC: 38 MG/DL (ref 7–30)
CALCIUM SERPL-MCNC: 8.6 MG/DL (ref 8.5–10.1)
CHLORIDE SERPL-SCNC: 103 MMOL/L (ref 94–109)
CO2 SERPL-SCNC: 31 MMOL/L (ref 20–32)
CREAT SERPL-MCNC: 1.62 MG/DL (ref 0.52–1.04)
GABAPENTIN SERPLBLD-MCNC: 26.6 UG/ML
GFR SERPL CREATININE-BSD FRML MDRD: 30 ML/MIN/1.7M2
GLUCOSE BLDC GLUCOMTR-MCNC: 117 MG/DL (ref 70–99)
GLUCOSE BLDC GLUCOMTR-MCNC: 121 MG/DL (ref 70–99)
GLUCOSE SERPL-MCNC: 97 MG/DL (ref 70–99)
POTASSIUM SERPL-SCNC: 4 MMOL/L (ref 3.4–5.3)
SODIUM SERPL-SCNC: 142 MMOL/L (ref 133–144)

## 2018-01-24 PROCEDURE — 97110 THERAPEUTIC EXERCISES: CPT | Mod: GP | Performed by: PHYSICAL THERAPIST

## 2018-01-24 PROCEDURE — 25000132 ZZH RX MED GY IP 250 OP 250 PS 637: Mod: GY | Performed by: PEDIATRICS

## 2018-01-24 PROCEDURE — 40000193 ZZH STATISTIC PT WARD VISIT: Performed by: PHYSICAL THERAPIST

## 2018-01-24 PROCEDURE — 99239 HOSP IP/OBS DSCHRG MGMT >30: CPT | Performed by: PEDIATRICS

## 2018-01-24 PROCEDURE — 80048 BASIC METABOLIC PNL TOTAL CA: CPT | Performed by: PEDIATRICS

## 2018-01-24 PROCEDURE — 36415 COLL VENOUS BLD VENIPUNCTURE: CPT | Performed by: PEDIATRICS

## 2018-01-24 PROCEDURE — 00000146 ZZHCL STATISTIC GLUCOSE BY METER IP

## 2018-01-24 PROCEDURE — 25000131 ZZH RX MED GY IP 250 OP 636 PS 637: Mod: GY | Performed by: PEDIATRICS

## 2018-01-24 PROCEDURE — A9270 NON-COVERED ITEM OR SERVICE: HCPCS | Mod: GY | Performed by: PEDIATRICS

## 2018-01-24 PROCEDURE — 97116 GAIT TRAINING THERAPY: CPT | Mod: GP | Performed by: PHYSICAL THERAPIST

## 2018-01-24 RX ORDER — GABAPENTIN 300 MG/1
300 CAPSULE ORAL 2 TIMES DAILY
Qty: 90 CAPSULE | Status: ON HOLD | DISCHARGE
Start: 2018-01-24 | End: 2019-04-07

## 2018-01-24 RX ORDER — AMLODIPINE BESYLATE 5 MG/1
5 TABLET ORAL DAILY
Qty: 30 TABLET | DISCHARGE
Start: 2018-01-24 | End: 2018-01-25

## 2018-01-24 RX ORDER — TORSEMIDE 10 MG/1
10 TABLET ORAL DAILY
DISCHARGE
Start: 2018-01-24 | End: 2018-03-16

## 2018-01-24 RX ORDER — HYDROCODONE BITARTRATE AND ACETAMINOPHEN 5; 325 MG/1; MG/1
1 TABLET ORAL EVERY 6 HOURS PRN
Qty: 30 TABLET | Refills: 0 | Status: ON HOLD | OUTPATIENT
Start: 2018-01-24 | End: 2019-04-07

## 2018-01-24 RX ORDER — ACETAMINOPHEN 325 MG/1
650 TABLET ORAL EVERY 4 HOURS PRN
Qty: 100 TABLET | DISCHARGE
Start: 2018-01-24 | End: 2019-06-02

## 2018-01-24 RX ORDER — SIMVASTATIN 80 MG
80 TABLET ORAL AT BEDTIME
Qty: 90 TABLET | Refills: 3 | DISCHARGE
Start: 2018-01-24 | End: 2018-07-17

## 2018-01-24 RX ORDER — HYDRALAZINE HYDROCHLORIDE 25 MG/1
50 TABLET, FILM COATED ORAL 3 TIMES DAILY
Qty: 270 TABLET | Refills: 1 | Status: ON HOLD | DISCHARGE
Start: 2018-01-24 | End: 2019-04-07

## 2018-01-24 RX ORDER — CEFDINIR 300 MG/1
300 CAPSULE ORAL DAILY
Refills: 0 | DISCHARGE
Start: 2018-01-25 | End: 2018-02-01

## 2018-01-24 RX ADMIN — HYDRALAZINE HYDROCHLORIDE 50 MG: 25 TABLET ORAL at 10:01

## 2018-01-24 RX ADMIN — TORSEMIDE 10 MG: 10 TABLET ORAL at 10:02

## 2018-01-24 RX ADMIN — GABAPENTIN 300 MG: 300 CAPSULE ORAL at 10:01

## 2018-01-24 RX ADMIN — INSULIN GLARGINE 10 UNITS: 100 INJECTION, SOLUTION SUBCUTANEOUS at 08:00

## 2018-01-24 RX ADMIN — AMLODIPINE BESYLATE 5 MG: 5 TABLET ORAL at 10:00

## 2018-01-24 RX ADMIN — CEFDINIR 300 MG: 300 CAPSULE ORAL at 10:00

## 2018-01-24 NOTE — PLAN OF CARE
Problem: Patient Care Overview  Goal: Plan of Care/Patient Progress Review  Outcome: Improving  Pt is confused with auditory and visual hallucinations. Pt frequently sets of bed alarm but is pleasant and cooperative when staff is in the room. Vitals signs stable, afebrile. O2 sats mid-low 90's on 2L/NC. Pt is incontinent of urine. Pt up to the commode x3 but unable to urinate.

## 2018-01-24 NOTE — DISCHARGE SUMMARY
[facility]    Discharge Summary  Hospitalist    Date of Admission:  1/21/2018  Date of Discharge:  1/24/2018  Discharging Provider: Fermin Florentino  Date of Service (when I saw the patient): 01/24/18    Discharge Diagnoses     E. 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 *      History of Present Illness   Smiley Acuña is an 83 year old female with multiple complicated chronic medical problems including diabetes with severe stage IV chronic kidney disease who presented with history of worsening cough, shortness of breath, weakness, and 2 recent falls at home.  Due to initial concern for possible infection and respiratory failure, she was hospitalized. See admission history and physical for details.    Hospital Course   Smiley Acuña was admitted on 1/21/2018.  The following problems were addressed during her hospitalization:    Problem #1 E. coli urinary tract infection.  Urinalysis demonstrated pyuria and urine culture grew E. coli.  She was regularly incontinent of urine but otherwise asymptomatic without overt UTI symptoms.  She did not have fever or leukocytosis.  Blood cultures were negative during her hospital stay.  Nasal culture for MRSA was negative.  Respiratory viral panel PCR testing was negative.  Chest x-ray demonstrated radiographic findings most consistent with atelectasis but no definite infiltrate.  After investigation, respiratory tract  infection was not suspected.  She did not have clinical course concerning for sepsis.    Problem #2 hypoxia and hypercapnia and possible chronic respiratory failure due to obesity hypoventilation, atelectasis, and sedentary status.  She was severely hypoxic, and blood gas testing demonstrated hypercapnia although no acidosis due to compensatory metabolic alkalosis.  She did not exhibit respiratory distress although was confused.  She was treated with oxygen and maintained adequate oxygen saturations with oxygen therapy.  She did not require assisted ventilation.  She had some initial wheezing at presentation that was treated as needed with bronchodilators, but she does not carry a previous diagnosis of obstructive lung disease and her clinical course was not suspicious for COPD or asthma exacerbation.  After investigation, hypoxia and hypercapnia due to obesity hypoventilation, sedentary status, and atelectasis was suspected, possibly consistent with chronic respiratory failure, but her clinical course was not concerning for acute respiratory failure.    Problem #3 probable dementia and possible acute encephalopathy.  She was obviously confused throughout her hospital stay, and confusion was present at the time of admission.  From additional history of long-standing and progressively worsening memory problems provided by the patient and her family during her hospital stay, there was strong clinical suspicion for previously undiagnosed dementia.  Memory problems persisted throughout her hospital stay such as difficulty for the patient to recognize close family members including her spouse.  Other signs of confusion did seem to improve as her acute and chronic medical problems including infection hypoglycemia,,  and hypoxia were treated.  She did not have obvious focal neurologic deficits or seizures.  Her behavior was cooperative.  After investigation, underlying dementia was strongly suspected.  She may have had a  superimposed acute encephalopathy associated with UTI, hypoxia and possibly gabapentin toxicity.    Problem #4 diabetes with hypoglycemia.  Hemoglobin A1c was 6.2.  She developed significant hypoglycemia late in the day on her usual chronic doses of Lantus and NovoLog insulin.  Dose of Lantus insulin was decreased, and prandial dosing of NovoLog was discontinued and substituted with a correction scale.  After these dose adjustments in insulin therapy, blood sugars had ranged from  for 24 hours prior to discharge.    Problem #5 accelerated hypertension.  Systolic blood pressures were initially severely elevated.  She was not overtly symptomatic although mentation was difficult to assess because of suspected dementia and her possible acute encephalopathy and vision was difficult to assess because of her chronic visual disturbance.  Her dose of hydralazine was increased.  Amlodipine and torsemide were added because of her severe chronic kidney disease.  Blood pressures improved and ranged between 132//88 for the 24 hours prior to discharge after these adjustments.    Problem #6 possible acute kidney injury gabapentin toxicity, and severe stage IV chronic kidney disease.  Creatinine dropped from 1.95 to 1.62 during her hospital stay.  Renal function appeared to improve coinciding with treatment of UTI severe hypertension, and hypoxia.  Because of her severely impaired renal function, her previous high dose of gabapentin was decreased.  A serum gabapentin level was sent while she had been taking her previous high doses of gabapentin, but those test results are pending at the time of discharge.  A repeat gabapentin level in a week after this dose reduction was made was recommended after discharge.  It was possible that she had acute kidney injury due to UTI, accelerated hypertension, and hypoxia superimposed upon her stage IV chronic kidney disease.  She may also have had possible gabapentin toxicity  "contributing to acute encephalopathy although she did not have obvious symptoms of a movement disturbance.    Problem #7 generalized weakness.  She was quite weak.  She was evaluated and treated by physical therapy as she was treated for her acute medical problems.  Strength trended toward improvement, but discharged to a TCU for a trial of short-term rehabilitation was recommended recognizing that suspected dementia may limit her ability to undergo rehabilitation effectively.    Fermin Florentino    Significant Results and Procedures   No procedures performed during this admission    Pending Results   These results will be followed up by NH provider  Unresulted Labs Ordered in the Past 30 Days of this Admission     Date and Time Order Name Status Description    1/23/2018 0001 Gabapentin level In process     1/21/2018 1723 Blood culture Preliminary     1/21/2018 1723 Blood culture Preliminary           Code Status   Full Code       Primary Care Physician   Margarito Hoffman    Physical Exam   180 lbs 12.44 oz  /88 (BP Location: Right arm)  Pulse 79  Temp 98.7  F (37.1  C) (Oral)  Resp 18  Ht 1.626 m (5' 4\")  Wt 82 kg (180 lb 12.4 oz)  SpO2 92%  BMI 31.03 kg/m2    No acute distress sitting in a chair  Normal respiratory effort, severely diminished breath sounds throughout, few inspiratory crackles on the left, no wheezing  Regular heart rate and rhythm, good capillary refill  Alert and maintains wakefulness and attention, memory problems apparent, explains that she did not recognize her brother last evening when he visited, able to follow simple instructions, speech is understandable, moving all limbs purposefully without involuntary movements    Discharge Disposition   Discharged to nursing home  Condition at discharge: Stable    Consultations This Hospital Stay   CARE TRANSITION RN/SW IP CONSULT  PHYSICAL THERAPY ADULT IP CONSULT  PHYSICAL THERAPY ADULT IP CONSULT  OCCUPATIONAL THERAPY ADULT IP " CONSULT    Time Spent on this Encounter   I, Fermin Florentino, personally saw the patient today and spent greater than 30 minutes discharging this patient.    Discharge Orders     General info for SNF   Length of Stay Estimate: Short Term Care: Estimated # of Days <30  Condition at Discharge: Stable  Level of care:skilled   Rehabilitation Potential: Fair  Admission H&P remains valid and up-to-date: Yes  Recent Chemotherapy: N/A  Use Nursing Home Standing Orders: Yes     Glucose monitor nursing POCT   Before meals and at bedtime     Follow Up and recommended labs and tests   Follow up with jail physician.  The following labs/tests are recommended: gabapentin level in 1 week (due to dose reduction during hospital stay).     Activity - Up with assistive device     Full Code     Physical Therapy Adult Consult   Evaluate and treat as clinically indicated.    Reason:  E coli UTI, weakness     Occupational Therapy Adult Consult   Evaluate and treat as clinically indicated.    Reason:  E coli UTI, weakness, confusion, ?dementia     Oxygen - Nasal cannula   2 Lpm by nasal cannula to keep O2 sats 90% or greater.     Advance Diet as Tolerated   Follow this diet upon discharge: Orders Placed This Encounter     Moderate Consistent CHO Diet       Discharge Medications   Current Discharge Medication List      START taking these medications    Details   acetaminophen (TYLENOL) 325 MG tablet Take 2 tablets (650 mg) by mouth every 4 hours as needed for mild pain  Qty: 100 tablet    Associated Diagnoses: Pain in thoracic spine      insulin aspart (NOVOLOG PEN) 100 UNIT/ML injection Inject 1-7 Units Subcutaneous 3 times daily (before meals) For  - 189 give 1 unit.   For  - 239 give 2 units.   For  - 289 give 3 units.   For  - 339 give 4 units.   For - 399 give 5 units.   For -449 give 6 units  For  or higher give 7 units.    Associated Diagnoses: Diabetes mellitus type 2 with  neurological manifestations (H)      amLODIPine (NORVASC) 5 MG tablet Take 1 tablet (5 mg) by mouth daily  Qty: 30 tablet    Associated Diagnoses: Hypertension goal BP (blood pressure) < 130/80      cefdinir (OMNICEF) 300 MG capsule Take 1 capsule (300 mg) by mouth daily for 7 days  Refills: 0    Associated Diagnoses: E. coli UTI      torsemide (DEMADEX) 10 MG tablet Take 1 tablet (10 mg) by mouth daily    Associated Diagnoses: Hypertension goal BP (blood pressure) < 130/80; CKD (chronic kidney disease) stage 4, GFR 15-29 ml/min (H)         CONTINUE these medications which have CHANGED    Details   HYDROcodone-acetaminophen (NORCO) 5-325 MG per tablet Take 1 tablet by mouth every 6 hours as needed for moderate to severe pain  Qty: 30 tablet, Refills: 0    Associated Diagnoses: Pain in thoracic spine      insulin glargine (LANTUS) 100 UNIT/ML injection Inject 10 Units Subcutaneous every morning    Associated Diagnoses: 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)      simvastatin (ZOCOR) 80 MG tablet Take 1 tablet (80 mg) by mouth At Bedtime  Qty: 90 tablet, Refills: 3    Associated Diagnoses: Hyperlipidemia LDL goal <100      hydrALAZINE (APRESOLINE) 25 MG tablet Take 2 tablets (50 mg) by mouth 3 times daily  Qty: 270 tablet, Refills: 1    Associated Diagnoses: Hypertension goal BP (blood pressure) < 130/80      gabapentin (NEURONTIN) 300 MG capsule Take 1 capsule (300 mg) by mouth 2 times daily  Qty: 90 capsule    Associated Diagnoses: 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      MULTIVITAMINS OR TABS 1 TABLET DAILY  Qty: 30, Refills: 0      insulin pen needle (B-D U/F) 31G X 8 MM Use 1 daily  "or as directed.  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 blood glucose monitoring (NO BRAND SPECIFIED) test strip Use to test blood sugar two times daily or as directed.  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 blood glucose monitoring (PRODIGY TWIST TOP 28G) lancets Use to test blood sugar two times daily or as directed.  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  Qty: 100 each, Refills: 11    Associated Diagnoses: Type 2 diabetes, HbA1C goal < 8% (H)      albuterol (PROAIR HFA, PROVENTIL HFA, VENTOLIN HFA) 108 (90 BASE) MCG/ACT inhaler Inhale 2 puffs into the lungs every 6 hours as needed for shortness of breath / dyspnea or wheezing Ventolin inhaler  Qty: 1 Inhaler, Refills: 5    Comments: Profile  Associated Diagnoses: Bronchitis      betamethasone dipropionate (DIPROSONE) 0.05 % lotion Apply topically to scalp once daily  Qty: 60 mL, Refills: 1    Associated Diagnoses: Itching      Spacer/Aero-Holding Chambers (OPTIHALER) BOGDAN As directed  Qty: 1 Device, Refills: 0    Associated Diagnoses: Bronchitis; Cough; SOB (shortness of breath)      !! glucose blood VI test strips (ONE TOUCH TEST STRIPS) strip test 4x daily (has One Touch Ultra 2 machine  Qty: 100 strip, Refills: 1yr    Associated Diagnoses: Type 2 diabetes, HbA1C goal < 8% (H)      !! LIFESCAN FINEPOINT LANCETS MISC 1 Device by Lancet route 4 times daily.  Qty: 100 each, Refills: prn    Comments: Has One Touch Ultra 2 machine  Associated Diagnoses: Type 2 diabetes, HbA1C goal < 8% (H)      ASPIRIN NOT PRESCRIBED, INTENTIONAL, by Other route continuous prn. Not " prescribed due to subdural hematoma history.    Refills: 0    Associated Diagnoses: Type 2 diabetes, HbA1C goal < 8% (H)       !! - Potential duplicate medications found. Please discuss with provider.      STOP taking these medications       insulin aspart (NOVOLOG VIAL) 100 UNITS/ML injection Comments:   Reason for Stopping:             Allergies   Allergies   Allergen Reactions     Lisinopril      upper resp symptoms, cough     Data   Most Recent 3 CBC's:  Recent Labs   Lab Test  01/22/18   0628  01/21/18   1735  02/28/17   1015  01/26/17   1745   WBC  7.5  7.6   --   7.6   HGB  11.9  13.4  14.2  14.8   MCV  97  96   --   93   PLT  134*  154   --   160      Most Recent 3 BMP's:  Recent Labs   Lab Test  01/24/18   0552  01/23/18   0527  01/22/18   0628   NA  142  142  142   POTASSIUM  4.0  4.4  4.3   CHLORIDE  103  106  107   CO2  31  31  30   BUN  38*  30  35*   CR  1.62*  1.60*  1.64*   ANIONGAP  8  5  5   KEANU  8.6  8.0*  8.2*   GLC  97  78  95     Most Recent 2 LFT's:  Recent Labs   Lab Test  01/21/18   1735  12/08/17   1009   AST  23  22   ALT  17  20   ALKPHOS  59  57   BILITOTAL  0.4  0.4     Most Recent 6 Bacteria Isolates From Any Culture (See EPIC Reports for Culture Details):  Recent Labs   Lab Test  01/22/18   0059  01/21/18   1800  01/21/18   1750  01/21/18   1735  04/21/15   1642  07/31/14   1006   CULT  No MRSA isolated  >100,000 colonies/mL  Escherichia coli  *  No growth after 3 days  No growth after 3 days  Lab accident - specimen processed incorrectly  Urine may have been QNS for culture, was never received at [facility] to be set   up    <10,000 colonies/mL Mixed gram positive tulio     Most Recent TSH, T4 and A1c Labs:  Recent Labs   Lab Test  01/21/18   1735   03/02/16   1502   TSH   --    --   1.54   A1C  6.2*   < >  6.6*    < > = values in this interval not displayed.     Results for orders placed or performed during the hospital encounter of 01/21/18   XR Chest Port 1 View    Narrative     CHEST PORTABLE ONE VIEW  1/21/2018 6:23 PM     HISTORY: Cough.    COMPARISON: Chest x-ray 1/26/2017.      Impression    IMPRESSION: Portable chest. Atelectasis lateral left lung base is  stable. Lungs are otherwise clear. Possible trace left pleural  effusion. Heart is normal in size. No pneumothorax.         CARMITA STOVER MD   XR Pelvis Port 1/2 Views    Narrative    PELVIS PORTABLE ONE TO TWO VIEWS  1/21/2018 6:24 PM     HISTORY: Pain after fall.    COMPARISON: Pelvis x-ray 4/27/2016.      Impression    IMPRESSION: AP view of the pelvis is performed. Pelvic ring appears  intact. Intramedullary anay right femur is unchanged. Hips bilaterally  are located without evidence of fracture on this single view.    CARMITA STOVER MD     *Note: Due to a large number of results and/or encounters for the requested time period, some results have not been displayed. A complete set of results can be found in Results Review.

## 2018-01-24 NOTE — PLAN OF CARE
Problem: Patient Care Overview  Goal: Plan of Care/Patient Progress Review  Outcome: Improving  Assisted to commode at HS. Unable to void at this time but has been incontinent at times. Pleasantly confused. Settled for bed.

## 2018-01-24 NOTE — PROGRESS NOTES
Smiley A Arianne  Gender: female  : 1935  [address]  [facility] [address]  [phone] (home)     Medical Record: [id number]  Pharmacy: [facility] [address]  Primary Care Provider: Margarito Hoffman    Parent's names are: Data Unavailable (mother) and Data Unavailable (father).      [facility]  2018     Discharge Phone Call:  Key Words/Key Times    Discharged to Giovana Cox RN

## 2018-01-24 NOTE — PLAN OF CARE
Problem: Patient Care Overview  Goal: Plan of Care/Patient Progress Review  Discharge Planner PT   Patient plan for discharge: Pt is ok with going to TCU.  Current status: Pt requiring CGA with sit to/from stand transfers and gait with FWW.  Pt needs cueing to maintain feet inside walker and not let it get out in front.  She ambulated and completed exercises without O2 however after first 20 ft of walking she dropped to 86% and needed 2L of O2 to recover after 2 minutes.  Pt needs reminder to breath during activities.    Barriers to return to prior living situation: Needing assistance with function activities, does not have strength to complete stairs safely in order to enter their home.  Recommendations for discharge: TCU for further rehab services.  Rationale for recommendations: Pt is requiring assistance with short distance walking and transfers.  Does not tolerate much activity.       Entered by: Alia Merrill 01/24/2018 9:59 AM     Thank you for your referral.    Alia Merrill, PT, DPT  [facility]  [phone]    Physical Therapy Discharge Summary    Reason for therapy discharge:    Discharged to transitional care facility.    Progress towards therapy goal(s). See goals on Care Plan in [facility] electronic health record for goal details.  Goals not met.  Barriers to achieving goals:   discharge from facility.    Therapy recommendation(s):    Continued therapy is recommended.  Rationale/Recommendations:  strength, activity tolerance, and mobility.     Thank you for your referral.    Alia Merrill, PT, DPT  [facility]  [phone]

## 2018-01-24 NOTE — PROGRESS NOTES
S-(situation): Patient discharged to [facility] via wheelchair with [facility]    B-(background): Admitted to hospital on 1/21/18 with Pneumonia and UTI    A-(assessment): Lungs diminished.  Up to chair with one assist and gait belt.  Denies pain.  Remains on oxygen 2L per NC.  Vitals stable.  Nursing report called to nurse Yulia at [facility].    R-(recommendations): Discharge instructions reviewed with [facility] nurse. Listed belongings gathered and returned to patient.          Discharge Nursing Criteria:     Care Plan and Patient education resolved: Yes    New Medications- pt has been educated about purpose and side effects: yes    Vaccines  Pneumonia Vaccine verified at discharge: Yes  Influenza status verified at discharge:  Yes      Intentionally Retained Items  Patient was sent home with no retained items left in place.     MISC  Prescriptions if needed, hard copies sent with patient  NA  Home and hospital aquired medications returned to patient: NA  Medication Bin checked and emptied on discharge Yes  Patient reports post-discharge pain management plan is effective: Yes    Yohana Robert

## 2018-01-24 NOTE — PLAN OF CARE
Problem: Patient Care Overview  Goal: Plan of Care/Patient Progress Review  Outcome: Adequate for Discharge Date Met: 01/24/18  Preparing for discharge.  See note.

## 2018-01-24 NOTE — PROGRESS NOTES
Name: Smiley Acuña    MRN#: [id number]    Reason for Hospitalization: Pneumonia [J18.9]  Weakness [R53.1]  Hypoxia [R09.02]  Acute cystitis without hematuria [N30.00]  Recurrent falls [R29.6]    Discharge Date: 1/24/2018    Patient / Family response to discharge plan: Met with patient and  regarding discharge plan to [facility], they are in agreement. Discussed transpiration. They request van transportation handi- van available at 11:15 today. They are aware of the private pay cost.     Follow-Up Appt: No future appointments.    Other Providers (Care Coordinator, [facility] Services, PCA services etc): No    Discharge Disposition: rehabilitation facility    Zoraida Yang  Social Work Student

## 2018-01-24 NOTE — PLAN OF CARE
Problem: Respiratory Distress Syndrome (,NICU)  Goal: Signs and Symptoms of Listed Potential Problems Will be Absent, Minimized or Managed (Respiratory Distress Syndrome)  Signs and symptoms of listed potential problems will be absent, minimized or managed by discharge/transition of care (reference Respiratory Distress Syndrome (,NICU) CPG).   Outcome: Improving  Note that pt continues to use oxygen at 2L to maintain sats in the 90's.  Eating fair.  Up with walker/gait belt and 2 assist, note ambulation is improving for pt's.  BP noted to improve today with changes in  Meds.  Lgs with diminished.  Pt up in chair for most of the day.  Note pt becomes slightly confused later in the afternoon.   Blood glucose has been on the low side which Dr. Florentino is aware of.

## 2018-01-24 NOTE — PROGRESS NOTES
Clinic Care Coordination Contact  Care Coordination Transition Communication    Referral Source: CTS    Clinical Data: Patient was hospitalized at [facility].     Reason for Hospitalization:  Pneumonia [  Weakness  Hypoxia   Acute cystitis without hematuria   Recurrent falls [R    Admit Date/Time: 1/21/2018  5:01 PM  Discharge Date: 1/24/2018    Transition to Facility:              Facility Name: [facility]               Contact name and phone number/fax: Ann BREWER [phone]    Plan: RN/SW Care Coordinator will await notification from facility staff informing RN/SW Care Coordinator of patient's discharge plans/needs. RN/SW Care Coordinator will review chart and outreach to facility staff every 4 weeks and as needed.     Tania MAHER, RN, PHN  Care Coordination    [facility]  [address] [place] [address]  Office: [phone]  Fax [phone]   [facility]  [address] [place] [address]  Office: [phone] Fax [phone]  [email or web address]   [email or web address]   Connect with [facility] on social media.

## 2018-01-25 ENCOUNTER — NURSING HOME VISIT (OUTPATIENT)
Dept: GERIATRICS | Facility: CLINIC | Age: 83
End: 2018-01-25
Payer: MEDICARE

## 2018-01-25 VITALS
TEMPERATURE: 98.2 F | OXYGEN SATURATION: 94 % | HEART RATE: 67 BPM | BODY MASS INDEX: 30.04 KG/M2 | RESPIRATION RATE: 19 BRPM | DIASTOLIC BLOOD PRESSURE: 63 MMHG | WEIGHT: 175 LBS | SYSTOLIC BLOOD PRESSURE: 140 MMHG

## 2018-01-25 DIAGNOSIS — N39.0 E. COLI UTI: Primary | ICD-10-CM

## 2018-01-25 DIAGNOSIS — E11.49 DIABETES MELLITUS TYPE 2 WITH NEUROLOGICAL MANIFESTATIONS (H): ICD-10-CM

## 2018-01-25 DIAGNOSIS — I10 ESSENTIAL HYPERTENSION WITH GOAL BLOOD PRESSURE LESS THAN 130/80: ICD-10-CM

## 2018-01-25 DIAGNOSIS — G30.9 ALZHEIMER'S DEMENTIA WITHOUT BEHAVIORAL DISTURBANCE, UNSPECIFIED TIMING OF DEMENTIA ONSET: ICD-10-CM

## 2018-01-25 DIAGNOSIS — E78.5 HYPERLIPIDEMIA LDL GOAL <100: ICD-10-CM

## 2018-01-25 DIAGNOSIS — F02.80 ALZHEIMER'S DEMENTIA WITHOUT BEHAVIORAL DISTURBANCE, UNSPECIFIED TIMING OF DEMENTIA ONSET: ICD-10-CM

## 2018-01-25 DIAGNOSIS — N18.4 CKD (CHRONIC KIDNEY DISEASE) STAGE 4, GFR 15-29 ML/MIN (H): ICD-10-CM

## 2018-01-25 DIAGNOSIS — B96.20 E. COLI UTI: Primary | ICD-10-CM

## 2018-01-25 DIAGNOSIS — E11.43 DIABETIC AUTONOMIC NEUROPATHY ASSOCIATED WITH TYPE 2 DIABETES MELLITUS (H): ICD-10-CM

## 2018-01-25 DIAGNOSIS — M62.81 GENERALIZED MUSCLE WEAKNESS: ICD-10-CM

## 2018-01-25 PROCEDURE — 99310 SBSQ NF CARE HIGH MDM 45: CPT | Performed by: NURSE PRACTITIONER

## 2018-01-25 NOTE — MR AVS SNAPSHOT
"              After Visit Summary   1/25/2018    Smiley Acuña    MRN: [id number]           Patient Information     Date Of Birth          1935        Visit Information        Provider Department      1/25/2018 11:30 AM Maria Fernanda Giang APRN CNP Geriatrics Transitional Care        Today's Diagnoses     E. 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)        Diabetic autonomic neuropathy associated with type 2 diabetes mellitus (H)        Generalized muscle weakness        CKD (chronic kidney disease) stage 4, GFR 15-29 ml/min (H)        Essential hypertension with goal blood pressure less than 130/80        Alzheimer's dementia without behavioral disturbance, unspecified timing of dementia onset        Hyperlipidemia LDL goal <100           Follow-ups after your visit        Who to contact     If you have questions or need follow up information about today's clinic visit or your schedule please contact [facility] directly at [phone].  Normal or non-critical lab and imaging results will be communicated to you by [employer], letter or phone within 4 business days after the clinic has received the results. If you do not hear from us within 7 days, please contact the clinic through [employer] or phone. If you have a critical or abnormal lab result, we will notify you by phone as soon as possible.  Submit refill requests through [employer] or call your pharmacy and they will forward the refill request to us. Please allow 3 business days for your refill to be completed.          Additional Information About Your Visit        [employer] Information     [employer] lets you send messages to your doctor, view your test results, renew your prescriptions, schedule appointments and more. To sign up, go to [email or web address] . Click on \"Log in\" on the left side of the screen, which will take you to the Welcome page. Then click on \"Sign up Now\" on the right " side of the page.     You will be asked to enter the access code listed below, as well as some personal information. Please follow the directions to create your username and password.     Your access code is: [email or web address]  Expires: 2018 12:19 PM     Your access code will  in 90 days. If you need help or a new code, please call your [facility] clinic or [phone].        Care EveryWhere ID     This is your Care EveryWhere ID. This could be used by other organizations to access your [facility] medical records  [id number]        Your Vitals Were     Pulse Temperature Respirations Pulse Oximetry BMI (Body Mass Index)       67 98.2  F (36.8  C) 19 94% 30.04 kg/m2        Blood Pressure from Last 3 Encounters:   18 (!) 182/93   18 140/63   18 182/88    Weight from Last 3 Encounters:   18 169 lb 12.8 oz (77 kg)   18 175 lb (79.4 kg)   18 180 lb 12.4 oz (82 kg)              Today, you had the following     No orders found for display       Primary Care Provider Office Phone # Fax #    Margarito Hoffman [phone]       [facility] [address] [place] [address]        Equal Access to Services     [facility] AH: Hadii aad ku hadasho Soomaali, waaxda luqadaha, qaybta kaalmada adeegyada, waxay idiin hayaan annelise hodge . So [facility] [phone].    ATENCIÓN: Si habla español, tiene a rodriguez disposición servicios gratuitos de asistencia lingüística. Llame al [phone].    We comply with applicable federal civil rights laws and [place] laws. We do not discriminate on the basis of race, color, national origin, age, disability, sex, sexual orientation, or gender identity.            Thank you!     Thank you for choosing [facility]  for your care. Our goal is always to provide you with excellent care. Hearing back from our patients is one way we can continue to improve our services. Please take a few minutes to complete the  written survey that you may receive in the mail after your visit with us. Thank you!             Your Updated Medication List - Protect others around you: Learn how to safely use, store and throw away your medicines at www.disposemymeds.org.          This list is accurate as of 1/25/18 11:59 PM.  Always use your most recent med list.                   Brand Name Dispense Instructions for use Diagnosis    acetaminophen 325 MG tablet    TYLENOL    100 tablet    Take 2 tablets (650 mg) by mouth every 4 hours as needed for mild pain    Pain in thoracic spine       albuterol 108 (90 BASE) MCG/ACT Inhaler    PROAIR HFA/PROVENTIL HFA/VENTOLIN HFA    1 Inhaler    Inhale 2 puffs into the lungs every 6 hours as needed for shortness of breath / dyspnea or wheezing Ventolin inhaler    Bronchitis       ASPIRIN NOT PRESCRIBED    INTENTIONAL     by Other route continuous prn. 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    Type 2 diabetes, HbA1C goal < 8% (H)       betamethasone dipropionate 0.05 % lotion    DIPROSONE    60 mL    Apply topically to scalp once daily    Itching       * blood glucose monitoring test strip    ONETOUCH TEST STRIPS    100 strip    test 4x daily (has One Touch Ultra 2 machine    Type 2 diabetes, HbA1C goal < 8% (H)       * blood glucose monitoring test strip    no brand specified    100 each    Use to test blood sugar two times daily or as directed.  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 coli UTI       cholecalciferol 400 UNIT Tabs tablet    vitamin D3     Take 2 tablets by mouth daily.        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  For  - 239 give 2 units.  For  - 289 give 3 units.  For  - 339 give 4 units.  For - 399 give 5 units.  For -449 give 6 units For  or higher give 7 units.    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    Type 2 diabetes, HbA1C goal < 8% (H)       * LIFESCAN FINEPOINT LANCETS Misc     100 each    1 Device by Lancet route 4 times daily.    Type 2 diabetes, HbA1C goal < 8% (H)       * blood glucose monitoring lancets     100 each    Use to test blood sugar two times daily or as directed.    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)       multivitamin per tablet     30    1 TABLET DAILY        OPTIHALER Kerry     1 Device    As directed    Bronchitis, Cough, SOB (shortness of breath)       simvastatin 80 MG tablet    ZOCOR    90 tablet    Take 1 tablet (80 mg) by mouth At Bedtime    Hyperlipidemia LDL goal <100       torsemide 10 MG tablet    DEMADEX     Take 1 tablet (10 mg) by mouth daily    Hypertension goal BP (blood pressure) < 130/80, CKD (chronic kidney disease) stage 4, GFR 15-29 ml/min " (H)       traZODone 50 MG tablet    DESYREL    90 tablet    TAKE ONE TABLET BY MOUTH AT BEDTIME    Primary insomnia       * Notice:  This list has 4 medication(s) that are the same as other medications prescribed for you. Read the directions carefully, and ask your doctor or other care provider to review them with you.

## 2018-01-25 NOTE — PROGRESS NOTES
[place] [facility]  PRIMARY CARE PROVIDER AND CLINIC:  Margarito Hoffman [facility] [address] [facility]  / [address] FRANZ 5*  Chief Complaint   Patient presents with     Hospital F/U     Clinic Care Coordination - Initial       HPI:    Smiley Acuña is a 83 year old  (1935),admitted to the [facility]  from [facility].  Hospital stay 1/21/18 through 1/24/18.  Admitted to this facility for  rehab, medical management and nursing care.  HPI information obtained from: facility chart records, facility staff, patient report and [facility] chart review.  Current issues are:        1. E. coli UTI    2. 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 Diabetic autonomic neuropathy associated with type 2 diabetes mellitus (H)    4. Generalized muscle weakness    5. CKD (chronic kidney disease) stage 4, GFR 15-29 ml/min (H)    6. Essential hypertension with goal blood pressure less than 130/80    7. Alzheimer's dementia without behavioral disturbance, unspecified timing of dementia onset      - Smiley is being seen today for an initial visit for order clarification and discussion of POC.  Spouse has been telling Smiley that he is having her out of here within 3 days and expecting her to be back to her old self.  When checking with , the kids have other ideas of staying longer.      Smiley presented to the [place] ED with worsening cough, SOB, weakness, and 2 falls at home.  Due to her multiple co-morbidities, there was concern for possible infection and respiratory failure.  Smiley will be receiving therapies while here and her and her spouses goal is to return back home.   One concern that came from pharmacy is the use of Norvasc with a high dose of simvastatin.  Worry for complications of muscle issues.  Amlodipine was not sent until clarified.  Appeared that Amlodipine was added due to renal  issues and HTN.       Hospital course:  Problem #1 E. coli urinary tract infection.  Urinalysis demonstrated pyuria and urine culture grew E. coli.  She was regularly incontinent of urine but otherwise asymptomatic without overt UTI symptoms.  She did not have fever or leukocytosis.  Blood cultures were negative during her hospital stay.  Nasal culture for MRSA was negative.  Respiratory viral panel PCR testing was negative.  Chest x-ray demonstrated radiographic findings most consistent with atelectasis but no definite infiltrate.  After investigation, respiratory tract infection was not suspected.  She did not have clinical course concerning for sepsis    Problem #2 hypoxia and hypercapnia and possible chronic respiratory failure due to obesity hypoventilation, atelectasis, and sedentary status.  She was severely hypoxic, and blood gas testing demonstrated hypercapnia although no acidosis due to compensatory metabolic alkalosis.  She did not exhibit respiratory distress although was confused.  She was treated with oxygen and maintained adequate oxygen saturations with oxygen therapy.  She did not require assisted ventilation.  She had some initial wheezing at presentation that was treated as needed with bronchodilators, but she does not carry a previous diagnosis of obstructive lung disease and her clinical course was not suspicious for COPD or asthma exacerbation.  After investigation, hypoxia and hypercapnia due to obesity hypoventilation, sedentary status, and atelectasis was suspected, possibly consistent with chronic respiratory failure, but her clinical course was not concerning for acute respiratory failure.     Problem #3 probable dementia and possible acute encephalopathy.  She was obviously confused throughout her hospital stay, and confusion was present at the time of admission.  From additional history of long-standing and progressively worsening memory problems provided by the patient and her family  during her hospital stay, there was strong clinical suspicion for previously undiagnosed dementia.  Memory problems persisted throughout her hospital stay such as difficulty for the patient to recognize close family members including her spouse.  Other signs of confusion did seem to improve as her acute and chronic medical problems including infection hypoglycemia,,  and hypoxia were treated.  She did not have obvious focal neurologic deficits or seizures.  Her behavior was cooperative.  After investigation, underlying dementia was strongly suspected.  She may have had a superimposed acute encephalopathy associated with UTI, hypoxia and possibly gabapentin toxicity.     Problem #4 diabetes with hypoglycemia.  Hemoglobin A1c was 6.2.  She developed significant hypoglycemia late in the day on her usual chronic doses of Lantus and NovoLog insulin.  Dose of Lantus insulin was decreased, and prandial dosing of NovoLog was discontinued and substituted with a correction scale.  After these dose adjustments in insulin therapy, blood sugars had ranged from  for 24 hours prior to discharge.     Problem #5 accelerated hypertension.  Systolic blood pressures were initially severely elevated.  She was not overtly symptomatic although mentation was difficult to assess because of suspected dementia and her possible acute encephalopathy and vision was difficult to assess because of her chronic visual disturbance.  Her dose of hydralazine was increased.  Amlodipine and torsemide were added because of her severe chronic kidney disease.  Blood pressures improved and ranged between 132//88 for the 24 hours prior to discharge after these adjustments.     Problem #6 possible acute kidney injury gabapentin toxicity, and severe stage IV chronic kidney disease.  Creatinine dropped from 1.95 to 1.62 during her hospital stay.  Renal function appeared to improve coinciding with treatment of UTI severe hypertension, and hypoxia.   Because of her severely impaired renal function, her previous high dose of gabapentin was decreased.  A serum gabapentin level was sent while she had been taking her previous high doses of gabapentin, but those test results are pending at the time of discharge.  A repeat gabapentin level in a week after this dose reduction was made was recommended after discharge.  It was possible that she had acute kidney injury due to UTI, accelerated hypertension, and hypoxia superimposed upon her stage IV chronic kidney disease.  She may also have had possible gabapentin toxicity contributing to acute encephalopathy although she did not have obvious symptoms of a movement disturbance.     Problem #7 generalized weakness.  She was quite weak.  She was evaluated and treated by physical therapy as she was treated for her acute medical problems.  Strength trended toward improvement, but discharged to a TCU for a trial of short-term rehabilitation was recommended recognizing that suspected dementia may limit her ability to undergo rehabilitation effectively.     CODE STATUS/ADVANCE DIRECTIVES DISCUSSION:   CPR/Full code   Patient's living condition: lives with spouse    ALLERGIES:Lisinopril  PAST MEDICAL HISTORY:  has a past medical history of Abnormality of gait; Anemia, unspecified; Chronic ischemic heart disease, unspecified; Closed fracture of patella (06/21/10); Coronary artery disease; Degenerative disc disease; Depressive disorder, not elsewhere classified; History of blood transfusion; Other and unspecified hyperlipidemia; Renal failure, unspecified; Septicemia due to Escherichia coli (E. coli)(038.42) (H) (05/03/2007); Syncope and collapse (4/8/2005); Syncope and collapse (05/26/10); Traumatic subdural hematomas (05/27/10); Type II or unspecified type diabetes mellitus without mention of complication, not stated as uncontrolled; Unspecified essential hypertension; Unspecified sleep apnea; and Urinary tract infection, site  not specified (4/4/2008). She also has no past medical history of Asthma; Congestive heart failure, unspecified; COPD (chronic obstructive pulmonary disease) (H); Malignant neoplasm (H); Thyroid disease; or Unspecified cerebral artery occlusion with cerebral infarction.  PAST SURGICAL HISTORY:  has a past surgical history that includes AMPUTATION TOE,MT-P JT (2000); APPENDECTOMY; ANESTH,UPPER LEG SURGERY; UPPER GI ENDOSCOPY,EXAM (04/27/2005); Colonoscopy w/wo Brush **Performed** (12/05/2005); UGI ENDOSCOPY DIAG W OR W/O BRUSH/WASH (12/05/2005); BX SYNOVIUM INTERPHALANG JT; craniotomy (05/28/10); REPAIR ROTATOR CUFF,ACUTE (11/21/2002); EXCISION LESION/TENDON-SHEATH/CAPSULE, FOOT (04/30/07); EXCISION LESION/TENDON-SHEATH/CAPSULE, FOOT (8/14/2007); bunionectomy rt/lt (10/24/07); and OPEN TX FEMORAL SUPRACONDYLAR FRACTURE W EXTENSION (12/14/12).  FAMILY HISTORY: family history includes C.A.D. in her brother; DIABETES in her brother, brother, and mother; OSTEOPOROSIS in her mother.  SOCIAL HISTORY:  reports that she has never smoked. She has never used smokeless tobacco. She reports that she does not drink alcohol or use illicit drugs.    Post Discharge Medication Reconciliation Status: discharge medications reconciled and changed, per note/orders (see AVS).  Current Outpatient Prescriptions   Medication Sig Dispense Refill     HYDROcodone-acetaminophen (NORCO) 5-325 MG per tablet Take 1 tablet by mouth every 6 hours as needed for moderate to severe pain 30 tablet 0     acetaminophen (TYLENOL) 325 MG tablet Take 2 tablets (650 mg) by mouth every 4 hours as needed for mild pain 100 tablet      insulin aspart (NOVOLOG PEN) 100 UNIT/ML injection Inject 1-7 Units Subcutaneous 3 times daily (before meals) For  - 189 give 1 unit.   For  - 239 give 2 units.   For  - 289 give 3 units.   For  - 339 give 4 units.   For - 399 give 5 units.   For -449 give 6 units  For  or higher give 7  "units.       insulin glargine (LANTUS) 100 UNIT/ML injection Inject 10 Units Subcutaneous every morning       simvastatin (ZOCOR) 80 MG tablet Take 1 tablet (80 mg) by mouth At Bedtime 90 tablet 3     hydrALAZINE (APRESOLINE) 25 MG tablet Take 2 tablets (50 mg) by mouth 3 times daily 270 tablet 1     cefdinir (OMNICEF) 300 MG capsule Take 1 capsule (300 mg) by mouth daily for 7 days  0     torsemide (DEMADEX) 10 MG tablet Take 1 tablet (10 mg) by mouth daily       gabapentin (NEURONTIN) 300 MG capsule Take 1 capsule (300 mg) by mouth 2 times daily 90 capsule      traZODone (DESYREL) 50 MG tablet TAKE ONE TABLET BY MOUTH AT BEDTIME 90 tablet 3     insulin pen needle (B-D U/F) 31G X 8 MM Use 1 daily or as directed. 100 each prn     blood glucose monitoring (NO BRAND SPECIFIED) test strip Use to test blood sugar two times daily or as directed.  Prodigy Autocode test strips 100 each 2     blood glucose monitoring (PRODIGY TWIST TOP 28G) lancets Use to test blood sugar two times daily or as directed. 100 each 2     insulin syringe-needle U-100 (BD INSULIN SYRINGE ULTRAFINE) 30G X 1/2\" 0.5 ML Use one syringe 4 times daily or as directed. 100 each 11     albuterol (PROAIR HFA, PROVENTIL HFA, VENTOLIN HFA) 108 (90 BASE) MCG/ACT inhaler Inhale 2 puffs into the lungs every 6 hours as needed for shortness of breath / dyspnea or wheezing Ventolin inhaler 1 Inhaler 5     betamethasone dipropionate (DIPROSONE) 0.05 % lotion Apply topically to scalp once daily 60 mL 1     Spacer/Aero-Holding Chambers (OPTIHALER) BOGDAN As directed 1 Device 0     cholecalciferol (VITAMIN D) 400 UNIT TABS Take 2 tablets by mouth daily.       glucose blood VI test strips (ONE TOUCH TEST STRIPS) strip test 4x daily (has One Touch Ultra 2 machine 100 strip 1yr     Co3 SystemsCAN FINEPOINT LANCETS MISC 1 Device by Lancet route 4 times daily. 100 each prn     ASPIRIN NOT PRESCRIBED, INTENTIONAL, by Other route continuous prn. Not prescribed due to subdural " hematoma history.    0     MULTIVITAMINS OR TABS 1 TABLET DAILY 30 0       ROS:  4 point ROS including Respiratory, CV, GI and , other than that noted in the HPI,  is negative    Exam:  /63  Pulse 67  Temp 98.2  F (36.8  C)  Resp 19  Wt 175 lb (79.4 kg)  SpO2 94%  BMI 30.04 kg/m2  GENERAL APPEARANCE:  Alert, in no distress, cooperative  EYES:  PERRL, Conjunctiva and lids normal  RESP:  respiratory effort and palpation of chest normal, no respiratory distress, diminished in bases  CV:  Palpation and auscultation of heart done , regular rate and rhythm, no murmur, rub, or gallop, no edema  ABDOMEN:  normal bowel sounds, soft, nontender, no hepatosplenomegaly or other masses, no guarding or rebound  M/S:   Gait and station abnormal assist of 2 staff for transferring.  uses a w/c for mobility.  able to move all 4 extremities  SKIN:  skin is pink, and warm to touch.  PSYCH:  oriented to person, family.  thought this was a hospital.  , insight and judgement impaired, memory impaired , affect and mood normal    Lab/Diagnostic data:     CBC RESULTS:   Recent Labs   Lab Test  01/22/18   0628  01/21/18   1735   WBC  7.5  7.6   RBC  3.87  4.40   HGB  11.9  13.4   HCT  37.6  42.4   MCV  97  96   MCH  30.7  30.5   MCHC  31.6  31.6   RDW  12.8  12.9   PLT  134*  154       Last Basic Metabolic Panel:  Recent Labs   Lab Test  01/24/18   0552  01/23/18   0527   NA  142  142   POTASSIUM  4.0  4.4   CHLORIDE  103  106   KEANU  8.6  8.0*   CO2  31  31   BUN  38*  30   CR  1.62*  1.60*   GLC  97  78       Liver Function Studies -   Recent Labs   Lab Test  01/21/18   1735  12/08/17   1009   PROTTOTAL  7.5  7.7   ALBUMIN  3.1*  3.5   BILITOTAL  0.4  0.4   ALKPHOS  59  57   AST  23  22   ALT  17  20       Lab Results   Component Value Date    A1C 6.2 01/21/2018    A1C 6.2 12/08/2017       ASSESSMENT/PLAN:  E. coli UTI  Remains on cefdinir 300mg daily.  This ends on 1/31/18.  Will not recheck a urine unless symptoms.    Diabetes  mellitus type 2 with neurological manifestations (H)  Has had only 3 sugar checks and range is 150-190's.  Will have sugar checked 4x day.  Is on Lantus 10 units daily and then sliding scale Novolog before meals.  A1c are good.    Diabetic autonomic neuropathy associated with type 2 diabetes mellitus (H)  Has a lower dose now of Gabapentin which is 300mg twice a day.  Does have PRN Norcol 5/325 every 6 hours prn and Tylenol PRN as well.  Will see how severe her pain becomes.    Generalized muscle weakness  This is intended to be a short rehab and return home.  Spouse is anxious but kids have other thoughts.  Will see how she does with therapies.    CKD (chronic kidney disease) stage 4, GFR 15-29 ml/min (H)  Will have a BMP done on 1/29/18 for monitoring.    Essential hypertension with goal blood pressure less than 130/80  Blood pressures range in the 110-130's/60-70's for Smiley.  Will discontinue the Norvasc at this time due to the risk of myopathy and rhabdomyolysis.  Daily vitals to be done.  Remains on hydralazine 50mg TID and torsemide 10mg daily.      Alzheimer's dementia without behavioral disturbance, unspecified timing of dementia onset  Provide a safe environment.  Monitor for any behaviors.       Hyperlipidemia LDL goal <100  Remains on simvastatin 80mg daily in PM.  No changes with this medication.    Orders:  1.  D/c Norvasc.  2.  Daily BP's due to HTN, update NP if SBP >170 or DBP > 90's.  3.  CBC and BMP 1/29/18.  Dx: HTN, DM2    Total time spent with patient visit at the skilled nursing facility was 35 min including patient visit and review of past records. Greater than 50% of total time spent with counseling and coordinating care due to clarifying orders and discussing POC    Electronically signed by:  ALBA Alba CNP

## 2018-01-27 LAB
BACTERIA SPEC CULT: NORMAL
BACTERIA SPEC CULT: NORMAL
SPECIMEN SOURCE: NORMAL
SPECIMEN SOURCE: NORMAL

## 2018-01-29 ENCOUNTER — HOSPITAL LABORATORY (OUTPATIENT)
Dept: NURSING HOME | Facility: OTHER | Age: 83
End: 2018-01-29

## 2018-01-29 ENCOUNTER — NURSING HOME VISIT (OUTPATIENT)
Dept: GERIATRICS | Facility: CLINIC | Age: 83
End: 2018-01-29
Payer: MEDICARE

## 2018-01-29 VITALS
RESPIRATION RATE: 19 BRPM | TEMPERATURE: 98.2 F | OXYGEN SATURATION: 93 % | HEART RATE: 80 BPM | DIASTOLIC BLOOD PRESSURE: 93 MMHG | WEIGHT: 169.8 LBS | SYSTOLIC BLOOD PRESSURE: 182 MMHG | BODY MASS INDEX: 29.15 KG/M2

## 2018-01-29 DIAGNOSIS — M62.81 GENERALIZED MUSCLE WEAKNESS: ICD-10-CM

## 2018-01-29 DIAGNOSIS — N18.4 CKD (CHRONIC KIDNEY DISEASE) STAGE 4, GFR 15-29 ML/MIN (H): Primary | ICD-10-CM

## 2018-01-29 DIAGNOSIS — G30.9 ALZHEIMER'S DEMENTIA WITHOUT BEHAVIORAL DISTURBANCE, UNSPECIFIED TIMING OF DEMENTIA ONSET: ICD-10-CM

## 2018-01-29 DIAGNOSIS — F02.80 ALZHEIMER'S DEMENTIA WITHOUT BEHAVIORAL DISTURBANCE, UNSPECIFIED TIMING OF DEMENTIA ONSET: ICD-10-CM

## 2018-01-29 LAB
ANION GAP SERPL CALCULATED.3IONS-SCNC: 9 MMOL/L (ref 3–14)
BUN SERPL-MCNC: 54 MG/DL (ref 7–30)
CALCIUM SERPL-MCNC: 9.2 MG/DL (ref 8.5–10.1)
CHLORIDE SERPL-SCNC: 106 MMOL/L (ref 94–109)
CO2 SERPL-SCNC: 28 MMOL/L (ref 20–32)
CREAT SERPL-MCNC: 2.02 MG/DL (ref 0.52–1.04)
ERYTHROCYTE [DISTWIDTH] IN BLOOD BY AUTOMATED COUNT: 13 % (ref 10–15)
GFR SERPL CREATININE-BSD FRML MDRD: 24 ML/MIN/1.7M2
GLUCOSE SERPL-MCNC: 164 MG/DL (ref 70–99)
HCT VFR BLD AUTO: 42.1 % (ref 35–47)
HGB BLD-MCNC: 13.6 G/DL (ref 11.7–15.7)
MCH RBC QN AUTO: 31.2 PG (ref 26.5–33)
MCHC RBC AUTO-ENTMCNC: 32.3 G/DL (ref 31.5–36.5)
MCV RBC AUTO: 97 FL (ref 78–100)
PLATELET # BLD AUTO: 283 10E9/L (ref 150–450)
POTASSIUM SERPL-SCNC: 4.1 MMOL/L (ref 3.4–5.3)
RBC # BLD AUTO: 4.36 10E12/L (ref 3.8–5.2)
SODIUM SERPL-SCNC: 143 MMOL/L (ref 133–144)
WBC # BLD AUTO: 8.6 10E9/L (ref 4–11)

## 2018-01-29 PROCEDURE — 99309 SBSQ NF CARE MODERATE MDM 30: CPT | Performed by: NURSE PRACTITIONER

## 2018-01-29 RX ORDER — SORBITOL SOLUTION 70 %
30 SOLUTION, ORAL MISCELLANEOUS DAILY PRN
COMMUNITY
End: 2018-03-16

## 2018-01-29 NOTE — PROGRESS NOTES
[place] [facility]    Chief Complaint   Patient presents with     Nursing Home Acute       HPI:    Smiley Acuña is a 83 year old  (1935), who is being seen today for an episodic care visit at [facility].    HPI information obtained from: patient report, [facility] chart review and spouse present as well at visit. Today's concern is:    1. CKD (chronic kidney disease) stage 4, GFR 15-29 ml/min (H)    2. Generalized muscle weakness    3. Alzheimer's dementia without behavioral disturbance, unspecified timing of dementia onset      Labs done today and so reviewing them.  Spouse here visiting and so spoke with him and Smiley of results. Smiley is here for rehabilitation after having a UTI, 2 falls at home and respiratory issues.  Right away the spouse wanted her home in three days but she was not walking for doing much due to being weak.  Now today, spouse stating they were told she could be discharged tomorrow.  Nursing not aware of this.  Therapies may be near the end but children not sure of the living situation for safety.      REVIEW OF SYSTEMS:  4 point ROS including Respiratory, CV, GI and , other than that noted in the HPI,  is negative    BP (!) 182/93  Pulse 80  Temp 98.2  F (36.8  C)  Resp 19  Wt 169 lb 12.8 oz (77 kg)  SpO2 93%  BMI 29.15 kg/m2  GENERAL APPEARANCE:  Alert, in no distress  Smiley is sitting on the bed.  Has a walker and w/c in her room.  Spouse pushes her around in the w/c.  Color is pink, warm and dry.  Heart rate regular and strong.  Lungs are clear with good expansion.    Component      Latest Ref Rng & Units 1/29/2018   Sodium      133 - 144 mmol/L 143   Potassium      3.4 - 5.3 mmol/L 4.1   Chloride      94 - 109 mmol/L 106   Carbon Dioxide      20 - 32 mmol/L 28   Anion Gap      3 - 14 mmol/L 9   Glucose      70 - 99 mg/dL 164 (H)   Urea Nitrogen      7 - 30 mg/dL 54 (H)   Creatinine      0.52 - 1.04 mg/dL 2.02 (H)   GFR Estimate      >60  mL/min/1.7m2 24 (L)   GFR Estimate If Black      >60 mL/min/1.7m2 28 (L)   Calcium      8.5 - 10.1 mg/dL 9.2   WBC      4.0 - 11.0 10e9/L 8.6   RBC Count      3.8 - 5.2 10e12/L 4.36   Hemoglobin      11.7 - 15.7 g/dL 13.6   Hematocrit      35.0 - 47.0 % 42.1   MCV      78 - 100 fl 97   MCH      26.5 - 33.0 pg 31.2   MCHC      31.5 - 36.5 g/dL 32.3   RDW      10.0 - 15.0 % 13.0   Platelet Count      150 - 450 10e9/L 283     ASSESSMENT/PLAN:  1. CKD (chronic kidney disease) stage 4, GFR 15-29 ml/min (H) - kidney function close to her baseline and stage 4.  Encouraged her to continue to drink and eat for her strength.    No further testing at this time unless she will be staying longer but know goal is wanting to be home.   2. Generalized muscle weakness - may be able to go home soon and so will speak with SW and nursing to see if they have any c=indication from therapy.  Can see the spouse clinging to any hope of returning home but agree with children that they want them safe in their environment.     3. Alzheimer's dementia without behavioral disturbance, unspecified timing of dementia onset - continue to provide a safe environment.  Anticipate needs as able.  Assist of one with cares, incontinence and mobility.         Electronically signed by:  Maria Fernanda Giang RN GNP

## 2018-01-29 NOTE — MR AVS SNAPSHOT
"              After Visit Summary   1/29/2018    Smiley Acuña    MRN: [id number]           Patient Information     Date Of Birth          1935        Visit Information        Provider Department      1/29/2018 11:30 AM Maria Fernanda Giang APRN CNP [facility]        Today's Diagnoses     CKD (chronic kidney disease) stage 4, GFR 15-29 ml/min (H)    -  1    Generalized muscle weakness        Alzheimer's dementia without behavioral disturbance, unspecified timing of dementia onset           Follow-ups after your visit        Who to contact     If you have questions or need follow up information about today's clinic visit or your schedule please contact [facility] directly at [phone].  Normal or non-critical lab and imaging results will be communicated to you by Omatehart, letter or phone within 4 business days after the clinic has received the results. If you do not hear from us within 7 days, please contact the clinic through Omatehart or phone. If you have a critical or abnormal lab result, we will notify you by phone as soon as possible.  Submit refill requests through [employer] or call your pharmacy and they will forward the refill request to us. Please allow 3 business days for your refill to be completed.          Additional Information About Your Visit        Omatehart Information     [employer] lets you send messages to your doctor, view your test results, renew your prescriptions, schedule appointments and more. To sign up, go to [email or web address] . Click on \"Log in\" on the left side of the screen, which will take you to the Welcome page. Then click on \"Sign up Now\" on the right side of the page.     You will be asked to enter the access code listed below, as well as some personal information. Please follow the directions to create your username and password.     Your access code is: [email or web address]  Expires: 5/8/2018  9:54 PM     Your access code will "  in 90 days. If you need help or a new code, please call your [place] clinic or [phone].        Care EveryWhere ID     This is your Care EveryWhere ID. This could be used by other organizations to access your [place] medical records  [id number]        Your Vitals Were     Pulse Temperature Respirations Pulse Oximetry BMI (Body Mass Index)       80 98.2  F (36.8  C) 19 93% 29.15 kg/m2        Blood Pressure from Last 3 Encounters:   18 (!) 182/93   18 140/63   18 182/88    Weight from Last 3 Encounters:   18 169 lb 12.8 oz (77 kg)   18 175 lb (79.4 kg)   18 180 lb 12.4 oz (82 kg)              Today, you had the following     No orders found for display       Primary Care Provider Office Phone # Fax #    Margarito Hoffman [phone]       [facility] [address] [place] [address]        Equal Access to Services     [facility]: Hadii aad ku hadasho Soomaali, waaxda luqadaha, qaybta kaalmada adeegyada, waxay idiin haymarti hodge . So [facility] [phone].    ATENCIÓN: Si habla español, tiene a rodriguez disposición servicios gratuitos de asistencia lingüística. Llame al [phone].    We comply with applicable federal civil rights laws and [place] laws. We do not discriminate on the basis of race, color, national origin, age, disability, sex, sexual orientation, or gender identity.            Thank you!     Thank you for choosing [facility]  for your care. Our goal is always to provide you with excellent care. Hearing back from our patients is one way we can continue to improve our services. Please take a few minutes to complete the written survey that you may receive in the mail after your visit with us. Thank you!             Your Updated Medication List - Protect others around you: Learn how to safely use, store and throw away your medicines at www.disposemymeds.org.          This list is accurate as of  1/29/18 11:59 PM.  Always use your most recent med list.                   Brand Name Dispense Instructions for use Diagnosis    acetaminophen 325 MG tablet    TYLENOL    100 tablet    Take 2 tablets (650 mg) by mouth every 4 hours as needed for mild pain    Pain in thoracic spine       albuterol 108 (90 BASE) MCG/ACT Inhaler    PROAIR HFA/PROVENTIL HFA/VENTOLIN HFA    1 Inhaler    Inhale 2 puffs into the lungs every 6 hours as needed for shortness of breath / dyspnea or wheezing Ventolin inhaler    Bronchitis       ASPIRIN NOT PRESCRIBED    INTENTIONAL     by Other route continuous prn. 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    Type 2 diabetes, HbA1C goal < 8% (H)       betamethasone dipropionate 0.05 % lotion    DIPROSONE    60 mL    Apply topically to scalp once daily    Itching       * blood glucose monitoring test strip    ONETOUCH TEST STRIPS    100 strip    test 4x daily (has One Touch Ultra 2 machine    Type 2 diabetes, HbA1C goal < 8% (H)       * blood glucose monitoring test strip    no brand specified    100 each    Use to test blood sugar two times daily or as directed.  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 coli UTI       cholecalciferol 400 UNIT Tabs tablet    vitamin D3     Take 2 tablets by mouth daily.        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  For  - 239 give 2 units.  For  - 289 give 3 units.  For  - 339 give 4 units.  For - 399 give 5 units.  For -449 give 6 units For  or higher give 7 units.    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    Type 2 diabetes, HbA1C goal < 8% (H)       * PagerDutyCAN FINEPOINT LANCETS Misc     100 each    1 Device by Lancet route 4 times daily.    Type 2 diabetes, HbA1C goal < 8% (H)       * blood glucose monitoring lancets     100 each    Use to test blood sugar two times daily or as directed.    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)       multivitamin per tablet     30    1 TABLET DAILY        OPTIHALER Kerry     1 Device    As directed    Bronchitis, Cough, SOB (shortness of breath)       simvastatin 80 MG tablet    ZOCOR    90 tablet    Take 1 tablet (80 mg) by mouth At Bedtime    Hyperlipidemia LDL goal <100       sorbitol 70 % Soln solution      Take 30 mLs by mouth daily as needed for constipation        torsemide 10 MG tablet    DEMADEX     Take 1 tablet (10 mg) by mouth daily    Hypertension goal BP (blood pressure) < 130/80, CKD (chronic kidney disease) stage 4, GFR 15-29 ml/min (H)       traZODone 50 MG tablet    DESYREL    90 tablet    TAKE ONE TABLET BY MOUTH AT BEDTIME    Primary insomnia       * Notice:  This list has 4 medication(s) that are the same as other " medications prescribed for you. Read the directions carefully, and ask your doctor or other care provider to review them with you.

## 2018-01-31 ENCOUNTER — HOSPITAL LABORATORY (OUTPATIENT)
Dept: NURSING HOME | Facility: OTHER | Age: 83
End: 2018-01-31

## 2018-02-01 LAB — GABAPENTIN SERPLBLD-MCNC: 16.4 UG/ML

## 2018-02-16 ENCOUNTER — NURSING HOME VISIT (OUTPATIENT)
Dept: GERIATRICS | Facility: CLINIC | Age: 83
End: 2018-02-16
Payer: MEDICARE

## 2018-02-16 VITALS
DIASTOLIC BLOOD PRESSURE: 59 MMHG | RESPIRATION RATE: 18 BRPM | SYSTOLIC BLOOD PRESSURE: 138 MMHG | HEART RATE: 74 BPM | BODY MASS INDEX: 28.63 KG/M2 | TEMPERATURE: 97.3 F | OXYGEN SATURATION: 98 % | WEIGHT: 166.8 LBS

## 2018-02-16 DIAGNOSIS — I10 ESSENTIAL HYPERTENSION WITH GOAL BLOOD PRESSURE LESS THAN 130/80: ICD-10-CM

## 2018-02-16 DIAGNOSIS — E78.5 HYPERLIPIDEMIA LDL GOAL <100: ICD-10-CM

## 2018-02-16 DIAGNOSIS — I70.25 ATHEROSCLEROSIS OF NATIVE ARTERIES OF THE EXTREMITIES WITH ULCERATION (H): ICD-10-CM

## 2018-02-16 DIAGNOSIS — N18.4 CKD (CHRONIC KIDNEY DISEASE) STAGE 4, GFR 15-29 ML/MIN (H): ICD-10-CM

## 2018-02-16 DIAGNOSIS — K21.9 GASTROESOPHAGEAL REFLUX DISEASE WITHOUT ESOPHAGITIS: ICD-10-CM

## 2018-02-16 DIAGNOSIS — E11.49 DIABETES MELLITUS TYPE 2 WITH NEUROLOGICAL MANIFESTATIONS (H): Primary | ICD-10-CM

## 2018-02-16 DIAGNOSIS — E11.43 DIABETIC AUTONOMIC NEUROPATHY ASSOCIATED WITH TYPE 2 DIABETES MELLITUS (H): ICD-10-CM

## 2018-02-16 DIAGNOSIS — M19.91 PRIMARY OSTEOARTHRITIS, UNSPECIFIED SITE: ICD-10-CM

## 2018-02-16 PROCEDURE — 99309 SBSQ NF CARE MODERATE MDM 30: CPT | Performed by: FAMILY MEDICINE

## 2018-02-16 NOTE — MR AVS SNAPSHOT
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  Normal or non-critical lab and imaging results will be communicated to you by MyChart, letter or phone within 4 business days after the clinic has received the results. If you do not hear from us within 7 days, please contact the clinic through [employer] or phone. If you have a critical or abnormal lab result, we will notify you by phone as soon as possible.  Submit refill requests through [employer] or call your pharmacy and they will forward the refill request to us. Please allow 3 business days for your refill to be completed.          Additional Information About Your Visit        MyChart Information     [employer] lets you send messages to your doctor, view your test results, renew your prescriptions, schedule appointments and more. To sign up, go to [email or web address] . Click on \"Log in\" on the left side of the screen, which will take you to the Welcome page. Then click on \"Sign " "up Now\" on the right side of the page.     You will be asked to enter the access code listed below, as well as some personal information. Please follow the directions to create your username and password.     Your access code is: [email or web address]  Expires: 2018  9:54 PM     Your access code will  in 90 days. If you need help or a new code, please call your [place] clinic or [phone].        Care EveryWhere ID     This is your Care EveryWhere ID. This could be used by other organizations to access your [place] medical records  [id number]        Your Vitals Were     Pulse Temperature Respirations Pulse Oximetry BMI (Body Mass Index)       74 97.3  F (36.3  C) 18 98% 28.63 kg/m2        Blood Pressure from Last 3 Encounters:   18 139/57   18 138/59   18 (!) 182/93    Weight from Last 3 Encounters:   18 167 lb 14.4 oz (76.2 kg)   18 166 lb 12.8 oz (75.7 kg)   18 169 lb 12.8 oz (77 kg)              Today, you had the following     No orders found for display       Primary Care Provider Office Phone # Fax #    Margarito Hoffman [phone]       [facility] [address] [place] [address]        Equal Access to Services     [facility] : Hadii michaela whitaker hadasho Sosophy, waaxda luqadaha, qaybta kaalmada refugio, aracely hodge . So [facility] [phone].    ATENCIÓN: Si habla español, tiene a rodriguez disposición servicios gratuitos de asistencia lingüística. Chris al [phone].    We comply with applicable federal civil rights laws and [place] laws. We do not discriminate on the basis of race, color, national origin, age, disability, sex, sexual orientation, or gender identity.            Thank you!     Thank you for choosing [facility]  for your care. Our goal is always to provide you with excellent care. Hearing back from our patients is one way we can continue to improve our services. Please take a " few minutes to complete the written survey that you may receive in the mail after your visit with us. Thank you!             Your Updated Medication List - Protect others around you: Learn how to safely use, store and throw away your medicines at www.disposemymeds.org.          This list is accurate as of 2/16/18 11:59 PM.  Always use your most recent med list.                   Brand Name Dispense Instructions for use Diagnosis    acetaminophen 325 MG tablet    TYLENOL    100 tablet    Take 2 tablets (650 mg) by mouth every 4 hours as needed for mild pain    Pain in thoracic spine       albuterol 108 (90 BASE) MCG/ACT Inhaler    PROAIR HFA/PROVENTIL HFA/VENTOLIN HFA    1 Inhaler    Inhale 2 puffs into the lungs every 6 hours as needed for shortness of breath / dyspnea or wheezing Ventolin inhaler    Bronchitis       ASPIRIN NOT PRESCRIBED    INTENTIONAL     by Other route continuous prn. 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    Type 2 diabetes, HbA1C goal < 8% (H)       betamethasone dipropionate 0.05 % lotion    DIPROSONE    60 mL    Apply topically to scalp once daily    Itching       * blood glucose monitoring test strip    ONETOUCH TEST STRIPS    100 strip    test 4x daily (has One Touch Ultra 2 machine    Type 2 diabetes, HbA1C goal < 8% (H)       * blood glucose monitoring test strip    no brand specified    100 each    Use to test blood sugar two times daily or as directed.  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  For  - 239 give 2 units.  For  - 289 give 3 units.  For  - 339 give 4 units.  For - 399 give 5 units.  For -449 give 6 units For  or higher give 7 units.    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    Type 2 diabetes, HbA1C goal < 8% (H)       * LIFESCAN FINEPOINT LANCETS Misc     100 each    1 Device by Lancet route 4 times daily.    Type 2 diabetes, HbA1C goal < 8% (H)       * blood glucose monitoring lancets     100 each    Use to test blood sugar two times daily or as directed.    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)       multivitamin per tablet     30    1 TABLET DAILY        OPTIHALER Kerry     1 Device    As directed    Bronchitis, Cough, SOB (shortness of breath)       simvastatin 80 MG tablet    ZOCOR    90 tablet    Take 1 tablet (80 mg) by mouth At Bedtime    Hyperlipidemia LDL goal <100       sorbitol 70 % Soln solution      Take 30 mLs by mouth daily as needed for constipation        torsemide 10 MG tablet    DEMADEX     Take 1 tablet (10 mg) by mouth daily    Hypertension goal BP (blood pressure) < 130/80, CKD (chronic kidney disease) stage 4, " GFR 15-29 ml/min (H)       traZODone 50 MG tablet    DESYREL    90 tablet    TAKE ONE TABLET BY MOUTH AT BEDTIME    Primary insomnia       * Notice:  This list has 4 medication(s) that are the same as other medications prescribed for you. Read the directions carefully, and ask your doctor or other care provider to review them with you.

## 2018-02-16 NOTE — PROGRESS NOTES
[facility]  PRIMARY CARE PROVIDER AND CLINIC:  Margarito Hoffman [facility] CLINIC [address] [facility]  / [place]*  Chief Complaint   Patient presents with     Hospital F/U     Clinic Care Coordination - Initial       HPI:    Smiley Acuña is a 83 year old  (1935),admitted to the [facility]  from [facility].  Hospital stay 1/21/18 through 1/24/18.  Admitted to this facility for  rehab, medical management and nursing care.  HPI information obtained from: facility chart records, facility staff, patient report and [facility] chart review.  Current issues are:      Patient was admitted to [facility] on 1/25/2018 following a urinary tract infection with generalized weakness, stage IV chronic kidney disease, type 2 diabetes, hypertension and Alzheimer's dementia without behavioral disturbance.  From the point of her admission there was a desire that this would be a short-term nursing home stay and off and on since arrival there have been questions and concerns about safety at home.  Her  is frequently here and is very caring and committed to help her get home.  Her urinary tract infection has resolved and her diabetes is improving in regard to control and she does seem to be improving in physical therapy.  She is in fact seen in physical therapy and is accompanied by her  who indicated that a week from today is planned point of discharge.      CODE STATUS/ADVANCE DIRECTIVES DISCUSSION:   CPR/Full code     ALLERGIES:Lisinopril  PAST MEDICAL HISTORY:  has a past medical history of Abnormality of gait; Anemia, unspecified; Chronic ischemic heart disease, unspecified; Closed fracture of patella (06/21/10); Coronary artery disease; Degenerative disc disease; Depressive disorder, not elsewhere classified; History of blood transfusion; Other and unspecified hyperlipidemia; Renal failure, unspecified;  Septicemia due to Escherichia coli (E. coli)(038.42) (H) (05/03/2007); Syncope and collapse (4/8/2005); Syncope and collapse (05/26/10); Traumatic subdural hematomas (05/27/10); Type II or unspecified type diabetes mellitus without mention of complication, not stated as uncontrolled; Unspecified essential hypertension; Unspecified sleep apnea; and Urinary tract infection, site not specified (4/4/2008). She also has no past medical history of Asthma; Congestive heart failure, unspecified; COPD (chronic obstructive pulmonary disease) (H); Malignant neoplasm (H); Thyroid disease; or Unspecified cerebral artery occlusion with cerebral infarction.  PAST SURGICAL HISTORY:  has a past surgical history that includes AMPUTATION TOE,MT-P JT (2000); APPENDECTOMY; ANESTH,UPPER LEG SURGERY; UPPER GI ENDOSCOPY,EXAM (04/27/2005); Colonoscopy w/wo Brush **Performed** (12/05/2005); UGI ENDOSCOPY DIAG W OR W/O BRUSH/WASH (12/05/2005); BX SYNOVIUM INTERPHALANG JT; craniotomy (05/28/10); REPAIR ROTATOR CUFF,ACUTE (11/21/2002); EXCISION LESION/TENDON-SHEATH/CAPSULE, FOOT (04/30/07); EXCISION LESION/TENDON-SHEATH/CAPSULE, FOOT (8/14/2007); bunionectomy rt/lt (10/24/07); and OPEN TX FEMORAL SUPRACONDYLAR FRACTURE W EXTENSION (12/14/12).  FAMILY HISTORY: family history includes C.A.D. in her brother; DIABETES in her brother, brother, and mother; OSTEOPOROSIS in her mother.  SOCIAL HISTORY:  reports that she has never smoked. She has never used smokeless tobacco. She reports that she does not drink alcohol or use illicit drugs.    Post Discharge Medication Reconciliation Status:   Current Outpatient Prescriptions   Medication Sig Dispense Refill     sorbitol 70 % SOLN solution Take 30 mLs by mouth daily as needed for constipation       HYDROcodone-acetaminophen (NORCO) 5-325 MG per tablet Take 1 tablet by mouth every 6 hours as needed for moderate to severe pain 30 tablet 0     acetaminophen (TYLENOL) 325 MG tablet Take 2 tablets (650 mg)  "by mouth every 4 hours as needed for mild pain 100 tablet      insulin aspart (NOVOLOG PEN) 100 UNIT/ML injection Inject 1-7 Units Subcutaneous 3 times daily (before meals) For  - 189 give 1 unit.   For  - 239 give 2 units.   For  - 289 give 3 units.   For  - 339 give 4 units.   For - 399 give 5 units.   For -449 give 6 units  For  or higher give 7 units.       insulin glargine (LANTUS) 100 UNIT/ML injection Inject 10 Units Subcutaneous every morning       simvastatin (ZOCOR) 80 MG tablet Take 1 tablet (80 mg) by mouth At Bedtime 90 tablet 3     hydrALAZINE (APRESOLINE) 25 MG tablet Take 2 tablets (50 mg) by mouth 3 times daily 270 tablet 1     torsemide (DEMADEX) 10 MG tablet Take 1 tablet (10 mg) by mouth daily       gabapentin (NEURONTIN) 300 MG capsule Take 1 capsule (300 mg) by mouth 2 times daily 90 capsule      traZODone (DESYREL) 50 MG tablet TAKE ONE TABLET BY MOUTH AT BEDTIME 90 tablet 3     albuterol (PROAIR HFA, PROVENTIL HFA, VENTOLIN HFA) 108 (90 BASE) MCG/ACT inhaler Inhale 2 puffs into the lungs every 6 hours as needed for shortness of breath / dyspnea or wheezing Ventolin inhaler 1 Inhaler 5     betamethasone dipropionate (DIPROSONE) 0.05 % lotion Apply topically to scalp once daily 60 mL 1     cholecalciferol (VITAMIN D) 400 UNIT TABS Take 2 tablets by mouth daily.       MULTIVITAMINS OR TABS 1 TABLET DAILY 30 0     insulin pen needle (B-D U/F) 31G X 8 MM Use 1 daily or as directed. 100 each prn     blood glucose monitoring (NO BRAND SPECIFIED) test strip Use to test blood sugar two times daily or as directed.  Prodigy Autocode test strips 100 each 2     blood glucose monitoring (PRODIGY TWIST TOP 28G) lancets Use to test blood sugar two times daily or as directed. 100 each 2     insulin syringe-needle U-100 (BD INSULIN SYRINGE ULTRAFINE) 30G X 1/2\" 0.5 ML Use one syringe 4 times daily or as directed. 100 each 11     Spacer/Aero-Holding Chambers (OPTIHALER) " BOGDAN As directed 1 Device 0     glucose blood VI test strips (ONE TOUCH TEST STRIPS) strip test 4x daily (has One Touch Ultra 2 machine 100 strip 1yr     LIFESCAN FINEPOINT LANCETS MISC 1 Device by Lancet route 4 times daily. 100 each prn     ASPIRIN NOT PRESCRIBED, INTENTIONAL, by Other route continuous prn. 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    0       ROS:  4 point ROS including Respiratory, CV, GI and , other than that noted in the HPI,  is negative    Exam:  /59  Pulse 74  Temp 97.3  F (36.3  C)  Resp 18  Wt 166 lb 12.8 oz (75.7 kg)  SpO2 98%  BMI 28.63 kg/m2  GENERAL APPEARANCE:  Alert, in no distress, appears healthy, oriented to person/place  RESP:  lungs clear to auscultation , no respiratory distress  CV:  regular rate and rhythm, no murmur, rub, or gallop, no edema  ABDOMEN:  normal bowel sounds, soft, nontender, no hepatosplenomegaly or other masses  M/S:   She is exercizing on an arm peddling machine and doing that quite well.  I did not assess her ambulation/gait.   PSYCH:  affect normal, mood normal, able to converse and state her desire to go home.  Appears lucid at this time.     Lab/Diagnostic data:     CBC RESULTS:   Recent Labs   Lab Test  01/29/18   0730  01/22/18   0628   WBC  8.6  7.5   RBC  4.36  3.87   HGB  13.6  11.9   HCT  42.1  37.6   MCV  97  97   MCH  31.2  30.7   MCHC  32.3  31.6   RDW  13.0  12.8   PLT  283  134*       Last Basic Metabolic Panel:  Recent Labs   Lab Test  01/29/18   0730  01/24/18   0552   NA  143  142   POTASSIUM  4.1  4.0   CHLORIDE  106  103   KEANU  9.2  8.6   CO2  28  31   BUN  54*  38*   CR  2.02*  1.62*   GLC  164*  97       Liver Function Studies -   Recent Labs   Lab Test  01/21/18   1735  12/08/17   1009   PROTTOTAL  7.5  7.7   ALBUMIN  3.1*  3.5   BILITOTAL  0.4  0.4   ALKPHOS  59  57   AST  23  22   ALT  17  20       Lab Results   Component Value Date    A1C 6.2 01/21/2018    A1C 6.2 12/08/2017       ASSESSMENT/PLAN:  Diabetes  mellitus type 2 with neurological manifestations (H)  This appears well controlled at present on current insulin dosing. Will need support to ensure insulin use at home is safe and closely monitored as her A1c of 6.2 suggests she is at risk for hypoglycemia.      Diabetic autonomic neuropathy associated with type 2 diabetes mellitus (H)  Will remain on gabapentin 300mg bid.    Gastroesophageal reflux disease without esophagitis  Has been stable off of PPI or H2 blockers; no change indicated.     Hyperlipidemia LDL goal <100  On statin therapy at this time; no change    Atherosclerosis of native arteries of the extremities with ulceration (H)  On statin and blood sugars under good control; no changes indicated at this time.     Essential hypertension with goal blood pressure less than 130/80  Was on amlodipine but this has been successfully tapered and off this med without rebound HTN. Will continue to monitor. Remains on hydralazine.  ssssssssssssss  Primary osteoarthritis, unspecified site  On hydrocodone and appears to be adequate for pain control.     CKD (chronic kidney disease) stage 4, GFR 15-29 ml/min (H)  Taking demadex and hydralazine       Orders:  Care plan  reviewed and renewed without further change at this time. She appears to be progressing with rehab and may be able to successfully discharge with home care support as well as strong family support.           Electronically signed by:  Gregory G. Schoen, MD

## 2018-02-22 ENCOUNTER — DISCHARGE SUMMARY NURSING HOME (OUTPATIENT)
Dept: GERIATRICS | Facility: CLINIC | Age: 83
End: 2018-02-22
Payer: MEDICARE

## 2018-02-22 VITALS
DIASTOLIC BLOOD PRESSURE: 57 MMHG | BODY MASS INDEX: 28.82 KG/M2 | RESPIRATION RATE: 20 BRPM | OXYGEN SATURATION: 95 % | WEIGHT: 167.9 LBS | SYSTOLIC BLOOD PRESSURE: 139 MMHG | HEART RATE: 68 BPM | TEMPERATURE: 97.3 F

## 2018-02-22 DIAGNOSIS — N18.4 CKD (CHRONIC KIDNEY DISEASE) STAGE 4, GFR 15-29 ML/MIN (H): ICD-10-CM

## 2018-02-22 DIAGNOSIS — I10 ESSENTIAL HYPERTENSION WITH GOAL BLOOD PRESSURE LESS THAN 130/80: ICD-10-CM

## 2018-02-22 DIAGNOSIS — G30.9 ALZHEIMER'S DEMENTIA WITHOUT BEHAVIORAL DISTURBANCE, UNSPECIFIED TIMING OF DEMENTIA ONSET: ICD-10-CM

## 2018-02-22 DIAGNOSIS — E11.49 DIABETES MELLITUS TYPE 2 WITH NEUROLOGICAL MANIFESTATIONS (H): ICD-10-CM

## 2018-02-22 DIAGNOSIS — M62.81 GENERALIZED MUSCLE WEAKNESS: Primary | ICD-10-CM

## 2018-02-22 DIAGNOSIS — F02.80 ALZHEIMER'S DEMENTIA WITHOUT BEHAVIORAL DISTURBANCE, UNSPECIFIED TIMING OF DEMENTIA ONSET: ICD-10-CM

## 2018-02-22 DIAGNOSIS — N39.0 E. COLI UTI: ICD-10-CM

## 2018-02-22 DIAGNOSIS — B96.20 E. COLI UTI: ICD-10-CM

## 2018-02-22 DIAGNOSIS — F51.01 PRIMARY INSOMNIA: ICD-10-CM

## 2018-02-22 PROCEDURE — 99316 NF DSCHRG MGMT 30 MIN+: CPT | Performed by: NURSE PRACTITIONER

## 2018-02-22 NOTE — PROGRESS NOTES
[place] [facility] DISCHARGE SUMMARY    PATIENT'S NAME: Smiley Acuña  YOB: 1935  MEDICAL RECORD NUMBER:  [id number]    PRIMARY CARE PROVIDER AND CLINIC RESPONSIBLE AFTER TRANSFER: Margarito Hoffman [facility] CLINIC [address] [facility]  / [place] [address]*     CODE STATUS/ADVANCE DIRECTIVES DISCUSSION:   CPR/Full code        Allergies   Allergen Reactions     Lisinopril      upper resp symptoms, cough       TRANSFERRING PROVIDERS: ALBA Alba CNP, Greg Schoen, MD  DATE OF SNF ADMISSION:  January / 24 / 2018  DATE OF SNF (anticipated) DISCHARGE: February / 23 / 2018  DISCHARGE DISPOSITION: [facility] Provider   [facility]ursing Facility[facility] 1/21/18 to 1/24/18.     Condition on Discharge:  Stable.  Function:  Ambulating with a walker  Cognitive Scores: BIMS 2/22/18 = 10/15    Equipment: walker    DISCHARGE DIAGNOSIS:   1. Generalized muscle weakness    2. E. coli UTI    3. Essential hypertension with goal blood pressure less than 130/80    4. 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)    5. CKD (chronic kidney disease) stage 4, GFR 15-29 ml/min (H)    6. Primary insomnia    7. Alzheimer's dementia without behavioral disturbance, unspecified timing of dementia onset        HPI Nursing Facility Course:  HPI information obtained from: facility chart records, facility staff, patient report and [facility] chart review.    Generalized muscle weakness  Smiley came in with UTI and general weakness.  Spouse has been saying that he has wanted her home within 3 days of being admitted but she was pretty weak at that time.  Smiley has been attending therapies and now spouse and Smiley ambulate in the hallways a lot to keep her strength up.  Very social with others and has done well.  Family wanted Smiley to get the most out of her stay before returning home.  - HOME CARE NURSING  REFERRAL    E. coli UTI  Resolved.  No further symptoms of a UTI.      Essential hypertension with goal blood pressure less than 130/80  Blood pressures range from 110-150's/50-80's.    Remains on Hydralazine 50mg TID.    - HOME CARE NURSING REFERRAL    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)  Staff have tried to teach the spouse about the insulin but will need more help at home.  Remains on Lantus 10 units daily and sliding scale at meals.    Blood sugars have ranged from   7am = 120-160's  12N = 140-220's  5pm = 140-280's   HS = 130-220's.  - HOME CARE NURSING REFERRAL    CKD (chronic kidney disease) stage 4, GFR 15-29 ml/min (H)  Periodic monitoring of kidney disease.  Stage 4 as evident by Cr- level.  No changes.  Continues to have a carb consistent diet.  encouraged her to keep up with her fluids.    Primary insomnia  Receives Trazodone 50mg at HS which has helped.  No concerns of not sleeping.    Alzheimer's dementia without behavioral disturbance, unspecified timing of dementia onset  Pleasantly can hold a conversation.  Spouse will help her out.  Is independent with ADLs and cues.  Remains on a MVI daily.    Both Smiley and spouse happy to return home.      PAST MEDICAL HISTORY:  has a past medical history of Abnormality of gait; Anemia, unspecified; Chronic ischemic heart disease, unspecified; Closed fracture of patella (06/21/10); Coronary artery disease; Degenerative disc disease; Depressive disorder, not elsewhere classified; History of blood transfusion; Other and unspecified hyperlipidemia; Renal failure, unspecified; Septicemia due to Escherichia coli (E. coli)(038.42) (H) (05/03/2007); Syncope and collapse (4/8/2005); Syncope and collapse (05/26/10); Traumatic subdural hematomas (05/27/10); Type II or unspecified type diabetes mellitus without mention of complication, not stated as uncontrolled; Unspecified essential hypertension; Unspecified sleep apnea; and Urinary tract  infection, site not specified (4/4/2008). She also has no past medical history of Asthma; Congestive heart failure, unspecified; COPD (chronic obstructive pulmonary disease) (H); Malignant neoplasm (H); Thyroid disease; or Unspecified cerebral artery occlusion with cerebral infarction.    DISCHARGE MEDICATIONS:  Current Outpatient Prescriptions   Medication Sig Dispense Refill     sorbitol 70 % SOLN solution Take 30 mLs by mouth daily as needed for constipation       HYDROcodone-acetaminophen (NORCO) 5-325 MG per tablet Take 1 tablet by mouth every 6 hours as needed for moderate to severe pain 30 tablet 0     acetaminophen (TYLENOL) 325 MG tablet Take 2 tablets (650 mg) by mouth every 4 hours as needed for mild pain 100 tablet      insulin aspart (NOVOLOG PEN) 100 UNIT/ML injection Inject 1-7 Units Subcutaneous 3 times daily (before meals) For  - 189 give 1 unit.   For  - 239 give 2 units.   For  - 289 give 3 units.   For  - 339 give 4 units.   For - 399 give 5 units.   For -449 give 6 units  For  or higher give 7 units.       insulin glargine (LANTUS) 100 UNIT/ML injection Inject 10 Units Subcutaneous every morning       simvastatin (ZOCOR) 80 MG tablet Take 1 tablet (80 mg) by mouth At Bedtime 90 tablet 3     hydrALAZINE (APRESOLINE) 25 MG tablet Take 2 tablets (50 mg) by mouth 3 times daily 270 tablet 1     torsemide (DEMADEX) 10 MG tablet Take 1 tablet (10 mg) by mouth daily       gabapentin (NEURONTIN) 300 MG capsule Take 1 capsule (300 mg) by mouth 2 times daily 90 capsule      traZODone (DESYREL) 50 MG tablet TAKE ONE TABLET BY MOUTH AT BEDTIME 90 tablet 3     albuterol (PROAIR HFA, PROVENTIL HFA, VENTOLIN HFA) 108 (90 BASE) MCG/ACT inhaler Inhale 2 puffs into the lungs every 6 hours as needed for shortness of breath / dyspnea or wheezing Ventolin inhaler 1 Inhaler 5     betamethasone dipropionate (DIPROSONE) 0.05 % lotion Apply topically to scalp once daily 60 mL 1  "    cholecalciferol (VITAMIN D) 400 UNIT TABS Take 2 tablets by mouth daily.       MULTIVITAMINS OR TABS 1 TABLET DAILY 30 0     insulin pen needle (B-D U/F) 31G X 8 MM Use 1 daily or as directed. 100 each prn     blood glucose monitoring (NO BRAND SPECIFIED) test strip Use to test blood sugar two times daily or as directed.  Prodigy Autocode test strips 100 each 2     blood glucose monitoring (PRODIGY TWIST TOP 28G) lancets Use to test blood sugar two times daily or as directed. 100 each 2     insulin syringe-needle U-100 (BD INSULIN SYRINGE ULTRAFINE) 30G X 1/2\" 0.5 ML Use one syringe 4 times daily or as directed. 100 each 11     Spacer/Aero-Holding Chambers (OPTIHALER) BOGDAN As directed 1 Device 0     glucose blood VI test strips (ONE TOUCH TEST STRIPS) strip test 4x daily (has One Touch Ultra 2 machine 100 strip 1yr     LIFESCAN FINEPOINT LANCETS MISC 1 Device by Lancet route 4 times daily. 100 each prn     ASPIRIN NOT PRESCRIBED, INTENTIONAL, by Other route continuous prn. 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    0       MEDICATION CHANGES/RATIONALE:   2/2/18  Prophylaxis for 2 weeks - Tamiflu 30mg QOD  1/25/18  OT eval and treat for ADLs, therapeutic exercise, functional mobility  1/25/18  PT eval and treat for gait, functional mobility and training , therapeutic exercise.  1/25/18  Discontinue Norvasc.  Daily B/P's due to HTN and update NP if SBP >170 or DBP >90's, CBC and BMP 1/29/18 2/16/18  Seen by Dr. Schoen - no changes  2/22/18  OK to discharge home with medications.  Home Care of choice:  RN to assess vitals and medication oversight    Controlled medications sent with patient:   not applicable/none     ROS:    10 point ROS of systems including Constitutional, Eyes, Respiratory, Cardiovascular, Gastroenterology, Genitourinary, Integumentary, Muscularskeletal, Psychiatric were all negative except for pertinent positives noted in my HPI.    Physical Exam:   Vitals: /57  Pulse 68  Temp " 97.3  F (36.3  C)  Resp 20  Wt 167 lb 14.4 oz (76.2 kg)  SpO2 95%  BMI 28.82 kg/m2  BMI= Body mass index is 28.82 kg/(m^2).    GENERAL APPEARANCE:  Alert, in no distress, cooperative  EYES:  Conjunctiva and lids normal, sclera's clear, no drainage seen on lashes  RESP:  respiratory effort and palpation of chest normal, lungs clear to auscultation , no respiratory distress  CV:  Palpation and auscultation of heart done , regular rate and rhythm, no murmur, rub, or gallop, no edema  ABDOMEN:  normal bowel sounds, soft, nontender, no hepatosplenomegaly or other masses, no guarding or rebound  M/S:   Gait and station abnormal ambulates independently with a 4 wheel walker  SKIN:  Inspection of skin and subcutaneous tissue baseline, Palpation of skin and subcutaneous tissue baseline  PSYCH:  oriented to person and place, insight and judgement impaired, memory impaired , affect and mood normal, scored 10/15 on the BIMS today.  PHQ-9 = 2 which is minimally depressed    DISCHARGE PLAN:  Registered Nurse and From:  Home Care of choice  Patient instructed to follow-up with:  PCP in 7-14 days       Current [place] scheduled appointments:  No future appointments.    MTM referral needed and placed by this provider: No    Pending labs: none  SNF labs   CBC RESULTS:   Recent Labs   Lab Test  01/29/18   0730  01/22/18   0628   WBC  8.6  7.5   RBC  4.36  3.87   HGB  13.6  11.9   HCT  42.1  37.6   MCV  97  97   MCH  31.2  30.7   MCHC  32.3  31.6   RDW  13.0  12.8   PLT  283  134*       Last Basic Metabolic Panel:  Recent Labs   Lab Test  01/29/18   0730  01/24/18   0552   NA  143  142   POTASSIUM  4.1  4.0   CHLORIDE  106  103   KEANU  9.2  8.6   CO2  28  31   BUN  54*  38*   CR  2.02*  1.62*   GLC  164*  97       Liver Function Studies -   Recent Labs   Lab Test  01/21/18   1735  12/08/17   1009   PROTTOTAL  7.5  7.7   ALBUMIN  3.1*  3.5   BILITOTAL  0.4  0.4   ALKPHOS  59  57   AST  23  22   ALT  17  20       Lab Results    Component Value Date    A1C 6.2 01/21/2018    A1C 6.2 12/08/2017       Discharge Treatments:  Ambulate with walker to keep strength up    TOTAL DISCHARGE TIME:   Greater than 30 minutes  Electronically signed by:  ALBA Alba CNP

## 2018-02-22 NOTE — PROGRESS NOTES
Documentation of Face-to-Face and Certification for Home Health Services     Patient: Smiley Acuña   YOB: 1935  MR Number: [id number]  Today's Date: 2/22/2018    I certify that patient: Smiley Acuña is under my care and that I, or a nurse practitioner or physician's assistant working with me, had a face-to-face encounter that meets the physician face-to-face encounter requirements with this patient on: 2/22/2018.    This encounter with the patient was in whole, or in part, for the following medical condition, which is the primary reason for home health care: Type II Diabetes, general weakness, Hypertention.    I certify that, based on my findings, the following services are medically necessary home health services: Nursing.    My clinical findings support the need for the above services because: Nurse is needed: To assess vitals and blood sugars after changes in medications or other medical regimen..    Further, I certify that my clinical findings support that this patient is homebound (i.e. absences from home require considerable and taxing effort and are for medical reasons or [employer] services or infrequently or of short duration when for other reasons) because: Requires assistance of another person or specialized equipment to access medical services because patient: Is unable to exit home safely on own due to: needing device for ambulation, risk of falls...    Based on the above findings. I certify that this patient is confined to the home and needs intermittent skilled nursing care, physical therapy and/or speech therapy.  The patient is under my care, and I have initiated the establishment of the plan of care.  This patient will be followed by a physician who will periodically review the plan of care.  Physician/Provider to provide follow up care: Margarito Hoffman    Responsible Medicare certified P[employer] Physician: Trino Traore  Physician Signature: See electronic signature  associated with these discharge orders.  Electronically signed by: Dr. Trino Traore MD  Date: 2/22/2018

## 2018-02-26 ENCOUNTER — DOCUMENTATION ONLY (OUTPATIENT)
Dept: CARE COORDINATION | Facility: CLINIC | Age: 83
End: 2018-02-26

## 2018-02-26 NOTE — PROGRESS NOTES
[facility] now requests orders and shares plan of care/discharge summaries for some patients through [facility].  Please REPLY TO THIS MESSAGE in order to give authorization for orders when needed.  This is considered a verbal order, you will still receive a faxed copy of orders for signature.  Thank you for your assistance in improving collaboration for our patients.    ORDER SN visits 3w1, 2w2, 1w6 for medication management and education, chronic disease management and education with focus on DMII education and management and home and fall safety. PT eval to include strengthening and balance and fall safety education. OT eval to include cognitive evaluation and low vision equipment.    SUMMARY TO MD DARDEN Pt is an 83 year old female hospitalized from 1/21 to 1/24 and then transferred to [facility] on 1/25 following multiple falls at home. Found to have UTI, acute encephalopathy with suspected underlying dementia, CÉSAR with gabapentin toxicity and severe stage IV CKD, hypoxia and hypercapnia with suspected chronic respiratory failure and generalized muscle weakness. Therapy and nurse management at [facility] on 2/22. Lives in a single family home with , one bird, no clutter but lacks cleanliness.  managing cares including meds, BG checks and insulin. Also, shops, cooks and does house work. Refused assist with med mangement. States he has managed her meds for years and is very comfortable with doing this. Would benefit from assistance. Sets up one day at a time in seperate pill bottles.  must admin meds to pt. Pt with very limited vision cannot read but can make out figures but cannot see details. Denies pain, states does not take any pain medication. [place] but no hearing aides. No dental concerns, no dentures. Wears incontinent brief but able to toilet self intermittently. BM x2/days ago, denies constipation/diarrhea. Reports lost weight, today 165, 2/22 167, 12/17 176.  Reports appetite improved, eats 3 meals a day. 2 eggs every AM, light lunch and cooked meal, usually venison for dinner. Skin turgor fair, mouth dry, chapped lips. Not an adequate amount of water. Drinks a lot of coffee and tea. ADA diet but not conscious about what she eats. BG this ,  admin Novolog per SS orders from [facility] paperwork. Also given Lantus per orders. Ambulates with FWW at baseline, does not always use FWW. Use of hands to push up from chair to stand,  locked brakes for pt. Slouched posture with ataxic gait pattern. Use of furniture for balance to get to bed. Required assistance on/off scale. Grab bar on bed to help pt turn in bed. Grab bars in bathroom near toilet.  assists with shower, pt able to wash self,  present for safety. No shower chair, use of a stool. Refusing HHA. Numbness/tingling to bilat LE, no edema. Pedal pulses positive bilat. H/O toe amputation, great toe included, to left foot, 3 toes remaining. Wears diabetic shoes. Had salonpas patch to bilat feet but denies pain. Skin intact. Alert and oriented with some confusion. Noted impairment in short term memory, repeated info multiple times. Mood pleasant and appropriate. Denies depressive sx.   VS.../80, HR 62 regular, RR 22 unlabored, O2 sats 92 percent RA, lungs clear but diminished bilat lower lobes, Temp 97.5 oral  BACKGROUND Extensive medical hx including DMII with neurological manifestations, chronic ischemic heart disease, CAD, CKD stage 4, HLD, osteoporosis, osteoarthritis, hypertension, GERD, peripheral autonomic neuropathy, depressive d/o, obesity, degenerative disc disease, insomnia, CANDIDA and anemia.  ANALYSIS Pt is at high risk for hospitalization r/t generalized muscle weakness with frequent falls, poor DMII managment, possible noncompliance of medication regimen and lack of home and fall safety.   RECOMMENDATION SN visits for medication managment and education, chronic disease  management and education including education and management of DMII and home and fall safety edcuation. OT evaluation for cognitive evaluation and low vision equipment. PT eval for strengthening, balance and fall safety education.    Thank you for your referral.  Sharmaine Piper RN  Admission Clinician  [facility]  [phone]  [email or web address]

## 2018-03-07 DIAGNOSIS — N18.30 CHRONIC KIDNEY DISEASE, STAGE III (MODERATE) (H): Primary | ICD-10-CM

## 2018-03-07 LAB
ALBUMIN SERPL-MCNC: 3.6 G/DL (ref 3.4–5)
ANION GAP SERPL CALCULATED.3IONS-SCNC: 9 MMOL/L (ref 3–14)
BUN SERPL-MCNC: 42 MG/DL (ref 7–30)
CALCIUM SERPL-MCNC: 9.5 MG/DL (ref 8.5–10.1)
CHLORIDE SERPL-SCNC: 101 MMOL/L (ref 94–109)
CO2 SERPL-SCNC: 32 MMOL/L (ref 20–32)
CREAT SERPL-MCNC: 1.68 MG/DL (ref 0.52–1.04)
CREAT UR-MCNC: 26 MG/DL
GFR SERPL CREATININE-BSD FRML MDRD: 29 ML/MIN/1.7M2
HGB BLD-MCNC: 13.7 G/DL (ref 11.7–15.7)
MICROALBUMIN UR-MCNC: 16 MG/L
MICROALBUMIN/CREAT UR: 60.69 MG/G CR (ref 0–25)
PHOSPHATE SERPL-MCNC: 3.2 MG/DL (ref 2.5–4.5)
POTASSIUM SERPL-SCNC: 4.1 MMOL/L (ref 3.4–5.3)
PROT UR-MCNC: 0.07 G/L
PROT/CREAT 24H UR: 0.26 G/G CR (ref 0–0.2)
PTH-INTACT SERPL-MCNC: 73 PG/ML (ref 18–80)
SODIUM SERPL-SCNC: 142 MMOL/L (ref 133–144)

## 2018-03-07 PROCEDURE — 82310 ASSAY OF CALCIUM: CPT | Performed by: INTERNAL MEDICINE

## 2018-03-07 PROCEDURE — 80051 ELECTROLYTE PANEL: CPT | Performed by: INTERNAL MEDICINE

## 2018-03-07 PROCEDURE — 36415 COLL VENOUS BLD VENIPUNCTURE: CPT | Performed by: INTERNAL MEDICINE

## 2018-03-07 PROCEDURE — 84156 ASSAY OF PROTEIN URINE: CPT | Performed by: INTERNAL MEDICINE

## 2018-03-07 PROCEDURE — 85018 HEMOGLOBIN: CPT | Performed by: INTERNAL MEDICINE

## 2018-03-07 PROCEDURE — 83970 ASSAY OF PARATHORMONE: CPT | Performed by: INTERNAL MEDICINE

## 2018-03-07 PROCEDURE — 82565 ASSAY OF CREATININE: CPT | Performed by: INTERNAL MEDICINE

## 2018-03-07 PROCEDURE — 84520 ASSAY OF UREA NITROGEN: CPT | Performed by: INTERNAL MEDICINE

## 2018-03-07 PROCEDURE — 84100 ASSAY OF PHOSPHORUS: CPT | Performed by: INTERNAL MEDICINE

## 2018-03-07 PROCEDURE — 82043 UR ALBUMIN QUANTITATIVE: CPT | Performed by: INTERNAL MEDICINE

## 2018-03-07 PROCEDURE — 82040 ASSAY OF SERUM ALBUMIN: CPT | Performed by: INTERNAL MEDICINE

## 2018-03-09 DIAGNOSIS — I10 HYPERTENSION GOAL BP (BLOOD PRESSURE) < 130/80: ICD-10-CM

## 2018-03-09 RX ORDER — HYDRALAZINE HYDROCHLORIDE 25 MG/1
TABLET, FILM COATED ORAL
Qty: 270 TABLET | Refills: 1 | Status: SHIPPED | OUTPATIENT
Start: 2018-03-09 | End: 2018-03-16

## 2018-03-09 NOTE — TELEPHONE ENCOUNTER
Hydralazine 25 MG       Last Written Prescription Date:  1/24/18  Last Fill Quantity: 270,   # refills: 1  Last Office Visit: 12/8/17  Future Office visit:       Routing refill request to provider for review/approval because:  Drug not on the [facility], P or [facility] refill protocol or controlled substance

## 2018-03-15 ENCOUNTER — TELEPHONE (OUTPATIENT)
Dept: FAMILY MEDICINE | Facility: CLINIC | Age: 83
End: 2018-03-15
Payer: MEDICARE

## 2018-03-15 DIAGNOSIS — E11.43 TYPE 2 DIABETES MELLITUS WITH PERIPHERAL AUTONOMIC NEUROPATHY (H): Primary | ICD-10-CM

## 2018-03-15 NOTE — TELEPHONE ENCOUNTER
Forms received from  Home Care on 02/09/2018.  Forms with RN to review medications.  Orders pended for provider to sign.    Forms given to Marleen for PCP signature 03/16/2018

## 2018-03-16 ENCOUNTER — OFFICE VISIT (OUTPATIENT)
Dept: INTERNAL MEDICINE | Facility: CLINIC | Age: 83
End: 2018-03-16
Payer: MEDICARE

## 2018-03-16 VITALS
WEIGHT: 166 LBS | TEMPERATURE: 96.9 F | DIASTOLIC BLOOD PRESSURE: 65 MMHG | HEART RATE: 88 BPM | SYSTOLIC BLOOD PRESSURE: 122 MMHG | RESPIRATION RATE: 16 BRPM | OXYGEN SATURATION: 97 % | BODY MASS INDEX: 28.49 KG/M2

## 2018-03-16 DIAGNOSIS — M62.81 GENERALIZED MUSCLE WEAKNESS: Primary | ICD-10-CM

## 2018-03-16 DIAGNOSIS — I10 HYPERTENSION GOAL BP (BLOOD PRESSURE) < 130/80: ICD-10-CM

## 2018-03-16 DIAGNOSIS — I10 ESSENTIAL HYPERTENSION WITH GOAL BLOOD PRESSURE LESS THAN 130/80: ICD-10-CM

## 2018-03-16 DIAGNOSIS — Z53.9 DIAGNOSIS NOT YET DEFINED: Primary | ICD-10-CM

## 2018-03-16 DIAGNOSIS — N18.4 CKD (CHRONIC KIDNEY DISEASE) STAGE 4, GFR 15-29 ML/MIN (H): ICD-10-CM

## 2018-03-16 PROBLEM — E16.2 HYPOGLYCEMIA: Status: RESOLVED | Noted: 2018-01-23 | Resolved: 2018-03-16

## 2018-03-16 PROCEDURE — G0180 MD CERTIFICATION HHA PATIENT: HCPCS | Performed by: INTERNAL MEDICINE

## 2018-03-16 PROCEDURE — 99214 OFFICE O/P EST MOD 30 MIN: CPT | Performed by: INTERNAL MEDICINE

## 2018-03-16 RX ORDER — TORSEMIDE 10 MG/1
10 TABLET ORAL DAILY
Qty: 90 TABLET | Refills: 3 | Status: ON HOLD | OUTPATIENT
Start: 2018-03-16 | End: 2019-04-07

## 2018-03-16 ASSESSMENT — PAIN SCALES - GENERAL: PAINLEVEL: NO PAIN (0)

## 2018-03-16 NOTE — TELEPHONE ENCOUNTER
Med reconciliation completed. Form and chart forwarded to PCP for signatures.    Marleen Veronica RN

## 2018-03-16 NOTE — NURSING NOTE
"Chief Complaint   Patient presents with     Hospital F/U     [place]        Initial /84  Pulse 88  Temp 96.9  F (36.1  C) (Temporal)  Resp 16  Wt 166 lb (75.3 kg)  SpO2 97%  BMI 28.49 kg/m2 Estimated body mass index is 28.49 kg/(m^2) as calculated from the following:    Height as of 1/21/18: 5' 4\" (1.626 m).    Weight as of this encounter: 166 lb (75.3 kg).  Medication Reconciliation: complete    "

## 2018-03-16 NOTE — MR AVS SNAPSHOT
"              After Visit Summary   3/16/2018    Smiley Acuña    MRN: [id number]           Patient Information     Date Of Birth          1935        Visit Information        Provider Department      3/16/2018 1:30 PM Margarito Hoffman MD [facility]         Follow-ups after your visit        Your next 10 appointments already scheduled     Mar 16, 2018  1:30 PM CDT   Office Visit with Margarito Hoffman MD   [facility] ([facility])    [address] [place] [id number] [phone]           Bring a current list of meds and any records pertaining to this visit. For Physicals, please bring immunization records and any forms needing to be filled out. Please arrive 10 minutes early to complete paperwork.              Who to contact     If you have questions or need follow up information about today's clinic visit or your schedule please contact [facility] directly at [phone].  Normal or non-critical lab and imaging results will be communicated to you by MyChart, letter or phone within 4 business days after the clinic has received the results. If you do not hear from us within 7 days, please contact the clinic through [employer] or phone. If you have a critical or abnormal lab result, we will notify you by phone as soon as possible.  Submit refill requests through [employer] or call your pharmacy and they will forward the refill request to us. Please allow 3 business days for your refill to be completed.          Additional Information About Your Visit        [facility] Information     [employer] lets you send messages to your doctor, view your test results, renew your prescriptions, schedule appointments and more. To sign up, go to [email or web address] . Click on \"Log in\" on the left side of the screen, which will take you to the Welcome page. Then click on \"Sign up Now\" on the right side of the page.     You will be asked to enter the " access code listed below, as well as some personal information. Please follow the directions to create your username and password.     Your access code is: [email or web address]  Expires: 2018 10:54 PM     Your access code will  in 90 days. If you need help or a new code, please call your [facility] or [phone].        Care EveryWhere ID     This is your Care EveryWhere ID. This could be used by other organizations to access your [place] medical records  [id number]        Your Vitals Were     Pulse Temperature Respirations Pulse Oximetry BMI (Body Mass Index)       88 96.9  F (36.1  C) (Temporal) 16 97% 28.49 kg/m2        Blood Pressure from Last 3 Encounters:   18 144/84   18 139/57   18 138/59    Weight from Last 3 Encounters:   18 166 lb (75.3 kg)   18 167 lb 14.4 oz (76.2 kg)   18 166 lb 12.8 oz (75.7 kg)              Today, you had the following     No orders found for display       Primary Care Provider Office Phone # Fax #    Margarito Hoffman [phone]       [facility]  [address] [place] [address]        Equal Access to Services     KRISTIE ASHER AH: Hadii aad ku hadasho Soomaali, waaxda luqadaha, qaybta kaalmada adeegyada, waxay idiin hayaan annelise hodge . So [facility] [phone].    ATENCIÓN: Si habla español, tiene a rodriguez disposición servicios gratuitos de asistencia lingüística. Llame al [phone].    We comply with applicable federal civil rights laws and [place] laws. We do not discriminate on the basis of race, color, national origin, age, disability, sex, sexual orientation, or gender identity.            Thank you!     Thank you for choosing [facility]  for your care. Our goal is always to provide you with excellent care. Hearing back from our patients is one way we can continue to improve our services. Please take a few minutes to complete the written survey that you may receive in the mail  after your visit with us. Thank you!             Your Updated Medication List - Protect others around you: Learn how to safely use, store and throw away your medicines at www.disposemymeds.org.          This list is accurate as of 3/16/18  1:25 PM.  Always use your most recent med list.                   Brand Name Dispense Instructions for use Diagnosis    acetaminophen 325 MG tablet    TYLENOL    100 tablet    Take 2 tablets (650 mg) by mouth every 4 hours as needed for mild pain    Pain in thoracic spine       albuterol 108 (90 BASE) MCG/ACT Inhaler    PROAIR HFA/PROVENTIL HFA/VENTOLIN HFA    1 Inhaler    Inhale 2 puffs into the lungs every 6 hours as needed for shortness of breath / dyspnea or wheezing Ventolin inhaler    Bronchitis       ASPIRIN NOT PRESCRIBED    INTENTIONAL     by Other route continuous prn. 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    Type 2 diabetes, HbA1C goal < 8% (H)       betamethasone dipropionate 0.05 % lotion    DIPROSONE    60 mL    Apply topically to scalp once daily    Itching       * blood glucose monitoring test strip    ONETOUCH TEST STRIPS    100 strip    test 4x daily (has One Touch Ultra 2 machine    Type 2 diabetes, HbA1C goal < 8% (H)       * blood glucose monitoring test strip    no brand specified    100 each    Use to test blood sugar two times daily or as directed.  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        gabapentin 300 MG capsule    NEURONTIN    90 capsule    Take 1 capsule (300 mg) by mouth 2 times daily    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)       hydrALAZINE 25 MG tablet    APRESOLINE    270 tablet    Take 2 tablets (50 mg) by mouth 3 times daily    Hypertension goal BP (blood pressure) < 130/80       HYDROcodone-acetaminophen 5-325 MG per tablet    NORCO    30 tablet    Take 1  "tablet by mouth every 6 hours as needed for moderate to severe pain    Pain in thoracic spine       insulin aspart 100 UNIT/ML injection    NovoLOG PEN     Inject 1-7 Units Subcutaneous 3 times daily (before meals) For  - 189 give 1 unit.  For  - 239 give 2 units.  For  - 289 give 3 units.  For  - 339 give 4 units.  For - 399 give 5 units.  For -449 give 6 units For  or higher give 7 units.    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    Type 2 diabetes, HbA1C goal < 8% (H)       * [employer] FINEPOINT LANCETS Misc     100 each    1 Device by Lancet route 4 times daily.    Type 2 diabetes, HbA1C goal < 8% (H)       * blood glucose monitoring lancets     100 each    Use to test blood sugar two times daily or as directed.    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)       multivitamin per tablet     30    1 TABLET DAILY        OPTIHALER Kerry     1 Device    As directed    Bronchitis, Cough, SOB (shortness of breath)       simvastatin 80 MG tablet    ZOCOR    90 tablet    Take 1 tablet (80 mg) by mouth At Bedtime    Hyperlipidemia LDL goal <100       torsemide 10 MG tablet    DEMADEX     Take 1 tablet (10 mg) by mouth daily    Hypertension goal BP (blood pressure) < 130/80, CKD (chronic kidney disease) stage 4, GFR 15-29 ml/min (H)       traZODone 50 MG tablet    DESYREL    90 tablet    TAKE ONE TABLET BY MOUTH AT BEDTIME    Primary insomnia       * Notice:  This list has 4 " medication(s) that are the same as other medications prescribed for you. Read the directions carefully, and ask your doctor or other care provider to review them with you.

## 2018-03-16 NOTE — PROGRESS NOTES
SUBJECTIVE:   Smiley Acuña is a 83 year old female who presents to clinic today for the following health issues:          Hospital Follow-up Visit:    Hospital/Nursing Home/ Rehab Facility: [facility]   Date of Admission: 1/24/18  Date of Discharge: 2/23/18  Reason(s) for Admission: muscle weakness  uti  htn            Problems taking medications regularly:  None       Medication changes since discharge: None not on amlodipine and trazodone     Problems adhering to non-medication therapy:  None    Summary of hospitalization:  [facility] discharge summary reviewed  Diagnostic Tests/Treatments reviewed.  Follow up needed: none  Other Healthcare Providers Involved in Patient s Care:         None  Update since discharge: improved.     Post Discharge Medication Reconciliation: discharge medications reconciled and changed, per note/orders (see AVS).  Plan of care communicated with patient and family        She passed out and lost all control. Had a UTI and was here and then to [place].  Feeling better, eating ok.  Urinating ok.      Blood sugars are good,  range,      BP today was 108/58.      Got started on torsemide 10 mg and needs refill.    Past Medical History:   Diagnosis Date     Abnormality of gait      Anemia, unspecified      Chronic ischemic heart disease, unspecified     2-vessel CAD     Closed fracture of patella 06/21/10    D/C 06/23/10     Coronary artery disease      Degenerative disc disease     lumbar     Depressive disorder, not elsewhere classified      History of blood transfusion      Other and unspecified hyperlipidemia      Renal failure, unspecified     chronic renal disease     Septicemia due to Escherichia coli (E. coli)(038.42) (H) 05/03/2007     Syncope and collapse 4/8/2005    Admit     Syncope and collapse 05/26/10    D/C 05/27/10 to [facility] [place]     Traumatic subdural hematomas 05/27/10     Type II or unspecified type diabetes mellitus  "without mention of complication, not stated as uncontrolled      Unspecified essential hypertension      Unspecified sleep apnea      Urinary tract infection, site not specified 4/4/2008    UTI with mild sepsis. Admitted.     Current Outpatient Prescriptions   Medication     HYDROcodone-acetaminophen (NORCO) 5-325 MG per tablet     acetaminophen (TYLENOL) 325 MG tablet     insulin aspart (NOVOLOG PEN) 100 UNIT/ML injection     insulin glargine (LANTUS) 100 UNIT/ML injection     simvastatin (ZOCOR) 80 MG tablet     hydrALAZINE (APRESOLINE) 25 MG tablet     torsemide (DEMADEX) 10 MG tablet     gabapentin (NEURONTIN) 300 MG capsule     traZODone (DESYREL) 50 MG tablet     albuterol (PROAIR HFA, PROVENTIL HFA, VENTOLIN HFA) 108 (90 BASE) MCG/ACT inhaler     Spacer/Aero-Holding Chambers (OPTIHALER) BOGDAN     MULTIVITAMINS OR TABS     [DISCONTINUED] hydrALAZINE (APRESOLINE) 25 MG tablet     insulin pen needle (B-D U/F) 31G X 8 MM     blood glucose monitoring (NO BRAND SPECIFIED) test strip     blood glucose monitoring (PRODIGY TWIST TOP 28G) lancets     insulin syringe-needle U-100 (BD INSULIN SYRINGE ULTRAFINE) 30G X 1/2\" 0.5 ML     betamethasone dipropionate (DIPROSONE) 0.05 % lotion     cholecalciferol (VITAMIN D) 400 UNIT TABS     glucose blood VI test strips (ONE TOUCH TEST STRIPS) strip     LIFESCAN FINEPOINT LANCETS MISC     ASPIRIN NOT PRESCRIBED, INTENTIONAL,     No current facility-administered medications for this visit.      Social History   Substance Use Topics     Smoking status: Never Smoker     Smokeless tobacco: Never Used     Alcohol use No     Review of Systems  Constitutional-No fevers, chills, or weight changes..  ENT-No earpain, sore throat, voice changes or rhinitis.  Cardiac-No chest pain or palpitations.  Respiratory-No cough, sob, or hemoptysis.  GI-No nausea, vomitting, diarrhea, constipation, or blood in the stool.  Musculoskeletal-getting stronger    Physical Exam  /65  Pulse 88  Temp " 96.9  F (36.1  C) (Temporal)  Resp 16  Wt 166 lb (75.3 kg)  SpO2 97%  BMI 28.49 kg/m2  General Appearance-healthy, alert, no distress  Cardiac-regular rate and rhythm  with normal S1, S2 ; no murmur, rub or gallops  Lungs-clear to auscultation  Extremities-no peripheral edema  Walking with her walker, chronic blindness.    ASSESSMENT:   Follow up after being in [facility], no more homecare.  Being more active, making beds and clothes.     She is taking her medications. Torsemide is new and doing ok at 10mg.  Little lack of control on urination.      Sugars are good, lantus and novolog are ok,  sets them up for her eyes.      HTN is high here today, good at home we will use those readings.  Continue to watch at home.      CKD and creatinine around 1.6 will see nephrology, keep torsemide until then for sure, refill done today.      Electronically signed by Margarito Hoffman MD

## 2018-03-20 ENCOUNTER — TELEPHONE (OUTPATIENT)
Dept: INTERNAL MEDICINE | Facility: CLINIC | Age: 83
End: 2018-03-20

## 2018-03-20 DIAGNOSIS — R32 URINARY INCONTINENCE, UNSPECIFIED TYPE: Primary | ICD-10-CM

## 2018-03-20 RX ORDER — OXYBUTYNIN CHLORIDE 5 MG/1
5 TABLET ORAL 4 TIMES DAILY
Qty: 360 TABLET | Refills: 0 | Status: SHIPPED | OUTPATIENT
Start: 2018-03-20 | End: 2019-03-04

## 2018-03-20 NOTE — TELEPHONE ENCOUNTER
Torsemide is a diuretic and should help her urinate and remove swelling. Doesn't help her leaking or incontinence.  Ditropan(oxybutynin) can help the urination control but has significant side effects such as dry mouth and eyes.  Cab be used 4 times a day as needed.

## 2018-03-20 NOTE — TELEPHONE ENCOUNTER
Pt states she is still having leaky due to urination. Pt states she cannot control it at times. She hasn't had any incontinence. Pt reports taking Torsemide 10 MG day, which helped at first but now she feels that medication isn't helping. Routed to Dr. Hoffman to advise.

## 2018-03-20 NOTE — TELEPHONE ENCOUNTER
Reason for Call:  Other call back    Detailed comments: Pt was to let Dr. Hoffman when she was having trouble with her leaky tube and she is. Please call her back and advise.     Phone Number Patient can be reached at: Home number on file [phone] (home)    Best Time: anytime    Can we leave a detailed message on this number? NO    Call taken on 3/20/2018 at 8:45 AM by Josy Prasad

## 2018-03-20 NOTE — TELEPHONE ENCOUNTER
Called and spoke with patient, informed her of Dr Hoffman's recommendations. She stated understanding. Aubrey/MA'

## 2018-03-21 ENCOUNTER — TRANSFERRED RECORDS (OUTPATIENT)
Dept: HEALTH INFORMATION MANAGEMENT | Facility: CLINIC | Age: 83
End: 2018-03-21

## 2018-03-23 ENCOUNTER — TELEPHONE (OUTPATIENT)
Dept: FAMILY MEDICINE | Facility: CLINIC | Age: 83
End: 2018-03-23

## 2018-03-23 NOTE — TELEPHONE ENCOUNTER
Forms received from [facility] on 02/09/2018.  Forms with RN to review medications.  Orders pended for provider to sign.    Forms given to Marleen for PCP signature.

## 2018-03-30 DIAGNOSIS — E11.49 DIABETES MELLITUS TYPE 2 WITH NEUROLOGICAL MANIFESTATIONS (H): ICD-10-CM

## 2018-03-30 NOTE — TELEPHONE ENCOUNTER
"Requested Prescriptions   Pending Prescriptions Disp Refills     blood glucose monitoring (NO BRAND SPECIFIED) test strip 100 each 3     Sig: Use to test blood sugar four times daily or as directed.  Prodigy Autocode test strips    Diabetic Supplies Protocol Passed    3/30/2018  3:02 PM       Passed - Patient is 18 years of age or older       Passed - Recent (6 mo) or future (30 days) visit within the authorizing provider's specialty    Patient had office visit in the last 6 months or has a visit in the next 30 days with authorizing provider.  See \"Patient Info\" tab in inbasket, or \"Choose Columns\" in Meds & Orders section of the refill encounter.              Last Written Prescription Date:  11/12/17  Last Fill Quantity: 100,  # refills: 2   Last Office Visit with G, P or [facility] prescribing provider:  3/16/18   Future Office Visit:       "

## 2018-04-02 DIAGNOSIS — E11.49 DIABETES MELLITUS TYPE 2 WITH NEUROLOGICAL MANIFESTATIONS (H): ICD-10-CM

## 2018-04-02 NOTE — TELEPHONE ENCOUNTER
"Requested Prescriptions   Pending Prescriptions Disp Refills     PRODIGY NO CODING BLOOD GLUC test strip [Pharmacy Med Name: PRODIGY NO CODING BLOOD GLUC  STRP] 100 each 2     Sig: USE TO TEST BLOOD SUGAR TWO TIMES A DAY OR AS DIRECTED    Diabetic Supplies Protocol Passed    4/2/2018 10:51 AM       Passed - Patient is 18 years of age or older       Passed - Recent (6 mo) or future (30 days) visit within the authorizing provider's specialty    Patient had office visit in the last 6 months or has a visit in the next 30 days with authorizing provider.  See \"Patient Info\" tab in inbasket, or \"Choose Columns\" in Meds & Orders section of the refill encounter.              Last Written Prescription Date:  11/12/17  Last Fill Quantity: 100,  # refills: 2   Last Office Visit with G, P or [facility] prescribing provider:  3/16/18   Future Office Visit:       "

## 2018-04-03 RX ORDER — BLOOD SUGAR DIAGNOSTIC
STRIP MISCELLANEOUS
Qty: 100 EACH | Refills: 2 | Status: SHIPPED | OUTPATIENT
Start: 2018-04-03 | End: 2018-04-06

## 2018-04-03 NOTE — TELEPHONE ENCOUNTER
Prescription approved per [facility] Refill Protocol.  Marleen Veronica RN

## 2018-04-03 NOTE — TELEPHONE ENCOUNTER
Prescription approved per [facility] Refill Protocol.  Marleen Veronica RN

## 2018-04-06 RX ORDER — BLOOD SUGAR DIAGNOSTIC
STRIP MISCELLANEOUS
Qty: 100 EACH | Refills: 2 | Status: SHIPPED | OUTPATIENT
Start: 2018-04-06 | End: 2019-09-04

## 2018-06-01 ENCOUNTER — TELEPHONE (OUTPATIENT)
Dept: FAMILY MEDICINE | Facility: CLINIC | Age: 83
End: 2018-06-01

## 2018-06-01 NOTE — TELEPHONE ENCOUNTER
Forms received from [facility] on 06/01/18.  Forms with RN to review medications.  Orders pended for provider to sign.    Forms given to Marleen for PCP signature.

## 2018-06-05 DIAGNOSIS — E11.9 TYPE 2 DIABETES, HBA1C GOAL < 8% (H): ICD-10-CM

## 2018-06-06 NOTE — TELEPHONE ENCOUNTER
Prescription approved per [facility] Refill Protocol.  Sarah Arellano RN. . .  6/6/2018, 5:09 PM

## 2018-06-06 NOTE — TELEPHONE ENCOUNTER
"Requested Prescriptions   Pending Prescriptions Disp Refills     B-D INSULIN SYRINGE 30G X 1/2\" 0.5 ML [Pharmacy Med Name: BD INS SYR 30G 1/2\" 0.5ML] 100 each 11     Sig: USE 1 SYRINGE FOUR TIMES A DAY OR AS DIRECTED    Diabetic Supplies Protocol Passed    6/5/2018  4:26 PM       Passed - Patient is 18 years of age or older       Passed - Recent (6 mo) or future (30 days) visit within the authorizing provider's specialty    Patient had office visit in the last 6 months or has a visit in the next 30 days with authorizing provider.  See \"Patient Info\" tab in inbasket, or \"Choose Columns\" in Meds & Orders section of the refill encounter.              Last Written Prescription Date:  12/8/17  Last Fill Quantity: 100,  # refills: PRN   Last Office Visit with G, P or [facility] prescribing provider:  3/16/18   Future Office Visit:       "

## 2018-06-25 ENCOUNTER — TELEPHONE (OUTPATIENT)
Dept: INTERNAL MEDICINE | Facility: CLINIC | Age: 83
End: 2018-06-25

## 2018-06-25 DIAGNOSIS — E11.49 DIABETES MELLITUS TYPE 2 WITH NEUROLOGICAL MANIFESTATIONS (H): ICD-10-CM

## 2018-06-25 RX ORDER — LANCETS 28 GAUGE
EACH MISCELLANEOUS
Qty: 100 EACH | Refills: 3 | Status: SHIPPED | OUTPATIENT
Start: 2018-06-25 | End: 2018-12-03

## 2018-06-25 RX ORDER — BLOOD-GLUCOSE CONTROL, LOW
EACH MISCELLANEOUS
Qty: 100 EACH | Refills: 3 | Status: CANCELLED | OUTPATIENT
Start: 2018-06-25

## 2018-06-25 NOTE — TELEPHONE ENCOUNTER
Reason for Call:  Same Day Appointment, Requested Provider:  Margarito Hoffman MD    PCP: Margairto Hoffman    Reason for visit: diabetic ck    Duration of symptoms:     Have you been treated for this in the past? Yes    Additional comments: pt was told by the Pharmacy that she needed to see PL today to have test strips and needles refilled.    Can we leave a detailed message on this number? YES    Phone number patient can be reached at:     Best Time:     Call taken on 6/25/2018 at 8:17 AM by Kenya Dhillon

## 2018-07-13 NOTE — TELEPHONE ENCOUNTER
Patient would like a new script for test strips, she is testing 4 times day. The last script was for 2 times day.    Thank You,  Stephen Earl, Pharmacy [facility] [place]     no

## 2018-07-17 DIAGNOSIS — E78.5 HYPERLIPIDEMIA LDL GOAL <100: ICD-10-CM

## 2018-07-17 NOTE — TELEPHONE ENCOUNTER
"Requested Prescriptions   Pending Prescriptions Disp Refills     simvastatin (ZOCOR) 80 MG tablet 90 tablet 3    Last Written Prescription Date:  01/24/2018  Last Fill Quantity: 90,  # refills: 3   Last office visit: 11/8/2017 with prescribing provider:  11/08/2017   Future Office Visit:     Sig: Take 1 tablet (80 mg) by mouth At Bedtime    Statins Protocol Failed    7/17/2018  1:17 PM       Failed - LDL on file in past 12 months    Recent Labs   Lab Test  03/29/16   0952   LDL  62            Passed - No abnormal creatine kinase in past 12 months    Recent Labs   Lab Test  06/21/10   1420   CKT  47               Passed - Recent (12 mo) or future (30 days) visit within the authorizing provider's specialty    Patient had office visit in the last 12 months or has a visit in the next 30 days with authorizing provider or within the authorizing provider's specialty.  See \"Patient Info\" tab in inbasket, or \"Choose Columns\" in Meds & Orders section of the refill encounter.           Passed - Patient is age 18 or older       Passed - No active pregnancy on record       Passed - No positive pregnancy test in past 12 months          "

## 2018-07-19 RX ORDER — SIMVASTATIN 80 MG
80 TABLET ORAL AT BEDTIME
Qty: 90 TABLET | Refills: 3 | Status: SHIPPED | OUTPATIENT
Start: 2018-07-19 | End: 2019-04-15 | Stop reason: DRUGHIGH

## 2018-07-19 NOTE — TELEPHONE ENCOUNTER
Routing refill request to provider for review/approval because:  RX is transitional.    Chyna Thompson RN

## 2018-09-19 ENCOUNTER — OFFICE VISIT (OUTPATIENT)
Dept: INTERNAL MEDICINE | Facility: CLINIC | Age: 83
End: 2018-09-19
Payer: MEDICARE

## 2018-09-19 VITALS
DIASTOLIC BLOOD PRESSURE: 88 MMHG | HEART RATE: 90 BPM | BODY MASS INDEX: 28.49 KG/M2 | RESPIRATION RATE: 16 BRPM | TEMPERATURE: 97.8 F | OXYGEN SATURATION: 95 % | WEIGHT: 166 LBS | SYSTOLIC BLOOD PRESSURE: 138 MMHG

## 2018-09-19 DIAGNOSIS — E11.49 DIABETES MELLITUS TYPE 2 WITH NEUROLOGICAL MANIFESTATIONS (H): ICD-10-CM

## 2018-09-19 DIAGNOSIS — F03.91 DEMENTIA WITH BEHAVIORAL DISTURBANCE, UNSPECIFIED DEMENTIA TYPE: Primary | ICD-10-CM

## 2018-09-19 DIAGNOSIS — Z23 NEED FOR PROPHYLACTIC VACCINATION AND INOCULATION AGAINST INFLUENZA: ICD-10-CM

## 2018-09-19 DIAGNOSIS — I10 ESSENTIAL HYPERTENSION WITH GOAL BLOOD PRESSURE LESS THAN 130/80: ICD-10-CM

## 2018-09-19 LAB
ALBUMIN SERPL-MCNC: 3.7 G/DL (ref 3.4–5)
ALP SERPL-CCNC: 63 U/L (ref 40–150)
ALT SERPL W P-5'-P-CCNC: 17 U/L (ref 0–50)
ANION GAP SERPL CALCULATED.3IONS-SCNC: 8 MMOL/L (ref 3–14)
AST SERPL W P-5'-P-CCNC: 28 U/L (ref 0–45)
BILIRUB SERPL-MCNC: 0.4 MG/DL (ref 0.2–1.3)
BUN SERPL-MCNC: 34 MG/DL (ref 7–30)
CALCIUM SERPL-MCNC: 9.5 MG/DL (ref 8.5–10.1)
CHLORIDE SERPL-SCNC: 102 MMOL/L (ref 94–109)
CO2 SERPL-SCNC: 31 MMOL/L (ref 20–32)
CREAT SERPL-MCNC: 1.75 MG/DL (ref 0.52–1.04)
ERYTHROCYTE [DISTWIDTH] IN BLOOD BY AUTOMATED COUNT: 12.4 % (ref 10–15)
GFR SERPL CREATININE-BSD FRML MDRD: 28 ML/MIN/1.7M2
GLUCOSE SERPL-MCNC: 67 MG/DL (ref 70–99)
HBA1C MFR BLD: 4.9 % (ref 0–5.6)
HCT VFR BLD AUTO: 43.3 % (ref 35–47)
HGB BLD-MCNC: 14 G/DL (ref 11.7–15.7)
MCH RBC QN AUTO: 30.9 PG (ref 26.5–33)
MCHC RBC AUTO-ENTMCNC: 32.3 G/DL (ref 31.5–36.5)
MCV RBC AUTO: 96 FL (ref 78–100)
PLATELET # BLD AUTO: 172 10E9/L (ref 150–450)
POTASSIUM SERPL-SCNC: 4.1 MMOL/L (ref 3.4–5.3)
PROT SERPL-MCNC: 8 G/DL (ref 6.8–8.8)
RBC # BLD AUTO: 4.53 10E12/L (ref 3.8–5.2)
SODIUM SERPL-SCNC: 141 MMOL/L (ref 133–144)
TSH SERPL DL<=0.005 MIU/L-ACNC: 1.41 MU/L (ref 0.4–4)
WBC # BLD AUTO: 8.4 10E9/L (ref 4–11)

## 2018-09-19 PROCEDURE — 90662 IIV NO PRSV INCREASED AG IM: CPT | Performed by: INTERNAL MEDICINE

## 2018-09-19 PROCEDURE — 36415 COLL VENOUS BLD VENIPUNCTURE: CPT | Performed by: INTERNAL MEDICINE

## 2018-09-19 PROCEDURE — 80053 COMPREHEN METABOLIC PANEL: CPT | Performed by: INTERNAL MEDICINE

## 2018-09-19 PROCEDURE — 85027 COMPLETE CBC AUTOMATED: CPT | Performed by: INTERNAL MEDICINE

## 2018-09-19 PROCEDURE — 83036 HEMOGLOBIN GLYCOSYLATED A1C: CPT | Performed by: INTERNAL MEDICINE

## 2018-09-19 PROCEDURE — 99214 OFFICE O/P EST MOD 30 MIN: CPT | Mod: 25 | Performed by: INTERNAL MEDICINE

## 2018-09-19 PROCEDURE — G0008 ADMIN INFLUENZA VIRUS VAC: HCPCS | Performed by: INTERNAL MEDICINE

## 2018-09-19 PROCEDURE — 84443 ASSAY THYROID STIM HORMONE: CPT | Performed by: INTERNAL MEDICINE

## 2018-09-19 RX ORDER — GABAPENTIN 100 MG/1
100 CAPSULE ORAL 3 TIMES DAILY
Qty: 90 CAPSULE | Refills: 1 | Status: ON HOLD | OUTPATIENT
Start: 2018-09-19 | End: 2019-04-07

## 2018-09-19 ASSESSMENT — PAIN SCALES - GENERAL: PAINLEVEL: WORST PAIN (10)

## 2018-09-19 NOTE — PROGRESS NOTES
SUBJECTIVE:   Smiley Acuña is a 83 year old female who presents to clinic today for the following health issues:    Chief Complaint   Patient presents with     Pain     leg pain     Altered Mental Status     questioning dementia, seeing people that other people aren't seeing        accompanied by her  and daughter. She is seeing people and a cat and dog.  She truly believes they are there, helping with meals and doesn't comprehend she is seeing things.    She has severe vision loss.    Sugars are good at home,  range.    BP is low at times    Has some chronic pain in her leg and back.    Past Medical History:   Diagnosis Date     Abnormality of gait      Anemia, unspecified      Chronic ischemic heart disease, unspecified     2-vessel CAD     Closed fracture of patella 06/21/10    D/C 06/23/10     Coronary artery disease      Degenerative disc disease     lumbar     Depressive disorder, not elsewhere classified      History of blood transfusion      Other and unspecified hyperlipidemia      Renal failure, unspecified     chronic renal disease     Septicemia due to Escherichia coli (E. coli)(038.42) (H) 05/03/2007     Syncope and collapse 4/8/2005    Admit     Syncope and collapse 05/26/10    D/C 05/27/10 to [facility]     Traumatic subdural hematomas 05/27/10     Type II or unspecified type diabetes mellitus without mention of complication, not stated as uncontrolled      Unspecified essential hypertension      Unspecified sleep apnea      Urinary tract infection, site not specified 4/4/2008    UTI with mild sepsis. Admitted.     Current Outpatient Prescriptions   Medication     acetaminophen (TYLENOL) 325 MG tablet     albuterol (PROAIR HFA, PROVENTIL HFA, VENTOLIN HFA) 108 (90 BASE) MCG/ACT inhaler     cholecalciferol (VITAMIN D) 400 UNIT TABS     gabapentin (NEURONTIN) 100 MG capsule     gabapentin (NEURONTIN) 300 MG capsule     hydrALAZINE (APRESOLINE) 25 MG tablet      "HYDROcodone-acetaminophen (NORCO) 5-325 MG per tablet     insulin aspart (NOVOLOG PEN) 100 UNIT/ML injection     insulin glargine (LANTUS) 100 UNIT/ML injection     LANTUS VIAL 100 UNIT/ML soln     MULTIVITAMINS OR TABS     NOVOLOG VIAL 100 UNIT/ML soln     oxybutynin (DITROPAN) 5 MG tablet     simvastatin (ZOCOR) 80 MG tablet     traZODone (DESYREL) 50 MG tablet     ASPIRIN NOT PRESCRIBED, INTENTIONAL,     B-D INSULIN SYRINGE 30G X 1/2\" 0.5 ML     blood glucose monitoring (NO BRAND SPECIFIED) test strip     blood glucose monitoring (PRODIGY TWIST TOP 28G) lancets     blood glucose monitoring (PRODIGY TWIST TOP 28G) lancets     insulin pen needle (B-D U/F) 31G X 8 MM     PRODIGY NO CODING BLOOD GLUC test strip     Spacer/Aero-Holding Chambers (OPTIHALER) BOGDAN     torsemide (DEMADEX) 10 MG tablet     No current facility-administered medications for this visit.      Social History   Substance Use Topics     Smoking status: Never Smoker     Smokeless tobacco: Never Used     Alcohol use No     Review of Systems  Constitutional-No fevers, chills, or weight changes..  ENT-No earpain, sore throat, voice changes or rhinitis.  Cardiac-No chest pain or palpitations.  Respiratory-No cough, sob, or hemoptysis.  GI-No nausea, vomitting, diarrhea, constipation, or blood in the stool.    Physical Exam  /88  Pulse 90  Temp 97.8  F (36.6  C) (Temporal)  Resp 16  Wt 166 lb (75.3 kg)  SpO2 95%  BMI 28.49 kg/m2  General Appearance-alert, no distress  Cardiac-regular rate and rhythm  with normal S1, S2 ; no murmur, rub or gallops  Lungs-clear to auscultation  Extremities-1+ pitting edema  Neuro-some confusion, she believes other people are in their home helping her.            ASSESSMENT:    83-year-old woman who has a history of chronic kidney disease, diabetes, severe vision loss.  She is now having more dementia.  She imagines that she is seeing people that her  and her daughter do not see.  She also has seen cats " and dogs at times.  Patient overall is doing pretty well but this is the main issue.  Any could be related to her medications either her oxybutynin or gabapentin.  We will try her off the oxybutynin despite the fact that it helps control her urination.  That did not change anything will then reduce her gabapentin down from 300 twice a day to 100 twice a day.  We will check a gabapentin level today.    Also check her labs for CBC, kidney function, TSH and diabetes today.    Her hypertension-on recheck her blood pressure is better 138/88.  At home her blood pressures are down in the 90s so we will cut her hydralazine from 2 pills to 1 pill.    Electronically signed by Margarito Hoffman MD

## 2018-09-19 NOTE — PROGRESS NOTES
Injectable Influenza Immunization Documentation    1.  Is the person to be vaccinated sick today?   No    2. Does the person to be vaccinated have an allergy to a component   of the vaccine?   No  Egg Allergy Algorithm Link    3. Has the person to be vaccinated ever had a serious reaction   to influenza vaccine in the past?   No    4. Has the person to be vaccinated ever had Guillain-Barré syndrome?   No    Form completed by Tahmina Macias CMA  Prior to injection verified patient identity using patient's name and date of birth.  Due to injection administration, patient instructed to remain in clinic for 15 minutes  afterwards, and to report any adverse reaction to me immediately.

## 2018-09-19 NOTE — MR AVS SNAPSHOT
After Visit Summary   9/19/2018    Smiley Acuña    MRN: [id number]           Patient Information     Date Of Birth          1935        Visit Information        Provider Department      9/19/2018 10:00 AM Margarito Hoffman MD [facility]         Follow-ups after your visit        Who to contact     If you have questions or need follow up information about today's clinic visit or your schedule please contact [facility] directly at [phone].  Normal or non-critical lab and imaging results will be communicated to you by MyChart, letter or phone within 4 business days after the clinic has received the results. If you do not hear from us within 7 days, please contact the clinic through MyChart or phone. If you have a critical or abnormal lab result, we will notify you by phone as soon as possible.  Submit refill requests through [facility] or call your pharmacy and they will forward the refill request to us. Please allow 3 business days for your refill to be completed.          Additional Information About Your Visit        Care EveryWhere ID     This is your Care EveryWhere ID. This could be used by other organizations to access your [facility] medical records  [id number]        Your Vitals Were     Pulse Temperature Respirations Pulse Oximetry BMI (Body Mass Index)       90 97.8  F (36.6  C) (Temporal) 16 95% 28.49 kg/m2        Blood Pressure from Last 3 Encounters:   09/19/18 (!) 166/92   03/16/18 122/65   02/22/18 139/57    Weight from Last 3 Encounters:   09/19/18 166 lb (75.3 kg)   03/16/18 166 lb (75.3 kg)   02/22/18 167 lb 14.4 oz (76.2 kg)              Today, you had the following     No orders found for display         Today's Medication Changes          These changes are accurate as of 9/19/18 10:19 AM.  If you have any questions, ask your nurse or doctor.               These medicines have changed or have updated prescriptions.        Dose/Directions     oxybutynin 5 MG tablet   Commonly known as:  DITROPAN   This may have changed:    - how much to take  - when to take this   Used for:  Urinary incontinence, unspecified type        Dose:  5 mg   Take 1 tablet (5 mg) by mouth 4 times daily   Quantity:  360 tablet   Refills:  0                Primary Care Provider Office Phone # Fax #    Margarito Hoffman [phone]       [address] [facility] [address]        Equal Access to Services     [facility] : Hadii aad ku hadasho Soomaali, waaxda luqadaha, qaybta kaalmada adeegyada, waxay idiin hayaan adeeg cruzsaulkaylyn lagume . So [facility] [phone].    ATENCIÓN: Si habla español, tiene a rodriguez disposición servicios gratuitos de asistencia lingüística. [facility] [phone].    We comply with applicable federal civil rights laws and [place] laws. We do not discriminate on the basis of race, color, national origin, age, disability, sex, sexual orientation, or gender identity.            Thank you!     Thank you for choosing [facility]  for your care. Our goal is always to provide you with excellent care. Hearing back from our patients is one way we can continue to improve our services. Please take a few minutes to complete the written survey that you may receive in the mail after your visit with us. Thank you!             Your Updated Medication List - Protect others around you: Learn how to safely use, store and throw away your medicines at www.disposemymeds.org.          This list is accurate as of 9/19/18 10:19 AM.  Always use your most recent med list.                   Brand Name Dispense Instructions for use Diagnosis    acetaminophen 325 MG tablet    TYLENOL    100 tablet    Take 2 tablets (650 mg) by mouth every 4 hours as needed for mild pain    Pain in thoracic spine       albuterol 108 (90 Base) MCG/ACT inhaler    PROAIR HFA/PROVENTIL HFA/VENTOLIN HFA    1 Inhaler    Inhale 2 puffs into the lungs every 6 hours as needed for  "shortness of breath / dyspnea or wheezing Ventolin inhaler    Bronchitis       ASPIRIN NOT PRESCRIBED    INTENTIONAL     by Other route continuous prn. 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    Type 2 diabetes, HbA1C goal < 8% (H)       B-D INSULIN SYRINGE 30G X 1/2\" 0.5 ML   Generic drug:  insulin syringe-needle U-100     100 each    USE 1 SYRINGE FOUR TIMES A DAY OR AS DIRECTED    Type 2 diabetes, HbA1C goal < 8% (H)       * blood glucose monitoring lancets     100 each    Use to test blood sugar two times daily or as directed.    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  For  - 239 give 2 units.  For  - 289 give 3 units.  For  - 339 give 4 units.  For - 399 give 5 units.  For -449 give 6 units For  or higher give 7 units.    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 Read the directions carefully,  and ask your doctor or other care provider to review them with you.

## 2018-09-20 LAB — GABAPENTIN SERPLBLD-MCNC: 19.9 UG/ML

## 2018-10-30 DIAGNOSIS — E11.49 DIABETES MELLITUS TYPE 2 WITH NEUROLOGICAL MANIFESTATIONS (H): ICD-10-CM

## 2018-10-31 RX ORDER — BLOOD SUGAR DIAGNOSTIC
STRIP MISCELLANEOUS
Qty: 100 EACH | Refills: 4 | Status: SHIPPED | OUTPATIENT
Start: 2018-10-31 | End: 2019-09-04

## 2018-10-31 NOTE — TELEPHONE ENCOUNTER
"Test strips  Last Written Prescription Date:  04/06/2018  Last Fill Quantity: 100,  # refills: 2   Last office visit: 9/19/2018 with prescribing provider:      Future Office Visit:      Requested Prescriptions   Pending Prescriptions Disp Refills     PRODIGY NO CODING BLOOD GLUC test strip [Pharmacy Med Name: PRODIGY NO CODING BLOOD GLUC  STRP] 100 each 3     Sig: USE TO TEST BLOOD SUGAR THREE TIMES A DAY OR AS DIRECTED    Diabetic Supplies Protocol Passed    10/30/2018  3:49 PM       Passed - Patient is 18 years of age or older       Passed - Recent (6 mo) or future (30 days) visit within the authorizing provider's specialty    Patient had office visit in the last 6 months or has a visit in the next 30 days with authorizing provider.  See \"Patient Info\" tab in inbasket, or \"Choose Columns\" in Meds & Orders section of the refill encounter.              Prescription approved per [facility] Refill Protocol.    Marleen Veronica RN on 10/31/2018 at 4:53 PM    "

## 2018-12-03 DIAGNOSIS — E11.49 DIABETES MELLITUS TYPE 2 WITH NEUROLOGICAL MANIFESTATIONS (H): ICD-10-CM

## 2018-12-05 RX ORDER — LANCETS 28 GAUGE
EACH MISCELLANEOUS
Qty: 100 EACH | Refills: 2 | Status: SHIPPED | OUTPATIENT
Start: 2018-12-05 | End: 2019-09-04

## 2018-12-05 NOTE — TELEPHONE ENCOUNTER
"Requested Prescriptions   Pending Prescriptions Disp Refills     blood glucose monitoring (PRODIGY TWIST TOP 28G) lancets [Pharmacy Med Name: PRODIGY TWIST TOP LANCETS 28  MISC] 100 each 3    Last Written Prescription Date:  6/25/18  Last Fill Quantity: 100,  # refills: 3   Last office visit: 9/19/2018 with prescribing provider:     Future Office Visit:     Sig: USE TO TEST BLOOD SUGAR THREE TIMES A DAY OR AS DIRECTED    Diabetic Supplies Protocol Passed    12/3/2018  9:25 AM       Passed - Patient is 18 years of age or older       Passed - Recent (6 mo) or future (30 days) visit within the authorizing provider's specialty    Patient had office visit in the last 6 months or has a visit in the next 30 days with authorizing provider.  See \"Patient Info\" tab in inbasket, or \"Choose Columns\" in Meds & Orders section of the refill encounter.            Prescription approved per [facility] Refill Protocol.    Debra Hernandez RN    "

## 2019-01-01 ENCOUNTER — TELEPHONE (OUTPATIENT)
Dept: GERIATRICS | Facility: CLINIC | Age: 84
End: 2019-01-01

## 2019-01-01 ENCOUNTER — NURSING HOME VISIT (OUTPATIENT)
Dept: GERIATRICS | Facility: CLINIC | Age: 84
End: 2019-01-01
Payer: COMMERCIAL

## 2019-01-01 VITALS
DIASTOLIC BLOOD PRESSURE: 75 MMHG | RESPIRATION RATE: 16 BRPM | WEIGHT: 143 LBS | BODY MASS INDEX: 23.8 KG/M2 | HEART RATE: 69 BPM | OXYGEN SATURATION: 85 % | TEMPERATURE: 98.4 F | SYSTOLIC BLOOD PRESSURE: 152 MMHG

## 2019-01-01 DIAGNOSIS — G30.9 ALZHEIMER'S DEMENTIA WITHOUT BEHAVIORAL DISTURBANCE, UNSPECIFIED TIMING OF DEMENTIA ONSET: Primary | ICD-10-CM

## 2019-01-01 DIAGNOSIS — E11.49 DIABETES MELLITUS TYPE 2 WITH NEUROLOGICAL MANIFESTATIONS (H): ICD-10-CM

## 2019-01-01 DIAGNOSIS — F02.80 ALZHEIMER'S DEMENTIA WITHOUT BEHAVIORAL DISTURBANCE, UNSPECIFIED TIMING OF DEMENTIA ONSET: Primary | ICD-10-CM

## 2019-01-01 DIAGNOSIS — N18.4 CKD (CHRONIC KIDNEY DISEASE) STAGE 4, GFR 15-29 ML/MIN (H): ICD-10-CM

## 2019-01-01 DIAGNOSIS — G62.9 NEUROPATHY: ICD-10-CM

## 2019-01-01 PROCEDURE — 99309 SBSQ NF CARE MODERATE MDM 30: CPT | Performed by: NURSE PRACTITIONER

## 2019-01-01 RX ORDER — GABAPENTIN 100 MG/1
100 CAPSULE ORAL AT BEDTIME
COMMUNITY
End: 2020-01-01

## 2019-01-02 DIAGNOSIS — E11.49 DIABETES MELLITUS TYPE 2 WITH NEUROLOGICAL MANIFESTATIONS (H): ICD-10-CM

## 2019-01-03 RX ORDER — INSULIN GLARGINE 100 [IU]/ML
INJECTION, SOLUTION SUBCUTANEOUS
Qty: 15 ML | Refills: 0 | Status: ON HOLD | OUTPATIENT
Start: 2019-01-03 | End: 2019-04-07

## 2019-01-14 DIAGNOSIS — F51.01 PRIMARY INSOMNIA: ICD-10-CM

## 2019-01-15 RX ORDER — TRAZODONE HYDROCHLORIDE 50 MG/1
TABLET, FILM COATED ORAL
Qty: 90 TABLET | Refills: 3 | Status: ON HOLD | OUTPATIENT
Start: 2019-01-15 | End: 2019-04-07

## 2019-01-15 NOTE — TELEPHONE ENCOUNTER
"Last Written Prescription Date:  1/5/18  Last Fill Quantity: 90,  # refills: 3   Last office visit: 9/19/2018 with prescribing provider:  Margarito Hoffman   Future Office Visit:      Requested Prescriptions   Pending Prescriptions Disp Refills     traZODone (DESYREL) 50 MG tablet [Pharmacy Med Name: TRAZODONE HCL 50MG TABS] 90 tablet 3     Sig: TAKE ONE TABLET BY MOUTH AT BEDTIME    Serotonin Modulators Passed - 1/14/2019  1:47 PM       Passed - Recent (12 mo) or future (30 days) visit within the authorizing provider's specialty    Patient had office visit in the last 12 months or has a visit in the next 30 days with authorizing provider or within the authorizing provider's specialty.  See \"Patient Info\" tab in inbasket, or \"Choose Columns\" in Meds & Orders section of the refill encounter.             Passed - Medication is active on med list       Passed - Patient is age 18 or older       Passed - No active pregnancy on record       Passed - No positive pregnancy test in past 12 months        Prescription approved per [facility] Refill Protocol.    Chyna Thompson RN     "

## 2019-03-04 DIAGNOSIS — R32 URINARY INCONTINENCE, UNSPECIFIED TYPE: ICD-10-CM

## 2019-03-04 DIAGNOSIS — I10 HYPERTENSION GOAL BP (BLOOD PRESSURE) < 130/80: ICD-10-CM

## 2019-03-06 RX ORDER — OXYBUTYNIN CHLORIDE 5 MG/1
TABLET ORAL
Qty: 360 TABLET | Refills: 0 | Status: ON HOLD | OUTPATIENT
Start: 2019-03-06 | End: 2019-04-07

## 2019-03-06 RX ORDER — HYDRALAZINE HYDROCHLORIDE 25 MG/1
TABLET, FILM COATED ORAL
Qty: 270 TABLET | Refills: 1 | Status: ON HOLD | OUTPATIENT
Start: 2019-03-06 | End: 2019-04-07

## 2019-03-06 NOTE — TELEPHONE ENCOUNTER
"Requested Prescriptions   Pending Prescriptions Disp Refills     oxybutynin (DITROPAN) 5 MG tablet [Pharmacy Med Name: OXYBUTYNIN CHLORIDE 5MG TABS] 360 tablet 0    Last Written Prescription Date:  3/20/18  Last Fill Quantity: 360,  # refills: 0   Last office visit: 9/19/2018 with prescribing provider:     Future Office Visit:     Sig: TAKE ONE TABLET BY MOUTH FOUR TIMES A DAY    Muscarinic Antagonists (Urinary Incontinence Agents) Passed - 3/4/2019  8:46 AM       Passed - Recent (12 mo) or future (30 days) visit within the authorizing provider's specialty    Patient had office visit in the last 12 months or has a visit in the next 30 days with authorizing provider or within the authorizing provider's specialty.  See \"Patient Info\" tab in inbasket, or \"Choose Columns\" in Meds & Orders section of the refill encounter.             Passed - Medication is Oxybutynin and patient is 5 years of age or older       Passed - Patient does not have a diagnosis of glaucoma on the problem list    If glaucoma diagnosis is new, refer refill to physician.         Passed - Medication is active on med list       Passed - Patient is 18 years of age or older   Prescription approved per [facility] [place].           hydrALAZINE (APRESOLINE) 25 MG tablet [Pharmacy Med Name: hydrALAZINE 25MG TAB] 270 tablet 1    Last Written Prescription Date:  1/24/18  Last Fill Quantity: 270,  # refills: 1   Last office visit: 9/19/2018 with prescribing provider:     Future Office Visit:     Sig: TAKE ONE TABLET (25 MG) BY MOUTH THREE TIMES A DAY    Vasodilators Passed - 3/4/2019  8:46 AM       Passed - Most recent BP less than 140/90 on record    BP Readings from Last 3 Encounters:   09/19/18 138/88   03/16/18 122/65   02/22/18 139/57          Passed - Most recent encounter is not a hospital encounter. Patient has recent (12 mos) or future (1 mos) visit with authorizing provider's specialty    Patient's most recent encounter is NOT a hospital encounter " "and has had an office visit in the last 12 months or has a visit in the next 30 days with authorizing provider or within the authorizing provider's specialty.      See \"Patient Info\" tab in inbasket, or \"Choose Columns\" in Meds & Orders section of the refill encounter.      If most recent encounter is a hospital encounter AND the patient does NOT have an appointment scheduled with the authorizing provider or authorizing provider's specialty within the next 30 days, forward refill to authorizing provider for medication review.         Passed - Medication is active on med list       Passed - Patient is of age 18 years or older       Passed - Patient is not pregnant       Passed - Patient has not had a positive pregnancy test within the past 12 months      Routing refill request to provider for review/approval because:  Medication is reported/transitional.    Debra Hernandez RN              "

## 2019-03-31 ENCOUNTER — HOSPITAL ENCOUNTER (INPATIENT)
Facility: CLINIC | Age: 84
LOS: 7 days | Discharge: NURSING, REHAB OR LONG-TERM CARE | DRG: 871 | End: 2019-04-07
Attending: FAMILY MEDICINE | Admitting: FAMILY MEDICINE
Payer: MEDICARE

## 2019-03-31 DIAGNOSIS — A41.51 SEPSIS DUE TO ESCHERICHIA COLI (H): ICD-10-CM

## 2019-03-31 DIAGNOSIS — Z79.4 TYPE 2 DIABETES MELLITUS WITH STAGE 4 CHRONIC KIDNEY DISEASE, WITH LONG-TERM CURRENT USE OF INSULIN (H): ICD-10-CM

## 2019-03-31 DIAGNOSIS — E11.22 TYPE 2 DIABETES MELLITUS WITH STAGE 4 CHRONIC KIDNEY DISEASE, WITH LONG-TERM CURRENT USE OF INSULIN (H): ICD-10-CM

## 2019-03-31 DIAGNOSIS — M62.81 GENERALIZED MUSCLE WEAKNESS: ICD-10-CM

## 2019-03-31 DIAGNOSIS — F03.90 DEMENTIA WITHOUT BEHAVIORAL DISTURBANCE, UNSPECIFIED DEMENTIA TYPE: ICD-10-CM

## 2019-03-31 DIAGNOSIS — I10 ESSENTIAL HYPERTENSION WITH GOAL BLOOD PRESSURE LESS THAN 130/80: ICD-10-CM

## 2019-03-31 DIAGNOSIS — M62.81 MUSCLE WEAKNESS (GENERALIZED): ICD-10-CM

## 2019-03-31 DIAGNOSIS — N18.4 ACUTE RENAL FAILURE SUPERIMPOSED ON STAGE 4 CHRONIC KIDNEY DISEASE, UNSPECIFIED ACUTE RENAL FAILURE TYPE (H): ICD-10-CM

## 2019-03-31 DIAGNOSIS — N17.9 ACUTE RENAL FAILURE SUPERIMPOSED ON STAGE 4 CHRONIC KIDNEY DISEASE, UNSPECIFIED ACUTE RENAL FAILURE TYPE (H): ICD-10-CM

## 2019-03-31 DIAGNOSIS — N18.4 TYPE 2 DIABETES MELLITUS WITH STAGE 4 CHRONIC KIDNEY DISEASE, WITH LONG-TERM CURRENT USE OF INSULIN (H): ICD-10-CM

## 2019-03-31 DIAGNOSIS — K59.00 CONSTIPATION, UNSPECIFIED CONSTIPATION TYPE: Primary | ICD-10-CM

## 2019-03-31 DIAGNOSIS — N30.00 ACUTE CYSTITIS WITHOUT HEMATURIA: ICD-10-CM

## 2019-03-31 DIAGNOSIS — R11.0 NAUSEA: ICD-10-CM

## 2019-03-31 DIAGNOSIS — N30.01 ACUTE CYSTITIS WITH HEMATURIA: ICD-10-CM

## 2019-03-31 DIAGNOSIS — I10 HYPERTENSION GOAL BP (BLOOD PRESSURE) < 130/80: ICD-10-CM

## 2019-03-31 DIAGNOSIS — E11.49 DIABETES MELLITUS TYPE 2 WITH NEUROLOGICAL MANIFESTATIONS (H): ICD-10-CM

## 2019-03-31 DIAGNOSIS — Z79.84 LONG TERM (CURRENT) USE OF ORAL HYPOGLYCEMIC DRUGS: ICD-10-CM

## 2019-03-31 DIAGNOSIS — I12.9 HYPERTENSIVE NEPHROPATHY: ICD-10-CM

## 2019-03-31 DIAGNOSIS — N17.8 ACUTE RENAL FAILURE WITH SPECIFIED LESION (H): ICD-10-CM

## 2019-03-31 DIAGNOSIS — G93.40 ENCEPHALOPATHY: ICD-10-CM

## 2019-03-31 DIAGNOSIS — R41.82 ALTERED MENTAL STATUS, UNSPECIFIED ALTERED MENTAL STATUS TYPE: ICD-10-CM

## 2019-03-31 PROBLEM — A41.9 SEPSIS (H): Status: ACTIVE | Noted: 2019-03-31

## 2019-03-31 LAB
ALBUMIN SERPL-MCNC: 2.5 G/DL (ref 3.4–5)
ALBUMIN UR-MCNC: >499 MG/DL
ALP SERPL-CCNC: 66 U/L (ref 40–150)
ALT SERPL W P-5'-P-CCNC: 63 U/L (ref 0–50)
ANION GAP SERPL CALCULATED.3IONS-SCNC: 13 MMOL/L (ref 3–14)
APPEARANCE UR: ABNORMAL
AST SERPL W P-5'-P-CCNC: 70 U/L (ref 0–45)
BACTERIA #/AREA URNS HPF: ABNORMAL /HPF
BASOPHILS # BLD AUTO: 0 10E9/L (ref 0–0.2)
BASOPHILS NFR BLD AUTO: 0.2 %
BILIRUB SERPL-MCNC: 0.3 MG/DL (ref 0.2–1.3)
BILIRUB UR QL STRIP: NEGATIVE
BUN SERPL-MCNC: 93 MG/DL (ref 7–30)
CALCIUM SERPL-MCNC: 9.3 MG/DL (ref 8.5–10.1)
CAOX CRY #/AREA URNS HPF: ABNORMAL /HPF
CHLORIDE SERPL-SCNC: 105 MMOL/L (ref 94–109)
CO2 SERPL-SCNC: 19 MMOL/L (ref 20–32)
COLOR UR AUTO: YELLOW
CREAT SERPL-MCNC: 3.3 MG/DL (ref 0.52–1.04)
DIFFERENTIAL METHOD BLD: ABNORMAL
EOSINOPHIL NFR BLD AUTO: 0.5 %
ERYTHROCYTE [DISTWIDTH] IN BLOOD BY AUTOMATED COUNT: 13 % (ref 10–15)
GFR SERPL CREATININE-BSD FRML MDRD: 12 ML/MIN/{1.73_M2}
GLUCOSE BLDC GLUCOMTR-MCNC: 84 MG/DL (ref 70–99)
GLUCOSE SERPL-MCNC: 93 MG/DL (ref 70–99)
GLUCOSE UR STRIP-MCNC: NEGATIVE MG/DL
HBA1C MFR BLD: 5.8 % (ref 0–5.6)
HCT VFR BLD AUTO: 38.3 % (ref 35–47)
HGB BLD-MCNC: 12.1 G/DL (ref 11.7–15.7)
HGB UR QL STRIP: NEGATIVE
IMM GRANULOCYTES # BLD: 0.1 10E9/L (ref 0–0.4)
IMM GRANULOCYTES NFR BLD: 0.7 %
KETONES UR STRIP-MCNC: NEGATIVE MG/DL
LACTATE BLD-SCNC: 1.1 MMOL/L (ref 0.7–2)
LEUKOCYTE ESTERASE UR QL STRIP: ABNORMAL
LYMPHOCYTES # BLD AUTO: 1.2 10E9/L (ref 0.8–5.3)
LYMPHOCYTES NFR BLD AUTO: 8 %
MCH RBC QN AUTO: 30.8 PG (ref 26.5–33)
MCHC RBC AUTO-ENTMCNC: 31.6 G/DL (ref 31.5–36.5)
MCV RBC AUTO: 98 FL (ref 78–100)
MONOCYTES # BLD AUTO: 1 10E9/L (ref 0–1.3)
MONOCYTES NFR BLD AUTO: 6.9 %
MUCOUS THREADS #/AREA URNS LPF: PRESENT /LPF
NEUTROPHILS # BLD AUTO: 12.3 10E9/L (ref 1.6–8.3)
NEUTROPHILS NFR BLD AUTO: 83.7 %
NITRATE UR QL: NEGATIVE
NRBC # BLD AUTO: 0 10*3/UL
NRBC BLD AUTO-RTO: 0 /100
PH UR STRIP: 9 PH (ref 5–7)
PLATELET # BLD AUTO: 264 10E9/L (ref 150–450)
POTASSIUM SERPL-SCNC: 4.6 MMOL/L (ref 3.4–5.3)
PROT SERPL-MCNC: 7 G/DL (ref 6.8–8.8)
RBC # BLD AUTO: 3.93 10E12/L (ref 3.8–5.2)
RBC #/AREA URNS AUTO: 10 /HPF (ref 0–2)
SODIUM SERPL-SCNC: 137 MMOL/L (ref 133–144)
SOURCE: ABNORMAL
SP GR UR STRIP: 1.01 (ref 1–1.03)
UROBILINOGEN UR STRIP-MCNC: 0 MG/DL (ref 0–2)
WBC # BLD AUTO: 14.7 10E9/L (ref 4–11)
WBC #/AREA URNS AUTO: 23 /HPF (ref 0–5)

## 2019-03-31 PROCEDURE — 81001 URINALYSIS AUTO W/SCOPE: CPT | Performed by: FAMILY MEDICINE

## 2019-03-31 PROCEDURE — 99223 1ST HOSP IP/OBS HIGH 75: CPT | Mod: AI | Performed by: FAMILY MEDICINE

## 2019-03-31 PROCEDURE — 99285 EMERGENCY DEPT VISIT HI MDM: CPT | Mod: Z6 | Performed by: FAMILY MEDICINE

## 2019-03-31 PROCEDURE — 87081 CULTURE SCREEN ONLY: CPT | Performed by: FAMILY MEDICINE

## 2019-03-31 PROCEDURE — A9270 NON-COVERED ITEM OR SERVICE: HCPCS | Mod: GY | Performed by: FAMILY MEDICINE

## 2019-03-31 PROCEDURE — 99207 ZZC CDG-MDM COMPONENT: MEETS MODERATE - UP CODED: CPT | Performed by: FAMILY MEDICINE

## 2019-03-31 PROCEDURE — 25800030 ZZH RX IP 258 OP 636: Performed by: FAMILY MEDICINE

## 2019-03-31 PROCEDURE — 00000146 ZZHCL STATISTIC GLUCOSE BY METER IP

## 2019-03-31 PROCEDURE — 12000000 ZZH R&B MED SURG/OB

## 2019-03-31 PROCEDURE — 87800 DETECT AGNT MULT DNA DIREC: CPT | Performed by: FAMILY MEDICINE

## 2019-03-31 PROCEDURE — 96361 HYDRATE IV INFUSION ADD-ON: CPT | Performed by: FAMILY MEDICINE

## 2019-03-31 PROCEDURE — 25000132 ZZH RX MED GY IP 250 OP 250 PS 637: Mod: GY | Performed by: FAMILY MEDICINE

## 2019-03-31 PROCEDURE — 87186 SC STD MICRODIL/AGAR DIL: CPT | Performed by: FAMILY MEDICINE

## 2019-03-31 PROCEDURE — 99285 EMERGENCY DEPT VISIT HI MDM: CPT | Mod: 25 | Performed by: FAMILY MEDICINE

## 2019-03-31 PROCEDURE — 25000128 H RX IP 250 OP 636: Performed by: FAMILY MEDICINE

## 2019-03-31 PROCEDURE — 36415 COLL VENOUS BLD VENIPUNCTURE: CPT | Performed by: FAMILY MEDICINE

## 2019-03-31 PROCEDURE — 83605 ASSAY OF LACTIC ACID: CPT | Performed by: FAMILY MEDICINE

## 2019-03-31 PROCEDURE — 87086 URINE CULTURE/COLONY COUNT: CPT | Performed by: FAMILY MEDICINE

## 2019-03-31 PROCEDURE — 83036 HEMOGLOBIN GLYCOSYLATED A1C: CPT | Performed by: FAMILY MEDICINE

## 2019-03-31 PROCEDURE — 87088 URINE BACTERIA CULTURE: CPT | Performed by: FAMILY MEDICINE

## 2019-03-31 PROCEDURE — 87077 CULTURE AEROBIC IDENTIFY: CPT | Performed by: FAMILY MEDICINE

## 2019-03-31 PROCEDURE — 87040 BLOOD CULTURE FOR BACTERIA: CPT | Performed by: FAMILY MEDICINE

## 2019-03-31 PROCEDURE — 85025 COMPLETE CBC W/AUTO DIFF WBC: CPT | Performed by: FAMILY MEDICINE

## 2019-03-31 PROCEDURE — 96365 THER/PROPH/DIAG IV INF INIT: CPT | Performed by: FAMILY MEDICINE

## 2019-03-31 PROCEDURE — 80053 COMPREHEN METABOLIC PANEL: CPT | Performed by: FAMILY MEDICINE

## 2019-03-31 RX ORDER — SODIUM CHLORIDE 9 MG/ML
INJECTION, SOLUTION INTRAVENOUS CONTINUOUS
Status: DISCONTINUED | OUTPATIENT
Start: 2019-03-31 | End: 2019-04-02

## 2019-03-31 RX ORDER — HYDRALAZINE HYDROCHLORIDE 25 MG/1
25 TABLET, FILM COATED ORAL EVERY 8 HOURS SCHEDULED
Status: DISCONTINUED | OUTPATIENT
Start: 2019-03-31 | End: 2019-04-03

## 2019-03-31 RX ORDER — NICOTINE POLACRILEX 4 MG
15-30 LOZENGE BUCCAL
Status: DISCONTINUED | OUTPATIENT
Start: 2019-03-31 | End: 2019-04-07 | Stop reason: HOSPADM

## 2019-03-31 RX ORDER — CEFTRIAXONE 1 G/1
1 INJECTION, POWDER, FOR SOLUTION INTRAMUSCULAR; INTRAVENOUS ONCE
Status: DISCONTINUED | OUTPATIENT
Start: 2019-03-31 | End: 2019-03-31

## 2019-03-31 RX ORDER — ONDANSETRON 2 MG/ML
4 INJECTION INTRAMUSCULAR; INTRAVENOUS EVERY 6 HOURS PRN
Status: DISCONTINUED | OUTPATIENT
Start: 2019-03-31 | End: 2019-04-07 | Stop reason: HOSPADM

## 2019-03-31 RX ORDER — ONDANSETRON 4 MG/1
4 TABLET, ORALLY DISINTEGRATING ORAL EVERY 6 HOURS PRN
Status: DISCONTINUED | OUTPATIENT
Start: 2019-03-31 | End: 2019-04-07 | Stop reason: HOSPADM

## 2019-03-31 RX ORDER — ACETAMINOPHEN 325 MG/1
650 TABLET ORAL EVERY 4 HOURS PRN
Status: DISCONTINUED | OUTPATIENT
Start: 2019-03-31 | End: 2019-04-02

## 2019-03-31 RX ORDER — DEXTROSE MONOHYDRATE 25 G/50ML
25-50 INJECTION, SOLUTION INTRAVENOUS
Status: DISCONTINUED | OUTPATIENT
Start: 2019-03-31 | End: 2019-04-07 | Stop reason: HOSPADM

## 2019-03-31 RX ORDER — SODIUM CHLORIDE 9 MG/ML
INJECTION, SOLUTION INTRAVENOUS CONTINUOUS
Status: DISCONTINUED | OUTPATIENT
Start: 2019-03-31 | End: 2019-03-31

## 2019-03-31 RX ORDER — TRAZODONE HYDROCHLORIDE 50 MG/1
50 TABLET, FILM COATED ORAL AT BEDTIME
Status: DISCONTINUED | OUTPATIENT
Start: 2019-03-31 | End: 2019-04-01

## 2019-03-31 RX ORDER — SODIUM CHLORIDE 9 MG/ML
1000 INJECTION, SOLUTION INTRAVENOUS CONTINUOUS
Status: DISCONTINUED | OUTPATIENT
Start: 2019-03-31 | End: 2019-03-31

## 2019-03-31 RX ORDER — CEFTRIAXONE 1 G/1
1 INJECTION, POWDER, FOR SOLUTION INTRAMUSCULAR; INTRAVENOUS EVERY 24 HOURS
Status: COMPLETED | OUTPATIENT
Start: 2019-03-31 | End: 2019-04-02

## 2019-03-31 RX ADMIN — SODIUM CHLORIDE 1000 ML: 9 INJECTION, SOLUTION INTRAVENOUS at 18:59

## 2019-03-31 RX ADMIN — HYDRALAZINE HYDROCHLORIDE 25 MG: 25 TABLET ORAL at 22:20

## 2019-03-31 RX ADMIN — ACETAMINOPHEN 650 MG: 325 TABLET ORAL at 23:49

## 2019-03-31 RX ADMIN — CEFTRIAXONE SODIUM 1 G: 1 INJECTION, POWDER, FOR SOLUTION INTRAMUSCULAR; INTRAVENOUS at 20:29

## 2019-03-31 RX ADMIN — Medication 2.5 MG: at 22:20

## 2019-03-31 RX ADMIN — TRAZODONE HYDROCHLORIDE 50 MG: 50 TABLET ORAL at 22:20

## 2019-03-31 RX ADMIN — SODIUM CHLORIDE 1000 ML: 9 INJECTION, SOLUTION INTRAVENOUS at 20:28

## 2019-03-31 RX ADMIN — SODIUM CHLORIDE: 9 INJECTION, SOLUTION INTRAVENOUS at 21:34

## 2019-03-31 ASSESSMENT — ENCOUNTER SYMPTOMS
SHORTNESS OF BREATH: 0
DIZZINESS: 0
BACK PAIN: 0
CHEST TIGHTNESS: 0
FREQUENCY: 0
DYSURIA: 0
DIARRHEA: 0
RHINORRHEA: 0
ABDOMINAL PAIN: 0
CONFUSION: 1
NAUSEA: 0
WEAKNESS: 1
NERVOUS/ANXIOUS: 0
DECREASED CONCENTRATION: 1
WOUND: 0
AGITATION: 0
COUGH: 0
HEADACHES: 0
SORE THROAT: 0
VOMITING: 0

## 2019-03-31 ASSESSMENT — MIFFLIN-ST. JEOR
SCORE: 1129.01
SCORE: 1135.13

## 2019-03-31 ASSESSMENT — ACTIVITIES OF DAILY LIVING (ADL)
RETIRED_EATING: 0-->INDEPENDENT
WHICH_OF_THE_ABOVE_FUNCTIONAL_RISKS_HAD_A_RECENT_ONSET_OR_CHANGE?: COGNITION
BATHING: 2-->ASSISTIVE PERSON
TOILETING: 1-->ASSISTIVE EQUIPMENT
DRESS: 0-->INDEPENDENT
SWALLOWING: 0-->SWALLOWS FOODS/LIQUIDS WITHOUT DIFFICULTY
RETIRED_COMMUNICATION: 0-->UNDERSTANDS/COMMUNICATES WITHOUT DIFFICULTY

## 2019-03-31 NOTE — ED TRIAGE NOTES
Pt brought in by EMS from home. Pt more confused today according to family. Pt hallucinating in room during triage. EMS reports strong odor of urine in home, pt has hx of UTI's.

## 2019-03-31 NOTE — ED PROVIDER NOTES
"  History     Chief Complaint   Patient presents with     Altered Mental Status     Possible UTI     HPI  Smiley Acuña is a 84 year old female with history of dementia, E. coli sepsis, UTIs, hypertension, diabetes type 2, generalized weakness and deconditioning, history of hypoxia and hypercapnia, coronary artery disease and stage IV renal failure who is brought to the emergency department by ambulance with family's concern for altered mental status.  Patient's baseline confusion has worsened today.  She felt feverish at home.  Her urine has been very strong smelling.  She is here in the ED with her  of 60 years and her daughter.  They both state the she has had a similar presentation with urinary infections.   also reports that she fell in the kitchen earlier today.  She collapsed to the floor \"from weakness..  He thinks she may have landed on her right leg.  She has been up and ambulatory since the fall.  She uses a walker.  She states she feels very weak and admits that she does feel sick today.  She denies any headache.  No nausea or vomiting.  No abdominal pain.    Allergies:  Allergies   Allergen Reactions     Lisinopril      upper resp symptoms, cough       Problem List:    Patient Active Problem List    Diagnosis Date Noted     Femoral distal fracture (H) 12/12/2012     Priority: High     Hypercapnia 12/12/2012     Priority: High     Dementia - suspected 01/23/2018     Priority: Medium     E. 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 Provider to review       " Dermatophytosis of nail 02/06/2014     Priority: Medium     Corns and callosities 02/06/2014     Priority: Medium     History of amputation of lesser toe (H) 02/06/2014     Priority: Medium     GERD (gastroesophageal reflux disease) 12/31/2013     Priority: Medium     Health Care Home 12/06/2013     Priority: Medium     State Tier Level:  unknown  Status:  Closed  Care Coordinator:  Carine Vasques RN BSN    See Letters for [facility] Care Plan           Hypoxia 12/21/2012     Priority: Medium     Atelectasis - left base 12/19/2012     Priority: Medium     Anemia 12/13/2012     Priority: Medium     Thrombocytopenia (H) 12/13/2012     Priority: Medium     Peripheral autonomic neuropathy due to diabetes mellitus (H) 07/13/2012     Priority: Medium     Osteoporosis 12/22/2011     Priority: Medium     Estrogen deficiency 12/12/2011     Priority: Medium     Subdural hematoma (H) 06/18/2010     Priority: Medium     Degenerative disc disease      Priority: Medium     lumbar       Unspecified osteomyelitis, other specified site 08/31/2005     Priority: Medium     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 Provider to review       Renal failure 09/09/2004     Priority: Medium     Problem list name updated by automated process. Provider to review       Primary osteoarthritis 07/20/2004     Priority: Medium     Acute encephalopathy 12/13/2012     Priority: Low     CKD (chronic kidney disease) stage 4, GFR 15-29 ml/min (H) 12/12/2012     Priority: Low     Hip pain 12/12/2012     Priority: Low     Left upper arm pain 12/12/2012     Priority: Low     Advanced directives, counseling/discussion 03/27/2012     Priority: Low     Patient states has Advance Directive and will bring in a copy to clinic. 3/27/2012          Hyperlipidemia LDL goal <100 10/31/2010     Priority: Low        Past Medical History:    Past Medical  History:   Diagnosis Date     Abnormality of gait      Anemia, unspecified      Chronic ischemic heart disease, unspecified      Closed fracture of patella 06/21/10     Coronary artery disease      Degenerative disc disease      Depressive disorder, not elsewhere classified      History of blood transfusion      Other and unspecified hyperlipidemia      Renal failure, unspecified      Septicemia due to Escherichia coli (E. coli)(038.42) (H) 05/03/2007     Syncope and collapse 4/8/2005     Syncope and collapse 05/26/10     Traumatic subdural hematomas 05/27/10     Type II or unspecified type diabetes mellitus without mention of complication, not stated as uncontrolled      Unspecified essential hypertension      Unspecified sleep apnea      Urinary tract infection, site not specified 4/4/2008       Past Surgical History:    Past Surgical History:   Procedure Laterality Date     BUNIONECTOMY RT/LT  10/24/07    Bunionectomy right foot. Arthrodesis IP joint, right hallux.     C ANESTH,UPPER LEG SURGERY       C APPENDECTOMY       C BX SYNOVIUM INTERPHALANG JT       C OPEN TX FEMORAL SUPRACONDYLAR FRACTURE W EXTENSION  12/14/12    right LE     CRANIOTOMY  05/28/10    [facility]     HC AMPUTATION TOE, MTP JT  2000    Second toe amputation     HC COLONOSCOPY W/WO BRUSH/WASH  12/05/2005    Internal hemorrhoids.  Diverticulosis-limited/left colon.  Otherwise normal to cecum.     HC EXCISION LESION/TENDON-SHEATH/CAPSULE, FOOT  04/30/07    right foot.     HC EXCISION LESION/TENDON-SHEATH/CAPSULE, FOOT  8/14/2007    Recurrent ganglion cyst - right ankle     HC REPAIR ROTATOR CUFF,ACUTE  11/21/2002    Rotator Cuff Repair; Right     HC UGI ENDOSCOPY DIAG W OR W/O BRUSH/WASH  12/05/2005    Hiatus hernia.  Otherwise normal.      UGI ENDOSCOPY, SIMPLE EXAM  04/27/2005       Family History:    Family History   Problem Relation Age of Onset     Diabetes Brother      [email or web address]. Brother      Diabetes Brother      Diabetes Mother       "Osteoporosis Mother        Social History:  Marital Status:   [2]  Social History     Tobacco Use     Smoking status: Never Smoker     Smokeless tobacco: Never Used   Substance Use Topics     Alcohol use: No     Alcohol/week: 0.0 oz     Drug use: No        Medications:      acetaminophen (TYLENOL) 325 MG tablet   albuterol (PROAIR HFA, PROVENTIL HFA, VENTOLIN HFA) 108 (90 BASE) MCG/ACT inhaler   ASPIRIN NOT PRESCRIBED, INTENTIONAL,   B-D INSULIN SYRINGE 30G X 1/2\" 0.5 ML   blood glucose monitoring (PRODIGY TWIST TOP 28G) lancets   blood glucose monitoring (PRODIGY TWIST TOP 28G) lancets   cholecalciferol (VITAMIN D) 400 UNIT TABS   gabapentin (NEURONTIN) 100 MG capsule   gabapentin (NEURONTIN) 300 MG capsule   hydrALAZINE (APRESOLINE) 25 MG tablet   hydrALAZINE (APRESOLINE) 25 MG tablet   HYDROcodone-acetaminophen (NORCO) 5-325 MG per tablet   insulin aspart (NOVOLOG PEN) 100 UNIT/ML injection   insulin glargine (BASAGLAR KWIKPEN) 100 UNIT/ML pen   insulin pen needle (B-D U/F) 31G X 8 MM miscellaneous   LANTUS VIAL 100 UNIT/ML soln   MULTIVITAMINS OR TABS   NOVOLOG VIAL 100 UNIT/ML soln   oxybutynin (DITROPAN) 5 MG tablet   PRODIGY NO CODING BLOOD GLUC test strip   PRODIGY NO CODING BLOOD GLUC test strip   simvastatin (ZOCOR) 80 MG tablet   Spacer/Aero-Holding Chambers (OPTIHALER) BOGDAN   torsemide (DEMADEX) 10 MG tablet   traZODone (DESYREL) 50 MG tablet         Review of Systems   Unable to perform ROS: Dementia   HENT: Negative for rhinorrhea and sore throat.    Eyes:        Vision loss no worse than usual   Respiratory: Negative for cough, chest tightness and shortness of breath.    Cardiovascular: Negative for chest pain.   Gastrointestinal: Negative for abdominal pain, diarrhea, nausea and vomiting.   Genitourinary: Negative for dysuria and frequency.   Musculoskeletal: Negative for back pain.   Skin: Negative for pallor, rash and wound.   Allergic/Immunologic: Negative for immunocompromised state. " "  Neurological: Positive for weakness. Negative for dizziness and headaches.   Psychiatric/Behavioral: Positive for confusion and decreased concentration. Negative for agitation and behavioral problems. The patient is not nervous/anxious.    All other systems reviewed and are negative.  Limited accuracy as the patient denies most questions.    Physical Exam   BP: 132/67  Pulse: 85  Temp: 99.1  F (37.3  C)  Resp: 18  Height: 157.5 cm (5' 2\")  Weight: 72.6 kg (160 lb)  SpO2: 95 %      Physical Exam   Constitutional: She is oriented to person, place, and time. She appears well-developed and well-nourished. No distress.   Alert elderly female.  She is smiling and conversing and joking with her family.  She does not appear to be in any distress.  She is slightly febrile at 99.  Vital signs otherwise stable.  She is breathing at ease with normal O2 sat at 95 on room air.  She has no cough or audible congestion./67   Pulse 85   Temp 99.1  F (37.3  C)   Resp 18   Ht 1.575 m (5' 2\")   Wt 72.6 kg (160 lb)   SpO2 95%   BMI 29.26 kg/m       HENT:   Head: Normocephalic and atraumatic.   Right Ear: External ear normal.   Left Ear: External ear normal.   Nose: Nose normal.   Mouth/Throat: Oropharynx is clear and moist.   Eyes: Conjunctivae and EOM are normal. Pupils are equal, round, and reactive to light.   Neck: Normal range of motion. Neck supple.   Cardiovascular: Normal rate, regular rhythm, normal heart sounds and intact distal pulses.   Pulmonary/Chest: Effort normal and breath sounds normal.   Abdominal: Soft. Bowel sounds are normal.   Musculoskeletal: Normal range of motion.   Neurological: She is alert and oriented to person, place, and time.   Skin: Skin is warm and dry. Capillary refill takes less than 2 seconds.   Psychiatric: She has a normal mood and affect. Her behavior is normal.   Nursing note and vitals reviewed.      ED Course        Procedures  Results for orders placed or performed during the " hospital encounter of 03/31/19   CBC with platelets differential   Result Value Ref Range    WBC 14.7 (H) 4.0 - 11.0 10e9/L    RBC Count 3.93 3.8 - 5.2 10e12/L    Hemoglobin 12.1 11.7 - 15.7 g/dL    Hematocrit 38.3 35.0 - 47.0 %    MCV 98 78 - 100 fl    MCH 30.8 26.5 - 33.0 pg    MCHC 31.6 31.5 - 36.5 g/dL    RDW 13.0 10.0 - 15.0 %    Platelet Count 264 150 - 450 10e9/L    Diff Method Automated Method     % Neutrophils 83.7 %    % Lymphocytes 8.0 %    % Monocytes 6.9 %    % Eosinophils 0.5 %    % Basophils 0.2 %    % Immature Granulocytes 0.7 %    Nucleated RBCs 0 0 /100    Absolute Neutrophil 12.3 (H) 1.6 - 8.3 10e9/L    Absolute Lymphocytes 1.2 0.8 - 5.3 10e9/L    Absolute Monocytes 1.0 0.0 - 1.3 10e9/L    Absolute Basophils 0.0 0.0 - 0.2 10e9/L    Abs Immature Granulocytes 0.1 0 - 0.4 10e9/L    Absolute Nucleated RBC 0.0    Comprehensive metabolic panel   Result Value Ref Range    Sodium 137 133 - 144 mmol/L    Potassium 4.6 3.4 - 5.3 mmol/L    Chloride 105 94 - 109 mmol/L    Carbon Dioxide 19 (L) 20 - 32 mmol/L    Anion Gap 13 3 - 14 mmol/L    Glucose 93 70 - 99 mg/dL    Urea Nitrogen 93 (H) 7 - 30 mg/dL    Creatinine 3.30 (H) 0.52 - 1.04 mg/dL    GFR Estimate 12 (L) >60 mL/min/[1.73_m2]    GFR Estimate If Black 14 (L) >60 mL/min/[1.73_m2]    Calcium 9.3 8.5 - 10.1 mg/dL    Bilirubin Total 0.3 0.2 - 1.3 mg/dL    Albumin 2.5 (L) 3.4 - 5.0 g/dL    Protein Total 7.0 6.8 - 8.8 g/dL    Alkaline Phosphatase 66 40 - 150 U/L    ALT 63 (H) 0 - 50 U/L    AST 70 (H) 0 - 45 U/L   Lactic acid whole blood   Result Value Ref Range    Lactic Acid 1.1 0.7 - 2.0 mmol/L   UA with Microscopic   Result Value Ref Range    Color Urine Yellow     Appearance Urine Cloudy     Glucose Urine Negative NEG^Negative mg/dL    Bilirubin Urine Negative NEG^Negative    Ketones Urine Negative NEG^Negative mg/dL    Specific Gravity Urine 1.012 1.003 - 1.035    Blood Urine Negative NEG^Negative    pH Urine 9.0 (H) 5.0 - 7.0 pH    Protein Albumin  Urine >499 (A) NEG^Negative mg/dL    Urobilinogen mg/dL 0.0 0.0 - 2.0 mg/dL    Nitrite Urine Negative NEG^Negative    Leukocyte Esterase Urine Moderate (A) NEG^Negative    Source Catheterized Urine     WBC Urine 23 (H) 0 - 5 /HPF    RBC Urine 10 (H) 0 - 2 /HPF    Bacteria Urine Moderate (A) NEG^Negative /HPF    Mucous Urine Present (A) NEG^Negative /LPF    Calcium Oxalate Few (A) NEG^Negative /HPF     *Note: Due to a large number of results and/or encounters for the requested time period, some results have not been displayed. A complete set of results can be found in Results Review.                  Critical Care time:  none               Results for orders placed or performed during the hospital encounter of 03/31/19 (from the past 24 hour(s))   CBC with platelets differential   Result Value Ref Range    WBC 14.7 (H) 4.0 - 11.0 10e9/L    RBC Count 3.93 3.8 - 5.2 10e12/L    Hemoglobin 12.1 11.7 - 15.7 g/dL    Hematocrit 38.3 35.0 - 47.0 %    MCV 98 78 - 100 fl    MCH 30.8 26.5 - 33.0 pg    MCHC 31.6 31.5 - 36.5 g/dL    RDW 13.0 10.0 - 15.0 %    Platelet Count 264 150 - 450 10e9/L    Diff Method Automated Method     % Neutrophils 83.7 %    % Lymphocytes 8.0 %    % Monocytes 6.9 %    % Eosinophils 0.5 %    % Basophils 0.2 %    % Immature Granulocytes 0.7 %    Nucleated RBCs 0 0 /100    Absolute Neutrophil 12.3 (H) 1.6 - 8.3 10e9/L    Absolute Lymphocytes 1.2 0.8 - 5.3 10e9/L    Absolute Monocytes 1.0 0.0 - 1.3 10e9/L    Absolute Basophils 0.0 0.0 - 0.2 10e9/L    Abs Immature Granulocytes 0.1 0 - 0.4 10e9/L    Absolute Nucleated RBC 0.0    Comprehensive metabolic panel   Result Value Ref Range    Sodium 137 133 - 144 mmol/L    Potassium 4.6 3.4 - 5.3 mmol/L    Chloride 105 94 - 109 mmol/L    Carbon Dioxide 19 (L) 20 - 32 mmol/L    Anion Gap 13 3 - 14 mmol/L    Glucose 93 70 - 99 mg/dL    Urea Nitrogen 93 (H) 7 - 30 mg/dL    Creatinine 3.30 (H) 0.52 - 1.04 mg/dL    GFR Estimate 12 (L) >60 mL/min/[1.73_m2]    GFR  Estimate If Black 14 (L) >60 mL/min/[1.73_m2]    Calcium 9.3 8.5 - 10.1 mg/dL    Bilirubin Total 0.3 0.2 - 1.3 mg/dL    Albumin 2.5 (L) 3.4 - 5.0 g/dL    Protein Total 7.0 6.8 - 8.8 g/dL    Alkaline Phosphatase 66 40 - 150 U/L    ALT 63 (H) 0 - 50 U/L    AST 70 (H) 0 - 45 U/L   Lactic acid whole blood   Result Value Ref Range    Lactic Acid 1.1 0.7 - 2.0 mmol/L   UA with Microscopic   Result Value Ref Range    Color Urine Yellow     Appearance Urine Cloudy     Glucose Urine Negative NEG^Negative mg/dL    Bilirubin Urine Negative NEG^Negative    Ketones Urine Negative NEG^Negative mg/dL    Specific Gravity Urine 1.012 1.003 - 1.035    Blood Urine Negative NEG^Negative    pH Urine 9.0 (H) 5.0 - 7.0 pH    Protein Albumin Urine >499 (A) NEG^Negative mg/dL    Urobilinogen mg/dL 0.0 0.0 - 2.0 mg/dL    Nitrite Urine Negative NEG^Negative    Leukocyte Esterase Urine Moderate (A) NEG^Negative    Source Catheterized Urine     WBC Urine 23 (H) 0 - 5 /HPF    RBC Urine 10 (H) 0 - 2 /HPF    Bacteria Urine Moderate (A) NEG^Negative /HPF    Mucous Urine Present (A) NEG^Negative /LPF    Calcium Oxalate Few (A) NEG^Negative /HPF     *Note: Due to a large number of results and/or encounters for the requested time period, some results have not been displayed. A complete set of results can be found in Results Review.       Medications   0.9% sodium chloride BOLUS (1,000 mLs Intravenous New Bag 3/31/19 [id number])     Followed by   sodium chloride 0.9% infusion (not administered)   0.9% sodium chloride BOLUS (not administered)   sodium chloride 0.9% infusion (not administered)   cefTRIAXone (ROCEPHIN) 1 g vial to attach to  mL bag for ADULTS or NS 50 mL bag for PEDS (not administered)       Assessments & Plan (with Medical Decision Making)   MDM--84-year-old female who presents the emergency room by ambulance with concerns on the family's part for increased confusion and weakness.  Patient has a history of UTIs and gram-negative sepsis.   She has had similar presentations with urinary tract infections in the past.  Patient is febrile and feeling ill but otherwise has no other complaints.  She lives at home with her .  She is usually up and ambulatory with her walker.  She did collapse to the floor in their kitchen because of weakness.  There is no trauma or injury from the fall.  She denies any hip or back leg or other pain.  Exam shows no injury or bruising.  She is febrile, elevated white count and a strongly positive urinalysis for infection.  Urine and blood cultures have been sent.  Patient's lactate is normal however she was still treated for sepsis with IV fluids and IV Rocephin.  She has chronic renal failure and her creatinine usually runs around 1.95 and BUN is usually around 40.  Today her BUN is 93 and her creatinine is 3.3 and she appears dehydrated.  2 L of normal saline have been ordered and IV maintenance at 150 an hour.  I discussed all the findings and with the patient's history of urosepsis recommended that we keep her overnight to give her the fluids that she needs IV antibiotics and close observation.  Family is comfortable with this.  Patient herself is not happy and wants to go home which I feel is actually a positive sign.  I discussed the case with our hospitalist Dr. Montana Horvath who accepts care of the patient.  He is in agreement with the treatment plan.      I have reviewed the nursing notes.    I have reviewed the findings, diagnosis, plan and need for follow up with the patient.          Medication List      There are no discharge medications for this visit.         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

## 2019-03-31 NOTE — LETTER
Transition Communication Hand-off for Care Transitions to Next Level of Care Provider    Name: Smiley Acuña  : 1935  MRN #: [id number]  Primary Care Provider: Margarito Hoffman  Primary Care MD Name: Dr. Margarito Hoffman  Primary Clinic: [address] [place] [id number]  Primary Care Clinic Name: [facility]  Reason for Hospitalization:  Generalized muscle weakness [M62.81]  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) [E11.49]  Acute cystitis with hematuria [N30.01]  Altered mental status, unspecified altered mental status type [R41.82]  Dementia without behavioral disturbance, unspecified dementia type [F03.90]  Essential hypertension with goal blood pressure less than 130/80 [I10]  Acute renal failure superimposed on stage 4 chronic kidney disease, unspecified acute renal failure type (H) [N17.9, N18.4]  Admit Date/Time: 3/31/2019  6:19 PM  Discharge Date: 2019    Payor Source: Payor: MEDICARE / Plan: MEDICARE / Product Type: Medicare /     Readmission Assessment Measure (CARLINE) Risk Score/category:   Average    Reason for Communication Hand-off Referral: Fragility  Concern for non-adherence with plan of care:   Y/N na    Follow-up specialty is recommended: No    Follow-up plan:  per TCU  Any outstanding tests or procedures:  NO        Key Recommendations:  Patient lives with her , Darien.  Darien states he provides all the care patient needs.  She does not have any in-home services.  Physical and Occupational therapy evaluations recommend TCU.  However, Darien and patient and are wanting patient to return home.  Discussed safety as main concern of patient returning home.  Darien states that he is able manage patient's cares at home.  Daughter had called and stated pt should be going to TCU as her  is not able to care for her with her current health.  Pt will be discharged to [facility] (Phone: [phone] Fax: [phone]).    Tania Vasques BSN, RN, PHN  RN Care  Coordinator  Care Transitions Department  [facility] [phone]  [facility] [phone]    AVS/Discharge Summary is the source of truth; this is a helpful guide for improved communication of patient story

## 2019-04-01 ENCOUNTER — APPOINTMENT (OUTPATIENT)
Dept: OCCUPATIONAL THERAPY | Facility: CLINIC | Age: 84
DRG: 871 | End: 2019-04-01
Attending: FAMILY MEDICINE
Payer: MEDICARE

## 2019-04-01 ENCOUNTER — APPOINTMENT (OUTPATIENT)
Dept: PHYSICAL THERAPY | Facility: CLINIC | Age: 84
DRG: 871 | End: 2019-04-01
Attending: PEDIATRICS
Payer: MEDICARE

## 2019-04-01 LAB
ANION GAP SERPL CALCULATED.3IONS-SCNC: 10 MMOL/L (ref 3–14)
BACTERIA SPEC CULT: NORMAL
BASOPHILS # BLD AUTO: 0 10E9/L (ref 0–0.2)
BASOPHILS NFR BLD AUTO: 0.3 %
BUN SERPL-MCNC: 81 MG/DL (ref 7–30)
CALCIUM SERPL-MCNC: 8.2 MG/DL (ref 8.5–10.1)
CHLORIDE SERPL-SCNC: 113 MMOL/L (ref 94–109)
CO2 SERPL-SCNC: 18 MMOL/L (ref 20–32)
CREAT SERPL-MCNC: 3.09 MG/DL (ref 0.52–1.04)
DIFFERENTIAL METHOD BLD: ABNORMAL
EOSINOPHIL NFR BLD AUTO: 0.5 %
ERYTHROCYTE [DISTWIDTH] IN BLOOD BY AUTOMATED COUNT: 13.2 % (ref 10–15)
GFR SERPL CREATININE-BSD FRML MDRD: 13 ML/MIN/{1.73_M2}
GLUCOSE BLDC GLUCOMTR-MCNC: 132 MG/DL (ref 70–99)
GLUCOSE BLDC GLUCOMTR-MCNC: 133 MG/DL (ref 70–99)
GLUCOSE BLDC GLUCOMTR-MCNC: 70 MG/DL (ref 70–99)
GLUCOSE BLDC GLUCOMTR-MCNC: 76 MG/DL (ref 70–99)
GLUCOSE BLDC GLUCOMTR-MCNC: 84 MG/DL (ref 70–99)
GLUCOSE BLDC GLUCOMTR-MCNC: 90 MG/DL (ref 70–99)
GLUCOSE BLDC GLUCOMTR-MCNC: 92 MG/DL (ref 70–99)
GLUCOSE SERPL-MCNC: 76 MG/DL (ref 70–99)
HCT VFR BLD AUTO: 32.9 % (ref 35–47)
HGB BLD-MCNC: 10.7 G/DL (ref 11.7–15.7)
IMM GRANULOCYTES # BLD: 0.1 10E9/L (ref 0–0.4)
IMM GRANULOCYTES NFR BLD: 0.5 %
LYMPHOCYTES # BLD AUTO: 1.4 10E9/L (ref 0.8–5.3)
LYMPHOCYTES NFR BLD AUTO: 12.4 %
MCH RBC QN AUTO: 30.9 PG (ref 26.5–33)
MCHC RBC AUTO-ENTMCNC: 32.5 G/DL (ref 31.5–36.5)
MCV RBC AUTO: 95 FL (ref 78–100)
MONOCYTES # BLD AUTO: 0.9 10E9/L (ref 0–1.3)
MONOCYTES NFR BLD AUTO: 8 %
NEUTROPHILS # BLD AUTO: 8.7 10E9/L (ref 1.6–8.3)
NEUTROPHILS NFR BLD AUTO: 78.3 %
NRBC # BLD AUTO: 0 10*3/UL
NRBC BLD AUTO-RTO: 0 /100
PLATELET # BLD AUTO: 245 10E9/L (ref 150–450)
POTASSIUM SERPL-SCNC: 4.8 MMOL/L (ref 3.4–5.3)
RBC # BLD AUTO: 3.46 10E12/L (ref 3.8–5.2)
SODIUM SERPL-SCNC: 141 MMOL/L (ref 133–144)
SPECIMEN SOURCE: NORMAL
WBC # BLD AUTO: 11.1 10E9/L (ref 4–11)

## 2019-04-01 PROCEDURE — 25000128 H RX IP 250 OP 636: Performed by: FAMILY MEDICINE

## 2019-04-01 PROCEDURE — 97530 THERAPEUTIC ACTIVITIES: CPT | Mod: GP | Performed by: PHYSICAL THERAPIST

## 2019-04-01 PROCEDURE — 99233 SBSQ HOSP IP/OBS HIGH 50: CPT | Performed by: HOSPITALIST

## 2019-04-01 PROCEDURE — 25000132 ZZH RX MED GY IP 250 OP 250 PS 637: Mod: GY | Performed by: FAMILY MEDICINE

## 2019-04-01 PROCEDURE — 36415 COLL VENOUS BLD VENIPUNCTURE: CPT | Performed by: FAMILY MEDICINE

## 2019-04-01 PROCEDURE — A9270 NON-COVERED ITEM OR SERVICE: HCPCS | Mod: GY | Performed by: HOSPITALIST

## 2019-04-01 PROCEDURE — 25800030 ZZH RX IP 258 OP 636: Performed by: HOSPITALIST

## 2019-04-01 PROCEDURE — 97535 SELF CARE MNGMENT TRAINING: CPT | Mod: GO | Performed by: OCCUPATIONAL THERAPIST

## 2019-04-01 PROCEDURE — 80048 BASIC METABOLIC PNL TOTAL CA: CPT | Performed by: FAMILY MEDICINE

## 2019-04-01 PROCEDURE — 25000131 ZZH RX MED GY IP 250 OP 636 PS 637: Mod: GY | Performed by: FAMILY MEDICINE

## 2019-04-01 PROCEDURE — 97162 PT EVAL MOD COMPLEX 30 MIN: CPT | Mod: GP | Performed by: PHYSICAL THERAPIST

## 2019-04-01 PROCEDURE — 85025 COMPLETE CBC W/AUTO DIFF WBC: CPT | Performed by: FAMILY MEDICINE

## 2019-04-01 PROCEDURE — 00000146 ZZHCL STATISTIC GLUCOSE BY METER IP

## 2019-04-01 PROCEDURE — 12000000 ZZH R&B MED SURG/OB

## 2019-04-01 PROCEDURE — 25800030 ZZH RX IP 258 OP 636: Performed by: FAMILY MEDICINE

## 2019-04-01 PROCEDURE — 97167 OT EVAL HIGH COMPLEX 60 MIN: CPT | Mod: GO | Performed by: OCCUPATIONAL THERAPIST

## 2019-04-01 PROCEDURE — A9270 NON-COVERED ITEM OR SERVICE: HCPCS | Mod: GY | Performed by: FAMILY MEDICINE

## 2019-04-01 PROCEDURE — 25000132 ZZH RX MED GY IP 250 OP 250 PS 637: Mod: GY | Performed by: HOSPITALIST

## 2019-04-01 RX ORDER — LANOLIN ALCOHOL/MO/W.PET/CERES
3 CREAM (GRAM) TOPICAL AT BEDTIME
Status: DISCONTINUED | OUTPATIENT
Start: 2019-04-01 | End: 2019-04-07 | Stop reason: HOSPADM

## 2019-04-01 RX ORDER — TRAZODONE HYDROCHLORIDE 50 MG/1
50 TABLET, FILM COATED ORAL
Status: DISCONTINUED | OUTPATIENT
Start: 2019-04-01 | End: 2019-04-07 | Stop reason: HOSPADM

## 2019-04-01 RX ADMIN — HYDRALAZINE HYDROCHLORIDE 25 MG: 25 TABLET ORAL at 21:28

## 2019-04-01 RX ADMIN — Medication 2.5 MG: at 20:43

## 2019-04-01 RX ADMIN — SODIUM CHLORIDE: 9 INJECTION, SOLUTION INTRAVENOUS at 05:01

## 2019-04-01 RX ADMIN — HYDRALAZINE HYDROCHLORIDE 25 MG: 25 TABLET ORAL at 05:36

## 2019-04-01 RX ADMIN — SODIUM CHLORIDE: 9 INJECTION, SOLUTION INTRAVENOUS at 22:55

## 2019-04-01 RX ADMIN — HYDRALAZINE HYDROCHLORIDE 25 MG: 25 TABLET ORAL at 14:04

## 2019-04-01 RX ADMIN — INSULIN GLARGINE 18 UNITS: 100 INJECTION, SOLUTION SUBCUTANEOUS at 08:36

## 2019-04-01 RX ADMIN — SODIUM CHLORIDE: 9 INJECTION, SOLUTION INTRAVENOUS at 12:20

## 2019-04-01 RX ADMIN — MELATONIN TAB 3 MG 3 MG: 3 TAB at 20:44

## 2019-04-01 RX ADMIN — Medication 2.5 MG: at 08:36

## 2019-04-01 RX ADMIN — CEFTRIAXONE SODIUM 1 G: 1 INJECTION, POWDER, FOR SOLUTION INTRAMUSCULAR; INTRAVENOUS at 20:43

## 2019-04-01 ASSESSMENT — ACTIVITIES OF DAILY LIVING (ADL)
ADLS_ACUITY_SCORE: 22
ADLS_ACUITY_SCORE: 21
ADLS_ACUITY_SCORE: 24
PREVIOUS_RESPONSIBILITIES: MEAL PREP;HOUSEKEEPING;LAUNDRY
ADLS_ACUITY_SCORE: 21
ADLS_ACUITY_SCORE: 22
ADLS_ACUITY_SCORE: 23

## 2019-04-01 NOTE — PROGRESS NOTES
"     Occupational Therapy Evaluation     04/01/19 5552   Quick Adds   Type of Visit Initial Occupational Therapy Evaluation   Living Environment   Lives With child(ksenia), adult;spouse   Living Arrangements house   Home Accessibility stairs to enter home   Transportation Anticipated family or friend will provide   Living Environment Comment Patient reports lives with spouse, sons, and \"others\" (OT questions pt. historian)   Self-Care   Usual Activity Tolerance good   Current Activity Tolerance poor   Regular Exercise No   Equipment Currently Used at Home grab bar, toilet;walker, rolling   Activity/Exercise/Self-Care Comment Patient reports takes care of a paraket/pet care.  Enjoys social conversation, games.   Functional Level   Ambulation 1-->assistive equipment   Transferring 1-->assistive equipment   Toileting 1-->assistive equipment   Bathing 3-->assistive equipment and person   Dressing 0-->independent   Eating 0-->independent   Communication 0-->understands/communicates without difficulty   Swallowing 0-->swallows foods/liquids without difficulty   Cognition 1 - attention or memory deficits   Fall history within last six months yes   Number of times patient has fallen within last six months (reports many falls, but unable to state amount)   Which of the above functional risks had a recent onset or change? cognition   General Information   Onset of Illness/Injury or Date of Surgery - Date 03/31/19   Referring Physician Dr Jose Horvath   Patient/Family Goals Statement \"return home\" if able   Additional Occupational Profile Info/Pertinent History of Current Problem The patient isw an 83 y/o female who was brought to the ED with increased confusion and weakness.  She may have UTI per EPIC chart review.  Yesterday there were reports of halluciantions, but none present this date. PMH: HTN, DM II, CKA, encepholpathy, mild dementia .  Currently for safety 2 person assist and use of a ceiling lift due to weakness.  " Decreased functional independence and safety with all daily tasks and activities.   Precautions/Limitations fall precautions   Heart Disease Risk Factors Age;Medical history   Cognitive Status Examination   Orientation person   Level of Consciousness lethargic/somnolent   Follows Commands (Cognition) follows one step commands   Memory impaired   Attention Quiet environment required;Alternating attention impaired, difficulty shifting between tasks;Divided attention impaired, difficulty with simultaneous tasks   Organization/Problem Solving Problem solving impaired;Categorization of information impaired;Prioritizing of information/tasks impaired   Executive Function Cognitive flexibility impaired;Planning ability impaired;Self awareness/monitoring impaired   Cognitive Comment Slurred speech pattern.  No formal screen completed this date.   Visual Perception   Visual Perception Wears glasses   Visual Perception Comments Eyes shut 75% throughout OT eval.   Pain Assessment   Patient Currently in Pain Yes, see Vital Sign flowsheet   Posture   Posture forward head position   Range of Motion (ROM)   ROM Comment WFL all UE planes of movement   Hand Strength   Hand Strength Comments right hand dominant   Coordination   Upper Extremity Coordination No deficits were identified  (slow motor response, but WFL)   Upper Body Dressing   Level of [place]: Dress Upper Body minimum assist (75% patients effort)   Lower Body Dressing   Level of [place]: Dress Lower Body maximum assist (25% patients effort)   Toileting   Level of [place]: Toilet maximum assist (25% patients effort)   Grooming   Level of [place]: Grooming minimum assist (75% patients effort)   Eating/Self Feeding   Level of [place]: Eating minimum assist (75% patients effort)   Instrumental Activities of Daily Living (IADL)   Previous Responsibilities meal prep;housekeeping;laundry   IADL Comments Family assists with higher functional level tasks  "with IADLs.  Takes care of paraket/pet care   Activities of Daily Living Analysis   Impairments Contributing to Impaired Activities of Daily Living pain;balance impaired;strength decreased   General Therapy Interventions   Planned Therapy Interventions ADL retraining;bed mobility training;cognition;transfer training   Clinical Impression   Criteria for Skilled Therapeutic Interventions Met yes, treatment indicated   OT Diagnosis Decreased functional indpeendence and safety with BADLS   Influenced by the following impairments confusion/cognitve status decline, weakness   Assessment of Occupational Performance 5 or more Performance Deficits   Identified Performance Deficits PMH: HTN, mild dementia, HTN, DMII, CKD.  dressing, hygiene, grooming, simple home mgmt, meal prep, care of pet.   Clinical Decision Making (Complexity) High complexity   Therapy Frequency daily   Predicted Duration of Therapy Intervention (days/wks) 2-3 days   Anticipated Equipment Needs at Discharge (to be determined or defered)   Anticipated Discharge Disposition Transitional Care Facility   Risks and Benefits of Treatment have been explained. Yes   Patient, Family & other staff in agreement with plan of care Yes   [facility] TM \"6 Clicks\"   2016, Trustees of [facility], under license to [employer].  All rights reserved.   6 Clicks Short Forms Daily Activity Inpatient Short Form   [facility] AM-PAC  \"6 Clicks\" Daily Activity Inpatient Short Form   1. Putting on and taking off regular lower body clothing? 1 - Total   2. Bathing (including washing, rinsing, drying)? 2 - A Lot   3. Toileting, which includes using toilet, bedpan or urinal? 2 - A Lot   4. Putting on and taking off regular upper body clothing? 3 - A Little   5. Taking care of personal grooming such as brushing teeth? 3 - A Little   6. Eating meals? 3 - A Little   Daily Activity Raw Score (Score out of 24.Lower scores equate to lower levels of function) 14 "   Total Evaluation Time   Total Evaluation Time (Minutes) 20       SHASHANK Booker/L  [facility]  [phone]

## 2019-04-01 NOTE — ASSESSMENT & PLAN NOTE
Baseline creatinine around 1.5, today at 3.30, likely due to poor oral intake and sepsis  Trending downward with IV fluids, still not at baseline  Will need outpatient follow-up

## 2019-04-01 NOTE — ASSESSMENT & PLAN NOTE
Likely due to sepsis although she does have underlying mild dementia per history  Monitor, PT/OT consult ordered  Probably at baseline per OT, they are recommending TCU placement   wants to bring her home, likely discharge home with home health care and PT OT follow-up

## 2019-04-01 NOTE — H&P
[facility]    History and Physical - Hospitalist Service       Date of Admission:  3/31/2019    Assessment & Plan   Smiley Acuña is a 84 year old female admitted on 3/31/2019. She presents from home with increased confusion.  EMS staff noted hallucinations with family reporting strong urine odor with previous history of UTIs.  She possibly had a fall at home with noted increased weakness.  In the emergency department she was afebrile but confused with hallucinations noted.  She has a leukocytosis of 14,000 with a normal lactate but has elevated creatinine over baseline and an abnormal urine on cath specimen.  Patient been treated with IV fluids and started on IV Rocephin with cultures of blood and urine pending.  We will admit inpatient status will continue IV fluids, recheck creatinine tomorrow.  Expect she will be the hospital for at least 2 days as we wait for her to improve.  At some point will have PT/OT consult to determine if safe to return home.    Acute cystitis without hematuria  Presenting with increased confusion with the smell of strong urine and previous history of recurrent UTIs  Urinalysis abnormal with signs and symptoms of sepsis with encephalopathy  Admit inpatient status with cultures of urine and blood pending.  Rocephin has been started, continue for now along with IV fluids.    Sepsis (H)  Presenting with increased confusion, weakness, leukocytosis with worsening renal function  Urinalysis is abnormal with clinical symptoms of urinary tract infection and known previous history of such  She been treated with IV fluids and will continue overnight.  Rocephin has been started with cultures of urine and blood pending.    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 with mealtime insulin  Has had poor appetite recently and last hemoglobin A1c was low at 4.9  Continue a.m. Lantus, hold mealtime Lantus with sliding scale coverage    CKD (chronic kidney disease) stage 4, GFR 15-29 ml/min (H)  Baseline creatinine around 1.5, today at 3.30, likely due to poor oral intake and sepsis  Has been given bolus with ongoing IV fluids to be continued overnight  Recheck creatinine in the morning    Dementia - suspected  Reported, increased confusion here  PT/OT consult ordered         Diet: Consistent Carbohydrate Diet 6987-2310 Calories: Moderate Consistent CHO (4-6 CHO units/meal)    DVT Prophylaxis: Pneumatic Compression Devices  Vázquez Catheter: not present  Code Status: Full Code      Disposition Plan   Expected discharge: 2 - 3 days, recommended to prior living arrangement once antibiotic plan established, mental status at baseline and SIRS/Sepsis treated.  Entered: Jose Horvath MD 03/31/2019, 9:47 PM     The patient's care was discussed with the Patient.    Jose Horvath MD  [facility]    ______________________________________________________________________    Chief Complaint   84-year-old female who comes from home with increased confusion and possible UTI    History is obtained from the patient, electronic health record and emergency department physician    History of Present Illness   Smiley Acuña is a 84 year old female who presents from home by EMS with increased confusion.  EMS staff noted that she was hallucinating during her trip to the hospital.  Family was concerned about possible urinary tract infection and that her urine had a strong odor.  Patient is confused and may be has underlying dementia and is not able to give a good history.  Family  evidently thought she might of fallen at home this morning but there is no sign of injuries and is been seen ambulating since the fall.  He says a history of UTIs in the past has been treated in the past for sepsis.  His insulin controlled diabetes, history of stage IV kidney disease and history of neuropathy taking gabapentin.    Review of Systems    Review of systems not obtained due to patient factors - confusion    Past Medical History    I have reviewed this patient's medical history and updated it with pertinent information if needed.   Past Medical History:   Diagnosis Date     Abnormality of gait      Anemia, unspecified      Chronic ischemic heart disease, unspecified     2-vessel CAD     Closed fracture of patella 06/21/10    D/C 06/23/10     Coronary artery disease      Degenerative disc disease     lumbar     Depressive disorder, not elsewhere classified      History of blood transfusion      Other and unspecified hyperlipidemia      Renal failure, unspecified     chronic renal disease     Septicemia due to Escherichia coli (E. coli)(038.42) (H) 05/03/2007     Syncope and collapse 4/8/2005    Admit     Syncope and collapse 05/26/10    D/C 05/27/10 to [facility]     Traumatic subdural hematomas 05/27/10     Type II or unspecified type diabetes mellitus without mention of complication, not stated as uncontrolled      Unspecified essential hypertension      Unspecified sleep apnea      Urinary tract infection, site not specified 4/4/2008    UTI with mild sepsis. Admitted.       Past Surgical History   I have reviewed this patient's surgical history and updated it with pertinent information if needed.  Past Surgical History:   Procedure Laterality Date     BUNIONECTOMY RT/LT  10/24/07    Bunionectomy right foot. Arthrodesis IP joint, right hallux.     C ANESTH,UPPER LEG SURGERY       C APPENDECTOMY       C BX SYNOVIUM INTERPHALANG JT       C OPEN TX FEMORAL SUPRACONDYLAR FRACTURE W EXTENSION  12/14/12     "right LE     CRANIOTOMY  05/28/10    [facility] [place]     HC AMPUTATION TOE, MTP JT      Second toe amputation     HC COLONOSCOPY W/WO BRUSH/WASH  2005    Internal hemorrhoids.  Diverticulosis-limited/left colon.  Otherwise normal to cecum.     HC EXCISION LESION/TENDON-SHEATH/CAPSULE, FOOT  07    right foot.     HC EXCISION LESION/TENDON-SHEATH/CAPSULE, FOOT  2007    Recurrent ganglion cyst - right ankle     HC REPAIR ROTATOR CUFF,ACUTE  2002    Rotator Cuff Repair; Right     HC UGI ENDOSCOPY DIAG W OR W/O BRUSH/WASH  2005    Hiatus hernia.  Otherwise normal.     HC UGI ENDOSCOPY, SIMPLE EXAM  2005       Social History   I have reviewed this patient's social history and updated it with pertinent information if needed.  Social History     Tobacco Use     Smoking status: Never Smoker     Smokeless tobacco: Never Used   Substance Use Topics     Alcohol use: No     Alcohol/week: 0.0 oz     Drug use: No       Family History   I have reviewed this patient's family history and updated it with pertinent information if needed.   Family History   Problem Relation Age of Onset     Diabetes Brother      C.A.D. Brother      Diabetes Brother      Diabetes Mother      Osteoporosis Mother        Prior to Admission Medications   Prior to Admission Medications   Prescriptions Last Dose Informant Patient Reported? Taking?   ASPIRIN NOT PRESCRIBED, INTENTIONAL, Past Week at Unknown time  Yes Yes   Sig: by Other route continuous prn. 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     B-D INSULIN SYRINGE 30G X 1/2\" 0.5 ML 3/31/2019 at Unknown time  No Yes   Sig: USE 1 SYRINGE FOUR TIMES A DAY OR AS DIRECTED   HYDROcodone-acetaminophen (NORCO) 5-325 MG per tablet   No No   Sig: Take 1 tablet by mouth every 6 hours as needed for moderate to severe pain   LANTUS VIAL 100 UNIT/ML soln   No No   Sig: INJECT 18 UNITS UNDER THE SKIN ONCE DAILY   MULTIVITAMINS OR TABS   Yes No   Si TABLET DAILY "   NOVOLOG VIAL 100 UNIT/ML soln 3/31/2019 at 1200  No Yes   Sig: INJECT 5 UNITS BEFORE BREAKFAST, 7 UNITS BEFORE LUNCH AND 7 UNITS BEFORE DINNER   PRODIGY NO CODING BLOOD GLUC test strip 3/31/2019 at Unknown time  No Yes   Sig: USE TO TEST BLOOD SUGAR THREE TIMES A DAY OR AS DIRECTED   PRODIGY NO CODING BLOOD GLUC test strip 3/31/2019 at Unknown time  No Yes   Sig: USE TO TEST BLOOD SUGAR THREE TIMES A DAY OR AS DIRECTED   Spacer/Aero-Holding Chambers (OPTIHALER) BOGDAN   No No   Sig: As directed   acetaminophen (TYLENOL) 325 MG tablet 3/30/2019 at 2000  No Yes   Sig: Take 2 tablets (650 mg) by mouth every 4 hours as needed for mild pain   albuterol (PROAIR HFA, PROVENTIL HFA, VENTOLIN HFA) 108 (90 BASE) MCG/ACT inhaler Unknown at Unknown time  No No   Sig: Inhale 2 puffs into the lungs every 6 hours as needed for shortness of breath / dyspnea or wheezing Ventolin inhaler   blood glucose monitoring (PRODIGY TWIST TOP 28G) lancets 3/31/2019 at Unknown time  No Yes   Sig: Use to test blood sugar two times daily or as directed.   blood glucose monitoring (PRODIGY TWIST TOP 28G) lancets 3/31/2019 at Unknown time  No Yes   Sig: USE TO TEST BLOOD SUGAR THREE TIMES A DAY OR AS DIRECTED   cholecalciferol (VITAMIN D) 400 UNIT TABS   Yes No   Sig: Take 2 tablets by mouth daily.   gabapentin (NEURONTIN) 100 MG capsule 3/31/2019 at 0900  No Yes   Sig: Take 1 capsule (100 mg) by mouth 3 times daily   gabapentin (NEURONTIN) 300 MG capsule Unknown at Unknown time  No No   Sig: Take 1 capsule (300 mg) by mouth 2 times daily   hydrALAZINE (APRESOLINE) 25 MG tablet 3/31/2019 at 1200  No Yes   Sig: Take 2 tablets (50 mg) by mouth 3 times daily   hydrALAZINE (APRESOLINE) 25 MG tablet 3/31/2019 at Unknown time  No Yes   Sig: TAKE ONE TABLET (25 MG) BY MOUTH THREE TIMES A DAY   insulin aspart (NOVOLOG PEN) 100 UNIT/ML injection 3/31/2019 at 1200  No Yes   Sig: Inject 1-7 Units Subcutaneous 3 times daily (before meals) For  - 189 give  1 unit.   For  - 239 give 2 units.   For  - 289 give 3 units.   For  - 339 give 4 units.   For - 399 give 5 units.   For -449 give 6 units  For  or higher give 7 units.   insulin glargine (BASAGLAR KWIKPEN) 100 UNIT/ML pen 3/31/2019 at 0900  No Yes   Sig: INJECT 18 UNITS UNDER THE SKIN EVERY MORNING   insulin pen needle (B-D U/F) 31G X 8 MM miscellaneous 3/31/2019 at Unknown time  No Yes   Sig: USE 1 DAILY OR AS DIRECTED   oxybutynin (DITROPAN) 5 MG tablet 3/31/2019 at 0900  No Yes   Sig: TAKE ONE TABLET BY MOUTH FOUR TIMES A DAY   simvastatin (ZOCOR) 80 MG tablet 3/30/2019 at 2000  No Yes   Sig: Take 1 tablet (80 mg) by mouth At Bedtime   torsemide (DEMADEX) 10 MG tablet   No No   Sig: Take 1 tablet (10 mg) by mouth daily   Patient not taking: Reported on 9/19/2018   traZODone (DESYREL) 50 MG tablet 3/30/2019 at 2000  No Yes   Sig: TAKE ONE TABLET BY MOUTH AT BEDTIME      Facility-Administered Medications: None     Allergies   Allergies   Allergen Reactions     Lisinopril      upper resp symptoms, cough       Physical Exam   Vital Signs: Temp: 99.4  F (37.4  C) Temp src: Oral BP: 140/68 Pulse: 87   Resp: 20 SpO2: 93 % O2 Device: None (Room air)    Weight: 157 lbs 13.59 oz    Constitutional: awake, alert, cooperative, no apparent distress, and appears stated age  Eyes: Lids and lashes normal, pupils equal, round and reactive to light, extra ocular muscles intact, sclera clear, conjunctiva normal  ENT: Normocephalic, without obvious abnormality, atraumatic, sinuses nontender on palpation, external ears without lesions, oral pharynx with moist mucous membranes, tonsils without erythema or exudates, gums normal and good dentition.  Hematologic / Lymphatic: no cervical lymphadenopathy and no supraclavicular lymphadenopathy  Respiratory: No increased work of breathing, good air exchange, clear to auscultation bilaterally, no crackles or wheezing  Cardiovascular: Normal apical impulse,  regular rate and rhythm, normal S1 and S2, no S3 or S4, and no murmur noted  GI: normal bowel sounds, soft, non-distended and non-tender  Skin: no bruising or bleeding, normal skin color, texture, turgor and no redness, warmth, or swelling  Musculoskeletal: There is no redness, warmth, or swelling of the joints.  Full range of motion noted.  Motor strength is 5 out of 5 all extremities bilaterally.  Tone is normal.  Neurologic: Mental Status Exam:  Level of Alertness:   awake  Orientation:   person, spinal, but not sure of the name  Memory:   Not able to cooperate  Cranial Nerves:  cranial nerves II-XII are grossly intact  Motor Exam:  moves all extremities well and symmetrically    Data   Data reviewed today: I reviewed all medications, new labs and imaging results over the last 24 hours. I personally reviewed no images or EKG's today.    Results for orders placed or performed during the hospital encounter of 03/31/19   CBC with platelets differential   Result Value Ref Range    WBC 14.7 (H) 4.0 - 11.0 10e9/L    RBC Count 3.93 3.8 - 5.2 10e12/L    Hemoglobin 12.1 11.7 - 15.7 g/dL    Hematocrit 38.3 35.0 - 47.0 %    MCV 98 78 - 100 fl    MCH 30.8 26.5 - 33.0 pg    MCHC 31.6 31.5 - 36.5 g/dL    RDW 13.0 10.0 - 15.0 %    Platelet Count 264 150 - 450 10e9/L    Diff Method Automated Method     % Neutrophils 83.7 %    % Lymphocytes 8.0 %    % Monocytes 6.9 %    % Eosinophils 0.5 %    % Basophils 0.2 %    % Immature Granulocytes 0.7 %    Nucleated RBCs 0 0 /100    Absolute Neutrophil 12.3 (H) 1.6 - 8.3 10e9/L    Absolute Lymphocytes 1.2 0.8 - 5.3 10e9/L    Absolute Monocytes 1.0 0.0 - 1.3 10e9/L    Absolute Basophils 0.0 0.0 - 0.2 10e9/L    Abs Immature Granulocytes 0.1 0 - 0.4 10e9/L    Absolute Nucleated RBC 0.0    Comprehensive metabolic panel   Result Value Ref Range    Sodium 137 133 - 144 mmol/L    Potassium 4.6 3.4 - 5.3 mmol/L    Chloride 105 94 - 109 mmol/L    Carbon Dioxide 19 (L) 20 - 32 mmol/L    Anion Gap 13  3 - 14 mmol/L    Glucose 93 70 - 99 mg/dL    Urea Nitrogen 93 (H) 7 - 30 mg/dL    Creatinine 3.30 (H) 0.52 - 1.04 mg/dL    GFR Estimate 12 (L) >60 mL/min/[1.73_m2]    GFR Estimate If Black 14 (L) >60 mL/min/[1.73_m2]    Calcium 9.3 8.5 - 10.1 mg/dL    Bilirubin Total 0.3 0.2 - 1.3 mg/dL    Albumin 2.5 (L) 3.4 - 5.0 g/dL    Protein Total 7.0 6.8 - 8.8 g/dL    Alkaline Phosphatase 66 40 - 150 U/L    ALT 63 (H) 0 - 50 U/L    AST 70 (H) 0 - 45 U/L   Lactic acid whole blood   Result Value Ref Range    Lactic Acid 1.1 0.7 - 2.0 mmol/L   UA with Microscopic   Result Value Ref Range    Color Urine Yellow     Appearance Urine Cloudy     Glucose Urine Negative NEG^Negative mg/dL    Bilirubin Urine Negative NEG^Negative    Ketones Urine Negative NEG^Negative mg/dL    Specific Gravity Urine 1.012 1.003 - 1.035    Blood Urine Negative NEG^Negative    pH Urine 9.0 (H) 5.0 - 7.0 pH    Protein Albumin Urine >499 (A) NEG^Negative mg/dL    Urobilinogen mg/dL 0.0 0.0 - 2.0 mg/dL    Nitrite Urine Negative NEG^Negative    Leukocyte Esterase Urine Moderate (A) NEG^Negative    Source Catheterized Urine     WBC Urine 23 (H) 0 - 5 /HPF    RBC Urine 10 (H) 0 - 2 /HPF    Bacteria Urine Moderate (A) NEG^Negative /HPF    Mucous Urine Present (A) NEG^Negative /LPF    Calcium Oxalate Few (A) NEG^Negative /HPF   Glucose by meter   Result Value Ref Range    Glucose 84 70 - 99 mg/dL     *Note: Due to a large number of results and/or encounters for the requested time period, some results have not been displayed. A complete set of results can be found in Results Review.

## 2019-04-01 NOTE — PLAN OF CARE
S. End of shift report    B. UTI    A. /60 and HR 65 patient is afebrile and on RA. Alert to self only with redirection. Lungs clear and diminished. Patient was up with 1A walker and gait belt to the BR this shift. Fluids @ 100ml/hr. Denies pain. Fair appetite with fluids encouraged. 1 Soaked brief and 300cc's UOP this shift. Blood cultures came back as gram negative rods; provider aware. Patient currently on rocephin Q/24 hours. .       R. Will continue to monitor per POC.

## 2019-04-01 NOTE — ASSESSMENT & PLAN NOTE
Controlled at home taking hydralazine  Is been on lower dose hydralazine with blood pressures trending higher  Increased to hydralazine 50 mg 3 times daily

## 2019-04-01 NOTE — PROGRESS NOTES
DATE: 4/1/2019    TIME OF RECEIPT FROM LAB:  2433  LAB TEST:  Blood culture from Left arm on the 31st  LAB VALUE:  Blood Culture positive for gram negative rods  RESULTS GIVEN WITH READ-BACK TO (PROVIDER):  Dr. Knox  TIME LAB VALUE REPORTED TO PROVIDER:   9913  NEW ORDERS: No

## 2019-04-01 NOTE — ED NOTES
ED Nursing criteria listed below was addressed during verbal handoff:     Abnormal vitals: No  Abnormal results: Yes  Med Reconciliation completed: Yes; difficult to get accurate last dosing due to husbands memory and how he changes meds or splits them  Meds given in ED: Yes; 1L NS; 2nd bolus NS started and 1g rocephin started in ED  Any Overdue Meds: No  Core Measures: Yes; sepsis work up   Isolation: N/A  Special needs: Yes; fall risk ; confusion/hallucination  Skin assessment: Yes;  states no sores on patient    Observation Patient  Education provided: N/A

## 2019-04-01 NOTE — PROGRESS NOTES
04/01/19 1015   Quick Adds   Type of Visit Initial PT Evaluation       Present no   Living Environment   Lives With spouse   Living Arrangements house   Home Accessibility stairs to enter home   Number of Stairs, Main Entrance 7   Stair Railings, Main Entrance railing on right side (ascending)   Transportation Anticipated family or friend will provide   Living Environment Comment Family visits, however does not live with patient   Self-Care   Usual Activity Tolerance good   Current Activity Tolerance poor   Regular Exercise No   Equipment Currently Used at Home grab bar, toilet;walker, rolling;shower chair;grab bar, tub/shower  (4WW and 2WW)   Activity/Exercise/Self-Care Comment Step over tub   Functional Level Prior   Ambulation 1-->assistive equipment  (4WW)   Transferring 1-->assistive equipment  (grab bar for bed)   Toileting 3-->assistive equipment and person   Bathing 3-->assistive equipment and person  ( assists)   Communication 0-->understands/communicates without difficulty   Swallowing 0-->swallows foods/liquids without difficulty   Cognition 1 - attention or memory deficits   Fall history within last six months yes   Number of times patient has fallen within last six months 6  (maybe more per )   Which of the above functional risks had a recent onset or change? ambulation;cognition;fall history   General Information   Onset of Illness/Injury or Date of Surgery - Date 03/31/19   Referring Physician Dr. Horvath   Patient/Family Goals Statement Return home with    Pertinent History of Current Problem (include personal factors and/or comorbidities that impact the POC) Patient is a 84 year old female, admitted from the ED with UTi, sepsis and encephalopathy. Patient has a previous medical history of dementia, HTN, GERD, DDD of the lumbar spine, DM type 2, CAD, stage 4 renal failure and osteoporosis.    Precautions/Limitations fall precautions   Weight-Bearing Status -  LUE full weight-bearing   Weight-Bearing Status - RUE full weight-bearing   Weight-Bearing Status - LLE full weight-bearing   Weight-Bearing Status - RLE full weight-bearing   General Observations Patient's  present and supportive. Patient agreeable to PT treatment. Notes family is not nearby and unable to assist with increased cares that the  cannot meet.    General Info Comments PT orders: evaluate and treat weakness. Activity orders: up with assistance.    Cognitive Status Examination   Orientation person  (1935, december, and [place])   Level of Consciousness confused   Follows Commands and Answers Questions 25% of the time;able to follow single-step instructions   Personal Safety and Judgment impulsive;impaired;at risk behaviors demonstrated   Memory impaired   Pain Assessment   Patient Currently in Pain Yes, see Vital Sign flowsheet  (global discomfort)   Integumentary/Edema   Integumentary/Edema no deficits were identifed   Posture    Posture Forward head position;Protracted shoulders   Posture Comments forward flexed trunk   Range of Motion (ROM)   ROM Comment Bilat UE and LE WFL for mobilization   Strength   Strength Comments Unable to follow commands for break testing. Sufficient strength for bed mobility and ambulation with assistive equipment   Bed Mobility   Bed Mobility Bed mobility skill: Rolling/Turning;Bed mobility skill: Scooting/Bridging;Bed mobility skill: Sit to supine;Bed mobility skill: Supine to sit   Bed Mobility Skill: Rolling/Turning   Level of [place]: Rolling/Turning independent   Physical/Nonphysical Assist: Rolling/Turning supervision   Assistive Device: Rolling/Turning bed rails   Bed Mobility Skill: Scooting/Bridging   Level of [place]: Scooting/Bridging independent   Bed Mobility Skill: Sit to Supine   Level of [place]: Sit/Supine stand-by assist   Physical Assist/Nonphysical Assist: Sit/Supine supervision;verbal cues   Bed Mobility Skill: Supine to  Sit   Level of [place]: Supine/Sit stand-by assist   Physical Assist/Nonphysical Assist: Supine/Sit supervision;verbal cues   Assistive Device: Supine/Sit bed rails   Transfer Skills   Transfer Transfer Skill: Sit to Stand;Transfer Skill:  Stand to Sit   Transfer Skill:  Sit to Stand   Level of [place]: Sit/Stand contact guard   Physical Assist/Nonphysical Assist: Sit/Stand supervision;verbal cues   Weightbearing Restrictions: Sit/Stand full weight-bearing   Assistive Device for Transfer: Sit/Stand rolling walker   Transfer Skill: Stand to Sit   Level of [place]: Stand/Sit contact guard   Physical Assist/Nonphysical Assist: Stand/Sit supervision;verbal cues   Weight-Bearing Restrictions: Stand/Sit full weight-bearing   Assistive Device: Stand to Sit rolling walker   Gait   Gait Gait Analysis;Gait Skill   Gait Skills   Level of [place]: Gait contact guard   Physical Assist/Nonphysical Assist: Gait supervision;verbal cues   Weight-Bearing Restrictions: Gait full weight-bearing   Assistive Device for Transfer: Gait rolling walker   Gait Distance (20 ft)   Gait Analysis   Gait Pattern Used swing-to gait   Gait Deviations Noted decreased toe-to-floor clearance;decreased stride length;decreased step length;increased time in double stance;decreased snow;decreased swing-to-stance ratio   Impairments Contributing to Gait Deviations decreased strength;impaired postural control;impaired balance;impaired coordination   Balance   Balance Comments forward flexed trunk, shuffled gait pattern, walker at arms length. Hand support for short sitting balance   Muscle Tone   Muscle Tone Comments low tone throughout   General Therapy Interventions   Planned Therapy Interventions balance training;gait training;ROM;strengthening;home program guidelines;progressive activity/exercise   Clinical Impression   Criteria for Skilled Therapeutic Intervention yes, treatment indicated   PT Diagnosis muscle weakness, impaired  "balance, poor posture, impaired muscular endurance   Influenced by the following impairments chronic medical history and decreased physical activity, acute medical status   Functional limitations due to impairments decreased safety with transitional movements, impaired functional mobility, increased risk of falls   Clinical Presentation Stable/Uncomplicated   Clinical Presentation Rationale Patient without increased pain with today's treatment. Patient with unchanges congitive impairment with treatment   Clinical Decision Making (Complexity) Low complexity   Therapy Frequency` daily   Predicted Duration of Therapy Intervention (days/wks) 2-3 days   Anticipated Equipment Needs at Discharge (defer to TCU)   Anticipated Discharge Disposition Transitional Care Facility   Risk & Benefits of therapy have been explained Yes   Patient, Family & other staff in agreement with plan of care Yes   Clinical Impression Comments Patient with impaired cognition, impaired insight to deficits and poor tolerance to functional mobility. Patient would benefit from placement for safe discharge disposition. Patient agreeable to intervention at this time and motivated to return home. Anticipate fair progress with rehabilitation.   [facility] [employer] TM \"6 Clicks\"   2016, Trustees of [facility], under license to [employer].  All rights reserved.   6 Clicks Short Forms Basic Mobility Inpatient Short Form   [facility] AM-PAC  \"6 Clicks\" V.2 Basic Mobility Inpatient Short Form   1. Turning from your back to your side while in a flat bed without using bedrails? 3 - A Little   2. Moving from lying on your back to sitting on the side of a flat bed without using bedrails? 3 - A Little   3. Moving to and from a bed to a chair (including a wheelchair)? 3 - A Little   4. Standing up from a chair using your arms (e.g., wheelchair, or bedside chair)? 4 - None   5. To walk in hospital room? 3 - A Little   6. Climbing 3-5 steps with " a railing? 1 - Total   Basic Mobility Raw Score (Score out of 24.Lower scores equate to lower levels of function) 17   Total Evaluation Time   Total Evaluation Time (Minutes) 23     Thank you for your referral.    Iliana Sam, PT, DPT, ATC    [facility]    O: [phone]  E: [email or web address]

## 2019-04-01 NOTE — PROGRESS NOTES
DATE: 4/1/2019    TIME OF RECEIPT FROM LAB:  1610  LAB TEST:  Blood culture drawn on the 31st, left arm  LAB VALUE:  Blood culture positive for gram negative rods, identified as ECOLI  RESULTS GIVEN WITH READ-BACK TO (PROVIDER):  Dr. Knox  TIME LAB VALUE REPORTED TO PROVIDER:   9075  NEW ORDERS: No  \

## 2019-04-01 NOTE — ASSESSMENT & PLAN NOTE
Presenting with increased confusion, weakness, leukocytosis with worsening renal function  Secondary to UTI with E. coli growing from blood and urine  Currently on Omnicef, confusion has improved, taking fluids well  Stop IV fluids, expect discharge to home or TCU tomorrow

## 2019-04-01 NOTE — ASSESSMENT & PLAN NOTE
Presenting with increased confusion with the smell of strong urine and previous history of recurrent UTIs  UC and blood cultures positive for E. Coli  Finished 3 days of Rocephin and switch to oral Omnicef this morning  Continue Omnicef for a total of 10 more days  Expect discharge to home with home health care or TCU tomorrow

## 2019-04-01 NOTE — PROGRESS NOTES
[facility]    Medicine Progress Note - Hospitalist Service       Date of Admission:  3/31/2019  Assessment & Plan    G-UTI with sepsis  -continue rocephin    ARF on CKD3  -contineu IVF  -monitor lytes and renal function closely    DM, controlled  -ivd5a=5.8 on 3/31/19    HTN, controlled  -continue hydralazine    Chronic insomnia  -change trazdone to melatonin to reduce risk of fall from orthostatic changes.    SCD for DVT prophylaxis    Diet: Consistent Carbohydrate Diet 7806-4116 Calories: Moderate Consistent CHO (4-6 CHO units/meal)    Vázquez Catheter: not present  Code Status: Full Code      Disposition Plan   Expected discharge: 2 - 3 days, recommended to prior living arrangement once renal function is back to baseline.  Entered: Dayanna Knox MD 04/01/2019, 2:21 PM       The patient's care was discussed with the  and RN.    Dayanna Knox MD  Hospitalist Service  [facility]    ______________________________________________________________________    Interval History    is at bedside and stated that she falls quite a few times at home prior to admission, she can walk short distance with standby assist  Patient holds her head in her hands but denies neck/head pain  RN and  both stated that she is no longer having hallucinations and she is supposed to go to SNF upon discharge, eventually home with  and [facility]    Data reviewed today: I reviewed all medications, new labs and imaging results over the last 24 hours.    Physical Exam   Vital Signs: Temp: 96.5  F (35.8  C) Temp src: Oral BP: 158/60 Pulse: 65   Resp: 18 SpO2: 94 % O2 Device: None (Room air)    Weight: 157 lbs 13.59 oz  Constitutional: awake, alert, cooperative, no apparent distress, and appears stated age  Eyes: Lids and lashes normal, pupils equal, round and reactive to light, extra ocular muscles intact, sclera clear, conjunctiva normal  ENT: Normocephalic, without obvious  abnormality, atraumatic, sinuses nontender on palpation, external ears without lesions, oral pharynx with moist mucous membranes, tonsils without erythema or exudates, gums normal and good dentition.  Hematologic / Lymphatic: no cervical lymphadenopathy and no supraclavicular lymphadenopathy  Respiratory: No increased work of breathing, good air exchange, clear to auscultation bilaterally, no crackles or wheezing  Cardiovascular: Normal apical impulse, regular rate and rhythm, normal S1 and S2, no S3 or S4, and no murmur noted  GI: normal bowel sounds, soft, non-distended and non-tender  Skin: no bruising or bleeding, normal skin color, texture, turgor and no redness, warmth, or swelling  Musculoskeletal: There is no redness, warmth, or swelling of the joints.  Full range of motion noted.  Motor strength is 5 out of 5 all extremities bilaterally.  Tone is normal.  Neurologic: Mental Status Exam:  Level of Alertness:   awake  Orientation:   person, spinal, but not sure of the name  Memory:  poor historian  Cranial Nerves:  cranial nerves II-XII are grossly intact  Motor Exam:  moves all extremities well and symmetrically    Data   Recent Labs   Lab 04/01/19  0611 03/31/19  1914   WBC 11.1* 14.7*   HGB 10.7* 12.1   MCV 95 98    264    137   POTASSIUM 4.8 4.6   CHLORIDE 113* 105   CO2 18* 19*   BUN 81* 93*   CR 3.09* 3.30*   ANIONGAP 10 13   KEANU 8.2* 9.3   GLC 76 93   ALBUMIN  --  2.5*   PROTTOTAL  --  7.0   BILITOTAL  --  0.3   ALKPHOS  --  66   ALT  --  63*   AST  --  70*

## 2019-04-01 NOTE — PROGRESS NOTES
"S-(situation): Patient arrives to room 265 via cart from ED    B-(background): Dementia, UTI    A-(assessment): Pt is disoriented x 2. She is able to state that she is in the hospital but doesn't know which one. Pt thinks she has 5 children but \"can't keep track of them all\" so she isn't quite sure. Pt is tearful upon arrival stating she just wants to go home. She reports generalized discomfort from recent fall at home. Pt was a very difficult transfer to the commode with 2 assist. Staff attempted to use the nguyen steady to return to bed but the lift was used due to weakness and unwillingness to stand. No skin issues noted. Blood sugar 84 at time of admission.    R-(recommendations): Orders reviewed with patient. Will monitor patient per MD orders.       "

## 2019-04-01 NOTE — PROGRESS NOTES
SPIRITUAL HEALTH SERVICES  SPIRITUAL ASSESSMENT Progress Note  [facility]      During Rounding,  introduced himself to Smiley Acuña and informed her of his availability.    Sudhir Shearer M.Div., [facility]  Staff   Office tel: [phone]

## 2019-04-01 NOTE — PROGRESS NOTES
Pt without urine output since change of shift. IVFs running overnight. Writer bladder scanned pt for over 350 mL at 0400. MD made aware. Orders placed for a one time straight cath procedure. Total output from straight cath was 500mL. Urine malodorous and dark yellow in appearance.      Kavon Key RN [id number]

## 2019-04-01 NOTE — PLAN OF CARE
Discharge Planner PT   Patient plan for discharge: Home with   Current status:Patient is a 84 year old female, admitted from the ED with UTi, sepsis and encephalopathy. Patient has a previous medical history of dementia, HTN, GERD, DDD of the lumbar spine, DM type 2, CAD, stage 4 renal failure and osteoporosis. Prior to admission, per patient and spouse, patient living in a single family home with 7 stairs to enter and single hand rail, using a 4WW for ambulation with at least 6 falls in the past 6 months. She sleeps in a standard bed and requires bed rail and physical assistance at time to transfer.  assists with showers and toileting. Currently, patient oriented to self only and reports global pain. MOD IND rolling, IND bridging, MOD IND supine to/from sitting. Fair short sitting balance with bilat UE support. CGA sit to/from stand with 2WW, posterior trunk lean with bracing on the bed. Weight shifting with Min assist to correct posterior weight shift. Ambulated 20 ft in room with CGA, 2WW at arms length, non-compliance with verbal cues, poor command following (turnign right when cued to turn left), freezing with obstacles and approach to destination. No LOB, however use of walker, shuffled gait pattern, posterior weight shift and poor environment navigation present a significant risk of falls. Patient unable to follow commands for strength assessment.   Barriers to return to prior living situation: Medical status, confusion, increased risk of falls, impaired insight to deficits, support needed for safe mobilization,  unable to safely assist  Recommendations for discharge: TCU  Rationale for recommendations: Patient with impaired cognition, impaired insight to deficits and poor tolerance to functional mobility. Patient would benefit from placement for safe discharge disposition. Patient agreeable to intervention at this time and motivated to return home. Anticipate fair progress with  rehabilitation.       Entered by: Iliana Sam 04/01/2019 10:48 AM

## 2019-04-01 NOTE — ASSESSMENT & PLAN NOTE
Controlled at home taking in the a.m. with mealtime insulin  Has had poor appetite recently and last hemoglobin A1c was low at 4.9  Blood sugars more controlled with lower dose of Lantus with sliding scale coverage  We will sent home with lower dose Lantus tomorrow

## 2019-04-01 NOTE — PROGRESS NOTES
Pt with altered mentation. Can be easily redirected.  Became febrile with a temp of101 F. PRN  tylenol given with a recheck temp of 98.9 F. Has on disposable brief for urinary incontinence. Urine is malodorous and dark yellow in appearance. IVFs running at 150 mL/hr per order. Bed alarm on for safety. Will continue to monitor as noted.     Kavon Key RN [id number]

## 2019-04-02 ENCOUNTER — APPOINTMENT (OUTPATIENT)
Dept: OCCUPATIONAL THERAPY | Facility: CLINIC | Age: 84
DRG: 871 | End: 2019-04-02
Payer: MEDICARE

## 2019-04-02 ENCOUNTER — APPOINTMENT (OUTPATIENT)
Dept: PHYSICAL THERAPY | Facility: CLINIC | Age: 84
DRG: 871 | End: 2019-04-02
Payer: MEDICARE

## 2019-04-02 LAB
ALBUMIN SERPL-MCNC: 1.9 G/DL (ref 3.4–5)
ALP SERPL-CCNC: 60 U/L (ref 40–150)
ALT SERPL W P-5'-P-CCNC: 68 U/L (ref 0–50)
ANION GAP SERPL CALCULATED.3IONS-SCNC: 8 MMOL/L (ref 3–14)
AST SERPL W P-5'-P-CCNC: 72 U/L (ref 0–45)
BACTERIA SPEC CULT: ABNORMAL
BILIRUB SERPL-MCNC: 0.3 MG/DL (ref 0.2–1.3)
BUN SERPL-MCNC: 72 MG/DL (ref 7–30)
CALCIUM SERPL-MCNC: 8 MG/DL (ref 8.5–10.1)
CHLORIDE SERPL-SCNC: 114 MMOL/L (ref 94–109)
CO2 SERPL-SCNC: 19 MMOL/L (ref 20–32)
CREAT SERPL-MCNC: 2.94 MG/DL (ref 0.52–1.04)
ERYTHROCYTE [DISTWIDTH] IN BLOOD BY AUTOMATED COUNT: 13.2 % (ref 10–15)
GFR SERPL CREATININE-BSD FRML MDRD: 14 ML/MIN/{1.73_M2}
GLUCOSE BLDC GLUCOMTR-MCNC: 109 MG/DL (ref 70–99)
GLUCOSE BLDC GLUCOMTR-MCNC: 133 MG/DL (ref 70–99)
GLUCOSE BLDC GLUCOMTR-MCNC: 137 MG/DL (ref 70–99)
GLUCOSE BLDC GLUCOMTR-MCNC: 143 MG/DL (ref 70–99)
GLUCOSE BLDC GLUCOMTR-MCNC: 62 MG/DL (ref 70–99)
GLUCOSE BLDC GLUCOMTR-MCNC: 65 MG/DL (ref 70–99)
GLUCOSE BLDC GLUCOMTR-MCNC: 87 MG/DL (ref 70–99)
GLUCOSE BLDC GLUCOMTR-MCNC: 95 MG/DL (ref 70–99)
GLUCOSE SERPL-MCNC: 142 MG/DL (ref 70–99)
HCT VFR BLD AUTO: 30.9 % (ref 35–47)
HGB BLD-MCNC: 10 G/DL (ref 11.7–15.7)
Lab: ABNORMAL
MCH RBC QN AUTO: 30.9 PG (ref 26.5–33)
MCHC RBC AUTO-ENTMCNC: 32.4 G/DL (ref 31.5–36.5)
MCV RBC AUTO: 95 FL (ref 78–100)
PLATELET # BLD AUTO: 246 10E9/L (ref 150–450)
POTASSIUM SERPL-SCNC: 5.1 MMOL/L (ref 3.4–5.3)
PROT SERPL-MCNC: 5.7 G/DL (ref 6.8–8.8)
RBC # BLD AUTO: 3.24 10E12/L (ref 3.8–5.2)
SODIUM SERPL-SCNC: 141 MMOL/L (ref 133–144)
SPECIMEN SOURCE: ABNORMAL
WBC # BLD AUTO: 10.7 10E9/L (ref 4–11)

## 2019-04-02 PROCEDURE — 97530 THERAPEUTIC ACTIVITIES: CPT | Mod: GP | Performed by: PHYSICAL THERAPIST

## 2019-04-02 PROCEDURE — 25800030 ZZH RX IP 258 OP 636: Performed by: HOSPITALIST

## 2019-04-02 PROCEDURE — 25000132 ZZH RX MED GY IP 250 OP 250 PS 637: Mod: GY | Performed by: HOSPITALIST

## 2019-04-02 PROCEDURE — 97127 ZZHC OT THERAPEUTIC INTERVENTION W/FOCUS ON COGNITIVE FUNCTION,EA 15 MIN: CPT | Mod: GO

## 2019-04-02 PROCEDURE — 00000146 ZZHCL STATISTIC GLUCOSE BY METER IP

## 2019-04-02 PROCEDURE — 80053 COMPREHEN METABOLIC PANEL: CPT | Performed by: HOSPITALIST

## 2019-04-02 PROCEDURE — 25000128 H RX IP 250 OP 636: Performed by: FAMILY MEDICINE

## 2019-04-02 PROCEDURE — 85027 COMPLETE CBC AUTOMATED: CPT | Performed by: HOSPITALIST

## 2019-04-02 PROCEDURE — 97116 GAIT TRAINING THERAPY: CPT | Mod: GP | Performed by: PHYSICAL THERAPIST

## 2019-04-02 PROCEDURE — A9270 NON-COVERED ITEM OR SERVICE: HCPCS | Mod: GY | Performed by: FAMILY MEDICINE

## 2019-04-02 PROCEDURE — 99233 SBSQ HOSP IP/OBS HIGH 50: CPT | Performed by: HOSPITALIST

## 2019-04-02 PROCEDURE — A9270 NON-COVERED ITEM OR SERVICE: HCPCS | Mod: GY | Performed by: HOSPITALIST

## 2019-04-02 PROCEDURE — 36415 COLL VENOUS BLD VENIPUNCTURE: CPT | Performed by: HOSPITALIST

## 2019-04-02 PROCEDURE — 12000000 ZZH R&B MED SURG/OB

## 2019-04-02 PROCEDURE — 25000132 ZZH RX MED GY IP 250 OP 250 PS 637: Mod: GY | Performed by: FAMILY MEDICINE

## 2019-04-02 RX ORDER — ACETAMINOPHEN 325 MG/1
325 TABLET ORAL EVERY 8 HOURS PRN
Status: DISCONTINUED | OUTPATIENT
Start: 2019-04-02 | End: 2019-04-07 | Stop reason: HOSPADM

## 2019-04-02 RX ORDER — SODIUM CHLORIDE 9 MG/ML
INJECTION, SOLUTION INTRAVENOUS CONTINUOUS
Status: DISCONTINUED | OUTPATIENT
Start: 2019-04-02 | End: 2019-04-03 | Stop reason: CLARIF

## 2019-04-02 RX ADMIN — CEFTRIAXONE SODIUM 1 G: 1 INJECTION, POWDER, FOR SOLUTION INTRAMUSCULAR; INTRAVENOUS at 19:50

## 2019-04-02 RX ADMIN — SODIUM CHLORIDE: 9 INJECTION, SOLUTION INTRAVENOUS at 21:08

## 2019-04-02 RX ADMIN — SODIUM CHLORIDE: 9 INJECTION, SOLUTION INTRAVENOUS at 10:21

## 2019-04-02 RX ADMIN — HYDRALAZINE HYDROCHLORIDE 25 MG: 25 TABLET ORAL at 05:46

## 2019-04-02 RX ADMIN — HYDRALAZINE HYDROCHLORIDE 25 MG: 25 TABLET ORAL at 14:06

## 2019-04-02 RX ADMIN — Medication 2.5 MG: at 21:08

## 2019-04-02 RX ADMIN — HYDRALAZINE HYDROCHLORIDE 25 MG: 25 TABLET ORAL at 21:08

## 2019-04-02 RX ADMIN — MELATONIN TAB 3 MG 3 MG: 3 TAB at 21:08

## 2019-04-02 RX ADMIN — Medication 2.5 MG: at 09:13

## 2019-04-02 ASSESSMENT — ACTIVITIES OF DAILY LIVING (ADL)
ADLS_ACUITY_SCORE: 20
ADLS_ACUITY_SCORE: 20
ADLS_ACUITY_SCORE: 21
ADLS_ACUITY_SCORE: 21
ADLS_ACUITY_SCORE: 20
ADLS_ACUITY_SCORE: 24

## 2019-04-02 NOTE — PLAN OF CARE
Patient has been afebrile tonight. Urine remains very odorous with mucous in it. Pt moves very slow and has difficulties following direction when getting up during the night. Assist of 2 needed with the walker. Blood sugar 62 during routine check. Pt given juice and crackers with highest recheck being 137.

## 2019-04-02 NOTE — CONSULTS
CARE TRANSITION SOCIAL WORK INITIAL ASSESSMENT:      Met with: Patient and Family.    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 Margarito Hoffman    ASSESSMENT  Cognitive Status: awake and alert.        Description of Support System: Supportive, Involved   Who is your support system?: , Children       Insurance Concerns: No Insurance issues identified  Living Arrangements: house    This writer met with pt and her , Darien, introduced self and role. Discussed discharge planning and medicare guidelines in regards to home care and SNF benefits. Patient lives with her , Darien.  Darien states he provides all the care patient needs.  She does not have any in-home services.  Physical and Occupational therapy evaluations recommend TCU.  However, Darien and patient and are wanting patient to return home.  Discussed safety as main concern of patient returning home.  Darien states that he is able manage patient's cares at home.  Discussed home care and list of agencies.  Patient/spouse are in agreement with home care for RN, HHA, P/T, and O/T.  Darien chooses [facility] Phone: [phone]. [phone] -  Received a call from patient's daughter, Theresa Massey.  She expressed that patient needs to be discharged to a TCU tomorrow.  She states she is the POA.  Explained the Theresa when POA can be used.  It is not determined if patient's  is cognitively able to make decisions for patient.  Encouraged Theresa to talk with patient/ and see if patient will voluntarily agree to go to TCU.  Family will discuss discharge plan with patient/.    PLAN    TCU  vs [facility]    MARILYNN Price  [facility] [phone]/ [facility] [phone]

## 2019-04-02 NOTE — PLAN OF CARE
BG 70, 90, 133, 132. Low grade fever; 99.9 and 99.0 orally. Disoriented x3. Up in the chair for dinner. Better appetite tonight. Elevated BP. Fine crackles in bilateral bases.

## 2019-04-02 NOTE — PLAN OF CARE
Discharge Planner OT   Patient plan for discharge: Unknown  Current status: Pt continues to require min-mod assist with all ADL tasks due to impaired cognition, decreased strength and activity tolerance, and safety concerns. Pt scored a 4/30 on the [facility] cognitive screen this date. Note pt did answer appropriately for all questions, however demonstrated tangential thought patterns with answers/questions and required frequent redirection for attention to task. Will continue to monitor cognition during inpatient stay as acute medical needs are addressed.   Barriers to return to prior living situation: Cognition; level of assist; safety with ADL   Recommendations for discharge: TCU  Rationale for recommendations: Pt would benefit from further skilled OT services within the TCU setting to increase independence with ADL, for further assessment of cognition, and progress toward prior level of function       Entered by: Ana Ruiz 04/02/2019 12:23 PM

## 2019-04-02 NOTE — PLAN OF CARE
S. End of shift report    B UTI    A. 's/50's, HR  60-80's, patient is afebrile and on RA. Lungs clear and tolerating a regular diet. Patient is alert to self only with reminders and reorientation. UP to the BR with 2A walker and gait belt. Denies pain and or N/V. Fluids @ 100ml/hr. And SCD's placed on patient. Good UOP yellow malodorous with sediment; see flow sheet    R. Will continue to monitor per POC,

## 2019-04-02 NOTE — PROGRESS NOTES
S-(situation): Cognitive, Urinary Elimination    B-(background): Dementia & UTI    A-(assessment): Patient is alert to self, needs redirection. She believes she is going home today. Patient has urgency to urinate frequently. Urine is odorous and has mucous in it. Averaging 200 mL of output every few hours. Patient denies pain.     R-(recommendations): Redirect patient as needed. Continue fluids as ordered and encourage patient to drink water. Administer antibiotics as scheduled.

## 2019-04-02 NOTE — PLAN OF CARE
"Discharge Planner PT   Patient plan for discharge: Unable state, \"I know I shouldn't have came here this morning\"  Current status: Completed short sitting LE strengthening with tactile, visual and verbal cues. Min assist sit to stand from recliner, assistance for lift off and posterior weight shift correction. Verbal cues for hand placement and proper sequencing. Ambulated with CGA for safety, physical assistance at the walker for proper management of the device. Verbal cues for \"big steps\" produced greatest improvement in gait mechanics, however remains at a high risk of falls due to \"pushing\" of walker, arms out stretched and forward flexed posture. Ambulated 75 ft, no LOB, however expressed significant fatigue and demonstrated progressively forward flexed posture.  Barriers to return to prior living situation: Cognition, high risk of falling, muscle weakness, impaired endurance, lack of safety awareness  Recommendations for discharge: TCU  Rationale for recommendations: Patient agreeable to treatment and completed requested mobilization. Patient remains with impaired insight, weakness, impaired posture and requiring increased assistance for safe mobilization. Patient would benefit from placement for safe disposition, to receive skilled therapeutic intervention and progress her towards greater function.       Entered by: Iliana Sam 04/02/2019 10:36 AM       "

## 2019-04-02 NOTE — PROGRESS NOTES
[facility]    Medicine Progress Note - Hospitalist Service       Date of Admission:  3/31/2019  Assessment & Plan    E. Coli UTI with sepsis  -continue rocephin per culture sensitivity    ARF(continues to improve) on CKD3  -continue IVF for another day  -monitor lytes and renal function and I and O closely    Abnormal LFT  -avoid/reduce hepatotoxins    DM, labile  -mor7e=9.8 on 3/31/19  -lantus dose reduced due to hypoglycemina  -continue SSI    HTN, controlled  -continue hydralazine    Chronic insomnia  -changed frp, trazdone to melatonin to reduce risk of fall from orthostatic changes in a frail elderly lady  -PT recommended SNF but both patient and  refused and prefers HHC instead.    SCD for DVT prophylaxis    Diet: Consistent Carbohydrate Diet 9668-9525 Calories: Moderate Consistent CHO (4-6 CHO units/meal)    Vázquez Catheter: not present  Code Status: Full Code      Disposition Plan   Expected discharge: 2 - 3 days, recommended to prior living arrangement once renal function is back to baseline. And antibiotic is working  Entered: Dayanna Knox MD 04/02/2019, 3:24 PM       The patient's care was discussed with the Patient and RN.    Dayanna Knox MD  Hospitalist Service  [facility]    ______________________________________________________________________    Interval History   Daughter and PT want her to go to SNF, but patient and  refused  Also has frequent episodes of hypoglycemia    Data reviewed today: I reviewed all medications, new labs and imaging results over the last 24 hours.    Physical Exam   Vital Signs: Temp: 98.4  F (36.9  C) Temp src: Oral BP: 194/84 Pulse: 81   Resp: 18 SpO2: 99 % O2 Device: None (Room air)    Weight: 157 lbs 13.59 oz  Constitutional: awake, alert, cooperative, no apparent distress, and appears stated age  Eyes: Lids and lashes normal, pupils equal, round and reactive to light, extra ocular muscles intact,  sclera clear, conjunctiva normal  ENT: Normocephalic, without obvious abnormality, atraumatic, sinuses nontender on palpation, external ears without lesions, oral pharynx with moist mucous membranes, tonsils without erythema or exudates, gums normal and good dentition.  Hematologic / Lymphatic: no cervical lymphadenopathy and no supraclavicular lymphadenopathy  Respiratory: No increased work of breathing, good air exchange, clear to auscultation bilaterally, no crackles or wheezing  Cardiovascular: Normal apical impulse, regular rate and rhythm, normal S1 and S2, no S3 or S4, and no murmur noted  GI: normal bowel sounds, soft, non-distended and non-tender  Skin: no bruising or bleeding, normal skin color, texture, turgor and no redness, warmth, or swelling  Musculoskeletal: There is no redness, warmth, or swelling of the joints.  Full range of motion noted.  Motor strength is 5 out of 5 all extremities bilaterally.  Tone is normal.  Neurologic: Mental Status Exam:  Level of Alertness:   awake  Orientation:   person, spinal, but not sure of the name  Memory:  poor historian  Cranial Nerves:  cranial nerves II-XII are grossly intact  Motor Exam:  moves all extremities well and symmetrically    Data   Recent Labs   Lab 04/02/19  0539 04/01/19  0611 03/31/19  1914   WBC 10.7 11.1* 14.7*   HGB 10.0* 10.7* 12.1   MCV 95 95 98    245 264    141 137   POTASSIUM 5.1 4.8 4.6   CHLORIDE 114* 113* 105   CO2 19* 18* 19*   BUN 72* 81* 93*   CR 2.94* 3.09* 3.30*   ANIONGAP 8 10 13   KEANU 8.0* 8.2* 9.3   * 76 93   ALBUMIN 1.9*  --  2.5*   PROTTOTAL 5.7*  --  7.0   BILITOTAL 0.3  --  0.3   ALKPHOS 60  --  66   ALT 68*  --  63*   AST 72*  --  70*

## 2019-04-03 ENCOUNTER — APPOINTMENT (OUTPATIENT)
Dept: OCCUPATIONAL THERAPY | Facility: CLINIC | Age: 84
DRG: 871 | End: 2019-04-03
Payer: MEDICARE

## 2019-04-03 ENCOUNTER — APPOINTMENT (OUTPATIENT)
Dept: PHYSICAL THERAPY | Facility: CLINIC | Age: 84
DRG: 871 | End: 2019-04-03
Payer: MEDICARE

## 2019-04-03 LAB
ALBUMIN SERPL-MCNC: 2 G/DL (ref 3.4–5)
ALP SERPL-CCNC: 68 U/L (ref 40–150)
ALT SERPL W P-5'-P-CCNC: 91 U/L (ref 0–50)
ANION GAP SERPL CALCULATED.3IONS-SCNC: 8 MMOL/L (ref 3–14)
AST SERPL W P-5'-P-CCNC: 97 U/L (ref 0–45)
BACTERIA SPEC CULT: ABNORMAL
BILIRUB SERPL-MCNC: 0.3 MG/DL (ref 0.2–1.3)
BUN SERPL-MCNC: 56 MG/DL (ref 7–30)
CALCIUM SERPL-MCNC: 8.2 MG/DL (ref 8.5–10.1)
CHLORIDE SERPL-SCNC: 116 MMOL/L (ref 94–109)
CO2 SERPL-SCNC: 18 MMOL/L (ref 20–32)
CREAT SERPL-MCNC: 2.49 MG/DL (ref 0.52–1.04)
ERYTHROCYTE [DISTWIDTH] IN BLOOD BY AUTOMATED COUNT: 13.2 % (ref 10–15)
GFR SERPL CREATININE-BSD FRML MDRD: 17 ML/MIN/{1.73_M2}
GLUCOSE BLDC GLUCOMTR-MCNC: 101 MG/DL (ref 70–99)
GLUCOSE BLDC GLUCOMTR-MCNC: 111 MG/DL (ref 70–99)
GLUCOSE BLDC GLUCOMTR-MCNC: 127 MG/DL (ref 70–99)
GLUCOSE BLDC GLUCOMTR-MCNC: 82 MG/DL (ref 70–99)
GLUCOSE BLDC GLUCOMTR-MCNC: 89 MG/DL (ref 70–99)
GLUCOSE BLDC GLUCOMTR-MCNC: 93 MG/DL (ref 70–99)
GLUCOSE SERPL-MCNC: 79 MG/DL (ref 70–99)
HCT VFR BLD AUTO: 31.7 % (ref 35–47)
HGB BLD-MCNC: 10.1 G/DL (ref 11.7–15.7)
Lab: ABNORMAL
MCH RBC QN AUTO: 30.5 PG (ref 26.5–33)
MCHC RBC AUTO-ENTMCNC: 31.9 G/DL (ref 31.5–36.5)
MCV RBC AUTO: 96 FL (ref 78–100)
PLATELET # BLD AUTO: 282 10E9/L (ref 150–450)
POTASSIUM SERPL-SCNC: 5 MMOL/L (ref 3.4–5.3)
PROT SERPL-MCNC: 6.1 G/DL (ref 6.8–8.8)
RBC # BLD AUTO: 3.31 10E12/L (ref 3.8–5.2)
SODIUM SERPL-SCNC: 142 MMOL/L (ref 133–144)
SPECIMEN SOURCE: ABNORMAL
WBC # BLD AUTO: 11.3 10E9/L (ref 4–11)

## 2019-04-03 PROCEDURE — 12000000 ZZH R&B MED SURG/OB

## 2019-04-03 PROCEDURE — 97530 THERAPEUTIC ACTIVITIES: CPT | Mod: GP | Performed by: PHYSICAL THERAPIST

## 2019-04-03 PROCEDURE — A9270 NON-COVERED ITEM OR SERVICE: HCPCS | Mod: GY | Performed by: FAMILY MEDICINE

## 2019-04-03 PROCEDURE — 80053 COMPREHEN METABOLIC PANEL: CPT | Performed by: HOSPITALIST

## 2019-04-03 PROCEDURE — 85027 COMPLETE CBC AUTOMATED: CPT | Performed by: HOSPITALIST

## 2019-04-03 PROCEDURE — 25000132 ZZH RX MED GY IP 250 OP 250 PS 637: Mod: GY | Performed by: FAMILY MEDICINE

## 2019-04-03 PROCEDURE — 97535 SELF CARE MNGMENT TRAINING: CPT | Mod: GO

## 2019-04-03 PROCEDURE — 25800030 ZZH RX IP 258 OP 636: Performed by: HOSPITALIST

## 2019-04-03 PROCEDURE — 00000146 ZZHCL STATISTIC GLUCOSE BY METER IP

## 2019-04-03 PROCEDURE — 99232 SBSQ HOSP IP/OBS MODERATE 35: CPT | Performed by: FAMILY MEDICINE

## 2019-04-03 PROCEDURE — 25000131 ZZH RX MED GY IP 250 OP 636 PS 637: Mod: GY | Performed by: HOSPITALIST

## 2019-04-03 PROCEDURE — 25000132 ZZH RX MED GY IP 250 OP 250 PS 637: Mod: GY | Performed by: HOSPITALIST

## 2019-04-03 PROCEDURE — 97110 THERAPEUTIC EXERCISES: CPT | Mod: GO

## 2019-04-03 PROCEDURE — 36415 COLL VENOUS BLD VENIPUNCTURE: CPT | Performed by: HOSPITALIST

## 2019-04-03 PROCEDURE — A9270 NON-COVERED ITEM OR SERVICE: HCPCS | Mod: GY | Performed by: HOSPITALIST

## 2019-04-03 RX ORDER — CEFDINIR 300 MG/1
300 CAPSULE ORAL DAILY
Status: DISCONTINUED | OUTPATIENT
Start: 2019-04-03 | End: 2019-04-07 | Stop reason: HOSPADM

## 2019-04-03 RX ORDER — HYDRALAZINE HYDROCHLORIDE 25 MG/1
50 TABLET, FILM COATED ORAL EVERY 8 HOURS SCHEDULED
Status: DISCONTINUED | OUTPATIENT
Start: 2019-04-03 | End: 2019-04-04

## 2019-04-03 RX ADMIN — HYDRALAZINE HYDROCHLORIDE 25 MG: 25 TABLET ORAL at 05:06

## 2019-04-03 RX ADMIN — INSULIN GLARGINE 8 UNITS: 100 INJECTION, SOLUTION SUBCUTANEOUS at 08:06

## 2019-04-03 RX ADMIN — Medication 2.5 MG: at 08:06

## 2019-04-03 RX ADMIN — HYDRALAZINE HYDROCHLORIDE 25 MG: 25 TABLET ORAL at 14:39

## 2019-04-03 RX ADMIN — Medication 2.5 MG: at 22:09

## 2019-04-03 RX ADMIN — CEFDINIR 300 MG: 300 CAPSULE ORAL at 08:06

## 2019-04-03 RX ADMIN — SODIUM CHLORIDE: 9 INJECTION, SOLUTION INTRAVENOUS at 07:23

## 2019-04-03 RX ADMIN — HYDRALAZINE HYDROCHLORIDE 50 MG: 25 TABLET ORAL at 22:09

## 2019-04-03 RX ADMIN — MELATONIN TAB 3 MG 3 MG: 3 TAB at 18:17

## 2019-04-03 ASSESSMENT — ACTIVITIES OF DAILY LIVING (ADL)
ADLS_ACUITY_SCORE: 22
ADLS_ACUITY_SCORE: 20
ADLS_ACUITY_SCORE: 22
ADLS_ACUITY_SCORE: 22

## 2019-04-03 NOTE — PLAN OF CARE
Pt.is very confused does know her name and birthday but does not know where she is or why she is here.Her  has been her most of the day.Pt.voids frequently.Blood pressure slightly elevated this morning and recheck done at noon with manual blood pressure cuff.

## 2019-04-03 NOTE — PLAN OF CARE
"Discharge Planner PT   Patient plan for discharge: pt unable to state, keeps repeating that she is \"so tired\"  Current status: Completed seated lower extremity strengthening exercises with tactile and verbal cues. Pt demonstrated fair carryover with cues and had difficulty keeping eyes open during session. Pt then completed 5x sit to stands with min A for lift off, verbal and tactile cues to reduce forward trunk lean. Pt required quite a bit of encouragement to participate in physical therapy due to fatigue. She was unable to attempt ambulation today due to fatigue.  present during session. He was supportive and assisted with encouraging pt to participate in exercises.  Barriers to return to prior living situation: Cognition, high risk of falling, muscle weakness, impaired endurance, lack of safety awareness  Recommendations for discharge: TCU  Rationale for recommendations: Pt agreeable to short session today due to fatigue. Pt continues to demonstrate weakness, impaired safety awareness with mobilization, decreased activity tolerance, assistance for safe mobilization, impaired sight, and impaired posture. Patient would benefit from placement for safe disposition, to receive skilled therapeutic intervention and progress her towards greater function.       Entered by: Adamaris Gomez 04/03/2019 12:05 PM      "

## 2019-04-03 NOTE — PROGRESS NOTES
[facility]    Medicine Progress Note - Hospitalist Service       Date of Admission:  3/31/2019  Assessment & Plan    Admitted on March 31, 2019 with mental status changes presumed secondary to sepsis secondary to UTI blood cultures and urine cultures growing E. coli, has finished 3 days of Rocephin and started on oral Omnicef this morning.  Overall improving, taking fluids well.  Renal function slowly improving.  OT/PT recommends TCU placement, but has been once to bring her home.  She likely can be discharged home with home health care follow-up tomorrow.  Family is working with the  to potentially consider TCU placement.    Acute cystitis without hematuria  Presenting with increased confusion with the smell of strong urine and previous history of recurrent UTIs  UC and blood cultures positive for E. Coli  Finished 3 days of Rocephin and switch to oral Omnicef this morning  Continue Omnicef for a total of 10 more days  Expect discharge to home with home health care or TCU tomorrow    Sepsis (H)  Presenting with increased confusion, weakness, leukocytosis with worsening renal function  Secondary to UTI with E. coli growing from blood and urine  Currently on Omnicef, confusion has improved, taking fluids well  Stop IV fluids, expect discharge to home or TCU tomorrow    Encephalopathy  Likely due to sepsis although she does have underlying mild dementia per history  Monitor, PT/OT consult ordered  Probably at baseline per OT, they are recommending TCU placement   wants to bring her home, likely discharge home with home health care and PT OT follow-up    Essential hypertension with goal blood pressure less than 130/80  Controlled at home taking hydralazine  Is been on lower dose hydralazine with blood pressures trending higher  Increased to hydralazine 50 mg 3 times daily    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  with mealtime insulin  Has had poor appetite recently and last hemoglobin A1c was low at 4.9  Blood sugars more controlled with lower dose of Lantus with sliding scale coverage  We will sent home with lower dose Lantus tomorrow    CKD (chronic kidney disease) stage 4, GFR 15-29 ml/min (H)  Baseline creatinine around 1.5, today at 3.30, likely due to poor oral intake and sepsis  Trending downward with IV fluids, still not at baseline  Will need outpatient follow-up    Dementia - suspected  Reported, increased confusion here  PT/OT consult ordered          Diet: Consistent Carbohydrate Diet 0295-7892 Calories: Moderate Consistent CHO (4-6 CHO units/meal)    DVT Prophylaxis: Pneumatic Compression Devices  Vázquez Catheter: not present  Code Status: Full Code      Disposition Plan   Expected discharge: Tomorrow, recommended to To be determined, likely home with home health care once safe disposition plan/ TCU bed available.  Entered: Jose Horvath MD 04/03/2019, 4:28 PM       The patient's care was discussed with the Bedside Nurse and Patient.    Jose Horvath MD  Hospitalist Service  [facility]    ______________________________________________________________________    Interval History   Overall feeling better, seems back to baseline mental status which is slightly confused.  Denies chest pain, shortness of breath, cough.  Has had good appetite, drinking well with adequate urination.    Data reviewed today: I reviewed all medications, new labs and imaging results over the last 24 hours. I personally reviewed no images or EKG's today.    Physical Exam   Vital Signs: Temp: 97.3  F (36.3  C) Temp src: Oral BP: 180/82 Pulse: 75   Resp: 16 SpO2: 96 % O2 Device: None (Room air)    Weight: 157 lbs 13.59 oz  Constitutional: awake, alert, cooperative, no apparent distress, and appears stated age  Respiratory: No increased work of breathing, good air exchange, clear to auscultation  bilaterally, no crackles or wheezing  Cardiovascular: Normal apical impulse, regular rate and rhythm, normal S1 and S2, no S3 or S4, and no murmur noted  GI: normal bowel sounds, soft, non-distended and non-tender  Skin: no bruising or bleeding, normal skin color, texture, turgor and no redness, warmth, or swelling  Musculoskeletal: There is no redness, warmth, or swelling of the joints.  Full range of motion noted.  Motor strength is 5 out of 5 all extremities bilaterally.  Tone is normal.    Data   Recent Labs   Lab 04/03/19  0607 04/02/19  0539 04/01/19  0611   WBC 11.3* 10.7 11.1*   HGB 10.1* 10.0* 10.7*   MCV 96 95 95    246 245    141 141   POTASSIUM 5.0 5.1 4.8   CHLORIDE 116* 114* 113*   CO2 18* 19* 18*   BUN 56* 72* 81*   CR 2.49* 2.94* 3.09*   ANIONGAP 8 8 10   KEANU 8.2* 8.0* 8.2*   GLC 79 142* 76   ALBUMIN 2.0* 1.9*  --    PROTTOTAL 6.1* 5.7*  --    BILITOTAL 0.3 0.3  --    ALKPHOS 68 60  --    ALT 91* 68*  --    AST 97* 72*  --

## 2019-04-03 NOTE — PLAN OF CARE
Discharge Planner OT   Patient plan for discharge: Unable to state  Current status: Pt ambulated to bathroom and completed all aspects of toileting with mod assist. CGA and verbal cues for safety with functional mobility and dependent for mgmt of IV pole. Pt completed grooming tasks while seated in chair with set up and mod assist. Pt required frequent verbal cues for attention to task, sequencing, and use of items during grooming. For example, pt required 1 step commands for brushing teeth and verbal cues for placement of toothpaste. During UB exercises pt also required consistent redirection and demonstration to complete exercises. Pt frequently becomes distracted during tasks and has tangential thought patterns. Pt also verbalizes inaudibly at times and changes subjects often in the middle of a thought/sentence. Pt not oriented to place or situation, and stated on several occasions she was taking care of children or pointing at her  when he wasn't there.    Barriers to return to prior living situation: Cognition; level of assist; medical needs  Recommendations for discharge: TCU  Rationale for recommendations: Pt would benefit from further skilled OT services within the TCU setting to increase independence with ADLs, to address cognition, and progress toward prior level of function       Entered by: Ana Ruiz 04/03/2019 11:23 AM

## 2019-04-03 NOTE — PROGRESS NOTES
"Care Transitions  visited with patient and her , Fermin, this afternoon.  Discussed the continued recommendation for TCU placement.  Discussed that [facility] has an opening available for patient and her Medicare coverage for TCU placement.      Fermin stated, \"I'm hoping that I will be able to take her home tomorrow.  If she were to fall, I would just call 911 and they would come to help me pick her up\".  Discussed that the hope is to decrease patient's fall risk by working on strengthening at a TCU and then returning home.  Patient stated, \"I don't want to go to [place], I want to go home.\"  Fermin also stated, \"If it does not work out at home, then maybe I will have her go there.\"  Discussed the transition to TCU is usually much smoother directly from the hospital versus from home.      Writer explained that the the [place] TCU is available if he does change his mind.     Writer has spoken with daughter, Phyllis, over the phone, per her request.  She voiced her concern about her mother returning home and stated, \"I don't know how (Fermin) would be able to get her into the house.\"       Care Transitions will visit with them again tomorrow.  Plan continues to be discharge to home with home care services.    "

## 2019-04-03 NOTE — PROGRESS NOTES
"S-(situation): End of shift note    B-(background): UTI    A-(assessment): Pt is confused and only alert to self. VSS. Afebrile. On RA and lung sounds are clear. Up to bedside commode with assist X1 with walker and gait belt. Denies having pain. Fluids running @100ml/hr. Having good urine output with malodorous and sediment. IV abx per providers orders. Continue to monitor. Blood pressure 161/62, pulse 82, temperature 98.2  F (36.8  C), temperature source Oral, resp. rate 18, height 1.6 m (5' 3\"), weight 71.6 kg (157 lb 13.6 oz), SpO2 97 %.    R-(recommendations): Continue to monitor.    "

## 2019-04-04 ENCOUNTER — APPOINTMENT (OUTPATIENT)
Dept: OCCUPATIONAL THERAPY | Facility: CLINIC | Age: 84
DRG: 871 | End: 2019-04-04
Payer: MEDICARE

## 2019-04-04 ENCOUNTER — DOCUMENTATION ONLY (OUTPATIENT)
Dept: OTHER | Facility: CLINIC | Age: 84
End: 2019-04-04

## 2019-04-04 ENCOUNTER — APPOINTMENT (OUTPATIENT)
Dept: PHYSICAL THERAPY | Facility: CLINIC | Age: 84
DRG: 871 | End: 2019-04-04
Payer: MEDICARE

## 2019-04-04 LAB
CREAT SERPL-MCNC: 2.03 MG/DL (ref 0.52–1.04)
GFR SERPL CREATININE-BSD FRML MDRD: 22 ML/MIN/{1.73_M2}
GLUCOSE BLDC GLUCOMTR-MCNC: 117 MG/DL (ref 70–99)
GLUCOSE BLDC GLUCOMTR-MCNC: 126 MG/DL (ref 70–99)
GLUCOSE BLDC GLUCOMTR-MCNC: 172 MG/DL (ref 70–99)
GLUCOSE BLDC GLUCOMTR-MCNC: 64 MG/DL (ref 70–99)
GLUCOSE BLDC GLUCOMTR-MCNC: 74 MG/DL (ref 70–99)
GLUCOSE BLDC GLUCOMTR-MCNC: 96 MG/DL (ref 70–99)
WBC # BLD AUTO: 10.3 10E9/L (ref 4–11)

## 2019-04-04 PROCEDURE — 00000146 ZZHCL STATISTIC GLUCOSE BY METER IP

## 2019-04-04 PROCEDURE — 25000131 ZZH RX MED GY IP 250 OP 636 PS 637: Mod: GY | Performed by: FAMILY MEDICINE

## 2019-04-04 PROCEDURE — 97530 THERAPEUTIC ACTIVITIES: CPT | Mod: GP

## 2019-04-04 PROCEDURE — 25000132 ZZH RX MED GY IP 250 OP 250 PS 637: Mod: GY | Performed by: FAMILY MEDICINE

## 2019-04-04 PROCEDURE — 99232 SBSQ HOSP IP/OBS MODERATE 35: CPT | Performed by: FAMILY MEDICINE

## 2019-04-04 PROCEDURE — A9270 NON-COVERED ITEM OR SERVICE: HCPCS | Mod: GY | Performed by: FAMILY MEDICINE

## 2019-04-04 PROCEDURE — A9270 NON-COVERED ITEM OR SERVICE: HCPCS | Mod: GY | Performed by: HOSPITALIST

## 2019-04-04 PROCEDURE — 85048 AUTOMATED LEUKOCYTE COUNT: CPT | Performed by: FAMILY MEDICINE

## 2019-04-04 PROCEDURE — 12000000 ZZH R&B MED SURG/OB

## 2019-04-04 PROCEDURE — 97530 THERAPEUTIC ACTIVITIES: CPT | Mod: GO

## 2019-04-04 PROCEDURE — 25000132 ZZH RX MED GY IP 250 OP 250 PS 637: Mod: GY | Performed by: HOSPITALIST

## 2019-04-04 PROCEDURE — 82565 ASSAY OF CREATININE: CPT | Performed by: FAMILY MEDICINE

## 2019-04-04 PROCEDURE — 40000893 ZZH STATISTIC PT IP EVAL DEFER

## 2019-04-04 PROCEDURE — 36415 COLL VENOUS BLD VENIPUNCTURE: CPT | Performed by: FAMILY MEDICINE

## 2019-04-04 PROCEDURE — 97110 THERAPEUTIC EXERCISES: CPT | Mod: GP

## 2019-04-04 RX ORDER — HYDRALAZINE HYDROCHLORIDE 25 MG/1
100 TABLET, FILM COATED ORAL EVERY 8 HOURS SCHEDULED
Status: DISCONTINUED | OUTPATIENT
Start: 2019-04-04 | End: 2019-04-07

## 2019-04-04 RX ORDER — DOCUSATE SODIUM 100 MG/1
100 CAPSULE, LIQUID FILLED ORAL 2 TIMES DAILY
Status: DISCONTINUED | OUTPATIENT
Start: 2019-04-04 | End: 2019-04-07 | Stop reason: HOSPADM

## 2019-04-04 RX ADMIN — HYDRALAZINE HYDROCHLORIDE 25 MG: 25 TABLET ORAL at 23:23

## 2019-04-04 RX ADMIN — HYDRALAZINE HYDROCHLORIDE 100 MG: 25 TABLET ORAL at 14:01

## 2019-04-04 RX ADMIN — CEFDINIR 300 MG: 300 CAPSULE ORAL at 08:28

## 2019-04-04 RX ADMIN — HYDRALAZINE HYDROCHLORIDE 50 MG: 25 TABLET ORAL at 06:17

## 2019-04-04 RX ADMIN — ACETAMINOPHEN 325 MG: 325 TABLET ORAL at 18:59

## 2019-04-04 RX ADMIN — DEXTROSE 15 G: 15 GEL ORAL at 01:48

## 2019-04-04 RX ADMIN — ONDANSETRON 4 MG: 4 TABLET, ORALLY DISINTEGRATING ORAL at 20:11

## 2019-04-04 RX ADMIN — Medication 2.5 MG: at 08:28

## 2019-04-04 ASSESSMENT — ACTIVITIES OF DAILY LIVING (ADL)
ADLS_ACUITY_SCORE: 22

## 2019-04-04 NOTE — PLAN OF CARE
Pt has voided every half to one hour t/o the night. Up with assist of 1-2 , gait belt and bedside commode. Urine is cloudy, odorous with sediment. vss to baseline, BP 160s/60s, 180s/80s. Denies pain. Oriented to self only. Redirectable at times. Is weepy now this morning, asking for  and mother. Will continue with oral Omnicef, encourage oral fluid intake, reorient pt.

## 2019-04-04 NOTE — PLAN OF CARE
S-(situation): Patient scheduled for PT treatment session    B-(background): Pt admitted with AMS and UTI.    A-(assessment): Pt was resting in recliner upon arrival. Did have OT prior to PT session. Pt refused to complete any seated exercises or mobility secondary to significant fatigue. Spent 15 mins attempting to motivate patient to work with PT without success. Fermin, pt's , present and working on motivation throughout as well.    R-(recommendations): Discussed with pt's RN. PT will attempt to come back up as schedules allow. Otherwise, patient will be on the schedule for PT tomorrow.    Thank you for your referral,     Sandra Mas, PT, DPT  [phone]  [facility]

## 2019-04-04 NOTE — PROGRESS NOTES
[facility]    Medicine Progress Note - Hospitalist Service       Date of Admission:  3/31/2019  Assessment & Plan       Admitted on March 31, 2019 with mental status changes presumed secondary to sepsis secondary to UTI blood cultures and urine cultures growing E. coli, has finished 3 days of Rocephin and started on oral Omnicef on 4/3/19.  Overall improving, taking fluids well but needing much encouragement from staff to eat/dink.  Renal function slowly improving but not back to baseline with IV fluids stopped yesterday and no creatinine drawn this morning.  OT/PT recommends TCU placement, but  would like to bring her home if possible.  Blood sugars have been low secondary to poor oral intake.  Blood pressures continue to be elevated in the 160-190 range systolic despite home hydralazine dosing.      Acute cystitis without hematuria  Presenting with increased confusion with the smell of strong urine and previous history of recurrent UTIs  UC and blood cultures positive for E. Coli  Finished 3 days of Rocephin and switch to oral Omnicef 4/3/19 based on culture results  Continue Omnicef for a total of 14 days of antibiotic completion  Expect discharge to home with home health care or TCU tomorrow depending on patient's progression overnight.  Will have care conference tomorrow with family after rehab evaluation to further discuss.  Placement at TCU has been found at [facility] if family is agreeable.      Sepsis (H)  Presenting with increased confusion, weakness, leukocytosis with worsening renal function  Secondary to UTI with E. coli growing from blood and urine  Currently on Omnicef, confusion has improved but unclear if it is back to baseline, taking fluids with encouragement.  IV fluids stopped yesterday  Will recheck creatinine and WBC now and continue Omnicef with ongoing monitoring for sepsis resolution     Encephalopathy  Likely due to sepsis although she does have  underlying mild dementia per history  Patient has had some improvement and has been evaluated by occupational therapy with SLUMS score 4/30 - unclear if this is patient's previous baseline.  occupational therapy is recommending TCU,  is desiring to bring patient home with home care services.    Continue occupational therapy evaluation and ongoing discussion regarding placement at time of anticipated discharge tomorrow.      Acute kidney injury superimposed on CKD (chronic kidney disease) stage 4, GFR 15-29 ml/min (H)  Baseline creatinine around 1.5-1.6.  Was 3.30 at time of admission, likely due to poor oral intake and sepsis.  Trended downward with fluids and was 2.64 yesterday.  IV fluids were stopped at that time.  Patient is taking on oral intake only with encouragement.  No labs were drawn this morning.    Will recheck creatinine now and consider reinitiation of IV fluids if increasing.  If decreasing would continue to hold IV fluids, encourage increased PO intake, continue to hold Demadex and recheck creatinine tomorrow for ongoing assessment.      Essential hypertension with goal blood pressure less than 130/80  Taking hydralazine and Demadex at home.  Demadex has been held secondary to renal function   Home hydralazine was restarted yesterday at 50 mg TID with blood pressures still in the 160-190 range systolic.    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 with mealtime insulin however it is unclear if patient and her  are using as directed.  Has had poor appetite recently and last hemoglobin A1c was low at 4.9.  Patient has been on lower than normal doses of Lantus (8 units BID) however continues to have hypoglycemic episodes and many of these Lantus doses have been held over the past few days with ongoing hypoglycemic episodes noted.  Will discontinue  Lantus at this time and continue with sliding scale insulin coverage only and monitor blood sugars closely.      Dementia - suspected  Reported per family, increased confusion here  PT/OT consult ordered  Patient will need additional testing performed going forward following completion of infection and any acute encephalopathy that is influencing testing.         Diet: Consistent Carbohydrate Diet 3237-4168 Calories: Moderate Consistent CHO (4-6 CHO units/meal)    DVT Prophylaxis: Pneumatic Compression Devices  Vázquez Catheter: not present  Code Status: Full Code      Disposition Plan   Expected discharge: Tomorrow, recommended to TCU vs home with home care once renal function improving with PO intake, blood sugars stable, blood pressures improving, safe disposition identified and found.  Entered: Letty De Leon MD 04/04/2019, 11:49 AM       The patient's care was discussed with the Bedside Nurse, Patient and Patient's Family.    Letty De Leon MD  Hospitalist Service  [facility]    ______________________________________________________________________    Interval History   Patient continues to have ongoing confusion and is needing more assistance with some of her cares today - needed to be fed at breakfast and strongly encouraged to eat and drink.  Vitals stable.  Patient has continued to have hypoglycemia episodes with blood sugars decreasing as low as 64 overnight.  Tolerating Omnicef without difficulty.  No new symptoms.      Data reviewed today: I reviewed all medications, new labs and imaging results over the last 24 hours.    Physical Exam   Vital Signs: Temp: 96.8  F (36  C) Temp src: Oral BP: 185/88 Pulse: 74   Resp: 20 SpO2: 95 % O2 Device: None (Room air)    Weight: 157 lbs 13.59 oz  Constitutional: awake, alert, cooperative but confused and oriented only to person currently, no apparent distress, and appears stated age  Respiratory: No increased work  of breathing, decreased air exchange, clear to auscultation bilaterally, no crackles or wheezing  Cardiovascular: Normal apical impulse, regular rate and rhythm  GI: bowel sounds present, abdomen soft and non-tender  Musculoskeletal: no lower extremity pitting edema present  Neurologic: awake, alert, oriented only to self and not aware of situation or location at this time.  Does recognize her     Data   Recent Labs   Lab 04/04/19  1220 04/03/19  0607 04/02/19  0539 04/01/19  0611   WBC 10.3 11.3* 10.7 11.1*   HGB  --  10.1* 10.0* 10.7*   MCV  --  96 95 95   PLT  --  282 246 245   NA  --  142 141 141   POTASSIUM  --  5.0 5.1 4.8   CHLORIDE  --  116* 114* 113*   CO2  --  18* 19* 18*   BUN  --  56* 72* 81*   CR  --  2.49* 2.94* 3.09*   ANIONGAP  --  8 8 10   KEANU  --  8.2* 8.0* 8.2*   GLC  --  79 142* 76   ALBUMIN  --  2.0* 1.9*  --    PROTTOTAL  --  6.1* 5.7*  --    BILITOTAL  --  0.3 0.3  --    ALKPHOS  --  68 60  --    ALT  --  91* 68*  --    AST  --  97* 72*  --

## 2019-04-04 NOTE — PROGRESS NOTES
Writer has spoken with daughter, Theresa, this morning.  She and her brother, Vernon, are still quite concerned about their father being able to care for patient's needs at home and concerned about patient's weakness and fall risk.      Theresa is calling her brother, Vernon, who apparently spoke with Fermin last night about patient going to TCU instead of home.  She is also checking into whether they could come to the hospital today to assist with discharge planning, but they both are at work in [place], so she has to see if this is possible.      1547- Patient is not ready for discharge today.  Daughter and son plan to be at the hospital tomorrow to assist with discharge planning.  They did not get back to writer regarding a time.       appears to be more open to the recommendation for TCU placement according to staff.  Writer has verified that [facility] will have a TCU bed available if  agrees to TCU tomorrow.      Care Transitions will continue to follow for discharge planning.

## 2019-04-04 NOTE — PLAN OF CARE
Pt alert to self only. Knew self was in [place]. Reoriented patient to place, time and situation. Pt was tearful about missing . Bed and chair alarms on at all times for safety. Patient up to bathroom with 1 assist and walker. Is able to walk to bathroom with many reminders on turning and holding onto walker. Medications given this morning. Bg was 74 before breakfast. Lantus was not given and discontinued. Ate 50% of breakfast and drank 240 ml orange juice. Helped feed patient 50% of the time and encouragement given to eat and drink. Ongoing explanation provided to pt. Will continue to monitor.

## 2019-04-04 NOTE — PLAN OF CARE
Discharge Planner OT   Patient plan for discharge: Home; pt's  considering TCU  Current status: Pt continues to demonstrate significant confusion and requires consistent verbal cues and 1 step directions for completion of ADL. Pt requires min-mod assist with dressing, toileting, bed mobility, bathing, and functional mobility. Pt demonstrates decreased strength and activity tolerance and fatigues very quickly with activity. Pt ambulated from chair at bedside into bathroom and back with min assist for use of 2WW. Min assist for sit to stand. Pt completed standing tolerance for 35 seconds before requesting to sit, with shaking noted throughout UEs and LEs. Pt completed 3 UB calisthenic exercises for 1 minute each to increase strength and activity tolerance. Pt required extended rest breaks between exercises and verbal cues and demonstration for following of exercises. Pt and  educated on discharge recommendations and benefits of TCU. Pt's  stated he is aware it might be difficult for pt to return home at this time and is considering TCU placement.   Barriers to return to prior living situation: Level of assist; cognition; decreased strength and activity tolerance  Recommendations for discharge: TCU  Rationale for recommendations: Pt would benefit from further skilled OT services within the TCU setting to increase independence with ADL, address cognition, increase strength and activity tolerance, and progress toward prior level of function       Entered by: Ana Ruiz 04/04/2019 3:13 PM

## 2019-04-04 NOTE — PROVIDER NOTIFICATION
Informed Dr. Horvath that BP's today have been elevated:  199/89 most recent, previous ones were 180/82, 144/64.181/85.

## 2019-04-05 LAB
ANION GAP SERPL CALCULATED.3IONS-SCNC: 9 MMOL/L (ref 3–14)
BUN SERPL-MCNC: 42 MG/DL (ref 7–30)
CALCIUM SERPL-MCNC: 8.9 MG/DL (ref 8.5–10.1)
CHLORIDE SERPL-SCNC: 112 MMOL/L (ref 94–109)
CO2 SERPL-SCNC: 18 MMOL/L (ref 20–32)
CREAT SERPL-MCNC: 1.87 MG/DL (ref 0.52–1.04)
GFR SERPL CREATININE-BSD FRML MDRD: 24 ML/MIN/{1.73_M2}
GLUCOSE BLDC GLUCOMTR-MCNC: 106 MG/DL (ref 70–99)
GLUCOSE BLDC GLUCOMTR-MCNC: 144 MG/DL (ref 70–99)
GLUCOSE BLDC GLUCOMTR-MCNC: 87 MG/DL (ref 70–99)
GLUCOSE BLDC GLUCOMTR-MCNC: 91 MG/DL (ref 70–99)
GLUCOSE SERPL-MCNC: 102 MG/DL (ref 70–99)
POTASSIUM SERPL-SCNC: 4.7 MMOL/L (ref 3.4–5.3)
SODIUM SERPL-SCNC: 139 MMOL/L (ref 133–144)

## 2019-04-05 PROCEDURE — 25000128 H RX IP 250 OP 636: Performed by: INTERNAL MEDICINE

## 2019-04-05 PROCEDURE — A9270 NON-COVERED ITEM OR SERVICE: HCPCS | Mod: GY | Performed by: FAMILY MEDICINE

## 2019-04-05 PROCEDURE — 25000128 H RX IP 250 OP 636: Performed by: FAMILY MEDICINE

## 2019-04-05 PROCEDURE — 99207 ZZC CDG-CODE CATEGORY CHANGED: CPT | Performed by: FAMILY MEDICINE

## 2019-04-05 PROCEDURE — 25000132 ZZH RX MED GY IP 250 OP 250 PS 637: Mod: GY | Performed by: FAMILY MEDICINE

## 2019-04-05 PROCEDURE — A9270 NON-COVERED ITEM OR SERVICE: HCPCS | Mod: GY | Performed by: INTERNAL MEDICINE

## 2019-04-05 PROCEDURE — 25000132 ZZH RX MED GY IP 250 OP 250 PS 637: Mod: GY | Performed by: HOSPITALIST

## 2019-04-05 PROCEDURE — 80048 BASIC METABOLIC PNL TOTAL CA: CPT | Performed by: FAMILY MEDICINE

## 2019-04-05 PROCEDURE — A9270 NON-COVERED ITEM OR SERVICE: HCPCS | Mod: GY | Performed by: HOSPITALIST

## 2019-04-05 PROCEDURE — 36415 COLL VENOUS BLD VENIPUNCTURE: CPT | Performed by: FAMILY MEDICINE

## 2019-04-05 PROCEDURE — 99232 SBSQ HOSP IP/OBS MODERATE 35: CPT | Performed by: FAMILY MEDICINE

## 2019-04-05 PROCEDURE — 00000146 ZZHCL STATISTIC GLUCOSE BY METER IP

## 2019-04-05 PROCEDURE — 99358 PROLONG SERVICE W/O CONTACT: CPT | Performed by: FAMILY MEDICINE

## 2019-04-05 PROCEDURE — 25000132 ZZH RX MED GY IP 250 OP 250 PS 637: Mod: GY | Performed by: INTERNAL MEDICINE

## 2019-04-05 PROCEDURE — 12000000 ZZH R&B MED SURG/OB

## 2019-04-05 RX ORDER — HALOPERIDOL 5 MG/ML
2.5 INJECTION INTRAMUSCULAR EVERY 6 HOURS PRN
Status: DISCONTINUED | OUTPATIENT
Start: 2019-04-05 | End: 2019-04-05

## 2019-04-05 RX ORDER — QUETIAPINE FUMARATE 25 MG/1
25 TABLET, FILM COATED ORAL 2 TIMES DAILY
Status: DISCONTINUED | OUTPATIENT
Start: 2019-04-05 | End: 2019-04-07 | Stop reason: HOSPADM

## 2019-04-05 RX ORDER — LORAZEPAM 2 MG/ML
1 INJECTION INTRAMUSCULAR ONCE
Status: COMPLETED | OUTPATIENT
Start: 2019-04-05 | End: 2019-04-05

## 2019-04-05 RX ORDER — HALOPERIDOL 5 MG/ML
2 INJECTION INTRAMUSCULAR EVERY 6 HOURS PRN
Status: DISCONTINUED | OUTPATIENT
Start: 2019-04-05 | End: 2019-04-05

## 2019-04-05 RX ORDER — QUETIAPINE FUMARATE 25 MG/1
25 TABLET, FILM COATED ORAL EVERY 6 HOURS PRN
Status: DISCONTINUED | OUTPATIENT
Start: 2019-04-05 | End: 2019-04-07 | Stop reason: HOSPADM

## 2019-04-05 RX ORDER — OLANZAPINE 10 MG/2ML
2.5 INJECTION, POWDER, FOR SOLUTION INTRAMUSCULAR DAILY PRN
Status: DISCONTINUED | OUTPATIENT
Start: 2019-04-05 | End: 2019-04-05

## 2019-04-05 RX ORDER — OLANZAPINE 10 MG/2ML
5 INJECTION, POWDER, FOR SOLUTION INTRAMUSCULAR DAILY PRN
Status: DISCONTINUED | OUTPATIENT
Start: 2019-04-05 | End: 2019-04-07 | Stop reason: HOSPADM

## 2019-04-05 RX ADMIN — QUETIAPINE FUMARATE 25 MG: 25 TABLET ORAL at 09:26

## 2019-04-05 RX ADMIN — HYDRALAZINE HYDROCHLORIDE 100 MG: 25 TABLET ORAL at 09:35

## 2019-04-05 RX ADMIN — HYDRALAZINE HYDROCHLORIDE 100 MG: 25 TABLET ORAL at 17:15

## 2019-04-05 RX ADMIN — OLANZAPINE 5 MG: 10 INJECTION, POWDER, LYOPHILIZED, FOR SOLUTION INTRAMUSCULAR at 19:45

## 2019-04-05 RX ADMIN — Medication 2.5 MG: at 09:27

## 2019-04-05 RX ADMIN — QUETIAPINE FUMARATE 25 MG: 25 TABLET ORAL at 17:15

## 2019-04-05 RX ADMIN — LORAZEPAM 1 MG: 2 INJECTION INTRAMUSCULAR; INTRAVENOUS at 21:28

## 2019-04-05 RX ADMIN — ACETAMINOPHEN 325 MG: 325 TABLET ORAL at 09:26

## 2019-04-05 RX ADMIN — DOCUSATE SODIUM 100 MG: 100 CAPSULE, LIQUID FILLED ORAL at 09:27

## 2019-04-05 RX ADMIN — CEFDINIR 300 MG: 300 CAPSULE ORAL at 09:27

## 2019-04-05 RX ADMIN — OLANZAPINE 2.5 MG: 10 INJECTION, POWDER, FOR SOLUTION INTRAMUSCULAR at 14:26

## 2019-04-05 RX ADMIN — HALOPERIDOL LACTATE 2 MG: 5 INJECTION, SOLUTION INTRAMUSCULAR at 02:37

## 2019-04-05 ASSESSMENT — ACTIVITIES OF DAILY LIVING (ADL)
ADLS_ACUITY_SCORE: 26
ADLS_ACUITY_SCORE: 26
ADLS_ACUITY_SCORE: 16.5
ADLS_ACUITY_SCORE: 16.5
ADLS_ACUITY_SCORE: 16
ADLS_ACUITY_SCORE: 26

## 2019-04-05 NOTE — PLAN OF CARE
S-(situation): Physical Therapy treatment per POC    B-(background): Patient is a 84 year old female, admitted from the ED with UTI, sepsis and encephalopathy. Patient has a previous medical history of dementia, HTN, GERD, DDD of the lumbar spine, DM type 2, CAD, stage 4 renal failure and osteoporosis.     A-(assessment): Patient recently on 1:1 with staff with increased behaviors over night. Patient did not sleep overnight and only recently settled.    R-(recommendations): Per discussion with patient's nurse Tierney and AYSHA Trujillo patient's treatment will be postponed to this afternoon.     3:40 pm: Attempted co treatment of patient with occupation therapy. Patient being transferred from commode to chair for nursing with assistance of , patient refusing to participate, combative and poorly following commands. Patient not appropriate for skilled therapeutic intervention at this time. Will reassess and treat as appropriate tomorrow.     Thank you for your referral.    Iliana Sam, PT, DPT, ATC    [facility]    O: [phone]  E: [email or web address]

## 2019-04-05 NOTE — PLAN OF CARE
Problem: Patient Care Overview  Goal: Plan of Care/Patient Progress Review  S-(situation): OT treatment    B-(background): Pt is an 84 year old female scheduled for OT treatment session after being admitted from ED with UTI, sepsis, and encephalopathy. Pt was on 1:1 and had increased behaviors last evening and overnight.    A-(assessment): Per physical therapist Iliana pt's care team requested OT session be held until this afternoon as able.     R-(recommendations): Will attempt OT session this PM as appropriate. Also plan to decrease OT inpatient POC to 5x/week due to pt's limited tolerance for therapy treatment sessions, pt's increased confusion, and lack of carryover of learning.    3:40pm: Attempted session as co-treatment with physical therapy. Pt was on commode upon arrival with nursing and with . Pt was refusing to participate, was combative, hitting staff, and not following commands. Pt was max assist of 2 for transfer from commode to chair. Nursing reports pt was attempting to get out of bed prior to going to commode. Pt was not appropriate for skilled OT session. Will attempt and reassess as appropriate.     Ana Ruiz, ALBINOR/L  [facility]  [phone]

## 2019-04-05 NOTE — PROGRESS NOTES
[facility]    Medicine Progress Note - Hospitalist Service       Date of Admission:  3/31/2019  Assessment & Plan          Admitted on March 31, 2019 with mental status changes presumed secondary to sepsis secondary to UTI blood cultures and urine cultures growing E. coli, has finished 3 days of Rocephin and started on oral Omnicef on 4/3/19.  Overall improving from an infection standpoing, taking fluids well but needing much encouragement from staff to eat/dink.  Renal function continues to slowly improving.  Patient has become significantly more confused with evidence of fluctuating levels of consciousness and some agitation and aggression last evening and required Haldol IV in order to prevent her from harming herself and others.  She has been sleeping for the past hour and appears calm    Acute cystitis without hematuria  Presenting with increased confusion with the smell of strong urine and previous history of recurrent UTIs  UC and blood cultures positive for E. Coli  Finished 3 days of Rocephin and switch to oral Omnicef 4/3/19 based on culture results  Continue Omnicef for a total of 14 days of antibiotic completion  Expect discharge to home with home health care or TCU depending on results of care conference this afternoon.      Sepsis (H)  Presenting with increased confusion, weakness, leukocytosis with worsening renal function  Secondary to UTI with E. coli growing from blood and urine  Currently on Omnicef, confusion had improved but unclear if it is back to baseline, but now with worsening concerning for delirium onset  Continue Omnicef with ongoing monitoring for sepsis resolution     Encephalopathy  Likely initial acute confusion was due to sepsis although she does have underlying mild dementia per history.  Has had fluctuating levels of consciousnessacute worsening with an intermittent agitation and aggression concerning for delirium development  Patient had been  evaluated by occupational therapy when she was more cognitively clear with SLUMS score 4/30 - unclear if this is patient's previous baseline.      We will proceed with care conference this afternoon and discuss initiation of oral and IM antipsychotics to help assist in delirium management going forward.     Acute kidney injury superimposed on CKD (chronic kidney disease) stage 4, GFR 15-29 ml/min (H)  Baseline creatinine around 1.5-1.6.  Was 3.30 at time of admission, likely due to poor oral intake and sepsis.  Trended downward with fluids and was 1.87 today.   Continue to encourage increased PO intake as patient will tolerate, continue to hold Demadex and recheck creatinine tomorrow for ongoing assessment.      Essential hypertension with goal blood pressure less than 130/80  Taking hydralazine and Demadex at home.  Demadex has been held secondary to renal function   Home hydralazine increased to 100 mg TID with patient refusing to take the doses last evening and this morning.  Blood pressures, unsurprisingly, remain elevated.    Will proceed with care conference this afternoon with discussion of clarification of goals, continue to offer hydralazine as patient will take it.    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 with mealtime insulin however it is unclear if patient and her  are using as directed.  Has had poor appetite recently and last hemoglobin A1c was low at 4.9.  Patient was initially placed on lower than normal doses of Lantus (8 units BID) however continues to have hypoglycemic episodes and many of these Lantus doses had to be held secondary to hypoglycemia.  Lantus was discontinued on 4/4/19 with blood sugars stabilizing and no hypoglycemia in the past 24 hours.    Continue with sliding scale insulin coverage only and monitor blood sugars closely.      Dementia - suspected  Reported per family, increased confusion here with now acute  worsening concerning for delirium  PT/OT consult ordered  Patient will need additional testing performed going forward following completion of infection and any acute encephalopathy that is influencing testing.      Proceed with care conference as above    Diet: Consistent Carbohydrate Diet 2573-7197 Calories: Moderate Consistent CHO (4-6 CHO units/meal)    DVT Prophylaxis: Pneumatic Compression Devices  Vázquez Catheter: not present  Code Status: DNR/DNI      Disposition Plan   Expected discharge: 2 - 3 days, recommended to transitional care unit vs home with home care depending on results of care conference occurring this afternoon.  Entered: Letty De Leon MD 04/05/2019, 12:12 PM       The patient's care was discussed with the Bedside Nurse.    Letty De Leon MD  Hospitalist Service  [facility]    ______________________________________________________________________    Interval History   Patient became increasingly agitated overnight and had aggression towards the staff and refused to take any oral medicines.  IV Haldol was administered x1 with patient having some call me effect and was able to sleep.  She continues to be mildly agitated this morning and is refusing her morning medication although nursing staff was able to get some medication administered.  Vital signs remained stable.  No other new concerns    Data reviewed today: I reviewed all medications, new labs and imaging results over the last 24 hours.    Physical Exam   Vital Signs: Temp: 99.2  F (37.3  C) Temp src: Oral BP: 182/81 Pulse: 84   Resp: 18 SpO2: 96 % O2 Device: None (Room air)    Weight: 157 lbs 13.59 oz  Constitutional: sleeping comfortably, no current agitation   Respiratory: No increased work of breathing, good air exchange, clear to auscultation bilaterally, no crackles or wheezing  Cardiovascular: Normal apical impulse, regular rate and rhythm  GI: bowel sounds present, abdomen  soft and non-tender  Skin: normal skin color, texture, turgor  Musculoskeletal: no lower extremity pitting edema present  Neurologic: Awake, alert, oriented to name, place and time.      Data   Recent Labs   Lab 04/05/19  0547 04/04/19  1220 04/03/19  0607 04/02/19  0539 04/01/19  0611   WBC  --  10.3 11.3* 10.7 11.1*   HGB  --   --  10.1* 10.0* 10.7*   MCV  --   --  96 95 95   PLT  --   --  282 246 245     --  142 141 141   POTASSIUM 4.7  --  5.0 5.1 4.8   CHLORIDE 112*  --  116* 114* 113*   CO2 18*  --  18* 19* 18*   BUN 42*  --  56* 72* 81*   CR 1.87* 2.03* 2.49* 2.94* 3.09*   ANIONGAP 9  --  8 8 10   KEANU 8.9  --  8.2* 8.0* 8.2*   *  --  79 142* 76   ALBUMIN  --   --  2.0* 1.9*  --    PROTTOTAL  --   --  6.1* 5.7*  --    BILITOTAL  --   --  0.3 0.3  --    ALKPHOS  --   --  68 60  --    ALT  --   --  91* 68*  --    AST  --   --  97* 72*  --

## 2019-04-05 NOTE — PLAN OF CARE
"Pt refused vitals this shift. Refused temp to be taken. Refused blood glucose check. Pt setting off bed alarm several times at start of shift becoming very restless and anxious. Pt wanting to put shoes on and go to the bathroom. When staff would have pt up to go to bedside commode pt would ask staff what they were doing and tell them \"you can go now\". Staff would remind pt that she was going to go to the bathroom and pt would state \"I didn't say that\" then pt would choose to go to her chair or to her her bed. Pt very restless requiring assist of 2 for ambulation and redirection.   Pt placed on 1:1. Pt becoming increasingly agitated through shift. Pt refused 0200 blood sugar check. Pt threatening staff and attempting to bite and pinch staff. Pt attempting to get out of bed at that time stating she needed to use the bathroom then refusing to sit in a safe chair/bed/commode. Pt then scratched and pinched staff. MD aware and order for PRN haldol received and given. Pt pulled IV out and pulling incontinence product off and throwing them at staff. Pt also throwing pillows and blankets at staff. PRN haldol somewhat effective. Pt tired but not sleeping still attempting to get out of bed and throw things at staff. Pt also able to get mitts off and throwing mitts at staff as well. Pt hallucinating, waving to the corners of the room and calling out for \"Chemo\". Pt continues to kick and swing at staff.   "

## 2019-04-05 NOTE — PROGRESS NOTES
Care Conference    But this care conference was the patient (who given her mental state was not able to participate in decision-making), her , daughter, son, and son-in-law.  Also present was myself and Martina .      Reviewed with family the reason patient was hospitalized and the E. Coli in the urine and blood as well as patient's ongoing improvement with oral antibiotics.  Did also discuss patient's initial confusion secondary to infection as well as secondary confusion in the past 24 hours most likely secondary to delirium.  Reviewed changes that have been made to patient's diabetes management given her ongoing poor oral intake and recommendations of TCU at time of discharge.  All family is now in agreement that patient is not doing well enough to return home with  and they are agreeable to TCU.  Given her worsening delirium, will start scheduled Seroquel in addition to as needed Seroquel with consideration of IM Zyprexa if symptoms become severe and patient refuses to take oral medications.  We will continue with antibiotic treatment and avoid any long-acting insulin to avoid hypoglycemia if the patient continues to refuse to eat.  Family is aware that patient would not be discharged to the rehab facility until Sunday at the earliest and only if her mental status had stabilized and appropriate regimen had been found.    Did review in detail what patient would want regarding her CODE STATUS and family is in agreement that she would desire to be DNR/DNI going forward therefore this change will be made.    Did also discuss with family the goals of TCU however discussed the possibility that the patient may not return to her previous baseline given the severity of this current illness and may need to consider alternative living options in the future and social work will give more information regarding options in this area    39 minutes were spent in the care conference in discussion of the  topic above.       Electronically Signed:  Letty De Leon MD

## 2019-04-05 NOTE — PROVIDER NOTIFICATION
Notified MD at 0100 AM regarding change in condition.      Spoke with: Dr Molina    Orders were obtained.    Comments: patient very confused and nondirectable. She is only oriented to self and is uncooperative with cares and has been increasingly getting up out of bed and becoming more of a safety risk. Nursing staff constantly at bedside except for very short periods of time. She is a safety risk to self and not a candidate for the VPM. A 1:1 sitter was ordered

## 2019-04-05 NOTE — PROGRESS NOTES
Pt has been resting comfortably @ 1000 after AM cares.  at bedside. Pt off 1:1 sitter @ 1000. Bed alarm zone 1 activated. Will continue to monitor.

## 2019-04-05 NOTE — PROGRESS NOTES
[facility] has left voice mails for daughter, Theresa, regarding what time she and her brother will be here at the hospital today.  Nursing stated that they thought they had heard that family was coming at 1:00 pm today.  Will await call back.      1428-  Daughter, Theresa, her , and patient's son, Vernon, arrived to the hospital at approximately 1100 today.   Fermin already was in patient room visiting.      Family conference was held at approximately 1120.  Hospitalist and  also in attendance.  Admitting diagnosis and current medical condition discussed.  Suspected dementia discussed along with sundowning.  Proposed plan for patient agitation discussed with  and children's agreement to plan.      Continued recommendation for TCU placement due to weakness.   and children in agreement with [facility].  [facility] is checking into possible admission for Sunday.  Their policy is that patient has to be off of a 1:1 or not had Haldol for 48 hours prior to admission.  Per nursing, patient was taken off 1:1 at 10:00 am today.      Current code status and code status options discussed.   and children stated that patient would want DNR/DNI status.      Planning for the future discussed.  Goal is for patient to return home with  and maybe needing more supportive care through services.  Discussed possibility that patient may not meet goals to return home and other living options including long term care or assisted living discussed.  Writer has provided the family with resources for Medicare certified home care, private pay home care, and area assisted living facilities.  Both daughter and son live in the [place] area, so they did begin discussion of possible options closer to where they live also.      Plan is for patient to discharge to [facility], soonest on Sunday morning.  Family has requested [facility] for transport and has left the money with [facility] to pay the .  Will need to set up transport when discharge is indicated.      [facility] will continue to follow for discharge planning.

## 2019-04-05 NOTE — PROVIDER NOTIFICATION
Notified MD at 0220 AM regarding change in condition.      Spoke with: Dr Molina    Orders were obtained.    Comments: Patients becoming very combative with attempts to hit and bite. She is also scratching staff members when in arms reach. Dr Molina notified and orders received for haldol.

## 2019-04-05 NOTE — PROGRESS NOTES
Refused lunch, due to lethargic.Blood glucose of 87 @ 1330. Increased restlessness this afternoon. Zyprexa IM given. Large incontinent of urine after attempted to get up to BSC. Will continue to monitor.

## 2019-04-06 ENCOUNTER — APPOINTMENT (OUTPATIENT)
Dept: PHYSICAL THERAPY | Facility: CLINIC | Age: 84
DRG: 871 | End: 2019-04-06
Payer: MEDICARE

## 2019-04-06 PROBLEM — R33.9 URINARY RETENTION: Status: ACTIVE | Noted: 2019-04-06

## 2019-04-06 LAB
ANION GAP SERPL CALCULATED.3IONS-SCNC: 7 MMOL/L (ref 3–14)
BUN SERPL-MCNC: 33 MG/DL (ref 7–30)
CALCIUM SERPL-MCNC: 8.7 MG/DL (ref 8.5–10.1)
CHLORIDE SERPL-SCNC: 115 MMOL/L (ref 94–109)
CO2 SERPL-SCNC: 22 MMOL/L (ref 20–32)
CREAT SERPL-MCNC: 1.73 MG/DL (ref 0.52–1.04)
GFR SERPL CREATININE-BSD FRML MDRD: 27 ML/MIN/{1.73_M2}
GLUCOSE BLDC GLUCOMTR-MCNC: 108 MG/DL (ref 70–99)
GLUCOSE BLDC GLUCOMTR-MCNC: 265 MG/DL (ref 70–99)
GLUCOSE BLDC GLUCOMTR-MCNC: 85 MG/DL (ref 70–99)
GLUCOSE BLDC GLUCOMTR-MCNC: 92 MG/DL (ref 70–99)
GLUCOSE SERPL-MCNC: 88 MG/DL (ref 70–99)
POTASSIUM SERPL-SCNC: 4.3 MMOL/L (ref 3.4–5.3)
SODIUM SERPL-SCNC: 144 MMOL/L (ref 133–144)

## 2019-04-06 PROCEDURE — A9270 NON-COVERED ITEM OR SERVICE: HCPCS | Mod: GY | Performed by: INTERNAL MEDICINE

## 2019-04-06 PROCEDURE — 25000132 ZZH RX MED GY IP 250 OP 250 PS 637: Mod: GY | Performed by: FAMILY MEDICINE

## 2019-04-06 PROCEDURE — 99232 SBSQ HOSP IP/OBS MODERATE 35: CPT | Performed by: FAMILY MEDICINE

## 2019-04-06 PROCEDURE — 25000132 ZZH RX MED GY IP 250 OP 250 PS 637: Mod: GY | Performed by: INTERNAL MEDICINE

## 2019-04-06 PROCEDURE — 00000146 ZZHCL STATISTIC GLUCOSE BY METER IP

## 2019-04-06 PROCEDURE — 25000131 ZZH RX MED GY IP 250 OP 636 PS 637: Mod: GY | Performed by: FAMILY MEDICINE

## 2019-04-06 PROCEDURE — A9270 NON-COVERED ITEM OR SERVICE: HCPCS | Mod: GY | Performed by: FAMILY MEDICINE

## 2019-04-06 PROCEDURE — A9270 NON-COVERED ITEM OR SERVICE: HCPCS | Mod: GY | Performed by: HOSPITALIST

## 2019-04-06 PROCEDURE — 25000132 ZZH RX MED GY IP 250 OP 250 PS 637: Mod: GY | Performed by: HOSPITALIST

## 2019-04-06 PROCEDURE — 12000000 ZZH R&B MED SURG/OB

## 2019-04-06 PROCEDURE — 97530 THERAPEUTIC ACTIVITIES: CPT | Mod: GP | Performed by: PHYSICAL THERAPIST

## 2019-04-06 PROCEDURE — 36415 COLL VENOUS BLD VENIPUNCTURE: CPT | Performed by: FAMILY MEDICINE

## 2019-04-06 PROCEDURE — 80048 BASIC METABOLIC PNL TOTAL CA: CPT | Performed by: FAMILY MEDICINE

## 2019-04-06 RX ADMIN — QUETIAPINE FUMARATE 25 MG: 25 TABLET ORAL at 20:00

## 2019-04-06 RX ADMIN — ACETAMINOPHEN 325 MG: 325 TABLET ORAL at 10:06

## 2019-04-06 RX ADMIN — MELATONIN TAB 3 MG 3 MG: 3 TAB at 19:59

## 2019-04-06 RX ADMIN — DOCUSATE SODIUM 100 MG: 100 CAPSULE, LIQUID FILLED ORAL at 20:00

## 2019-04-06 RX ADMIN — DOCUSATE SODIUM 100 MG: 100 CAPSULE, LIQUID FILLED ORAL at 10:06

## 2019-04-06 RX ADMIN — QUETIAPINE FUMARATE 25 MG: 25 TABLET ORAL at 10:06

## 2019-04-06 RX ADMIN — INSULIN ASPART 2 UNITS: 100 INJECTION, SOLUTION INTRAVENOUS; SUBCUTANEOUS at 23:37

## 2019-04-06 RX ADMIN — Medication 2.5 MG: at 10:06

## 2019-04-06 RX ADMIN — CEFDINIR 300 MG: 300 CAPSULE ORAL at 10:06

## 2019-04-06 RX ADMIN — ACETAMINOPHEN 325 MG: 325 TABLET ORAL at 19:59

## 2019-04-06 RX ADMIN — QUETIAPINE FUMARATE 25 MG: 25 TABLET ORAL at 16:51

## 2019-04-06 RX ADMIN — HYDRALAZINE HYDROCHLORIDE 100 MG: 25 TABLET ORAL at 16:51

## 2019-04-06 ASSESSMENT — ACTIVITIES OF DAILY LIVING (ADL)
ADLS_ACUITY_SCORE: 26

## 2019-04-06 NOTE — PROGRESS NOTES
[facility]    Medicine Progress Note - Hospitalist Service       Date of Admission:  3/31/2019  Assessment & Plan            Admitted on March 31, 2019 with mental status changes presumed secondary to sepsis secondary to UTI blood cultures and urine cultures growing E. coli, has finished 3 days of Rocephin and started on oral Omnicef on 4/3/19.  Overall improving from an infection standpoing, taking fluids well but needing much encouragement from staff to eat/dink.  Renal function continues to slowly improving.  Patient has become significantly more confused with evidence of fluctuating levels of consciousness and some agitation and aggression in the past 48 hours with urinary retention diagnosed and patient had improvement in agitation and aggression following straight cath last night  but concern for delirium continues to be present.      Acute cystitis without hematuria  Presenting with increased confusion with the smell of strong urine and previous history of recurrent UTIs  UC and blood cultures positive for E. Coli  Finished 3 days of Rocephin and switch to oral Omnicef 4/3/19 based on culture results  Continue Omnicef for a total of 14 days of antibiotic completion  Expect discharge to TCU when patient is medically stable    Sepsis (H)  Presenting with increased confusion, weakness, leukocytosis with worsening renal function  Secondary to UTI with E. coli growing from blood and urine  Currently on Omnicef, confusion had improved but unclear if it is back to baseline, but now with worsening concerning for delirium onset  Continue Omnicef with ongoing monitoring for sepsis resolution     Encephalopathy  Likely initial acute confusion was due to sepsis although she does have underlying mild dementia per history.  Has had fluctuating levels of consciousnessacute worsening with an intermittent agitation and aggression concerning for delirium development but improvement noted after  straight cath drainage last night and patient is able to sleep peacefully today for the first time in 3 days.  Patient had been evaluated by occupational therapy when she was more cognitively clear with SLUMS score 4/30 - unclear if this is patient's previous baseline.      Continue with scheduled seroquel, prn seroquel and prn IM Zyprexa to help improve agitation and aggression.      Acute kidney injury superimposed on CKD (chronic kidney disease) stage 4, GFR 15-29 ml/min (H)  Baseline creatinine around 1.5-1.6.  Was 3.30 at time of admission, likely due to poor oral intake and sepsis.  Trended downward with fluids but continues to improve as IV fluids were stopped and was 1.73 today.   Continue to encourage increased PO intake as patient will tolerate when awake, continue to hold Demadex and recheck creatinine tomorrow for ongoing assessment.      Urinary Retention  Developed in the past 48 hours and may be the underlying etiology for patient's agitation and aggression as she is much more calm and able to rest since straight cath performed x1 last night.  Able to void this morning - still had some retention but down to 400 post-void and patient is calm and appropriate without concern for pain.  Patient has a known history of overactive bladder and is on oxybutynin however it is unclear if she had been this as prescribed as it does not sound familiar to her  who sets up her medications.  Of note, she has been getting the medication since time of admission and on review it can commonly cause urinary retention.  Asked pharmacy to review the rest of the patient's current regimen and no other agents have been found that may be contributing to retention.    Will hold oxybutynin, continue to bladder scan and straight cath if needed.  Given patient's dementia and baseline status, will avoid pascal catheter going forward if at all possible.      Essential hypertension with goal blood pressure less than  130/80  Taking hydralazine and Demadex at home.  Demadex has been held secondary to renal function   Home hydralazine increased to 100 mg TID with patient refusing to take several doses in the apst 24 hours secondary to agitation.  Blood pressures remain elevated.    Continue to offer hydralazine as patient is willing to take it.      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  Llast hemoglobin A1c was low at 4.9 and patient has had an ongoing poor appetite during this hospitalization.  Patient was initially placed on lower than normal doses of Lantus (8 units BID) however continues to have hypoglycemic episodes and many of these Lantus doses had to be held secondary to hypoglycemia.  Lantus was discontinued on 4/4/19 with blood sugars stabilizing and no hypoglycemia in the past 48 hours.    Continue with sliding scale insulin coverage only and monitor blood sugars closely.      Dementia - suspected  Reported per family, increased confusion here with now acute worsening concerning for delirium as above  Patient will need additional testing performed going forward following completion of infection and any acute encephalopathy that is influencing testing.  TCU at time of discharge       Diet: Consistent Carbohydrate Diet 5062-8467 Calories: Moderate Consistent CHO (4-6 CHO units/meal)    DVT Prophylaxis: Pneumatic Compression Devices  Vázquez Catheter: not present  Code Status: DNR/DNI      Disposition Plan   Expected discharge: 1-3 days, recommended to transitional care unit once urinary retnetion is further evaluation and treatment plan in place, patient agitation and aggression are well controlled with medications.  Entered: Letty De Leon MD 04/06/2019, 2:59 PM       The patient's care was discussed with the Bedside Nurse and Patient's Family.    Letty Real  MD [place]  Hospitalist Service  [facility]    ______________________________________________________________________    Interval History   Patient became very agitation again last evening and was given prn seroquel, IM Zyprexa and IM Ativan without any significant benefit.  Patient was found to have urinary retention and even as the bladder was being drained and pressure was relieved she fell asleep and sleep peacefully overnight.  Woke up this morning for breakfast and was appropriate and has been sleeping again this afternoon.  Vital overall stable - blood pressure still elevated however afebrile, tachycardia resolved since agitation resolved.  No other new concerns.     Data reviewed today: I reviewed all medications, new labs and imaging results over the last 24 hours.    Physical Exam   Vital Signs: Temp: 96.9  F (36.1  C) Temp src: Axillary BP: 147/71 Pulse: 80 Heart Rate: 80 Resp: 20 SpO2: 97 % O2 Device: None (Room air)    Weight: 157 lbs 13.59 oz  Constitutional: patient is sleeping soundly, snoring loudly on entry into the room.  She continues to sleep to voice and light touch, does move with touch and opens her eyes with sternal rub but quickly turns her head and falls back asleep.    Respiratory: No increased work of breathing, decreased air exchange secondary to patient not taking deep breaths, difficult to assess for crackles secondary to patient's ongoing snoring.  Cardiovascular: Normal apical impulse, regular rate and rhythm although difficult to hear secondary to patient's snoring  GI: bowel sounds present, abdomen soft and no apparent tenderness  Musculoskeletal: no lower extremity pitting edema present  Neurologic: patient is sleeping soundly and although she briefly awakens to sternal rub does not make eye contact and falls asleep rapidly     Data   Recent Labs   Lab 04/06/19  0616 04/05/19  0547 04/04/19  1220 04/03/19  0607 04/02/19  0539 04/01/19  0611   WBC   --   --  10.3 11.3* 10.7 11.1*   HGB  --   --   --  10.1* 10.0* 10.7*   MCV  --   --   --  96 95 95   PLT  --   --   --  282 246 245    139  --  142 141 141   POTASSIUM 4.3 4.7  --  5.0 5.1 4.8   CHLORIDE 115* 112*  --  116* 114* 113*   CO2 22 18*  --  18* 19* 18*   BUN 33* 42*  --  56* 72* 81*   CR 1.73* 1.87* 2.03* 2.49* 2.94* 3.09*   ANIONGAP 7 9  --  8 8 10   KEANU 8.7 8.9  --  8.2* 8.0* 8.2*   GLC 88 102*  --  79 142* 76   ALBUMIN  --   --   --  2.0* 1.9*  --    PROTTOTAL  --   --   --  6.1* 5.7*  --    BILITOTAL  --   --   --  0.3 0.3  --    ALKPHOS  --   --   --  68 60  --    ALT  --   --   --  91* 68*  --    AST  --   --   --  97* 72*  --

## 2019-04-06 NOTE — PLAN OF CARE
Patient is disoriented x4. At the beginning of this shift, patient was agitated, combative, and restless. Patient was scratching, hitting, attempting to bite and kick at staff. Patient was trying to climb out of bed. Zyprexa IM 5 mg was given at 1945 and hand mitts were put on. Patient slept for approximately 45 minutes and then started escalating again. Patient refused to cooperate to take oral medications and clenched teeth together, MD notified. At 2130 1 mg IM Ativan was given. Patient was sleepy, not opening eyes, but still restless and would intermittently try to hit staff. At 0000 patient was bladder scanned for 906 mL. At 0100 patient was straight cathed for 1100 mL. After straight cath, patient slept. Hand mitts were removed at 0200. Patient moved around in the bed quite a bit, but remained asleep. Blood pressures were elevated, but vital signs are otherwise stable. Afebrile. Will continue to monitor.

## 2019-04-06 NOTE — PROGRESS NOTES
Plan is for patient to discharge to [facility] (Main Phone: [phone] Admissions Phone: [phone] Fax: [phone]) tomorrow if she remains off of 1:1 and VPM throughout the night.  Dariela is the weekend contact at [facility] [phone].  Left a voicemail for [facility] regarding transport for Sunday.  Will need to call again on Sunday.  [facility] for transport.  Call son, Vernon, on Friday with discharge plans [phone].    PAS-RR    D: Per DHS regulation, SW completed and submitted PAS-RR to [place] [employer] Direct Connect via the Senior LinkAge Line.  PAS-RR confirmation # is : [id number]    P: Further questions may be directed to Senior LinkAge Line at #[phone], option #4 for PAS-RR staff.    MARILYNN Price  [facility] [phone]/ [place] [phone]

## 2019-04-06 NOTE — PLAN OF CARE
Discharge Planner PT   Patient plan for discharge: TCU for mobility/safety  Current status: Requires significant assist form bed mobility and transfers, decreased mental state with some behaviors yesterday, not today.   Barriers to return to prior living situation: Safe mobility  Recommendations for discharge:  TCU for mobility, strength and tolerance to activity  Rationale for recommendations: At this time pt. Requires significant assist.        Entered by: Sonali Clarke 04/06/2019 1:51 PM

## 2019-04-07 ENCOUNTER — APPOINTMENT (OUTPATIENT)
Dept: PHYSICAL THERAPY | Facility: CLINIC | Age: 84
DRG: 871 | End: 2019-04-07
Payer: MEDICARE

## 2019-04-07 VITALS
OXYGEN SATURATION: 95 % | TEMPERATURE: 97.8 F | RESPIRATION RATE: 18 BRPM | HEART RATE: 80 BPM | SYSTOLIC BLOOD PRESSURE: 168 MMHG | BODY MASS INDEX: 27.97 KG/M2 | WEIGHT: 157.85 LBS | DIASTOLIC BLOOD PRESSURE: 71 MMHG | HEIGHT: 63 IN

## 2019-04-07 LAB
ALBUMIN UR-MCNC: 100 MG/DL
ANION GAP SERPL CALCULATED.3IONS-SCNC: 8 MMOL/L (ref 3–14)
APPEARANCE UR: ABNORMAL
BACTERIA SPEC CULT: NO GROWTH
BILIRUB UR QL STRIP: NEGATIVE
BUN SERPL-MCNC: 36 MG/DL (ref 7–30)
CALCIUM SERPL-MCNC: 8.8 MG/DL (ref 8.5–10.1)
CHLORIDE SERPL-SCNC: 115 MMOL/L (ref 94–109)
CO2 SERPL-SCNC: 24 MMOL/L (ref 20–32)
COLOR UR AUTO: YELLOW
CREAT SERPL-MCNC: 1.89 MG/DL (ref 0.52–1.04)
GFR SERPL CREATININE-BSD FRML MDRD: 24 ML/MIN/{1.73_M2}
GLUCOSE BLDC GLUCOMTR-MCNC: 131 MG/DL (ref 70–99)
GLUCOSE BLDC GLUCOMTR-MCNC: 141 MG/DL (ref 70–99)
GLUCOSE BLDC GLUCOMTR-MCNC: 305 MG/DL (ref 70–99)
GLUCOSE SERPL-MCNC: 119 MG/DL (ref 70–99)
GLUCOSE UR STRIP-MCNC: 50 MG/DL
HGB UR QL STRIP: ABNORMAL
KETONES UR STRIP-MCNC: NEGATIVE MG/DL
LEUKOCYTE ESTERASE UR QL STRIP: ABNORMAL
Lab: NORMAL
NITRATE UR QL: NEGATIVE
PH UR STRIP: 7 PH (ref 5–7)
POTASSIUM SERPL-SCNC: 5.1 MMOL/L (ref 3.4–5.3)
RBC #/AREA URNS AUTO: 25 /HPF (ref 0–2)
SODIUM SERPL-SCNC: 147 MMOL/L (ref 133–144)
SOURCE: ABNORMAL
SP GR UR STRIP: 1.01 (ref 1–1.03)
SPECIMEN SOURCE: NORMAL
UROBILINOGEN UR STRIP-MCNC: 0 MG/DL (ref 0–2)
WBC #/AREA URNS AUTO: >182 /HPF (ref 0–5)
WBC CLUMPS #/AREA URNS HPF: PRESENT /HPF

## 2019-04-07 PROCEDURE — 25000132 ZZH RX MED GY IP 250 OP 250 PS 637: Performed by: FAMILY MEDICINE

## 2019-04-07 PROCEDURE — A9270 NON-COVERED ITEM OR SERVICE: HCPCS | Performed by: FAMILY MEDICINE

## 2019-04-07 PROCEDURE — 99239 HOSP IP/OBS DSCHRG MGMT >30: CPT | Performed by: FAMILY MEDICINE

## 2019-04-07 PROCEDURE — 87086 URINE CULTURE/COLONY COUNT: CPT | Performed by: FAMILY MEDICINE

## 2019-04-07 PROCEDURE — 81001 URINALYSIS AUTO W/SCOPE: CPT | Performed by: FAMILY MEDICINE

## 2019-04-07 PROCEDURE — 25000132 ZZH RX MED GY IP 250 OP 250 PS 637: Performed by: INTERNAL MEDICINE

## 2019-04-07 PROCEDURE — 00000146 ZZHCL STATISTIC GLUCOSE BY METER IP

## 2019-04-07 PROCEDURE — 97530 THERAPEUTIC ACTIVITIES: CPT | Mod: GP | Performed by: PHYSICAL THERAPIST

## 2019-04-07 PROCEDURE — A9270 NON-COVERED ITEM OR SERVICE: HCPCS | Performed by: INTERNAL MEDICINE

## 2019-04-07 PROCEDURE — 36415 COLL VENOUS BLD VENIPUNCTURE: CPT | Performed by: FAMILY MEDICINE

## 2019-04-07 PROCEDURE — 80048 BASIC METABOLIC PNL TOTAL CA: CPT | Performed by: FAMILY MEDICINE

## 2019-04-07 RX ORDER — HYDRALAZINE HYDROCHLORIDE 100 MG/1
100 TABLET, FILM COATED ORAL 3 TIMES DAILY
DISCHARGE
Start: 2019-04-07 | End: 2020-01-01 | Stop reason: SINTOL

## 2019-04-07 RX ORDER — QUETIAPINE FUMARATE 25 MG/1
25 TABLET, FILM COATED ORAL EVERY 6 HOURS PRN
DISCHARGE
Start: 2019-04-07 | End: 2019-06-02

## 2019-04-07 RX ORDER — ONDANSETRON 4 MG/1
4 TABLET, ORALLY DISINTEGRATING ORAL EVERY 6 HOURS PRN
DISCHARGE
Start: 2019-04-07 | End: 2019-06-25

## 2019-04-07 RX ORDER — DOCUSATE SODIUM 100 MG/1
100 CAPSULE, LIQUID FILLED ORAL 2 TIMES DAILY
DISCHARGE
Start: 2019-04-07 | End: 2019-04-15

## 2019-04-07 RX ORDER — LANOLIN ALCOHOL/MO/W.PET/CERES
3 CREAM (GRAM) TOPICAL AT BEDTIME
DISCHARGE
Start: 2019-04-07 | End: 2020-01-01

## 2019-04-07 RX ORDER — POLYETHYLENE GLYCOL 3350 17 G/17G
1 POWDER, FOR SOLUTION ORAL DAILY
DISCHARGE
Start: 2019-04-07 | End: 2020-01-01 | Stop reason: SINTOL

## 2019-04-07 RX ORDER — CEFDINIR 300 MG/1
300 CAPSULE ORAL DAILY
Qty: 6 CAPSULE | Refills: 0 | Status: ON HOLD | DISCHARGE
Start: 2019-04-08 | End: 2019-04-11

## 2019-04-07 RX ORDER — OLANZAPINE 10 MG/2ML
5 INJECTION, POWDER, FOR SOLUTION INTRAMUSCULAR DAILY PRN
DISCHARGE
Start: 2019-04-07 | End: 2019-04-15

## 2019-04-07 RX ORDER — HYDRALAZINE HYDROCHLORIDE 25 MG/1
100 TABLET, FILM COATED ORAL EVERY 8 HOURS SCHEDULED
Status: DISCONTINUED | OUTPATIENT
Start: 2019-04-07 | End: 2019-04-07 | Stop reason: HOSPADM

## 2019-04-07 RX ORDER — QUETIAPINE FUMARATE 25 MG/1
25 TABLET, FILM COATED ORAL 2 TIMES DAILY
DISCHARGE
Start: 2019-04-07 | End: 2019-04-15

## 2019-04-07 RX ADMIN — ACETAMINOPHEN 325 MG: 325 SUPPOSITORY RECTAL at 06:17

## 2019-04-07 RX ADMIN — QUETIAPINE FUMARATE 25 MG: 25 TABLET ORAL at 07:40

## 2019-04-07 RX ADMIN — CEFDINIR 300 MG: 300 CAPSULE ORAL at 10:41

## 2019-04-07 RX ADMIN — DOCUSATE SODIUM 100 MG: 100 CAPSULE, LIQUID FILLED ORAL at 10:41

## 2019-04-07 RX ADMIN — QUETIAPINE FUMARATE 25 MG: 25 TABLET ORAL at 12:24

## 2019-04-07 RX ADMIN — HYDRALAZINE HYDROCHLORIDE 100 MG: 25 TABLET ORAL at 10:40

## 2019-04-07 ASSESSMENT — ACTIVITIES OF DAILY LIVING (ADL)
ADLS_ACUITY_SCORE: 26

## 2019-04-07 NOTE — PROGRESS NOTES
Discharge Planner   Discharge Plans in progress: pt discharging today at 2pm via [facility], son Vernon will be here at 2 also.  [facility] notified of pt's return.    Barriers to discharge plan: medically stable  Follow up plan: TCU       Entered by: Carine Vasques 04/07/2019 11:24 AM

## 2019-04-07 NOTE — PLAN OF CARE
Vss. Pt incontinent of urine x1, bladder scan =366, straight cathed for 575mL pale, cloudy, mucousy urine with sediment. Pt has been sleeping through the night. Moaned with pain while getting bladder scanned, tylenol suppository given. Confused x3 with mild dementia per baseline. Will continue with plan of care, abx, monitor UOP, vs, labs.

## 2019-04-07 NOTE — PLAN OF CARE
Discharge Planner PT   Patient plan for discharge: To [facility] today or tomorrow  Current status: Improving temperament, very friendly, willing to follow directions. Rolled in bed with verbal cues to put diaper on, transferred supine to sit with min./SBA. Sit to stand with moderate assist of 1 and set up of equipment. Pt. Unable to stand for more than a minute, wanting to sit down. Performed sit to stand again and took 2-3 steps to the recliner with moderate assist of P.T. Improved mobility but not ready to walk for longer distances in the room.   Barriers to return to prior living situation: Return to [facility] when medically stable.   Recommendations for discharge: To [facility] to assist with strength, improved mobility and safety.  Rationale for discharge: Requires assist for mobility, is not safe, requires skilled P.T. To progress her mobility.         Entered by: Sonali Clarke 04/07/2019 9:13 AM      Physical Therapy Discharge Summary    Reason for therapy discharge:    Discharged to transitional care facility.    Progress towards therapy goal(s). See goals on Care Plan in [facility] electronic health record for goal details.  Goals partially met.  Barriers to achieving goals:   limited tolerance for therapy and discharge from facility.    Therapy recommendation(s):    Continued therapy is recommended.  Rationale/Recommendations:  see above.   Thank you for your referral.    Iliana Sam, PT, DPT, ATC    [facility]    O: [phone]  E: [email or web address]

## 2019-04-07 NOTE — DISCHARGE SUMMARY
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  If patient needs ongoing   intermittent straight catheterization in 1-2 weeks despite changes to   medication made during this hospitalization stay, would consider   outpatient urology follow up.  Also, all long acting insulin has been held   secondary to hypoglycemia episodes throughout this hospitalization however   as patient's appetite continues to improve it is expected that Lantus   re-initiation will be needed and blood sugars should be reviewed by   nursing home provider to help assist in this management.           Discharge Disposition   Discharged to [facility]  Condition at discharge: Stable    Hospital Course              Admitted on March 31, 2019 with mental status changes presumed secondary to sepsis secondary to UTI blood cultures and urine cultures growing E. coli, has finished 3 days of Rocephin and started on oral Omnicef on 4/3/19.  Overall improving from an infection standpoint occurred and renal function improved even after IV fluids were discontinued.  Patient has become significantly more confused with evidence of fluctuating levels of consciousness and some agitation and aggression on 4/4/19 and initially there was concern for developing delirium however on  4/5/19 it was found patient had significant urinary retention patient had improvement in agitation and aggression following straight cath.  She continued to need intermittent catheterization secondary to ongoing retention but her mentation has returned to her previous baseline and she is pleasant and cooperative and able to participate in therapy and care and is discharged to the TCU with ongoing Omnicef for infection treatment and intermittent straight catheterization as needed for urinary retention    Acute cystitis without hematuria  Presenting with increased confusion with the smell of strong urine and previous history of recurrent UTIs  UC and blood cultures positive for E. Coli  Finished 3 days of Rocephin and switch to oral Omnicef 4/3/19 based on culture results  Continue Omnicef for a total of 14 days of antibiotic completion  Discharge the TCU for ongoing strengthening    Sepsis (H)  Presenting with increased confusion, weakness, leukocytosis with worsening renal function  Secondary to UTI with E. coli growing from blood and urine  Currently on Omnicef, confusion had improved but unclear if it is back to baseline, but now with worsening concerning for delirium onset  Discharge with ongoing Omnicef as above    Encephalopathy  Likely initial acute confusion was due to sepsis although she does have underlying mild dementia per history.  Has had fluctuating levels of consciousnessacute worsening with an intermittent agitation and aggression concerning for delirium development but improvement noted after straight cath drainage with return to previous baseline with ongoing intermittent straight catheterizations as needed  Patient had been evaluated by occupational therapy when she was more cognitively clear with SLUMS score 4/30 - unclear if this is patient's previous baseline.      Continue with scheduled seroquel, prn seroquel and prn IM Zyprexa at time of discharge to the TCU, continue with straight  catheterization as needed to avoid pain that could trigger agitation and aggression    Acute kidney injury superimposed on CKD (chronic kidney disease) stage 4, GFR 15-29 ml/min (H)  Baseline creatinine around 1.5-1.6.  Was 3.30 at time of admission, likely due to poor oral intake and sepsis.  Trended downward and is 1.89 at time of discharge  Continue to encourage increased PO intake as patient will tolerate when awake, continue to hold Demadex at discharge to the TCU and recheck BMP in 3 days to ensure ongoing improvement    Urinary Retention  Developed on 4/5/19 hours and is felt to be the underlying etiology for patient's agitation and aggression as she is much more calm and able to rest since straight cath performed more consistently and will begin getting slightly agitated with retention when it does occur.   Patient has a known history of overactive bladder and is on oxybutynin however it is unclear if she had been this as prescribed as it does not sound familiar to her  who sets up her medications.  Of note, she has been getting the medication since time of admission and on review it can commonly cause urinary retention.  Asked pharmacy to review the rest of the patient's current regimen and no other agents have been found that may be contributing to retention.    Will hold oxybutynin, continue to bladder scan and straight cath if needed on discharge to the TCU.  Did discuss with family and at this time will avoid Vázquez catheter placement given patient's level of baseline dementia and high risk for her removing it forcefully.  Did discuss that if retention is ongoing, would need outpatient urology follow-up to discuss further.    Essential hypertension with goal blood pressure less than 130/80  Taking hydralazine and Demadex at home.  Demadex has been held secondary to renal function   Home hydralazine increased to 100 mg TID with patient not receiving several doses in the past few days secondary to  initial aggression and then significant sedation as patient recovered.  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  Last hemoglobin A1c was low at 4.9 and patient was initially placed on lower than normal doses of Lantus (8 units BID) however continues to have hypoglycemic episodes and many of these Lantus doses had to be held secondary to hypoglycemia.  Lantus was discontinued on 4/4/19 with blood sugars stabilizing and no hypoglycemia in the past 72 hours prior to discharge to TCU.  Blood sugars are starting to trend upward as patient's oral intake improves    Continue with sliding scale insulin coverage only at time of discharge and monitor blood sugars closely.  Anticipate patient will likely need some basal insulin resumed as she continues to recover and will need adjustment by the nursing home provider going forward    Dementia - suspected  Reported per family, increased confusion here initially which was thought secondary to infection and then with acute worsening on 4/5/19 thought secondary to urinary retention  Patient will need additional testing performed going forward following completion of infection and any acute encephalopathy that is influencing testing.  TCU at time of discharge      Consultations This Hospital Stay   PHYSICAL THERAPY ADULT IP CONSULT  OCCUPATIONAL THERAPY ADULT IP CONSULT  CARE TRANSITION RN/SW IP CONSULT  PHYSICAL THERAPY ADULT IP CONSULT  OCCUPATIONAL THERAPY ADULT IP CONSULT    Code Status   DNR/DNI    Time Spent on this Encounter   Letty MCFADDEN, personally saw the patient today and spent greater than 30 minutes  discharging this patient.     Letty De Leon MD  [facility]  ______________________________________________________________________    Physical Exam   Vital Signs: Temp: 97.8  F (36.6  C) Temp src: Oral BP: 168/71 Pulse: 80 Heart Rate: 80 Resp: 18 SpO2: 95 % O2 Device: None (Room air)    Weight: 157 lbs 13.59 oz  Constitutional: awake, alert, cooperative, no apparent distress, and appears stated age.  Patient is following commands well and very pleasant  Respiratory: No increased work of breathing, good air exchange, clear to auscultation bilaterally, no crackles or wheezing  Cardiovascular: Normal apical impulse, regular rate and rhythm  GI: bowel sounds present, abdomen soft and non-tender  Skin: normal skin color, texture, turgor  Neurologic: awake, alert, oriented to person only which is her baseline.         Primary Care Physician   Margarito Hoffman      Discharge Orders      General info for SNF    Length of Stay Estimate: Short Term Care: Estimated # of Days <30  Condition at Discharge: Improving  Level of care:skilled   Rehabilitation Potential: Fair  Admission H&P remains valid and up-to-date: Yes  Recent Chemotherapy: N/A  Use Nursing Home Standing Orders: Yes     Mantoux instructions    Give two-step Mantoux (PPD) Per Facility Policy Yes     Reason for your hospital stay    Urinary tract infection and blood stream infection by E coli bacteria with patient having progressive improvement from an infection stand point.  She did have worsening confusion and agitation which was found likely multifactorial in nature but in part caused by urinary retention and this has improved since starting medications to help prevent agitation and since starting as needed straight cath.     Glucose monitor nursing POCT    Before meals and at bedtime.     Bladder scan    Three times a day with trial of voiding if bladder volume is over 500 mL and repeat scan after voiding attempt.  If  patient continues to have over 500 mL of volume present, please proceed with as needed straight cath to avoid worsening retention and onset of agitation.     Follow Up and recommended labs and tests    Follow up with Nursing home physician.  If patient needs ongoing intermittent straight catheterization in 1-2 weeks despite changes to medication made during this hospitalization stay, would consider outpatient urology follow up.  Also, all long acting insulin has been held secondary to hypoglycemia episodes throughout this hospitalization however as patient's appetite continues to improve it is expected that Lantus re-initiation will be needed and blood sugars should be reviewed by nursing home provider to help assist in this management.     Activity - Up with nursing assistance     Additional Discharge Instructions    1.  Blood pressure monitoring 2 times a day.  2.  Straight cath patient in sterile fashion up to three times a day as indicated by bladder scan over 500 mL following voiding trial as outlined.     Physical Therapy Adult Consult    Evaluate and treat as clinically indicated.    Reason:  Generalized weakness, sepsis with E coli UTI and bacteremia, baseline dementia     Occupational Therapy Adult Consult    Evaluate and treat as clinically indicated.    Reason:  Generalized weakness, sepsis with E coli UTI and bacteremia, baseline dementia     Fall precautions     Advance Diet as Tolerated    Follow this diet upon discharge: Moderate consistent carbohydrate (3282-0571 katerin / 4-6 CHO units per meal)     Discharge Medications   Current Discharge Medication List      START taking these medications    Details   cefdinir (OMNICEF) 300 MG capsule Take 1 capsule (300 mg) by mouth daily for 6 days  Qty: 6 capsule, Refills: 0    Associated Diagnoses: Sepsis due to Escherichia coli (H); Acute cystitis without hematuria      docusate sodium (COLACE) 100 MG capsule Take 1 capsule (100 mg) by mouth 2 times daily Hold  for loose stools    Associated Diagnoses: Constipation, unspecified constipation type      !! insulin aspart (NOVOLOG PEN) 100 UNIT/ML pen Inject 1-5 Units Subcutaneous At Bedtime Do Not give Bedtime Correction Insulin if BG less than  200.   For  - 249 give 1 units.   For  - 299 give 2 units.   For  - 349 give 3 units.   For  -399 give 4 units.   For BG greater than or equal to 400 give 5 units.  Notify provider if glucose greater than or equal to 350 mg/dL after administration of correction dose.    Associated Diagnoses: Type 2 diabetes mellitus with stage 4 chronic kidney disease, with long-term current use of insulin (H)      !! insulin aspart (NOVOLOG PEN) 100 UNIT/ML pen Inject 2 units per carb unit three times a day with meals    Associated Diagnoses: Type 2 diabetes mellitus with stage 4 chronic kidney disease, with long-term current use of insulin (H)      melatonin 3 MG tablet Take 1 tablet (3 mg) by mouth At Bedtime    Associated Diagnoses: Dementia without behavioral disturbance, unspecified dementia type      OLANZapine (ZYPREXA) injection Inject 5 mg into the muscle daily as needed for agitation    Associated Diagnoses: Encephalopathy      ondansetron (ZOFRAN-ODT) 4 MG ODT tab Take 1 tablet (4 mg) by mouth every 6 hours as needed for nausea or vomiting    Associated Diagnoses: Nausea      polyethylene glycol (MIRALAX/GLYCOLAX) packet Take 17 g by mouth daily    Associated Diagnoses: Constipation, unspecified constipation type      !! QUEtiapine (SEROQUEL) 25 MG tablet Take 1 tablet (25 mg) by mouth every 6 hours as needed (agitation)    Associated Diagnoses: Encephalopathy      !! QUEtiapine (SEROQUEL) 25 MG tablet Take 1 tablet (25 mg) by mouth 2 times daily    Associated Diagnoses: Encephalopathy       !! - Potential duplicate medications found. Please discuss with provider.      CONTINUE these medications which have CHANGED    Details   hydrALAZINE (APRESOLINE) 100 MG tablet  "Take 1 tablet (100 mg) by mouth 3 times daily    Associated Diagnoses: Hypertension goal BP (blood pressure) < 130/80         CONTINUE these medications which have NOT CHANGED    Details   acetaminophen (TYLENOL) 325 MG tablet Take 2 tablets (650 mg) by mouth every 4 hours as needed for mild pain  Qty: 100 tablet    Associated Diagnoses: Pain in thoracic spine      ASPIRIN NOT PRESCRIBED, INTENTIONAL, by Other route continuous prn. 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    Refills: 0    Associated Diagnoses: Type 2 diabetes, HbA1C goal < 8% (H)      B-D INSULIN SYRINGE 30G X 1/2\" 0.5 ML USE 1 SYRINGE FOUR TIMES A DAY OR AS DIRECTED  Qty: 100 each, Refills: 11    Associated Diagnoses: Type 2 diabetes, HbA1C goal < 8% (H)      !! 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 blood glucose monitoring (PRODIGY TWIST TOP 28G) lancets Use to test blood sugar two times daily or as directed.  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 insulin aspart (NOVOLOG PEN) 100 UNIT/ML injection Inject 1-7 Units Subcutaneous 3 times daily (before meals) For  - 189 give 1 unit.   For  - 239 give 2 units.   For  - 289 give 3 units.   For  - 339 give 4 units.   For - 399 give 5 units.   For -449 give 6 units  For  or higher give 7 units.    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)      simvastatin (ZOCOR) 80 MG tablet Take 1 tablet (80 mg) by mouth At Bedtime  Qty: 90 tablet, Refills: 3    Associated Diagnoses: Hyperlipidemia LDL goal <100      albuterol (PROAIR HFA, PROVENTIL HFA, VENTOLIN HFA) 108 (90 BASE) MCG/ACT inhaler Inhale 2 puffs into the lungs every 6 hours as needed for shortness of breath / dyspnea or wheezing Ventolin inhaler  Qty: 1 Inhaler, Refills: 5    Comments: Profile  Associated Diagnoses: Bronchitis      MULTIVITAMINS OR TABS 1 TABLET DAILY  Qty: 30, Refills: 0      Spacer/Aero-Holding Chambers (OPTIHALER) BOGDAN As directed  Qty: 1 Device, Refills: 0    Associated Diagnoses: Bronchitis; Cough; SOB (shortness of breath)       !! - Potential duplicate medications found. Please discuss with provider.      STOP taking these medications       cholecalciferol (VITAMIN D) 400 UNIT TABS Comments:   Reason for Stopping:         gabapentin (NEURONTIN) 100 MG capsule Comments:   Reason for Stopping:         gabapentin (NEURONTIN) 300 MG capsule Comments:   Reason for Stopping:         HYDROcodone-acetaminophen (NORCO) 5-325 MG per tablet Comments:   Reason for Stopping:         insulin glargine (BASAGLAR KWIKPEN) 100 UNIT/ML pen Comments:   Reason for Stopping:         LANTUS VIAL 100 UNIT/ML soln Comments:   Reason for Stopping:         NOVOLOG VIAL 100 UNIT/ML soln Comments:   Reason for Stopping:         oxybutynin (DITROPAN) 5 MG tablet Comments:   Reason for Stopping:         torsemide (DEMADEX) 10 MG tablet Comments:   Reason for Stopping:         traZODone (DESYREL) 50 MG tablet Comments:   Reason for Stopping:             Allergies    Allergies   Allergen Reactions     Lisinopril      upper resp symptoms, cough

## 2019-04-07 NOTE — PROGRESS NOTES
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) [E11.49]  Acute cystitis with hematuria [N30.01]  Altered mental status, unspecified altered mental status type [R41.82]  Dementia without behavioral disturbance, unspecified dementia type [F03.90]  Essential hypertension with goal blood pressure less than 130/80 [I10]  Acute renal failure superimposed on stage 4 chronic kidney disease, unspecified acute renal failure type (H) [N17.9, N18.4]    Discharge Date: 4/7/2019    Patient / Family response to discharge plan: in agreement    Other Providers (Care Coordinator, County Services, PCA services etc): Yes:     CTS Hand Off Completed: Yes:      CARLINE Score: Average    Discharge Disposition: transitional care unit         Tania OLIVARESN, RN, PHN  RN Care Coordinator  Care Transitions Department  [facility] [phone]  [facility] [phone]

## 2019-04-07 NOTE — PROGRESS NOTES
Smiley A Arianne  Gender: female  : 1935  [address]  [facility] [place] [address]  [phone] (home)     Medical Record: [id number]  Pharmacy: [address]  Primary Care Provider: Margarito Hoffman    Parent's names are: Data Unavailable (mother) and Data Unavailable (father).      [facility]  2019     Patient discharged to [facility] TCU.

## 2019-04-07 NOTE — PLAN OF CARE
Occupational Therapy Discharge Summary    Reason for therapy discharge:    Discharged to transitional care facility.    Progress towards therapy goal(s). See goals on Care Plan in [facility] electronic health record for goal details.  Goals not met.  Barriers to achieving goals:   limited tolerance for therapy and discharge from facility.    Therapy recommendation(s):    Continued therapy is recommended.  Rationale/Recommendations:  to progress pt's independence with ADL and progress toward prior level of function.     Ana Ruiz OTR/L  [facility]  [phone]

## 2019-04-07 NOTE — PROGRESS NOTES
S-(situation): shift note    B-(background): UTI    A-(assessment): Pt has been awake this evening, confused and restless. Has set off bed alarm x2, was found sitting up near edge of bed. Per 's request, can have sided rails up x4.  Ate 75% for supper with being fed. Bladder scan of 539 after incontinence. St cath of 650 cloudy, sediment urine output.     R-(recommendations): pt has been resting. Will continue to monitor frequent checks on pt, bed alarm activated.

## 2019-04-07 NOTE — PROGRESS NOTES
[facility] set up for 2pm to [facility].  Left message for Dariela the weekend contact at [facility] [phone] and notifed her of pt's return.      Son Arnulfo was also notifed of Pt's return to [facility]. States he will be here to help the transition go smoother.       Tania Vasques BSN, RN, PHN  RN Care Coordinator  Care Transitions Department  [facility] [phone]  [facility] [phone]

## 2019-04-07 NOTE — PROGRESS NOTES
"S-(situation): Patient discharged to [facility] via w/c with [facility] @ 1415.    B-(background): UTI, weakness, h/o dementia    A-(assessment): Pt alert, confused x3. Urinary incontinence with retention. St.cath PRN, recently @ 1330. Bladder scan of 279, st cath of 500 cloudy, sediment and mucousy urine, UA sent to lab. Up with 2 assist and walker, needing much cues with transfers. /71 (BP Location: Right arm)   Pulse 80   Temp 97.8  F (36.6  C) (Oral)   Resp 18   Ht 1.6 m (5' 3\")   Wt 71.6 kg (157 lb 13.6 oz)   SpO2 95%   BMI 27.96 kg/m    Last bowel movement: 04/03/19     R-(recommendations):Report called to Kim. Listed belongings gathered and sent with patient.     Discharge Nursing Criteria:     Care Plan and Patient education resolved: Yes    Core Measures   Core Measures applicable during stay (Stroke/TIA, MI, Pneumonia and SSI):     Vaccines  Influenza status verified at discharge:  Yes      [facility] [place]  Home and hospital aquired medications returned to patient: Yes  Medication Bin checked and emptied on discharge Yes  All paperwork sent with patient/Copy of AVS given to patient or family Yes.          "

## 2019-04-08 ENCOUNTER — NURSING HOME VISIT (OUTPATIENT)
Dept: GERIATRICS | Facility: CLINIC | Age: 84
End: 2019-04-08
Payer: MEDICARE

## 2019-04-08 ENCOUNTER — HOSPITAL LABORATORY (OUTPATIENT)
Dept: NURSING HOME | Facility: OTHER | Age: 84
End: 2019-04-08

## 2019-04-08 ENCOUNTER — PATIENT OUTREACH (OUTPATIENT)
Dept: CARE COORDINATION | Facility: CLINIC | Age: 84
End: 2019-04-08

## 2019-04-08 VITALS
BODY MASS INDEX: 27.97 KG/M2 | TEMPERATURE: 96.2 F | OXYGEN SATURATION: 94 % | DIASTOLIC BLOOD PRESSURE: 94 MMHG | HEART RATE: 94 BPM | WEIGHT: 167.9 LBS | HEIGHT: 65 IN | SYSTOLIC BLOOD PRESSURE: 180 MMHG | RESPIRATION RATE: 16 BRPM

## 2019-04-08 DIAGNOSIS — N18.4 CKD (CHRONIC KIDNEY DISEASE) STAGE 4, GFR 15-29 ML/MIN (H): ICD-10-CM

## 2019-04-08 DIAGNOSIS — G93.40 ENCEPHALOPATHY: ICD-10-CM

## 2019-04-08 DIAGNOSIS — N39.0 E. COLI UTI: Primary | ICD-10-CM

## 2019-04-08 DIAGNOSIS — I10 ESSENTIAL HYPERTENSION WITH GOAL BLOOD PRESSURE LESS THAN 130/80: ICD-10-CM

## 2019-04-08 DIAGNOSIS — E11.49 DIABETES MELLITUS TYPE 2 WITH NEUROLOGICAL MANIFESTATIONS (H): ICD-10-CM

## 2019-04-08 DIAGNOSIS — R44.3 HALLUCINATIONS: ICD-10-CM

## 2019-04-08 DIAGNOSIS — R33.9 URINARY RETENTION: ICD-10-CM

## 2019-04-08 DIAGNOSIS — B96.20 E. COLI UTI: Primary | ICD-10-CM

## 2019-04-08 LAB
ANION GAP SERPL CALCULATED.3IONS-SCNC: 10 MMOL/L (ref 3–14)
BACTERIA SPEC CULT: NO GROWTH
BUN SERPL-MCNC: 36 MG/DL (ref 7–30)
CALCIUM SERPL-MCNC: 9.2 MG/DL (ref 8.5–10.1)
CHLORIDE SERPL-SCNC: 110 MMOL/L (ref 94–109)
CO2 SERPL-SCNC: 25 MMOL/L (ref 20–32)
CREAT SERPL-MCNC: 1.78 MG/DL (ref 0.52–1.04)
GFR SERPL CREATININE-BSD FRML MDRD: 26 ML/MIN/{1.73_M2}
GLUCOSE SERPL-MCNC: 272 MG/DL (ref 70–99)
Lab: NORMAL
POTASSIUM SERPL-SCNC: 5.2 MMOL/L (ref 3.4–5.3)
SODIUM SERPL-SCNC: 145 MMOL/L (ref 133–144)
SPECIMEN SOURCE: NORMAL

## 2019-04-08 PROCEDURE — 99310 SBSQ NF CARE HIGH MDM 45: CPT | Performed by: NURSE PRACTITIONER

## 2019-04-08 ASSESSMENT — MIFFLIN-ST. JEOR: SCORE: 1212.47

## 2019-04-08 NOTE — PROGRESS NOTES
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) [E11.49]  Acute cystitis with hematuria [N30.01]  Altered mental status, unspecified altered mental status type [R41.82]  Dementia without behavioral disturbance, unspecified dementia type [F03.90]  Essential hypertension with goal blood pressure less than 130/80 [I10]  Acute renal failure superimposed on stage 4 chronic kidney disease, unspecified acute renal failure type (H) [N17.9, N18.4]  Admit Date/Time: 3/31/2019  6:19 PM  Discharge Date: 4/7/2019    Transition to Facility:              Facility Name: [facility]              Contact name and phone number/fax: [phone] Ann JARAMILLO    Plan: RN/SW Care Coordinator will await notification from facility staff informing RN/SW Care Coordinator of patient's discharge plans/needs. RN/SW Care Coordinator will review chart and outreach to facility staff every 4 weeks and as needed.       Tania OLIVARESN, RN, PHN  Care Coordination    [facility]  [address] [place] [address]  Office: [phone]  Fax [phone]   [facility]  [address] [place] [address]  Office: [phone] Fax [phone]  [email or web address]   [email or web address]   Connect with [facility] on social media.

## 2019-04-08 NOTE — PROGRESS NOTES
"[place] [facility]  PRIMARY CARE PROVIDER AND CLINIC:  Margarito Hoffman MD, [address] [facility] [address]  Chief Complaint   Patient presents with     Hospital F/U     Clinic Care Coordination - Initial     [place] Medical Record Number:  [id number]  Place of Service where encounter took place:  [facility] [place] ([facility]) [[id number]]    Smiley Acuña  is a 84 year old  (1935), admitted to the above facility from  [facility]. Hospital stay 3/31/19 through 4/7/19..  Admitted to this facility for  rehab, medical management and nursing care.    HPI:    HPI information obtained from: facility chart records, facility staff and [place] Epic chart review.   Brief Summary of Hospital Course: Smiley presented to Hospital on 3/31/19 with mental status changes and found to have a blood culture and urine culture gorw out E. Coli.  Was given Rocephin in hospital and then started on oral ABX.  Smiley stated to improve but then developed aggressive behavior with confusion.  Did have urinary retention and cognition went back to baseline once having intermittent straight catheterization.  Came to [facility] for rehab as well as intermittent cathing for retention.    Updates on Status Since Skilled nursing Admission: today staff Smiley out by the desk.  They are struggling with her behaviors as she is not allowing them even to check a temp tympanically.  Could hear her scream from down the delgado when vitals attempted.    This NP did fairly well with an exam as everything was explained to her.  Did not really communicate, mumbled more.  Later on in AM, staff attempted to take her blood sugar and then Smiley started to take the nurse's hands and bite them.  She did not succeed.    Refusing medications last night, did not sleep hardly at all last night and stateds \"I am cold\"  Staff providing here with warm blankets.  Not allowing temp to be taken.  Did take some " Tylenol though after coaxing her.      CODE STATUS/ADVANCE DIRECTIVES DISCUSSION:   CPR/Full code   Patient's living condition: lives with spouse  ALLERGIES: Lisinopril  PAST MEDICAL HISTORY:  has a past medical history of Abnormality of gait, Anemia, unspecified, Chronic ischemic heart disease, unspecified, Closed fracture of patella (06/21/10), Coronary artery disease, Degenerative disc disease, Depressive disorder, not elsewhere classified, History of blood transfusion, Other and unspecified hyperlipidemia, Renal failure, unspecified, Septicemia due to Escherichia coli (E. coli)(038.42) (H) (05/03/2007), Syncope and collapse (4/8/2005), Syncope and collapse (05/26/10), Traumatic subdural hematomas (05/27/10), Type II or unspecified type diabetes mellitus without mention of complication, not stated as uncontrolled, Unspecified essential hypertension, Unspecified sleep apnea, and Urinary tract infection, site not specified (4/4/2008). She also has no past medical history of Asthma, Congestive heart failure, unspecified, COPD (chronic obstructive pulmonary disease) (H), Malignant neoplasm (H), Thyroid disease, or Unspecified cerebral artery occlusion with cerebral infarction.  PAST SURGICAL HISTORY:   has a past surgical history that includes AMPUTATION TOE,MT-P JT (2000); APPENDECTOMY; ANESTH,UPPER LEG SURGERY; UPPER GI ENDOSCOPY,EXAM (04/27/2005); Colonoscopy w/wo Brush **Performed** (12/05/2005); UGI ENDOSCOPY DIAG W OR W/O BRUSH/WASH (12/05/2005); BX SYNOVIUM INTERPHALANG JT; craniotomy (05/28/10); REPAIR ROTATOR CUFF,ACUTE (11/21/2002); EXCISION LESION/TENDON-SHEATH/CAPSULE, FOOT (04/30/07); EXCISION LESION/TENDON-SHEATH/CAPSULE, FOOT (8/14/2007); bunionectomy rt/lt (10/24/07); and OPEN TX FEMORAL SUPRACONDYLAR FRACTURE W EXTENSION (12/14/12).  FAMILY HISTORY: family history includes C.A.D. in her brother; Diabetes in her brother, brother, and mother; Osteoporosis in her mother.  SOCIAL HISTORY:   reports that  she has never smoked. She has never used smokeless tobacco. She reports that she does not drink alcohol or use drugs.    Post Discharge Medication Reconciliation Status: discharge medications reconciled and changed, per note/orders (see AVS)    Current Outpatient Medications   Medication Sig Dispense Refill     acetaminophen (TYLENOL) 325 MG tablet Take 2 tablets (650 mg) by mouth every 4 hours as needed for mild pain 100 tablet      albuterol (PROAIR HFA, PROVENTIL HFA, VENTOLIN HFA) 108 (90 BASE) MCG/ACT inhaler Inhale 2 puffs into the lungs every 6 hours as needed for shortness of breath / dyspnea or wheezing Ventolin inhaler 1 Inhaler 5     cefdinir (OMNICEF) 300 MG capsule Take 1 capsule (300 mg) by mouth daily for 6 days 6 capsule 0     docusate sodium (COLACE) 100 MG capsule Take 1 capsule (100 mg) by mouth 2 times daily Hold for loose stools       hydrALAZINE (APRESOLINE) 100 MG tablet Take 1 tablet (100 mg) by mouth 3 times daily       insulin aspart (NOVOLOG PEN) 100 UNIT/ML injection Inject 1-7 Units Subcutaneous 3 times daily (before meals) For  - 189 give 1 unit.   For  - 239 give 2 units.   For  - 289 give 3 units.   For  - 339 give 4 units.   For - 399 give 5 units.   For -449 give 6 units  For  or higher give 7 units.       insulin aspart (NOVOLOG PEN) 100 UNIT/ML pen Inject 1-5 Units Subcutaneous At Bedtime Do Not give Bedtime Correction Insulin if BG less than  200.   For  - 249 give 1 units.   For  - 299 give 2 units.   For  - 349 give 3 units.   For  -399 give 4 units.   For BG greater than or equal to 400 give 5 units.  Notify provider if glucose greater than or equal to 350 mg/dL after administration of correction dose.       insulin aspart (NOVOLOG PEN) 100 UNIT/ML pen Inject 2 units per carb unit three times a day with meals       melatonin 3 MG tablet Take 1 tablet (3 mg) by mouth At Bedtime       MULTIVITAMINS OR TABS 1  "TABLET DAILY 30 0     OLANZapine (ZYPREXA) injection Inject 5 mg into the muscle daily as needed for agitation       ondansetron (ZOFRAN-ODT) 4 MG ODT tab Take 1 tablet (4 mg) by mouth every 6 hours as needed for nausea or vomiting       polyethylene glycol (MIRALAX/GLYCOLAX) packet Take 17 g by mouth daily       QUEtiapine (SEROQUEL) 25 MG tablet Take 1 tablet (25 mg) by mouth every 6 hours as needed (agitation)       QUEtiapine (SEROQUEL) 25 MG tablet Take 1 tablet (25 mg) by mouth 2 times daily       simvastatin (ZOCOR) 80 MG tablet Take 1 tablet (80 mg) by mouth At Bedtime 90 tablet 3     ASPIRIN NOT PRESCRIBED, INTENTIONAL, by Other route continuous prn. 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    0     B-D INSULIN SYRINGE 30G X 1/2\" 0.5 ML USE 1 SYRINGE FOUR TIMES A DAY OR AS DIRECTED 100 each 11     blood glucose monitoring (PRODIGY TWIST TOP 28G) lancets USE TO TEST BLOOD SUGAR THREE TIMES A DAY OR AS DIRECTED 100 each 2     blood glucose monitoring (PRODIGY TWIST TOP 28G) lancets Use to test blood sugar two times daily or as directed. 100 each 2     insulin pen needle (B-D U/F) 31G X 8 MM miscellaneous USE 1 DAILY OR AS DIRECTED 100 each prn     PRODIGY NO CODING BLOOD GLUC test strip USE TO TEST BLOOD SUGAR THREE TIMES A DAY OR AS DIRECTED 100 each 4     PRODIGY NO CODING BLOOD GLUC test strip USE TO TEST BLOOD SUGAR THREE TIMES A DAY OR AS DIRECTED 100 each 2     Spacer/Aero-Holding Chambers (OPTIHALER) BOGDAN As directed 1 Device 0       ROS:  Unable as she has a blanket covering her up and she is holding her head.  She will mumble and let this NP listen to her once explained.  \"I'm cold\"    Vitals:  BP (!) 180/94   Pulse 94   Temp 96.2  F (35.7  C)   Resp 16   Ht 1.651 m (5' 5\")   Wt 76.2 kg (167 lb 14.4 oz)   SpO2 94%   BMI 27.94 kg/m    Exam:  GENERAL APPEARANCE:  Alert, in mental distress due to unknown physical distress, cooperative, uncooperative  EYES:  Conjunctiva and lids normal, " will look up at times but otherwise holding her head by her temples  RESP:  respiratory effort and palpation of chest normal, lungs clear to auscultation , no respiratory distress  CV:  Palpation and auscultation of heart done , regular rate and rhythm, no murmur, rub, or gallop, no edema  ABDOMEN:  normal bowel sounds, soft, nontender, no hepatosplenomegaly or other masses, no guarding or rebound  M/S:   Gait and station abnormal assist of one with transfers, gait belt.  able to propel self around the unit  SKIN:  bruising on arms and right knee cap.  skin is dry and warm to touch.    PSYCH:  hard to assess as she is not cooperative at this time with anyone.  mumbles her thoughts.    Lab/Diagnostic data:  Component      Latest Ref Rng & Units 4/7/2019 4/8/2019   Sodium      133 - 144 mmol/L 147 (H) 145 (H)   Potassium      3.4 - 5.3 mmol/L 5.1 5.2   Chloride      94 - 109 mmol/L 115 (H) 110 (H)   Carbon Dioxide      20 - 32 mmol/L 24 25   Anion Gap      3 - 14 mmol/L 8 10   Glucose      70 - 99 mg/dL 119 (H) 272 (H)   Urea Nitrogen      7 - 30 mg/dL 36 (H) 36 (H)   Creatinine      0.52 - 1.04 mg/dL 1.89 (H) 1.78 (H)   GFR Estimate      >60 mL/min/1.73:m2 24 (L) 26 (L)   GFR Estimate If Black      >60 mL/min/1.73:m2 28 (L) 30 (L)   Calcium      8.5 - 10.1 mg/dL 8.8 9.2       ASSESSMENT/PLAN:  Current [place] scheduled appointments:   none - will be followed by NH physician    E. coli UTI  Currently has an order for cefdinir 300mg daily for 6 days.  Due to being admitted here with a UTI, will consider rechecking after ABX done to make sure it has cleared.  Did have the E. Coli in her blood as well.  Will check a CBC on 4/10/19 for UTI and HTN dx    Urinary retention  Staff are attempting to do a bladder scan.  Does have an order for bladder scan 3x daily with volume >500cc then repeat after voiding attempt.  If continues then would be able to straight cath at that point.    Do not wish for her to have a  catheter.  May need to refer to urology in future if this does not resolve.    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  So far has only allowed 2 checks.  Supper yesterday was 354 and this   Is on a moderate consistent diet of 4077-5711 calorie.  Expect her sugars to be elevated due to infection.    Is on carb counting with sliding scale of Novolog.  1.  Discontinue carb count  2.  Use sliding scale for sugar correction for now.  Will eval her blood sugars at a later date.    Essential hypertension with goal blood pressure less than 130/80  CKD stage 4 GFR 15-29  Blood pressures 150-170's/70-90's.  BMP done today and slightly improved since the day prior.    Does take hydralazine 100mg TID.  With her stage 4 CKD, expect higher readings and if not cooperative, do not think her numbers will stay on lower side.  No changes for now.      Has an order for a BMP on 4/9 and will give orders to move it to 4/10/19    Encephalopathy  Hallucinations  - did review behaviors in hospital and feel she has some of the same here now that was to have cleared by time of her discharge.  Will give her a chance for rehab here.    Noted to be on Seroquel 25mg BID and 25mg every 6 hours PRN as well as Zyprexa IM prn option.    Need to have a qualifying dx which will be hallucinations with evidence of her talking to No one present and then agitation with cares seen today.       Orders written by provider at facility and transcribed by : Kathy Galdamez MA    1.  Discontinue Carb counting.  2.  Seroquel & Zyprexa for hallucinations E/B talking to no one, agitation w/cares  3.  Move BMP on 4/9 to 4/10. Dx: UTI, HTN  4.  CBC on 4/10.  Dx: UTI, HTN    Total time spent with patient visit at the skilled nursing facility was 37 including patient visit and review of past records. Greater than 50% of total time spent with counseling and  coordinating care due to behaviors noted, review of medications with staff and goals of care with staff  Electronically signed by:  ALBA Alba CNP

## 2019-04-08 NOTE — LETTER
"    4/8/2019        RE: Smiley Acuña  [address]  [facility] [address]        [place] [facility]  PRIMARY CARE PROVIDER AND CLINIC:  Margarito Hoffman MD, [address] [facility] [address]  Chief Complaint   Patient presents with     Hospital F/U     Clinic Care Coordination - Initial     [place] Medical Record Number:  [id number]  Place of Service where encounter took place:  [facility] [place] ([facility] [[id number]]    Smiley Acuña  is a 84 year old  (1935), admitted to the above facility from  [facility]. Hospital stay 3/31/19 through 4/7/19..  Admitted to this facility for  rehab, medical management and nursing care.    HPI:    HPI information obtained from: facility chart records, facility staff and [facility] chart review.   Brief Summary of Hospital Course: Smiley presented to Hospital on 3/31/19 with mental status changes and found to have a blood culture and urine culture gorw out E. Coli.  Was given Rocephin in hospital and then started on oral ABX.  Smiley stated to improve but then developed aggressive behavior with confusion.  Did have urinary retention and cognition went back to baseline once having intermittent straight catheterization.  Came to [facility] for rehab as well as intermittent cathing for retention.    Updates on Status Since Skilled nursing Admission: today staff Smiley out by the desk.  They are struggling with her behaviors as she is not allowing them even to check a temp tympanically.  Could hear her scream from down the delgado when vitals attempted.    This NP did fairly well with an exam as everything was explained to her.  Did not really communicate, mumbled more.  Later on in AM, staff attempted to take her blood sugar and then Smiley started to take the nurse's hands and bite them.  She did not succeed.    Refusing medications last night, did not sleep hardly at all last night and stateds \"I am cold\" "  Staff providing here with warm blankets.  Not allowing temp to be taken.  Did take some Tylenol though after coaxing her.      CODE STATUS/ADVANCE DIRECTIVES DISCUSSION:   CPR/Full code   Patient's living condition: lives with spouse  ALLERGIES: Lisinopril  PAST MEDICAL HISTORY:  has a past medical history of Abnormality of gait, Anemia, unspecified, Chronic ischemic heart disease, unspecified, Closed fracture of patella (06/21/10), Coronary artery disease, Degenerative disc disease, Depressive disorder, not elsewhere classified, History of blood transfusion, Other and unspecified hyperlipidemia, Renal failure, unspecified, Septicemia due to Escherichia coli (E. coli)(038.42) (H) (05/03/2007), Syncope and collapse (4/8/2005), Syncope and collapse (05/26/10), Traumatic subdural hematomas (05/27/10), Type II or unspecified type diabetes mellitus without mention of complication, not stated as uncontrolled, Unspecified essential hypertension, Unspecified sleep apnea, and Urinary tract infection, site not specified (4/4/2008). She also has no past medical history of Asthma, Congestive heart failure, unspecified, COPD (chronic obstructive pulmonary disease) (H), Malignant neoplasm (H), Thyroid disease, or Unspecified cerebral artery occlusion with cerebral infarction.  PAST SURGICAL HISTORY:   has a past surgical history that includes AMPUTATION TOE,MT-P JT (2000); APPENDECTOMY; ANESTH,UPPER LEG SURGERY; UPPER GI ENDOSCOPY,EXAM (04/27/2005); Colonoscopy w/wo Brush **Performed** (12/05/2005); UGI ENDOSCOPY DIAG W OR W/O BRUSH/WASH (12/05/2005); BX SYNOVIUM INTERPHALANG JT; craniotomy (05/28/10); REPAIR ROTATOR CUFF,ACUTE (11/21/2002); EXCISION LESION/TENDON-SHEATH/CAPSULE, FOOT (04/30/07); EXCISION LESION/TENDON-SHEATH/CAPSULE, FOOT (8/14/2007); bunionectomy rt/lt (10/24/07); and OPEN TX FEMORAL SUPRACONDYLAR FRACTURE W EXTENSION (12/14/12).  FAMILY HISTORY: family history includes C.A.D. in her brother; Diabetes in her  brother, brother, and mother; Osteoporosis in her mother.  SOCIAL HISTORY:   reports that she has never smoked. She has never used smokeless tobacco. She reports that she does not drink alcohol or use drugs.    Post Discharge Medication Reconciliation Status: discharge medications reconciled and changed, per note/orders (see AVS)    Current Outpatient Medications   Medication Sig Dispense Refill     acetaminophen (TYLENOL) 325 MG tablet Take 2 tablets (650 mg) by mouth every 4 hours as needed for mild pain 100 tablet      albuterol (PROAIR HFA, PROVENTIL HFA, VENTOLIN HFA) 108 (90 BASE) MCG/ACT inhaler Inhale 2 puffs into the lungs every 6 hours as needed for shortness of breath / dyspnea or wheezing Ventolin inhaler 1 Inhaler 5     cefdinir (OMNICEF) 300 MG capsule Take 1 capsule (300 mg) by mouth daily for 6 days 6 capsule 0     docusate sodium (COLACE) 100 MG capsule Take 1 capsule (100 mg) by mouth 2 times daily Hold for loose stools       hydrALAZINE (APRESOLINE) 100 MG tablet Take 1 tablet (100 mg) by mouth 3 times daily       insulin aspart (NOVOLOG PEN) 100 UNIT/ML injection Inject 1-7 Units Subcutaneous 3 times daily (before meals) For  - 189 give 1 unit.   For  - 239 give 2 units.   For  - 289 give 3 units.   For  - 339 give 4 units.   For - 399 give 5 units.   For -449 give 6 units  For  or higher give 7 units.       insulin aspart (NOVOLOG PEN) 100 UNIT/ML pen Inject 1-5 Units Subcutaneous At Bedtime Do Not give Bedtime Correction Insulin if BG less than  200.   For  - 249 give 1 units.   For  - 299 give 2 units.   For  - 349 give 3 units.   For  -399 give 4 units.   For BG greater than or equal to 400 give 5 units.  Notify provider if glucose greater than or equal to 350 mg/dL after administration of correction dose.       insulin aspart (NOVOLOG PEN) 100 UNIT/ML pen Inject 2 units per carb unit three times a day with meals        "melatonin 3 MG tablet Take 1 tablet (3 mg) by mouth At Bedtime       MULTIVITAMINS OR TABS 1 TABLET DAILY 30 0     OLANZapine (ZYPREXA) injection Inject 5 mg into the muscle daily as needed for agitation       ondansetron (ZOFRAN-ODT) 4 MG ODT tab Take 1 tablet (4 mg) by mouth every 6 hours as needed for nausea or vomiting       polyethylene glycol (MIRALAX/GLYCOLAX) packet Take 17 g by mouth daily       QUEtiapine (SEROQUEL) 25 MG tablet Take 1 tablet (25 mg) by mouth every 6 hours as needed (agitation)       QUEtiapine (SEROQUEL) 25 MG tablet Take 1 tablet (25 mg) by mouth 2 times daily       simvastatin (ZOCOR) 80 MG tablet Take 1 tablet (80 mg) by mouth At Bedtime 90 tablet 3     ASPIRIN NOT PRESCRIBED, INTENTIONAL, by Other route continuous prn. 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    0     B-D INSULIN SYRINGE 30G X 1/2\" 0.5 ML USE 1 SYRINGE FOUR TIMES A DAY OR AS DIRECTED 100 each 11     blood glucose monitoring (PRODIGY TWIST TOP 28G) lancets USE TO TEST BLOOD SUGAR THREE TIMES A DAY OR AS DIRECTED 100 each 2     blood glucose monitoring (PRODIGY TWIST TOP 28G) lancets Use to test blood sugar two times daily or as directed. 100 each 2     insulin pen needle (B-D U/F) 31G X 8 MM miscellaneous USE 1 DAILY OR AS DIRECTED 100 each prn     PRODIGY NO CODING BLOOD GLUC test strip USE TO TEST BLOOD SUGAR THREE TIMES A DAY OR AS DIRECTED 100 each 4     PRODIGY NO CODING BLOOD GLUC test strip USE TO TEST BLOOD SUGAR THREE TIMES A DAY OR AS DIRECTED 100 each 2     Spacer/Aero-Holding Chambers (OPTIHALER) BOGDAN As directed 1 Device 0       ROS:  Unable as she has a blanket covering her up and she is holding her head.  She will mumble and let this NP listen to her once explained.  \"I'm cold\"    Vitals:  BP (!) 180/94   Pulse 94   Temp 96.2  F (35.7  C)   Resp 16   Ht 1.651 m (5' 5\")   Wt 76.2 kg (167 lb 14.4 oz)   SpO2 94%   BMI 27.94 kg/m     Exam:  GENERAL APPEARANCE:  Alert, in mental distress due to " unknown physical distress, cooperative, uncooperative  EYES:  Conjunctiva and lids normal, will look up at times but otherwise holding her head by her temples  RESP:  respiratory effort and palpation of chest normal, lungs clear to auscultation , no respiratory distress  CV:  Palpation and auscultation of heart done , regular rate and rhythm, no murmur, rub, or gallop, no edema  ABDOMEN:  normal bowel sounds, soft, nontender, no hepatosplenomegaly or other masses, no guarding or rebound  M/S:   Gait and station abnormal assist of one with transfers, gait belt.  able to propel self around the unit  SKIN:  bruising on arms and right knee cap.  skin is dry and warm to touch.    PSYCH:  hard to assess as she is not cooperative at this time with anyone.  mumbles her thoughts.    Lab/Diagnostic data:  Component      Latest Ref Rng & Units 4/7/2019 4/8/2019   Sodium      133 - 144 mmol/L 147 (H) 145 (H)   Potassium      3.4 - 5.3 mmol/L 5.1 5.2   Chloride      94 - 109 mmol/L 115 (H) 110 (H)   Carbon Dioxide      20 - 32 mmol/L 24 25   Anion Gap      3 - 14 mmol/L 8 10   Glucose      70 - 99 mg/dL 119 (H) 272 (H)   Urea Nitrogen      7 - 30 mg/dL 36 (H) 36 (H)   Creatinine      0.52 - 1.04 mg/dL 1.89 (H) 1.78 (H)   GFR Estimate      >60 mL/min/1.73:m2 24 (L) 26 (L)   GFR Estimate If Black      >60 mL/min/1.73:m2 28 (L) 30 (L)   Calcium      8.5 - 10.1 mg/dL 8.8 9.2       ASSESSMENT/PLAN:  Current [facility] scheduled appointments:   none - will be followed by NH physician    E. coli UTI  Currently has an order for cefdinir 300mg daily for 6 days.  Due to being admitted here with a UTI, will consider rechecking after ABX done to make sure it has cleared.  Did have the E. Coli in her blood as well.  Will check a CBC on 4/10/19 for UTI and HTN dx    Urinary retention  Staff are attempting to do a bladder scan.  Does have an order for bladder scan 3x daily with volume >500cc then repeat after voiding attempt.  If continues then  would be able to straight cath at that point.    Do not wish for her to have a catheter.  May need to refer to urology in future if this does not resolve.    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  So far has only allowed 2 checks.  Supper yesterday was 354 and this   Is on a moderate consistent diet of 3728-0073 calorie.  Expect her sugars to be elevated due to infection.    Is on carb counting with sliding scale of Novolog.  1.  Discontinue carb count  2.  Use sliding scale for sugar correction for now.  Will eval her blood sugars at a later date.    Essential hypertension with goal blood pressure less than 130/80  CKD stage 4 GFR 15-29  Blood pressures 150-170's/70-90's.  BMP done today and slightly improved since the day prior.    Does take hydralazine 100mg TID.  With her stage 4 CKD, expect higher readings and if not cooperative, do not think her numbers will stay on lower side.  No changes for now.      Has an order for a BMP on 4/9 and will give orders to move it to 4/10/19    Encephalopathy  Hallucinations  - did review behaviors in hospital and feel she has some of the same here now that was to have cleared by time of her discharge.  Will give her a chance for rehab here.    Noted to be on Seroquel 25mg BID and 25mg every 6 hours PRN as well as Zyprexa IM prn option.    Need to have a qualifying dx which will be hallucinations with evidence of her talking to No one present and then agitation with cares seen today.       Orders written by provider at facility and transcribed by : Kathy Galdamez MA    1.  Discontinue Carb counting.  2.  Seroquel & Zyprexa for hallucinations E/B talking to no one, agitation w/cares  3.  Move BMP on 4/9 to 4/10. Dx: UTI, HTN  4.  CBC on 4/10.  Dx: UTI, HTN    Total time spent with patient visit at the skilled nursing facility was 37 including patient visit and  review of past records. Greater than 50% of total time spent with counseling and coordinating care due to behaviors noted, review of medications with staff and goals of care with staff  Electronically signed by:  ALBA Alba CNP                         Sincerely,        ALBA Alba CNP

## 2019-04-09 ENCOUNTER — APPOINTMENT (OUTPATIENT)
Dept: GENERAL RADIOLOGY | Facility: CLINIC | Age: 84
DRG: 638 | End: 2019-04-09
Attending: EMERGENCY MEDICINE
Payer: MEDICARE

## 2019-04-09 ENCOUNTER — HOSPITAL ENCOUNTER (INPATIENT)
Facility: CLINIC | Age: 84
LOS: 3 days | Discharge: NURSING, REHAB OR LONG-TERM CARE | DRG: 638 | End: 2019-04-12
Attending: EMERGENCY MEDICINE | Admitting: FAMILY MEDICINE
Payer: MEDICARE

## 2019-04-09 DIAGNOSIS — F03.91 DEMENTIA WITH BEHAVIORAL DISTURBANCE, UNSPECIFIED DEMENTIA TYPE: ICD-10-CM

## 2019-04-09 DIAGNOSIS — E83.42 HYPOMAGNESEMIA: Primary | ICD-10-CM

## 2019-04-09 DIAGNOSIS — N30.01 ACUTE CYSTITIS WITH HEMATURIA: ICD-10-CM

## 2019-04-09 PROBLEM — K59.09 OTHER CONSTIPATION: Status: ACTIVE | Noted: 2019-04-09

## 2019-04-09 LAB
ALBUMIN SERPL-MCNC: 2.8 G/DL (ref 3.4–5)
ALBUMIN SERPL-MCNC: 3.1 G/DL (ref 3.4–5)
ALBUMIN UR-MCNC: 100 MG/DL
ALP SERPL-CCNC: 63 U/L (ref 40–150)
ALP SERPL-CCNC: 66 U/L (ref 40–150)
ALT SERPL W P-5'-P-CCNC: 42 U/L (ref 0–50)
ALT SERPL W P-5'-P-CCNC: 48 U/L (ref 0–50)
ANION GAP SERPL CALCULATED.3IONS-SCNC: 9 MMOL/L (ref 3–14)
APPEARANCE UR: ABNORMAL
AST SERPL W P-5'-P-CCNC: 20 U/L (ref 0–45)
AST SERPL W P-5'-P-CCNC: 27 U/L (ref 0–45)
BASOPHILS # BLD AUTO: 0 10E9/L (ref 0–0.2)
BASOPHILS NFR BLD AUTO: 0.2 %
BILIRUB DIRECT SERPL-MCNC: 0.2 MG/DL (ref 0–0.2)
BILIRUB SERPL-MCNC: 0.3 MG/DL (ref 0.2–1.3)
BILIRUB SERPL-MCNC: 0.4 MG/DL (ref 0.2–1.3)
BILIRUB UR QL STRIP: NEGATIVE
BUN SERPL-MCNC: 43 MG/DL (ref 7–30)
CALCIUM SERPL-MCNC: 9.2 MG/DL (ref 8.5–10.1)
CHLORIDE SERPL-SCNC: 107 MMOL/L (ref 94–109)
CO2 SERPL-SCNC: 25 MMOL/L (ref 20–32)
COLOR UR AUTO: YELLOW
CREAT SERPL-MCNC: 1.87 MG/DL (ref 0.52–1.04)
DIFFERENTIAL METHOD BLD: ABNORMAL
EOSINOPHIL NFR BLD AUTO: 0.1 %
ERYTHROCYTE [DISTWIDTH] IN BLOOD BY AUTOMATED COUNT: 13.1 % (ref 10–15)
GFR SERPL CREATININE-BSD FRML MDRD: 24 ML/MIN/{1.73_M2}
GLUCOSE BLDC GLUCOMTR-MCNC: 223 MG/DL (ref 70–99)
GLUCOSE BLDC GLUCOMTR-MCNC: 309 MG/DL (ref 70–99)
GLUCOSE SERPL-MCNC: 325 MG/DL (ref 70–99)
GLUCOSE UR STRIP-MCNC: >499 MG/DL
HCT VFR BLD AUTO: 35.9 % (ref 35–47)
HGB BLD-MCNC: 11.6 G/DL (ref 11.7–15.7)
HGB UR QL STRIP: ABNORMAL
IMM GRANULOCYTES # BLD: 0.1 10E9/L (ref 0–0.4)
IMM GRANULOCYTES NFR BLD: 0.6 %
KETONES UR STRIP-MCNC: NEGATIVE MG/DL
LACTATE BLD-SCNC: 1.1 MMOL/L (ref 0.7–2)
LACTATE BLD-SCNC: 1.2 MMOL/L (ref 0.7–2)
LACTATE BLD-SCNC: 2.7 MMOL/L (ref 0.7–2)
LEUKOCYTE ESTERASE UR QL STRIP: ABNORMAL
LYMPHOCYTES # BLD AUTO: 1.5 10E9/L (ref 0.8–5.3)
LYMPHOCYTES NFR BLD AUTO: 9.4 %
MCH RBC QN AUTO: 31 PG (ref 26.5–33)
MCHC RBC AUTO-ENTMCNC: 32.3 G/DL (ref 31.5–36.5)
MCV RBC AUTO: 96 FL (ref 78–100)
MONOCYTES # BLD AUTO: 1 10E9/L (ref 0–1.3)
MONOCYTES NFR BLD AUTO: 6.5 %
MUCOUS THREADS #/AREA URNS LPF: PRESENT /LPF
NEUTROPHILS # BLD AUTO: 12.9 10E9/L (ref 1.6–8.3)
NEUTROPHILS NFR BLD AUTO: 83.2 %
NITRATE UR QL: NEGATIVE
NRBC # BLD AUTO: 0 10*3/UL
NRBC BLD AUTO-RTO: 0 /100
PH UR STRIP: 6 PH (ref 5–7)
PLATELET # BLD AUTO: 306 10E9/L (ref 150–450)
POTASSIUM SERPL-SCNC: 5 MMOL/L (ref 3.4–5.3)
PROCALCITONIN SERPL-MCNC: <0.05 NG/ML
PROT SERPL-MCNC: 6.8 G/DL (ref 6.8–8.8)
PROT SERPL-MCNC: 8 G/DL (ref 6.8–8.8)
RBC # BLD AUTO: 3.74 10E12/L (ref 3.8–5.2)
RBC #/AREA URNS AUTO: 50 /HPF (ref 0–2)
SODIUM SERPL-SCNC: 141 MMOL/L (ref 133–144)
SOURCE: ABNORMAL
SP GR UR STRIP: 1.01 (ref 1–1.03)
SQUAMOUS #/AREA URNS AUTO: 6 /HPF (ref 0–1)
UROBILINOGEN UR STRIP-MCNC: 0 MG/DL (ref 0–2)
WBC # BLD AUTO: 15.5 10E9/L (ref 4–11)
WBC #/AREA URNS AUTO: 2330 /HPF (ref 0–5)
WBC CLUMPS #/AREA URNS HPF: PRESENT /HPF

## 2019-04-09 PROCEDURE — 85025 COMPLETE CBC W/AUTO DIFF WBC: CPT | Performed by: EMERGENCY MEDICINE

## 2019-04-09 PROCEDURE — 25000132 ZZH RX MED GY IP 250 OP 250 PS 637: Performed by: NURSE PRACTITIONER

## 2019-04-09 PROCEDURE — 87040 BLOOD CULTURE FOR BACTERIA: CPT | Performed by: EMERGENCY MEDICINE

## 2019-04-09 PROCEDURE — 80076 HEPATIC FUNCTION PANEL: CPT | Performed by: NURSE PRACTITIONER

## 2019-04-09 PROCEDURE — 83605 ASSAY OF LACTIC ACID: CPT | Performed by: NURSE PRACTITIONER

## 2019-04-09 PROCEDURE — 25800030 ZZH RX IP 258 OP 636: Performed by: EMERGENCY MEDICINE

## 2019-04-09 PROCEDURE — 99221 1ST HOSP IP/OBS SF/LOW 40: CPT | Mod: AI | Performed by: NURSE PRACTITIONER

## 2019-04-09 PROCEDURE — 25000128 H RX IP 250 OP 636: Performed by: EMERGENCY MEDICINE

## 2019-04-09 PROCEDURE — 25000128 H RX IP 250 OP 636: Performed by: NURSE PRACTITIONER

## 2019-04-09 PROCEDURE — 99285 EMERGENCY DEPT VISIT HI MDM: CPT | Mod: Z6 | Performed by: EMERGENCY MEDICINE

## 2019-04-09 PROCEDURE — 25000125 ZZHC RX 250: Performed by: NURSE PRACTITIONER

## 2019-04-09 PROCEDURE — 93005 ELECTROCARDIOGRAM TRACING: CPT

## 2019-04-09 PROCEDURE — 87086 URINE CULTURE/COLONY COUNT: CPT | Performed by: FAMILY MEDICINE

## 2019-04-09 PROCEDURE — 74019 RADEX ABDOMEN 2 VIEWS: CPT

## 2019-04-09 PROCEDURE — 25000131 ZZH RX MED GY IP 250 OP 636 PS 637: Performed by: NURSE PRACTITIONER

## 2019-04-09 PROCEDURE — 83605 ASSAY OF LACTIC ACID: CPT | Performed by: EMERGENCY MEDICINE

## 2019-04-09 PROCEDURE — 81003 URINALYSIS AUTO W/O SCOPE: CPT | Performed by: EMERGENCY MEDICINE

## 2019-04-09 PROCEDURE — 80053 COMPREHEN METABOLIC PANEL: CPT | Performed by: EMERGENCY MEDICINE

## 2019-04-09 PROCEDURE — 00000146 ZZHCL STATISTIC GLUCOSE BY METER IP

## 2019-04-09 PROCEDURE — 99207 ZZC DOWN CODE DUE TO INITIAL EXAM: CPT | Performed by: NURSE PRACTITIONER

## 2019-04-09 PROCEDURE — 83735 ASSAY OF MAGNESIUM: CPT | Performed by: NURSE PRACTITIONER

## 2019-04-09 PROCEDURE — 36415 COLL VENOUS BLD VENIPUNCTURE: CPT | Performed by: NURSE PRACTITIONER

## 2019-04-09 PROCEDURE — 84145 PROCALCITONIN (PCT): CPT | Performed by: NURSE PRACTITIONER

## 2019-04-09 PROCEDURE — 96365 THER/PROPH/DIAG IV INF INIT: CPT | Performed by: EMERGENCY MEDICINE

## 2019-04-09 PROCEDURE — A9270 NON-COVERED ITEM OR SERVICE: HCPCS | Performed by: NURSE PRACTITIONER

## 2019-04-09 PROCEDURE — 99285 EMERGENCY DEPT VISIT HI MDM: CPT | Mod: 25 | Performed by: EMERGENCY MEDICINE

## 2019-04-09 PROCEDURE — 25800030 ZZH RX IP 258 OP 636: Performed by: NURSE PRACTITIONER

## 2019-04-09 PROCEDURE — 36415 COLL VENOUS BLD VENIPUNCTURE: CPT | Performed by: EMERGENCY MEDICINE

## 2019-04-09 PROCEDURE — 12000000 ZZH R&B MED SURG/OB

## 2019-04-09 RX ORDER — SODIUM CHLORIDE 9 MG/ML
1000 INJECTION, SOLUTION INTRAVENOUS CONTINUOUS
Status: DISCONTINUED | OUTPATIENT
Start: 2019-04-09 | End: 2019-04-09

## 2019-04-09 RX ORDER — NALOXONE HYDROCHLORIDE 0.4 MG/ML
.1-.4 INJECTION, SOLUTION INTRAMUSCULAR; INTRAVENOUS; SUBCUTANEOUS
Status: DISCONTINUED | OUTPATIENT
Start: 2019-04-09 | End: 2019-04-12 | Stop reason: HOSPADM

## 2019-04-09 RX ORDER — HYDRALAZINE HYDROCHLORIDE 25 MG/1
100 TABLET, FILM COATED ORAL 3 TIMES DAILY
Status: DISCONTINUED | OUTPATIENT
Start: 2019-04-09 | End: 2019-04-12 | Stop reason: HOSPADM

## 2019-04-09 RX ORDER — QUETIAPINE FUMARATE 25 MG/1
25 TABLET, FILM COATED ORAL EVERY 6 HOURS PRN
Status: DISCONTINUED | OUTPATIENT
Start: 2019-04-09 | End: 2019-04-12 | Stop reason: HOSPADM

## 2019-04-09 RX ORDER — SODIUM CHLORIDE 9 MG/ML
INJECTION, SOLUTION INTRAVENOUS CONTINUOUS
Status: DISCONTINUED | OUTPATIENT
Start: 2019-04-09 | End: 2019-04-09

## 2019-04-09 RX ORDER — ONDANSETRON 4 MG/1
4 TABLET, ORALLY DISINTEGRATING ORAL EVERY 6 HOURS PRN
Status: DISCONTINUED | OUTPATIENT
Start: 2019-04-09 | End: 2019-04-12 | Stop reason: HOSPADM

## 2019-04-09 RX ORDER — CEFTRIAXONE 1 G/1
1 INJECTION, POWDER, FOR SOLUTION INTRAMUSCULAR; INTRAVENOUS ONCE
Status: COMPLETED | OUTPATIENT
Start: 2019-04-09 | End: 2019-04-09

## 2019-04-09 RX ORDER — PROCHLORPERAZINE MALEATE 5 MG
5 TABLET ORAL EVERY 6 HOURS PRN
Status: DISCONTINUED | OUTPATIENT
Start: 2019-04-09 | End: 2019-04-12 | Stop reason: HOSPADM

## 2019-04-09 RX ORDER — SODIUM CHLORIDE 9 MG/ML
INJECTION, SOLUTION INTRAVENOUS CONTINUOUS
Status: DISCONTINUED | OUTPATIENT
Start: 2019-04-09 | End: 2019-04-10

## 2019-04-09 RX ORDER — NICOTINE POLACRILEX 4 MG
15-30 LOZENGE BUCCAL
Status: DISCONTINUED | OUTPATIENT
Start: 2019-04-09 | End: 2019-04-12 | Stop reason: HOSPADM

## 2019-04-09 RX ORDER — CEFTRIAXONE 1 G/1
1 INJECTION, POWDER, FOR SOLUTION INTRAMUSCULAR; INTRAVENOUS EVERY 24 HOURS
Status: DISCONTINUED | OUTPATIENT
Start: 2019-04-10 | End: 2019-04-11

## 2019-04-09 RX ORDER — CALCIUM CARBONATE 500 MG/1
2 TABLET, CHEWABLE ORAL 4 TIMES DAILY
COMMUNITY
End: 2019-04-15

## 2019-04-09 RX ORDER — QUETIAPINE FUMARATE 25 MG/1
25 TABLET, FILM COATED ORAL 2 TIMES DAILY
Status: DISCONTINUED | OUTPATIENT
Start: 2019-04-09 | End: 2019-04-12 | Stop reason: HOSPADM

## 2019-04-09 RX ORDER — OLANZAPINE 10 MG/2ML
5 INJECTION, POWDER, FOR SOLUTION INTRAMUSCULAR DAILY PRN
Status: DISCONTINUED | OUTPATIENT
Start: 2019-04-09 | End: 2019-04-12 | Stop reason: HOSPADM

## 2019-04-09 RX ORDER — ONDANSETRON 2 MG/ML
4 INJECTION INTRAMUSCULAR; INTRAVENOUS EVERY 6 HOURS PRN
Status: DISCONTINUED | OUTPATIENT
Start: 2019-04-09 | End: 2019-04-12 | Stop reason: HOSPADM

## 2019-04-09 RX ORDER — HYDRALAZINE HYDROCHLORIDE 20 MG/ML
5 INJECTION INTRAMUSCULAR; INTRAVENOUS EVERY 6 HOURS PRN
Status: DISCONTINUED | OUTPATIENT
Start: 2019-04-09 | End: 2019-04-11

## 2019-04-09 RX ORDER — BISACODYL 10 MG
10 SUPPOSITORY, RECTAL RECTAL DAILY PRN
Status: DISCONTINUED | OUTPATIENT
Start: 2019-04-09 | End: 2019-04-12 | Stop reason: HOSPADM

## 2019-04-09 RX ORDER — LANOLIN ALCOHOL/MO/W.PET/CERES
3 CREAM (GRAM) TOPICAL AT BEDTIME
Status: DISCONTINUED | OUTPATIENT
Start: 2019-04-09 | End: 2019-04-12 | Stop reason: HOSPADM

## 2019-04-09 RX ORDER — DEXTROSE MONOHYDRATE 25 G/50ML
25-50 INJECTION, SOLUTION INTRAVENOUS
Status: DISCONTINUED | OUTPATIENT
Start: 2019-04-09 | End: 2019-04-12 | Stop reason: HOSPADM

## 2019-04-09 RX ORDER — PROCHLORPERAZINE 25 MG
12.5 SUPPOSITORY, RECTAL RECTAL EVERY 12 HOURS PRN
Status: DISCONTINUED | OUTPATIENT
Start: 2019-04-09 | End: 2019-04-12 | Stop reason: HOSPADM

## 2019-04-09 RX ADMIN — QUETIAPINE 25 MG: 25 TABLET ORAL at 20:03

## 2019-04-09 RX ADMIN — CEFTRIAXONE SODIUM 1 G: 1 INJECTION, POWDER, FOR SOLUTION INTRAMUSCULAR; INTRAVENOUS at 12:37

## 2019-04-09 RX ADMIN — OLANZAPINE 5 MG: 10 INJECTION, POWDER, LYOPHILIZED, FOR SOLUTION INTRAMUSCULAR at 18:35

## 2019-04-09 RX ADMIN — Medication 3 MG: at 20:03

## 2019-04-09 RX ADMIN — INSULIN ASPART 4 UNITS: 100 INJECTION, SOLUTION INTRAVENOUS; SUBCUTANEOUS at 17:10

## 2019-04-09 RX ADMIN — HYDRALAZINE HYDROCHLORIDE 100 MG: 25 TABLET ORAL at 20:03

## 2019-04-09 RX ADMIN — SODIUM CHLORIDE 1000 ML: 9 INJECTION, SOLUTION INTRAVENOUS at 14:37

## 2019-04-09 RX ADMIN — SODIUM CHLORIDE: 9 INJECTION, SOLUTION INTRAVENOUS at 15:23

## 2019-04-09 RX ADMIN — FAMOTIDINE 20 MG: 10 INJECTION, SOLUTION INTRAVENOUS at 18:30

## 2019-04-09 RX ADMIN — SODIUM CHLORIDE: 9 INJECTION, SOLUTION INTRAVENOUS at 15:11

## 2019-04-09 RX ADMIN — SODIUM CHLORIDE 1000 ML: 9 INJECTION, SOLUTION INTRAVENOUS at 12:36

## 2019-04-09 ASSESSMENT — ACTIVITIES OF DAILY LIVING (ADL)
ADLS_ACUITY_SCORE: 26
ADLS_ACUITY_SCORE: 26

## 2019-04-09 NOTE — H&P
[facility]    History and Physical - Hospitalist Service       Date of Admission:  4/9/2019    Assessment & Plan   Smiley Acuña is a 84 year old female admitted on 4/9/2019. She presented to the ER from the nursing home with concerns for constipation, hyperglycemia, altered mental state. Patient was recently admitted for E. Coli UTI. Admitted on March 31, 2019 with mental status changes presumed secondary to sepsis secondary to UTI blood cultures and urine cultures growing E. coli, has finished 3 days of Rocephin and started on oral Omnicef on 4/3/19. Patient had been discharged to the rehab facility, she had refused her medications and it appears that her infection has returned. Given current information patient will require > 2 midnights in the hospital and will be admitted to inpatient status.       Sepsis (H)  UTI   She presented to the ER from the nursing home with concerns for constipation, hyperglycemia, altered mental state.   Patient was recently admitted on March 31, 2019 with mental status changes presumed secondary to sepsis secondary to UTI blood cultures and urine cultures growing E. coli, has finished 3 days of Rocephin and started on oral Omnicef on 4/3/19.   Patient had been discharged to the rehab facility, she had refused her medications and it appears that her infection has worsened without treatment  Patient with ongoing urine retention, has needed intermittent catheterization   > 2,000 WBC in urine  Lactic acid 2.7 in ER, down to 1.1 on admission, treated with fluid bolus and antibiotics  WBC 15,000  Unclear if she has had fevers, chills  Blood glucose > 600 this morning  Creat mildly elevated at 1.8  Vázquez placed with good urine output  Will continue Rocephin IV at this time  Continue to monitor closely  Transition to Omnicef when patient is taking oral medications  Telemetry with 1st degree block, elevated P waves  EKG repeated - Discussed with   Yuliet  Monitor for 24 hours on telemetry    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  Glucose QID  Hypoglycemia protocol  Sliding scale insulin ordered    Essential hypertension with goal blood pressure less than 130/80  Apresoline 100 mg TID  Hypertensive on admission, although patient has not been able to take her medications  Will assess when she has several doses and titrate as needed  IV Apresoline ordered PRN if unable or unwilling to take oral medications and systolic blood pressure is > 180      E. coli UTI  History of and treated as above  Will continue Rocephin as this time    Urinary retention  Noted in the recent hospitalization  Patient did develop increased agitation when she was retaining urine  Vázquez placed with good output    Other constipation  Miralax and Senna scheduled  Suppository PRN      CKD (chronic kidney disease) stage 4, GFR 15-29 ml/min (H)  Creat 1.8  Cr Clearance 22.9  Will need to follow closely    Encephalopathy  Dementia - suspected  Altered Mental Status  Will start with relieving the urine retention  Resume Seroquel BID and PRN  PRN Zyprexa was needed previously  VPM was implemented on admission, will remove when able      Diet: Diabetic  DVT Prophylaxis: Pneumatic Compression Devices  Vázquez Catheter: not present  Code Status: DNR    Disposition Plan   Expected discharge: 2 - 3 days, recommended to transitional care unit once antibiotic plan established, blood pressure stable, mental status at baseline, renal function improved, safe disposition  plan/ TCU bed available and SIRS/Sepsis treated.  Entered: Inez TERRENCE Thompson CNP 04/09/2019, 1:59 PM     The patient's care was discussed with the Attending Physician, Dr. De Leon, Bedside Nurse, Care Coordinator/ and Patient.    Inez Thompson, CNP  [facility]    ______________________________________________________________________    Chief Complaint   Constipation, altered mental state    History is obtained from the patient, electronic health record and emergency department physician    History of Present Illness   Smiley Acuña is a 84 year old female who presented to the emergency department via EMS from the nursing home where she has been since Saturday, 3 days ago.  She has been refusing her oral medications.  She is recently had a UTI and was discharged on Omnicef. Patient was not able to / refused to take her oral medications.  Her blood sugar was 600 prior to getting insulin today and it was in the 300s for EMS. Unfortunately the patient has memory issues and so cannot give me any history to confirm that.  I do not know if she has had abdominal pain.  She states that she is feeling well.  Patient has not been able to void, straight catheterization in ER became clogged with sediment. Patient has not had a BM for several days. The rest of the review of systems is unobtainable.      Review of Systems    The 10 point Review of Systems is negative other than noted in the HPI or here.     Past Medical History    I have reviewed this patient's medical history and updated it with pertinent information if needed.   Past Medical History:   Diagnosis Date     Abnormality of gait      Anemia, unspecified      Chronic ischemic heart disease, unspecified     2-vessel CAD     Closed fracture of patella 06/21/10    D/C 06/23/10     Coronary artery disease      Degenerative disc disease     lumbar     Depressive disorder, not elsewhere classified      History of  blood transfusion      Other and unspecified hyperlipidemia      Renal failure, unspecified     chronic renal disease     Septicemia due to Escherichia coli (E. coli)(038.42) (H) 05/03/2007     Syncope and collapse 4/8/2005    Admit     Syncope and collapse 05/26/10    D/C 05/27/10 to [facility]     Traumatic subdural hematomas 05/27/10     Type II or unspecified type diabetes mellitus without mention of complication, not stated as uncontrolled      Unspecified essential hypertension      Unspecified sleep apnea      Urinary tract infection, site not specified 4/4/2008    UTI with mild sepsis. Admitted.       Past Surgical History   I have reviewed this patient's surgical history and updated it with pertinent information if needed.  Past Surgical History:   Procedure Laterality Date     BUNIONECTOMY RT/LT  10/24/07    Bunionectomy right foot. Arthrodesis IP joint, right hallux.     C ANESTH,UPPER LEG SURGERY       C APPENDECTOMY       C BX SYNOVIUM INTERPHALANG JT       C OPEN TX FEMORAL SUPRACONDYLAR FRACTURE W EXTENSION  12/14/12    right LE     CRANIOTOMY  05/28/10    [facility]     HC AMPUTATION TOE, MTP JT  2000    Second toe amputation     HC COLONOSCOPY W/WO BRUSH/WASH  12/05/2005    Internal hemorrhoids.  Diverticulosis-limited/left colon.  Otherwise normal to cecum.     HC EXCISION LESION/TENDON-SHEATH/CAPSULE, FOOT  04/30/07    right foot.     HC EXCISION LESION/TENDON-SHEATH/CAPSULE, FOOT  8/14/2007    Recurrent ganglion cyst - right ankle     HC REPAIR ROTATOR CUFF,ACUTE  11/21/2002    Rotator Cuff Repair; Right     HC UGI ENDOSCOPY DIAG W OR W/O BRUSH/WASH  12/05/2005    Hiatus hernia.  Otherwise normal.      UGI ENDOSCOPY, SIMPLE EXAM  04/27/2005       Social History   I have reviewed this patient's social history and updated it with pertinent information if needed.  Social History     Tobacco Use     Smoking status: Never Smoker     Smokeless tobacco: Never Used   Substance Use Topics      "Alcohol use: No     Alcohol/week: 0.0 oz     Drug use: No       Family History   I have reviewed this patient's family history and updated it with pertinent information if needed.   Family History   Problem Relation Age of Onset     Diabetes Brother      C.A.D. Brother      Diabetes Brother      Diabetes Mother      Osteoporosis Mother        Prior to Admission Medications   Prior to Admission Medications   Prescriptions Last Dose Informant Patient Reported? Taking?   ASPIRIN NOT PRESCRIBED, INTENTIONAL,   Yes No   Sig: by Other route continuous prn. 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     B-D INSULIN SYRINGE 30G X 1/2\" 0.5 ML   No No   Sig: USE 1 SYRINGE FOUR TIMES A DAY OR AS DIRECTED   MULTIVITAMINS OR TABS 2019 at 0700  Yes Yes   Si TABLET DAILY   OLANZapine (ZYPREXA) injection More than a month at Unknown time  No No   Sig: Inject 5 mg into the muscle daily as needed for agitation   PRODIGY NO CODING BLOOD GLUC test strip   No No   Sig: USE TO TEST BLOOD SUGAR THREE TIMES A DAY OR AS DIRECTED   PRODIGY NO CODING BLOOD GLUC test strip   No No   Sig: USE TO TEST BLOOD SUGAR THREE TIMES A DAY OR AS DIRECTED   QUEtiapine (SEROQUEL) 25 MG tablet More than a month at Unknown time  No No   Sig: Take 1 tablet (25 mg) by mouth every 6 hours as needed (agitation)   QUEtiapine (SEROQUEL) 25 MG tablet 2019 at 0700  No Yes   Sig: Take 1 tablet (25 mg) by mouth 2 times daily   Spacer/Aero-Holding Chambers (OPTIHALER) BOGDAN   No No   Sig: As directed   acetaminophen (TYLENOL) 325 MG tablet 2019 at 1100  No Yes   Sig: Take 2 tablets (650 mg) by mouth every 4 hours as needed for mild pain   albuterol (PROAIR HFA, PROVENTIL HFA, VENTOLIN HFA) 108 (90 BASE) MCG/ACT inhaler More than a month at Unknown time  No No   Sig: Inhale 2 puffs into the lungs every 6 hours as needed for shortness of breath / dyspnea or wheezing Ventolin inhaler   blood glucose monitoring (PRODIGY TWIST TOP 28G) lancets   No No "   Sig: Use to test blood sugar two times daily or as directed.   blood glucose monitoring (PRODIGY TWIST TOP 28G) lancets   No No   Sig: USE TO TEST BLOOD SUGAR THREE TIMES A DAY OR AS DIRECTED   calcium carbonate (TUMS) 500 MG chewable tablet 4/8/2019 at 1600  Yes Yes   Sig: Take 2 chew tab by mouth 4 times daily   cefdinir (OMNICEF) 300 MG capsule 4/9/2019 at 0700  No Yes   Sig: Take 1 capsule (300 mg) by mouth daily for 6 days   docusate sodium (COLACE) 100 MG capsule 4/9/2019 at 0700  No Yes   Sig: Take 1 capsule (100 mg) by mouth 2 times daily Hold for loose stools   hydrALAZINE (APRESOLINE) 100 MG tablet 4/9/2019 at 0700  No Yes   Sig: Take 1 tablet (100 mg) by mouth 3 times daily   insulin aspart (NOVOLOG PEN) 100 UNIT/ML injection 4/9/2019 at 0700  No Yes   Sig: Inject 1-7 Units Subcutaneous 3 times daily (before meals) For  - 189 give 1 unit.   For  - 239 give 2 units.   For  - 289 give 3 units.   For  - 339 give 4 units.   For - 399 give 5 units.   For -449 give 6 units  For  or higher give 7 units.   insulin aspart (NOVOLOG PEN) 100 UNIT/ML pen Past Week at Unknown time  No Yes   Sig: Inject 1-5 Units Subcutaneous At Bedtime Do Not give Bedtime Correction Insulin if BG less than  200.   For  - 249 give 1 units.   For  - 299 give 2 units.   For  - 349 give 3 units.   For  -399 give 4 units.   For BG greater than or equal to 400 give 5 units.  Notify provider if glucose greater than or equal to 350 mg/dL after administration of correction dose.   insulin aspart (NOVOLOG PEN) 100 UNIT/ML pen 4/8/2019 at    No Yes   Sig: Inject 2 units per carb unit three times a day with meals   insulin pen needle (B-D U/F) 31G X 8 MM miscellaneous   No No   Sig: USE 1 DAILY OR AS DIRECTED   melatonin 3 MG tablet More than a month at Unknown time  No No   Sig: Take 1 tablet (3 mg) by mouth At Bedtime   ondansetron (ZOFRAN-ODT) 4 MG ODT tab More than a month at  Unknown time  No No   Sig: Take 1 tablet (4 mg) by mouth every 6 hours as needed for nausea or vomiting   polyethylene glycol (MIRALAX/GLYCOLAX) packet 4/9/2019 at 0700  No Yes   Sig: Take 17 g by mouth daily   simvastatin (ZOCOR) 80 MG tablet More than a month at Unknown time  No No   Sig: Take 1 tablet (80 mg) by mouth At Bedtime      Facility-Administered Medications: None     Allergies   Allergies   Allergen Reactions     Lisinopril      upper resp symptoms, cough       Physical Exam   Vital Signs: Temp: 97.6  F (36.4  C) Temp src: Temporal BP: 166/71 Pulse: 81 Heart Rate: 79 Resp: 9 SpO2: 96 % O2 Device: None (Room air)    Weight: 0 lbs 0 oz    Exam:  Constitutional: mild distress, uncooperative and pale  Cardiovascular: negative findings: regular rate and rhythm  Respiratory: positive findings: tachypnea, rales fine and diminished at bases  Gastrointestinal: Abdomen soft, non-tender. BS normal.   Musculoskeletal: extremities normal- no gross deformities noted  Skin: ecchymoses - scattered  Neurologic: confused, BERT        Data   Data reviewed today: I reviewed all medications, new labs and imaging results over the last 24 hours.     Recent Labs   Lab 04/09/19  1544 04/09/19  1110 04/08/19  0755 04/07/19  0544  04/04/19  1220 04/03/19  0607   WBC  --  15.5*  --   --   --  10.3 11.3*   HGB  --  11.6*  --   --   --   --  10.1*   MCV  --  96  --   --   --   --  96   PLT  --  306  --   --   --   --  282   NA  --  141 145* 147*   < >  --  142   POTASSIUM  --  5.0 5.2 5.1   < >  --  5.0   CHLORIDE  --  107 110* 115*   < >  --  116*   CO2  --  25 25 24   < >  --  18*   BUN  --  43* 36* 36*   < >  --  56*   CR  --  1.87* 1.78* 1.89*   < > 2.03* 2.49*   ANIONGAP  --  9 10 8   < >  --  8   KEANU  --  9.2 9.2 8.8   < >  --  8.2*   GLC  --  325* 272* 119*   < >  --  79   ALBUMIN 2.8* 3.1*  --   --   --   --  2.0*   PROTTOTAL 6.8 8.0  --   --   --   --  6.1*   BILITOTAL 0.4 0.3  --   --   --   --  0.3   ALKPHOS 63 66  --   --    --   --  68   ALT 42 48  --   --   --   --  91*   AST 20 27  --   --   --   --  97*    < > = values in this interval not displayed.     Recent Results (from the past 24 hour(s))   KUB XR    Narrative    ABDOMEN ONE VIEW   4/9/2019 12:12 PM     HISTORY: Constipation.    COMPARISON: None.      Impression    IMPRESSION: Nonobstructive bowel gas pattern. Gas within a few small  bowel loops and mildly distending the colon. Correlate with ileus.  Lung bases appear clear. No free air.    KIMBERLY GUERRA MD

## 2019-04-09 NOTE — ED PROVIDER NOTES
History     Chief Complaint   Patient presents with     Constipation     HPI  Smiley Acuña is a 84 year old female who presents to the emergency department via EMS from the nursing home where she has been since Saturday, 3 days ago.  She has been refusing her oral medications.  She is recently had a UTI.  Her blood sugar was 600 prior to getting insulin today and it was in the 300s for EMS.  They also state that she has not had a bowel movement in 6 days.  Unfortunately the patient has memory issues and so cannot give me any history to confirm that.  I do not know if she has had abdominal pain.  She states that she is feeling well.  The rest of the review of systems is unobtainable.    Allergies:  Allergies   Allergen Reactions     Lisinopril      upper resp symptoms, cough       Problem List:    Patient Active Problem List    Diagnosis Date Noted     Femoral distal fracture (H) 12/12/2012     Priority: High     Hypercapnia 12/12/2012     Priority: High     Urinary retention 04/06/2019     Priority: Medium     Altered mental status 03/31/2019     Priority: Medium     Sepsis (H) 03/31/2019     Priority: Medium     Acute cystitis without hematuria 03/31/2019     Priority: Medium     Dementia - suspected 01/23/2018     Priority: Medium     E. 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 Provider to review       Dermatophytosis of nail 02/06/2014     Priority: Medium     Corns and callosities 02/06/2014     Priority: Medium     History of amputation of lesser toe (H) 02/06/2014     Priority:  Medium     GERD (gastroesophageal reflux disease) 12/31/2013     Priority: Medium     Health Care Home 12/06/2013     Priority: Medium     State Tier Level:  unknown  Status:  Closed  Care Coordinator:  Carine Vasques RN BSN    See Letters for [facility] Care Plan           Hypoxia 12/21/2012     Priority: Medium     Atelectasis - left base 12/19/2012     Priority: Medium     Anemia 12/13/2012     Priority: Medium     Thrombocytopenia (H) 12/13/2012     Priority: Medium     Peripheral autonomic neuropathy due to diabetes mellitus (H) 07/13/2012     Priority: Medium     Osteoporosis 12/22/2011     Priority: Medium     Estrogen deficiency 12/12/2011     Priority: Medium     Subdural hematoma (H) 06/18/2010     Priority: Medium     Degenerative disc disease      Priority: Medium     lumbar       Unspecified osteomyelitis, other specified site 08/31/2005     Priority: Medium     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 Provider to review       Renal failure 09/09/2004     Priority: Medium     Problem list name updated by automated process. Provider to review       Primary osteoarthritis 07/20/2004     Priority: Medium     Encephalopathy 12/13/2012     Priority: Low     CKD (chronic kidney disease) stage 4, GFR 15-29 ml/min (H) 12/12/2012     Priority: Low     Hip pain 12/12/2012     Priority: Low     Left upper arm pain 12/12/2012     Priority: Low     Hyperlipidemia LDL goal <100 10/31/2010     Priority: Low        Past Medical History:    Past Medical History:   Diagnosis Date     Abnormality of gait      Anemia, unspecified      Chronic ischemic heart disease, unspecified      Closed fracture of patella 06/21/10     Coronary artery disease      Degenerative disc disease      Depressive disorder, not elsewhere classified      History of blood transfusion      Other and unspecified hyperlipidemia      Renal  failure, unspecified      Septicemia due to Escherichia coli (E. coli)(038.42) (H) 05/03/2007     Syncope and collapse 4/8/2005     Syncope and collapse 05/26/10     Traumatic subdural hematomas 05/27/10     Type II or unspecified type diabetes mellitus without mention of complication, not stated as uncontrolled      Unspecified essential hypertension      Unspecified sleep apnea      Urinary tract infection, site not specified 4/4/2008       Past Surgical History:    Past Surgical History:   Procedure Laterality Date     BUNIONECTOMY RT/LT  10/24/07    Bunionectomy right foot. Arthrodesis IP joint, right hallux.     C ANESTH,UPPER LEG SURGERY       C APPENDECTOMY       C BX SYNOVIUM INTERPHALANG JT       C OPEN TX FEMORAL SUPRACONDYLAR FRACTURE W EXTENSION  12/14/12    right LE     CRANIOTOMY  05/28/10    [facility]     HC AMPUTATION TOE, MTP JT  2000    Second toe amputation     HC COLONOSCOPY W/WO BRUSH/WASH  12/05/2005    Internal hemorrhoids.  Diverticulosis-limited/left colon.  Otherwise normal to cecum.     HC EXCISION LESION/TENDON-SHEATH/CAPSULE, FOOT  04/30/07    right foot.     HC EXCISION LESION/TENDON-SHEATH/CAPSULE, FOOT  8/14/2007    Recurrent ganglion cyst - right ankle     HC REPAIR ROTATOR CUFF,ACUTE  11/21/2002    Rotator Cuff Repair; Right     HC UGI ENDOSCOPY DIAG W OR W/O BRUSH/WASH  12/05/2005    Hiatus hernia.  Otherwise normal.      UGI ENDOSCOPY, SIMPLE EXAM  04/27/2005       Family History:    Family History   Problem Relation Age of Onset     Diabetes Brother      C.A.D. Brother      Diabetes Brother      Diabetes Mother      Osteoporosis Mother        Social History:  Marital Status:   [2]  Social History     Tobacco Use     Smoking status: Never Smoker     Smokeless tobacco: Never Used   Substance Use Topics     Alcohol use: No     Alcohol/week: 0.0 oz     Drug use: No        Medications:      acetaminophen (TYLENOL) 325 MG tablet   albuterol (PROAIR HFA, PROVENTIL HFA,  "VENTOLIN HFA) 108 (90 BASE) MCG/ACT inhaler   ASPIRIN NOT PRESCRIBED, INTENTIONAL,   B-D INSULIN SYRINGE 30G X 1/2\" 0.5 ML   blood glucose monitoring (PRODIGY TWIST TOP 28G) lancets   blood glucose monitoring (PRODIGY TWIST TOP 28G) lancets   cefdinir (OMNICEF) 300 MG capsule   docusate sodium (COLACE) 100 MG capsule   hydrALAZINE (APRESOLINE) 100 MG tablet   insulin aspart (NOVOLOG PEN) 100 UNIT/ML injection   insulin aspart (NOVOLOG PEN) 100 UNIT/ML pen   insulin aspart (NOVOLOG PEN) 100 UNIT/ML pen   insulin pen needle (B-D U/F) 31G X 8 MM miscellaneous   melatonin 3 MG tablet   MULTIVITAMINS OR TABS   OLANZapine (ZYPREXA) injection   ondansetron (ZOFRAN-ODT) 4 MG ODT tab   polyethylene glycol (MIRALAX/GLYCOLAX) packet   PRODIGY NO CODING BLOOD GLUC test strip   PRODIGY NO CODING BLOOD GLUC test strip   QUEtiapine (SEROQUEL) 25 MG tablet   QUEtiapine (SEROQUEL) 25 MG tablet   simvastatin (ZOCOR) 80 MG tablet   Spacer/Aero-Holding Chambers (OPTIHALER) BOGDAN         Review of Systems   Unable to perform ROS: Mental status change       Physical Exam   BP: 143/68  Pulse: 82  Temp: 97.6  F (36.4  C)  Resp: 15  SpO2: 98 %      Physical Exam   Constitutional: She appears well-developed and well-nourished. No distress.   Appears well, laying in the bed resting.   HENT:   Head: Normocephalic and atraumatic.   Eyes: No scleral icterus.   Neck: Normal range of motion. Neck supple.   Cardiovascular: Normal rate and regular rhythm.   Pulmonary/Chest: Effort normal and breath sounds normal.   Abdominal: Soft.   Mild suprapubic tenderness to palpation otherwise the abdomen is soft and nontender and nondistended.   Neurological: She is alert. No cranial nerve deficit. Coordination normal.   Unable to answer questions.  Does not know where she lives.  He cannot give a history.   Skin: Skin is warm and dry. No rash noted. She is not diaphoretic. No erythema. No pallor.   Psychiatric: She has a normal mood and affect.       ED " Course        Procedures                       Results for orders placed or performed during the hospital encounter of 04/09/19 (from the past 24 hour(s))   CBC with platelets differential   Result Value Ref Range    WBC 15.5 (H) 4.0 - 11.0 10e9/L    RBC Count 3.74 (L) 3.8 - 5.2 10e12/L    Hemoglobin 11.6 (L) 11.7 - 15.7 g/dL    Hematocrit 35.9 35.0 - 47.0 %    MCV 96 78 - 100 fl    MCH 31.0 26.5 - 33.0 pg    MCHC 32.3 31.5 - 36.5 g/dL    RDW 13.1 10.0 - 15.0 %    Platelet Count 306 150 - 450 10e9/L    Diff Method Automated Method     % Neutrophils 83.2 %    % Lymphocytes 9.4 %    % Monocytes 6.5 %    % Eosinophils 0.1 %    % Basophils 0.2 %    % Immature Granulocytes 0.6 %    Nucleated RBCs 0 0 /100    Absolute Neutrophil 12.9 (H) 1.6 - 8.3 10e9/L    Absolute Lymphocytes 1.5 0.8 - 5.3 10e9/L    Absolute Monocytes 1.0 0.0 - 1.3 10e9/L    Absolute Basophils 0.0 0.0 - 0.2 10e9/L    Abs Immature Granulocytes 0.1 0 - 0.4 10e9/L    Absolute Nucleated RBC 0.0    Comprehensive metabolic panel   Result Value Ref Range    Sodium 141 133 - 144 mmol/L    Potassium 5.0 3.4 - 5.3 mmol/L    Chloride 107 94 - 109 mmol/L    Carbon Dioxide 25 20 - 32 mmol/L    Anion Gap 9 3 - 14 mmol/L    Glucose 325 (H) 70 - 99 mg/dL    Urea Nitrogen 43 (H) 7 - 30 mg/dL    Creatinine 1.87 (H) 0.52 - 1.04 mg/dL    GFR Estimate 24 (L) >60 mL/min/[1.73_m2]    GFR Estimate If Black 28 (L) >60 mL/min/[1.73_m2]    Calcium 9.2 8.5 - 10.1 mg/dL    Bilirubin Total 0.3 0.2 - 1.3 mg/dL    Albumin 3.1 (L) 3.4 - 5.0 g/dL    Protein Total 8.0 6.8 - 8.8 g/dL    Alkaline Phosphatase 66 40 - 150 U/L    ALT 48 0 - 50 U/L    AST 27 0 - 45 U/L   UA reflex to Microscopic and Culture   Result Value Ref Range    Color Urine Yellow     Appearance Urine Cloudy     Glucose Urine >499 (A) NEG^Negative mg/dL    Bilirubin Urine Negative NEG^Negative    Ketones Urine Negative NEG^Negative mg/dL    Specific Gravity Urine 1.012 1.003 - 1.035    Blood Urine Moderate (A)  NEG^Negative    pH Urine 6.0 5.0 - 7.0 pH    Protein Albumin Urine 100 (A) NEG^Negative mg/dL    Urobilinogen mg/dL 0.0 0.0 - 2.0 mg/dL    Nitrite Urine Negative NEG^Negative    Leukocyte Esterase Urine Large (A) NEG^Negative    Source Catheterized Urine     RBC Urine 50 (H) 0 - 2 /HPF    WBC Urine 2,330 (H) 0 - 5 /HPF    WBC Clumps Present (A) NEG^Negative /HPF    Squamous Epithelial /HPF Urine 6 (H) 0 - 1 /HPF    Mucous Urine Present (A) NEG^Negative /LPF   Lactic acid whole blood   Result Value Ref Range    Lactic Acid 2.7 (H) 0.7 - 2.0 mmol/L   KUB XR    Narrative    ABDOMEN ONE VIEW   4/9/2019 12:12 PM     HISTORY: Constipation.    COMPARISON: None.      Impression    IMPRESSION: Nonobstructive bowel gas pattern. Gas within a few small  bowel loops and mildly distending the colon. Correlate with ileus.  Lung bases appear clear. No free air.     *Note: Due to a large number of results and/or encounters for the requested time period, some results have not been displayed. A complete set of results can be found in Results Review.       Medications   0.9% sodium chloride BOLUS (1,000 mLs Intravenous New Bag 4/9/19 4676)     Followed by   sodium chloride 0.9% infusion (has no administration in time range)   0.9% sodium chloride BOLUS (has no administration in time range)   sodium chloride 0.9% infusion (has no administration in time range)   cefTRIAXone (ROCEPHIN) 1 g vial to attach to  mL bag for ADULTS or NS 50 mL bag for PEDS (1 g Intravenous New Bag 4/9/19 2553)       Assessments & Plan (with Medical Decision Making)  Elderly female who recently was admitted for sepsis and a UTI presents the emergency department with altered mental status.  Urinalysis and labs would suggest possible sepsis from UTI.  Previous UA with culture showed pansensitive E. coli.  The patient was given 1 g of Rocephin IV in the emergency department and was given fluids per sepsis protocol.  I discussed the case with Inez who will  discuss the case with Dr. Letty De Leon for admission to the hospital.      I have reviewed the nursing notes.    I have reviewed the findings, diagnosis, plan and need for follow up with the patient.         Medication List      There are no discharge medications for this visit.         Final diagnoses:   Acute cystitis with hematuria       4/9/2019   [facility] EMERGENCY DEPARTMENT     Ramirez Cardenas MD  04/09/19 2391

## 2019-04-09 NOTE — ED NOTES
Writer was reassessing pts IV site when writer noted pts L fingers, hand, and lower forearm to be swollen. pts IV infiltrated. IV removed and new one placed. Hot pack placed on pts L arm/hand. Pt does not complain of discomfort at this time.

## 2019-04-09 NOTE — ED NOTES
Video Observation initiated, patient informed.     Dorota Hills, RN    Video Patient Monitoring (VPM) Downtime Documentation      Reason for VPM:  Confusion (Acute or Chronic)      Behavior:   Agitated, non-cooperative, pulling at lines/tubes       Assessment:  Exhibits behaviors and actions with potential for harm        Plan:   No 1:1 staff required in patient room; implement bed/chair alarm if not already in place

## 2019-04-09 NOTE — ED NOTES
ED Nursing criteria listed below was addressed during verbal handoff:     Abnormal vitals: No  Abnormal results: Yes  Med Reconciliation completed: Yes  Meds given in ED: Yes  Any Overdue Meds: No  Core Measures: Yes  Isolation: N/A  Special needs: Yes  Skin assessment: Yes    Observation Patient  Education provided: N/A

## 2019-04-09 NOTE — LETTER
Transition Communication Hand-off for Care Transitions to Next Level of Care Provider    Name: Smiley Acuña  : 1935  MRN #: [id number]  Primary Care Provider: Margarito Hoffman  Primary Care MD Name: Dr. Margarito Hoffman  Primary Clinic: [address] [place] [id number]  Primary Care Clinic Name: [facility]  Reason for Hospitalization:  Acute cystitis with hematuria [N30.01]  Admit Date/Time: 2019 10:29 AM  Discharge Date: 19  Payor Source: Payor: MEDICARE / Plan: MEDICARE / Product Type: Medicare /     Readmission Assessment Measure (CARLINE) Risk Score/category: Average     Reason for Communication Hand-off Referral: Fragility  Other Going to long term care at [facility]     Discharge Plan:  Discharge Plan:      Most Recent Value   Disposition Comments  Accepted at [facility]            Concern for non-adherence with plan of care:   Y/N no   Discharge Needs Assessment:  Needs      Most Recent Value   Equipment Currently Used at Home  grab bar, toilet, grab bar, tub/shower, raised toilet, walker, rolling   # of Referrals Placed by [facility]  Internal Clinic Care Coordination, Post Acute Facilities, Transportation   Other Resources  Transportation Services   Transportation Agency  [facility]    Skilled Nursing Facility  [facility]) [phone], Fax: [phone]   PAS Number  [id number]          Already enrolled in Tele-monitoring program and name of program:  no   Follow-up specialty is recommended: No    Follow-up plan:  No future appointments.    Any outstanding tests or procedures:        Referrals     Future Labs/Procedures    Physical Therapy Adult Consult     Comments:    Evaluate and treat as clinically indicated.    Reason:  Abnormal gait, suspected dementia            Key Recommendations:  Long term care     MARILYNN Wasserman     AVS/Discharge Summary is the source of truth; this is a helpful guide for improved communication of  patient story

## 2019-04-09 NOTE — PROGRESS NOTES
S-(situation): Patient arrives to room 250 via cart from ER     B-(background): UTI. Ileus. Dementia. VSS.     A-(assessment): Denies pain. Disorientated x4. Skin intact. Left hand edematous r/t infiltrate in ER.     R-(recommendations): Orders reviewed with pt and pt's spouse. Will monitor patient per MD orders.     Inpatient nursing criteria listed below were met:    Health care directives status obtained and documented: Yes  SCD's Documented: Yes  Skin issues/needs documented:NA  Isolation needs addressed, if appropriate: NA  Fall Prevention: Care plan updated, Education given and documented Yes  MRSA swab completed for patient 55 years and older (exclude MARY JO and TKA): NA  Care Plan initiated: Yes  Education Assessment documented:Yes  Education Documented (Pre-existing chronic infection such as, MRSA/VRE need education on admission): Yes  Admission Medication Reconciliation completed: Yes  New medication patient education completed and documented (Possible Side Effects of Common Medications handout): No  Home medications if not able to send immediately home with family stored here: NA  Reminder note placed in discharge instructions: NA  Discharge planning review completed (admission navigator) No

## 2019-04-09 NOTE — PLAN OF CARE
Pt disorientated x4. Becoming more restless as the evening goes on. Currently up in chair, a-2 needed with walker and a lot of coaching. IM Zyprexa given at 1830. Left AC IV infiltrated - Large bruise to area was there on admission. Bruising to andrez UE and scratches/scabs to andrez LE. Incontinent of bm in ER. Incont of urine - pascal placed with assist of 4 people. BGM: 309. Continue to monitor.

## 2019-04-10 ENCOUNTER — HOSPITAL LABORATORY (OUTPATIENT)
Dept: NURSING HOME | Facility: OTHER | Age: 84
End: 2019-04-10

## 2019-04-10 LAB
ALBUMIN SERPL-MCNC: 2.4 G/DL (ref 3.4–5)
ALP SERPL-CCNC: 56 U/L (ref 40–150)
ALT SERPL W P-5'-P-CCNC: 36 U/L (ref 0–50)
ANION GAP SERPL CALCULATED.3IONS-SCNC: 9 MMOL/L (ref 3–14)
AST SERPL W P-5'-P-CCNC: 25 U/L (ref 0–45)
BILIRUB SERPL-MCNC: 0.4 MG/DL (ref 0.2–1.3)
BUN SERPL-MCNC: 31 MG/DL (ref 7–30)
CALCIUM SERPL-MCNC: 8.2 MG/DL (ref 8.5–10.1)
CHLORIDE SERPL-SCNC: 113 MMOL/L (ref 94–109)
CO2 SERPL-SCNC: 23 MMOL/L (ref 20–32)
CREAT SERPL-MCNC: 1.57 MG/DL (ref 0.52–1.04)
ERYTHROCYTE [DISTWIDTH] IN BLOOD BY AUTOMATED COUNT: 13.2 % (ref 10–15)
GFR SERPL CREATININE-BSD FRML MDRD: 30 ML/MIN/{1.73_M2}
GLUCOSE BLDC GLUCOMTR-MCNC: 117 MG/DL (ref 70–99)
GLUCOSE BLDC GLUCOMTR-MCNC: 135 MG/DL (ref 70–99)
GLUCOSE BLDC GLUCOMTR-MCNC: 203 MG/DL (ref 70–99)
GLUCOSE BLDC GLUCOMTR-MCNC: 276 MG/DL (ref 70–99)
GLUCOSE BLDC GLUCOMTR-MCNC: 311 MG/DL (ref 70–99)
GLUCOSE SERPL-MCNC: 114 MG/DL (ref 70–99)
HCT VFR BLD AUTO: 31.5 % (ref 35–47)
HGB BLD-MCNC: 10.4 G/DL (ref 11.7–15.7)
MAGNESIUM SERPL-MCNC: 1.6 MG/DL (ref 1.6–2.3)
MAGNESIUM SERPL-MCNC: 2.1 MG/DL (ref 1.6–2.3)
MCH RBC QN AUTO: 31.4 PG (ref 26.5–33)
MCHC RBC AUTO-ENTMCNC: 33 G/DL (ref 31.5–36.5)
MCV RBC AUTO: 95 FL (ref 78–100)
PLATELET # BLD AUTO: 220 10E9/L (ref 150–450)
POTASSIUM SERPL-SCNC: 4.6 MMOL/L (ref 3.4–5.3)
PROT SERPL-MCNC: 6.3 G/DL (ref 6.8–8.8)
RBC # BLD AUTO: 3.31 10E12/L (ref 3.8–5.2)
SODIUM SERPL-SCNC: 145 MMOL/L (ref 133–144)
WBC # BLD AUTO: 10.1 10E9/L (ref 4–11)

## 2019-04-10 PROCEDURE — 80053 COMPREHEN METABOLIC PANEL: CPT | Performed by: NURSE PRACTITIONER

## 2019-04-10 PROCEDURE — 83735 ASSAY OF MAGNESIUM: CPT | Performed by: NURSE PRACTITIONER

## 2019-04-10 PROCEDURE — 25000131 ZZH RX MED GY IP 250 OP 636 PS 637: Performed by: NURSE PRACTITIONER

## 2019-04-10 PROCEDURE — 12000000 ZZH R&B MED SURG/OB

## 2019-04-10 PROCEDURE — 85027 COMPLETE CBC AUTOMATED: CPT | Performed by: NURSE PRACTITIONER

## 2019-04-10 PROCEDURE — 25000125 ZZHC RX 250: Performed by: NURSE PRACTITIONER

## 2019-04-10 PROCEDURE — 25000128 H RX IP 250 OP 636: Performed by: NURSE PRACTITIONER

## 2019-04-10 PROCEDURE — 36415 COLL VENOUS BLD VENIPUNCTURE: CPT | Performed by: NURSE PRACTITIONER

## 2019-04-10 PROCEDURE — 00000146 ZZHCL STATISTIC GLUCOSE BY METER IP

## 2019-04-10 PROCEDURE — 25800029 ZZH RX IP 258 OP 250: Performed by: NURSE PRACTITIONER

## 2019-04-10 PROCEDURE — 25000128 H RX IP 250 OP 636: Performed by: FAMILY MEDICINE

## 2019-04-10 PROCEDURE — 25800030 ZZH RX IP 258 OP 636: Performed by: NURSE PRACTITIONER

## 2019-04-10 PROCEDURE — 25000132 ZZH RX MED GY IP 250 OP 250 PS 637: Performed by: NURSE PRACTITIONER

## 2019-04-10 PROCEDURE — A9270 NON-COVERED ITEM OR SERVICE: HCPCS | Performed by: NURSE PRACTITIONER

## 2019-04-10 PROCEDURE — 99233 SBSQ HOSP IP/OBS HIGH 50: CPT | Performed by: NURSE PRACTITIONER

## 2019-04-10 RX ORDER — MAGNESIUM SULFATE HEPTAHYDRATE 40 MG/ML
2 INJECTION, SOLUTION INTRAVENOUS DAILY PRN
Status: DISCONTINUED | OUTPATIENT
Start: 2019-04-10 | End: 2019-04-11

## 2019-04-10 RX ORDER — SODIUM CHLORIDE 450 MG/100ML
INJECTION, SOLUTION INTRAVENOUS CONTINUOUS
Status: DISCONTINUED | OUTPATIENT
Start: 2019-04-10 | End: 2019-04-10

## 2019-04-10 RX ORDER — MAGNESIUM SULFATE HEPTAHYDRATE 40 MG/ML
4 INJECTION, SOLUTION INTRAVENOUS EVERY 4 HOURS PRN
Status: DISCONTINUED | OUTPATIENT
Start: 2019-04-10 | End: 2019-04-11

## 2019-04-10 RX ORDER — RISPERIDONE 0.5 MG/1
0.5 TABLET ORAL AT BEDTIME
Status: DISCONTINUED | OUTPATIENT
Start: 2019-04-10 | End: 2019-04-11

## 2019-04-10 RX ADMIN — INSULIN ASPART 3 UNITS: 100 INJECTION, SOLUTION INTRAVENOUS; SUBCUTANEOUS at 11:52

## 2019-04-10 RX ADMIN — FAMOTIDINE 20 MG: 10 INJECTION, SOLUTION INTRAVENOUS at 06:12

## 2019-04-10 RX ADMIN — CEFTRIAXONE SODIUM 1 G: 1 INJECTION, POWDER, FOR SOLUTION INTRAMUSCULAR; INTRAVENOUS at 11:53

## 2019-04-10 RX ADMIN — HYDRALAZINE HYDROCHLORIDE 100 MG: 25 TABLET ORAL at 21:00

## 2019-04-10 RX ADMIN — OLANZAPINE 5 MG: 10 INJECTION, POWDER, LYOPHILIZED, FOR SOLUTION INTRAMUSCULAR at 19:07

## 2019-04-10 RX ADMIN — HYDRALAZINE HYDROCHLORIDE 100 MG: 25 TABLET ORAL at 14:20

## 2019-04-10 RX ADMIN — MAGNESIUM SULFATE IN WATER 2 G: 40 INJECTION, SOLUTION INTRAVENOUS at 01:57

## 2019-04-10 RX ADMIN — SODIUM CHLORIDE: 4.5 INJECTION, SOLUTION INTRAVENOUS at 17:19

## 2019-04-10 RX ADMIN — HYDRALAZINE HYDROCHLORIDE 100 MG: 25 TABLET ORAL at 09:06

## 2019-04-10 RX ADMIN — QUETIAPINE 25 MG: 25 TABLET ORAL at 21:01

## 2019-04-10 RX ADMIN — Medication 3 MG: at 21:01

## 2019-04-10 RX ADMIN — QUETIAPINE 25 MG: 25 TABLET ORAL at 09:06

## 2019-04-10 RX ADMIN — INSULIN ASPART 2 UNITS: 100 INJECTION, SOLUTION INTRAVENOUS; SUBCUTANEOUS at 17:55

## 2019-04-10 RX ADMIN — FAMOTIDINE 20 MG: 10 INJECTION, SOLUTION INTRAVENOUS at 17:55

## 2019-04-10 RX ADMIN — SODIUM CHLORIDE: 9 INJECTION, SOLUTION INTRAVENOUS at 01:56

## 2019-04-10 RX ADMIN — RISPERIDONE 0.5 MG: 0.5 TABLET ORAL at 21:05

## 2019-04-10 RX ADMIN — SODIUM CHLORIDE: 9 INJECTION, SOLUTION INTRAVENOUS at 11:53

## 2019-04-10 ASSESSMENT — ACTIVITIES OF DAILY LIVING (ADL)
ADLS_ACUITY_SCORE: 22
ADLS_ACUITY_SCORE: 26
ADLS_ACUITY_SCORE: 22
ADLS_ACUITY_SCORE: 22

## 2019-04-10 NOTE — CONSULTS
CARE TRANSITION SOCIAL WORK INITIAL ASSESSMENT:      Met with: Family.    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 coli UTI    Dementia - suspected    Urinary retention    Other constipation    CKD (chronic kidney disease) stage 4, GFR 15-29 ml/min (H)    Encephalopathy       Primary Care Clinic Name: [facility]  Primary Care MD Name: Dr. Margarito Hoffman    ASSESSMENT  Cognitive Status: confused.      Who is your support system?: , Children       Insurance Concerns: No Insurance issues identified     This writer met with patient's daughter, Theresa, introduced self and role. Discussed discharge planning and medicare guidelines in regards to home care and SNF benefits. Patient comes in from [facility] where she just discharged to 3 days ago.  She does have a bed hold at [facility], however, after discussion with Theresa, patient may need a memory care unit.  Theresa's contact information [phone] work and [phone] home.  Theresa lives in the [place], [place] area.  She would like referrals sent in that area.    PLAN    Memory Care - referrals pending    MARILYNN Price  [facility] [phone]/ [facility] [phone]

## 2019-04-10 NOTE — PROGRESS NOTES
[facility]    Medicine Progress Note - Hospitalist Service       Date of Admission:  4/9/2019  Assessment & Plan      Smiley Acuña is a 84 year old female admitted on 4/9/2019. She presented to the ER from the nursing home with concerns for constipation, hyperglycemia, altered mental state.      Sepsis (H),  presumed secondary to UTI  She presented to the ER from the nursing home with concerns for constipation, hyperglycemia, altered mental status.   Patient was recently admitted on March 31, 2019 with mental status changes presumed secondary to sepsis secondary to UTI blood cultures and urine cultures growing E. Coli. At that time patient completed 3 days of Rocephin and started on oral Omnicef on 4/3/19.   Patient had been discharged to the rehab facility, she had refused her medications and it appeared that her infection has worsened without proper treatment  Patient with ongoing urine retention, has needed intermittent catheterization   Urine Culture pending  WBC 15 to 10 today  Creat mildly elevated at 1.8 down to 1.5 today  Vázquez placed with good urine output  Will continue Rocephin IV at this time  Continue to monitor closely  Transition to Omnicef 300 mg daily tomorrow if patient continues to improve  Stop telemetry  Magnesium was replaced  Sodium mildly elevated, will change to 0.45 NS       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)  Elevated blood sugar at nursing home, > 600   During recent hospitalization, her long acting insulin has been held secondary to hypoglycemia episodes   Will need ongoing assessment of Lantus re-initiation   Glucose QID  Hypoglycemia protocol  Sliding scale insulin ordered  Patient has brittle and variable blood sugars, she does refuse to eat at times, with this unpredictability it is hard to treat with the long acting insulin  Will continue sliding scale at this time   Patient may be able to resume her Lantus when  she is alert and has consistent mentation      Essential hypertension with goal blood pressure less than 130/80  Apresoline 100 mg TID  Hypertensive on admission, although patient has not been able to take her medications  IV Apresoline ordered PRN if unable or unwilling to take oral medications and systolic blood pressure is > 180  Will need ongoing assessment when she has several oral doses with consistent mentation    E. coli UTI  History of and treated as above  Will continue Rocephin as this time    Urinary retention  Noted in the recent hospitalization  Patient did develop increased agitation when she was retaining urine  Vázquez placed with good output    Other constipation  BM 4/9/19  Miralax and Senna scheduled  Suppository PRN    CKD (chronic kidney disease) stage 4, GFR 15-29 ml/min (H)  Creat 1.5  Cr Clearance 27.2  Will need to follow closely    Encephalopathy  Dementia - suspected  Altered Mental Status  Resume Seroquel BID and PRN  PRN Zyprexa was needed previously  VPM was implemented on admission, will remove when able      Diet: Consistent Carbohydrate Diet 7992-1893 Calories: Moderate Consistent CHO (4-6 CHO units/meal)    DVT Prophylaxis: Pneumatic Compression Devices  Vázquez Catheter: in place, indication: Retention  Code Status: DNR/DNI      Disposition Plan   Expected discharge: 2 - 3 days, recommended to transitional care unit once antibiotic plan established, mental status at baseline, renal function improved, safe disposition plan/ TCU bed available and SIRS/Sepsis treated.  Entered: Inez Thompson CNP 04/10/2019, 4:01 PM       The patient's care was discussed with the Attending Physician, Dr. Montoya, Bedside Nurse, Care Coordinator/, Patient and Patient's Family.    Inez Thompson CNP  Hospitalist Service  [facility]    ______________________________________________________________________    Interval History   Patient appears to have  improved, she is able to remain calm and cooperative. She is talking although she is not able to follow conversation and is only alert to self. Afebrile. Hemodynamically stable. Up with assist. Requires assistance to eat and reminders/encouargement. Patient has been cooperative today and has been taking her medications.      Data reviewed today: I reviewed all medications, new labs and imaging results over the last 24 hours.      Physical Exam   Vital Signs: Temp: 97  F (36.1  C) Temp src: Oral BP: 171/77 Pulse: 67 Heart Rate: 71 Resp: 16 SpO2: 94 % O2 Device: None (Room air)    Weight: 0 lbs 0 oz    Exam:  Constitutional: mild distress and frail, ill  Cardiovascular: No edema or JVD., negative findings: regular rate and rhythm  Respiratory: Diminished diaphragmatic excursion. Lungs clear  Gastrointestinal: Abdomen soft, non-tender. BS normal.    Musculoskeletal: extremities normal- no gross deformities noted and normal muscle tone  Skin: no suspicious lesions or rashes  Neurologic: positive findings: disoriented, confused        Data   Recent Labs   Lab 04/10/19  0626 04/09/19  1544 04/09/19  1110  04/08/19  0755  04/04/19  1220   WBC 10.1  --  15.5*  --   --   --  10.3   HGB 10.4*  --  11.6*  --   --   --   --    MCV 95  --  96  --   --   --   --      --  306  --   --   --   --    *  --  141  --  145*   < >  --    POTASSIUM 4.6  --  5.0  --  5.2   < >  --    CHLORIDE 113*  --  107  --  110*   < >  --    CO2 23  --  25  --  25   < >  --    BUN 31*  --  43*  --  36*   < >  --    CR 1.57*  --  1.87*  --  1.78*   < > 2.03*   ANIONGAP 9  --  9  --  10   < >  --    KEANU 8.2*  --  9.2  --  9.2   < >  --    *  --  325*  --  272*   < >  --    ALBUMIN 2.4* 2.8* 3.1*   < >  --   --   --    PROTTOTAL 6.3* 6.8 8.0   < >  --   --   --    BILITOTAL 0.4 0.4 0.3   < >  --   --   --    ALKPHOS 56 63 66   < >  --   --   --    ALT 36 42 48   < >  --   --   --    AST 25 20 27   < >  --   --   --     < > = values in  this interval not displayed.

## 2019-04-10 NOTE — PLAN OF CARE
S-(situation): Physical therapy orders acknowledged    B-(background): Smiley Acuña is a 84 year old female admitted on 4/9/2019. She presented to the ER from the nursing home with concerns for constipation, hyperglycemia, altered mental state.    A-(assessment): Patient recently placed at TCU, does not follow commands and is not cooperative. Per Dr. Galvin during rounding patient has no skilled inpatient therapy needs.    R-(recommendations): Discharge orders. Should status change new orders will be needed for formal evaluation.    Thank you for your referral.    Iliana Sam, PT, DPT, ATC    [facility]    O: [phone]  E: [email or web address]

## 2019-04-10 NOTE — PLAN OF CARE
"Vss. Pt is disoriented x4 but has been cooperative with cares, kissing staff's hands during cares, saying \"God loves you\". Turned and repositioned every two hours, pt turning self at times. Vázquez catheter with 525mL pale cloudy UOP, sediment noted. Telemetry noted to be in 2nd degree AVB type 1 at beginning of night. Dr. Horvath aware and ordered magnesium level. Magnesium =1.6 and replacement given. Recheck will be this morning. Telemetry is now SR with 1st degree AVB. Will continue to closely monitor tele, cognition, vs, UOP.   "

## 2019-04-10 NOTE — PLAN OF CARE
Patient is improving. She is intermittently confused. Sometimes she can say her last name and birthday and sometimes she is unable to recall. Patient was coopreative to stand with 2 assist this am but this afternoon lynda was neccesary to put patient back into bed.   Patient took her pills crushed in applesauce. Diet advanced back to moderate consistent carb.   BP remains elevated 170's systolic. Scheduled hydralazine given. Tele was [id number]. Will cont to monitor.

## 2019-04-11 PROBLEM — N39.0 E. COLI UTI: Status: RESOLVED | Noted: 2018-01-21 | Resolved: 2019-04-11

## 2019-04-11 PROBLEM — B96.20 E. COLI UTI: Status: RESOLVED | Noted: 2018-01-21 | Resolved: 2019-04-11

## 2019-04-11 PROBLEM — F03.918 DEMENTIA WITH BEHAVIORAL DISTURBANCE (H): Status: ACTIVE | Noted: 2018-01-23

## 2019-04-11 PROBLEM — R79.89 ELEVATED LACTIC ACID LEVEL: Status: ACTIVE | Noted: 2019-03-31

## 2019-04-11 LAB
ANION GAP SERPL CALCULATED.3IONS-SCNC: 7 MMOL/L (ref 3–14)
BACTERIA SPEC CULT: NO GROWTH
BUN SERPL-MCNC: 24 MG/DL (ref 7–30)
CALCIUM SERPL-MCNC: 8 MG/DL (ref 8.5–10.1)
CHLORIDE SERPL-SCNC: 110 MMOL/L (ref 94–109)
CO2 SERPL-SCNC: 25 MMOL/L (ref 20–32)
CREAT SERPL-MCNC: 1.52 MG/DL (ref 0.52–1.04)
ERYTHROCYTE [DISTWIDTH] IN BLOOD BY AUTOMATED COUNT: 13 % (ref 10–15)
GFR SERPL CREATININE-BSD FRML MDRD: 31 ML/MIN/{1.73_M2}
GLUCOSE BLDC GLUCOMTR-MCNC: 119 MG/DL (ref 70–99)
GLUCOSE BLDC GLUCOMTR-MCNC: 125 MG/DL (ref 70–99)
GLUCOSE BLDC GLUCOMTR-MCNC: 225 MG/DL (ref 70–99)
GLUCOSE BLDC GLUCOMTR-MCNC: 301 MG/DL (ref 70–99)
GLUCOSE BLDC GLUCOMTR-MCNC: 417 MG/DL (ref 70–99)
GLUCOSE SERPL-MCNC: 128 MG/DL (ref 70–99)
HCT VFR BLD AUTO: 31 % (ref 35–47)
HGB BLD-MCNC: 9.9 G/DL (ref 11.7–15.7)
Lab: NORMAL
MAGNESIUM SERPL-MCNC: 1.7 MG/DL (ref 1.6–2.3)
MCH RBC QN AUTO: 30.9 PG (ref 26.5–33)
MCHC RBC AUTO-ENTMCNC: 31.9 G/DL (ref 31.5–36.5)
MCV RBC AUTO: 97 FL (ref 78–100)
PLATELET # BLD AUTO: 201 10E9/L (ref 150–450)
POTASSIUM SERPL-SCNC: 3.7 MMOL/L (ref 3.4–5.3)
RBC # BLD AUTO: 3.2 10E12/L (ref 3.8–5.2)
SODIUM SERPL-SCNC: 142 MMOL/L (ref 133–144)
SPECIMEN SOURCE: NORMAL
WBC # BLD AUTO: 9.2 10E9/L (ref 4–11)

## 2019-04-11 PROCEDURE — 36415 COLL VENOUS BLD VENIPUNCTURE: CPT | Performed by: FAMILY MEDICINE

## 2019-04-11 PROCEDURE — 12000000 ZZH R&B MED SURG/OB

## 2019-04-11 PROCEDURE — 83735 ASSAY OF MAGNESIUM: CPT | Performed by: FAMILY MEDICINE

## 2019-04-11 PROCEDURE — A9270 NON-COVERED ITEM OR SERVICE: HCPCS | Performed by: NURSE PRACTITIONER

## 2019-04-11 PROCEDURE — 80048 BASIC METABOLIC PNL TOTAL CA: CPT | Performed by: FAMILY MEDICINE

## 2019-04-11 PROCEDURE — 25000132 ZZH RX MED GY IP 250 OP 250 PS 637: Performed by: NURSE PRACTITIONER

## 2019-04-11 PROCEDURE — 25000132 ZZH RX MED GY IP 250 OP 250 PS 637: Performed by: PEDIATRICS

## 2019-04-11 PROCEDURE — A9270 NON-COVERED ITEM OR SERVICE: HCPCS | Performed by: PEDIATRICS

## 2019-04-11 PROCEDURE — 85027 COMPLETE CBC AUTOMATED: CPT | Performed by: FAMILY MEDICINE

## 2019-04-11 PROCEDURE — 00000146 ZZHCL STATISTIC GLUCOSE BY METER IP

## 2019-04-11 PROCEDURE — 99232 SBSQ HOSP IP/OBS MODERATE 35: CPT | Performed by: PEDIATRICS

## 2019-04-11 RX ORDER — MAGNESIUM OXIDE 400 MG/1
400 TABLET ORAL DAILY
DISCHARGE
Start: 2019-04-12 | End: 2020-01-01

## 2019-04-11 RX ORDER — MAGNESIUM OXIDE 400 MG/1
400 TABLET ORAL DAILY
Status: DISCONTINUED | OUTPATIENT
Start: 2019-04-11 | End: 2019-04-12 | Stop reason: HOSPADM

## 2019-04-11 RX ORDER — RISPERIDONE 0.5 MG/1
0.5 TABLET ORAL
Status: DISCONTINUED | OUTPATIENT
Start: 2019-04-11 | End: 2019-04-12 | Stop reason: HOSPADM

## 2019-04-11 RX ORDER — RISPERIDONE 0.5 MG/1
0.5 TABLET ORAL
DISCHARGE
Start: 2019-04-11 | End: 2019-04-12

## 2019-04-11 RX ADMIN — INSULIN ASPART 4 UNITS: 100 INJECTION, SOLUTION INTRAVENOUS; SUBCUTANEOUS at 11:56

## 2019-04-11 RX ADMIN — MAGNESIUM OXIDE TAB 400 MG (241.3 MG ELEMENTAL MG) 400 MG: 400 (241.3 MG) TAB at 11:17

## 2019-04-11 RX ADMIN — QUETIAPINE 25 MG: 25 TABLET ORAL at 09:03

## 2019-04-11 RX ADMIN — HYDRALAZINE HYDROCHLORIDE 100 MG: 25 TABLET ORAL at 21:23

## 2019-04-11 RX ADMIN — QUETIAPINE 25 MG: 25 TABLET ORAL at 21:23

## 2019-04-11 RX ADMIN — Medication 3 MG: at 21:23

## 2019-04-11 RX ADMIN — HYDRALAZINE HYDROCHLORIDE 100 MG: 25 TABLET ORAL at 09:02

## 2019-04-11 RX ADMIN — QUETIAPINE FUMARATE 25 MG: 25 TABLET ORAL at 18:32

## 2019-04-11 RX ADMIN — HYDRALAZINE HYDROCHLORIDE 100 MG: 25 TABLET ORAL at 13:50

## 2019-04-11 ASSESSMENT — ACTIVITIES OF DAILY LIVING (ADL)
ADLS_ACUITY_SCORE: 22

## 2019-04-11 NOTE — PROGRESS NOTES
SPIRITUAL HEALTH SERVICES  SPIRITUAL ASSESSMENT Progress Note  [facility]      During Rounding,  introduced himself to Smiley Acuña and informed her of his availability.    Sudhir Shearer M.Div., [facility]  Staff   Office tel: [phone]

## 2019-04-11 NOTE — PLAN OF CARE
Patient became agitated after  left. Sat up I recliner for supper then started to become increasingly confused. She wanted to go home, tried to get out of bed, pull at IV's and pascal catheter. IM Zyprexa given will cont to monitor.

## 2019-04-11 NOTE — PLAN OF CARE
Patient is alert to self. Cooperative with 2 assist to the recliner for breakfast. Napped this afternoon. VSS. Urine is yellow and draining into pascal catheter. Taking pills crushed in applesauce. Will cont to monitor.

## 2019-04-11 NOTE — PROGRESS NOTES
"Patient combative when staff attempted to reposition into a safer position. She attempted to transfer herself out of bed to \"get out of here\". Staff attempted redirection and reorientation multiple times and offered snacks, water temperature changes etc. Patient initially refused her nighttime medications however was compliant later in the night. BP's continue to be elevated-see fs-Hydralazine administered when patient agreed to take her medications  Afebrile. Confused. Patient took first dose of Risperdal. Having good output cloudy urine  "

## 2019-04-11 NOTE — PROGRESS NOTES
[facility]    Hospitalist Progress Note    Date of Service (when I saw the patient): 04/11/2019    Assessment & Plan   Smiley Acuña is a 84 year old female who was admitted on 4/9/2019 with concern for possible sepsis after she had recently been hospitalized for E. coli UTI with sepsis.  However, persistent or new infection has not been identified, so infection is no longer suspected.  Rather, initial clinical presentation and abnormal lab results including elevated lactic acid level were more likely due to hypovolemia associated with a combination of severe hyperglycemia from refusing to use her previous insulin therapy and inadequate oral intake also from refusing to take oral intake.  Refusal to cooperate with oral intake and medication administration was likely due to behavioral disturbance associated with suspected underlying dementia.  An acute encephalopathy is possible potentially exacerbated by urinary retention.      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    Assessment: Seems to have significant dementia although this does not appear to have been formally diagnosed previously, behavioral disturbance has improved and she is tolerating present antipsychotic therapy well so far    Plan: Continue twice daily Seroquel, may use Risperdal dissolvable tablet as needed if she refuses to take oral Seroquel at night, may also use intramuscular doses of Zyprexa if needed for agitation if she is refusing oral medications      CKD (chronic kidney disease) stage 4, GFR 15-29 ml/min (H)    Assessment: Renal function has recovered to baseline and appears to have been worsened initially probably due to prerenal azotemia from  hypovolemia    Plan: No changes today      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)    Assessment: Blood sugar reportedly over 600 at the TCU prior to hospital admission and has trended toward improvement in the hospital but continues to have widely fluctuating blood sugars as well as fluctuating oral intake putting her at significant risk for hypoglycemia from use of long-acting insulin therapies, recent A1c 5.8 on March 31    Plan: Continue NovoLog sliding scale with meals      Elevated lactic acid level - hypovolemia/severe hyperglycemia    Assessment: Infection not suspected, initially elevated lactic acid level was probably due to the combination of diabetes with severe hyperglycemia and initial hypovolemia, lactic acid normalized with previous treatment including IV fluids, pro calcitonin was undetectable and cultures are negative arguing against bacterial infection    Plan: Stop antibiotics today      Urinary retention    Assessment: Recurrent and may have contributed to worsening encephalopathy and behavioral disturbance, improved with Vázquez catheter, cause unclear but negative urine culture argues against ongoing bladder dysfunction because of persistent infection    Plan: Continue Vázquez catheter including after discharge until outpatient follow-up with urology      Anemia due to stage 4 chronic kidney disease (H)    Assessment: Mildly anemic most likely due to chronic kidney disease    Plan: No acute changes      Essential hypertension with goal blood pressure less than 130/80    Assessment: Adequate blood pressure control    Plan: No acute changes      Other constipation    Assessment: Stable    Plan: Switch IV to oral magnesium replacement today    DVT Prophylaxis: Pneumatic Compression Devices  Code Status: DNR/DNI    Disposition Plan   Recommended Disposition:  TCU versus long-term care with PT, anticipate discharge tomorrow if clinically stable and she has a safe disposition  plan         Entered: Fermin Florentino 04/11/2019, 11:20 AM       Fermin Florentino    Interval History   Patient offers no complaints at this time but does not appear to be a reliable historian.  For example, she starts talking about seeing mice.  Her speech is also quite dysarthric.  Nursing reports that her behavior has been cooperative today.  She rested through the night although did require 1 dose of IM Zyprexa last evening because of worsening agitation.  She is tolerating some oral intake.  She has been taking her oral medications and has been permitting administration of doses of insulin.  She has been afebrile and hemodynamically stable with blood pressure that improved today.  Oxygenation is normal.  Urine output has been good.    -Data reviewed today: I reviewed all new labs and imaging results over the last 24 hours. I personally reviewed no images or EKG's today.    Physical Exam   Temp: 97.1  F (36.2  C) Temp src: Oral BP: 130/61 Pulse: 67 Heart Rate: 80 Resp: 18 SpO2: 97 % O2 Device: None (Room air)    There were no vitals filed for this visit.    Vital Signs with Ranges  Temp:  [96.9  F (36.1  C)-97.9  F (36.6  C)] 97.1  F (36.2  C)  Pulse:  [67-73] 67  Heart Rate:  [75-80] 80  Resp:  [16-18] 18  BP: (130-188)/(61-98) 130/61  SpO2:  [94 %-98 %] 97 %  I/O last 3 completed shifts:  In: 700 [P.O.:700]  Out: 2100 [Urine:2100]    Constitutional: Elderly woman, no acute distress while seated in a chair  Respiratory: Normal respiratory effort, clear lungs  Cardiovascular: Regular rate and rhythm  Neuro: Somnolent but opens eyes to voice and appears to attempt to respond to questions although is obviously confused, generally cooperative    Medications       hydrALAZINE  100 mg Oral TID     insulin aspart  1-7 Units Subcutaneous TID AC     insulin aspart  1-5 Units Subcutaneous At Bedtime     magnesium oxide  400 mg Oral Daily     melatonin  3 mg Oral At Bedtime     QUEtiapine  25 mg Oral BID     risperiDONE   0.5 mg Oral At Bedtime       Data   Data reviewed today:  I personally reviewed no images or EKG's today.    Recent Labs   Lab 04/11/19  0528 04/10/19  0626 04/09/19  1544 04/09/19  1110   WBC 9.2 10.1  --  15.5*   HGB 9.9* 10.4*  --  11.6*   MCV 97 95  --  96    220  --  306    145*  --  141   POTASSIUM 3.7 4.6  --  5.0   CHLORIDE 110* 113*  --  107   CO2 25 23  --  25   BUN 24 31*  --  43*   CR 1.52* 1.57*  --  1.87*   ANIONGAP 7 9  --  9   KEANU 8.0* 8.2*  --  9.2   * 114*  --  325*   ALBUMIN  --  2.4* 2.8* 3.1*   PROTTOTAL  --  6.3* 6.8 8.0   BILITOTAL  --  0.4 0.4 0.3   ALKPHOS  --  56 63 66   ALT  --  36 42 48   AST  --  25 20 27     Blood sugars have ranged 119-311 over the last day    Blood cultures are negative so far    Urine culture was negative on April 7 and again on April 9

## 2019-04-12 ENCOUNTER — PATIENT OUTREACH (OUTPATIENT)
Dept: CARE COORDINATION | Facility: CLINIC | Age: 84
End: 2019-04-12

## 2019-04-12 VITALS
OXYGEN SATURATION: 94 % | TEMPERATURE: 98.3 F | HEART RATE: 77 BPM | DIASTOLIC BLOOD PRESSURE: 58 MMHG | SYSTOLIC BLOOD PRESSURE: 133 MMHG | RESPIRATION RATE: 16 BRPM

## 2019-04-12 LAB
GLUCOSE BLDC GLUCOMTR-MCNC: 140 MG/DL (ref 70–99)
GLUCOSE BLDC GLUCOMTR-MCNC: 293 MG/DL (ref 70–99)

## 2019-04-12 PROCEDURE — A9270 NON-COVERED ITEM OR SERVICE: HCPCS | Performed by: PEDIATRICS

## 2019-04-12 PROCEDURE — 00000146 ZZHCL STATISTIC GLUCOSE BY METER IP

## 2019-04-12 PROCEDURE — 25000132 ZZH RX MED GY IP 250 OP 250 PS 637: Performed by: PEDIATRICS

## 2019-04-12 PROCEDURE — 99239 HOSP IP/OBS DSCHRG MGMT >30: CPT | Performed by: FAMILY MEDICINE

## 2019-04-12 PROCEDURE — A9270 NON-COVERED ITEM OR SERVICE: HCPCS | Performed by: NURSE PRACTITIONER

## 2019-04-12 PROCEDURE — 25000132 ZZH RX MED GY IP 250 OP 250 PS 637: Performed by: NURSE PRACTITIONER

## 2019-04-12 RX ORDER — RISPERIDONE 0.5 MG/1
0.5 TABLET ORAL
DISCHARGE
Start: 2019-04-12 | End: 2019-06-02

## 2019-04-12 RX ADMIN — HYDRALAZINE HYDROCHLORIDE 100 MG: 25 TABLET ORAL at 09:29

## 2019-04-12 RX ADMIN — MAGNESIUM OXIDE TAB 400 MG (241.3 MG ELEMENTAL MG) 400 MG: 400 (241.3 MG) TAB at 09:29

## 2019-04-12 RX ADMIN — INSULIN ASPART 1 UNITS: 100 INJECTION, SOLUTION INTRAVENOUS; SUBCUTANEOUS at 09:28

## 2019-04-12 RX ADMIN — QUETIAPINE 25 MG: 25 TABLET ORAL at 09:29

## 2019-04-12 ASSESSMENT — ACTIVITIES OF DAILY LIVING (ADL)
TOILETING: 3-->ASSISTIVE EQUIPMENT AND PERSON
FALL_HISTORY_WITHIN_LAST_SIX_MONTHS: YES
ADLS_ACUITY_SCORE: 22
AMBULATION: 3-->ASSISTIVE EQUIPMENT AND PERSON
WHICH_OF_THE_ABOVE_FUNCTIONAL_RISKS_HAD_A_RECENT_ONSET_OR_CHANGE?: AMBULATION;TRANSFERRING
BATHING: 4-->COMPLETELY DEPENDENT
COGNITION: 2 - DIFFICULTY WITH ORGANIZING THOUGHTS
SWALLOWING: 0-->SWALLOWS FOODS/LIQUIDS WITHOUT DIFFICULTY
RETIRED_COMMUNICATION: 2-->DIFFICULTY UNDERSTANDING (NOT RELATED TO LANGUAGE BARRIER)
DRESS: 2-->ASSISTIVE PERSON
RETIRED_EATING: 2-->ASSISTIVE PERSON
TRANSFERRING: 3-->ASSISTIVE EQUIPMENT AND PERSON

## 2019-04-12 NOTE — PROGRESS NOTES
Clinic Care Coordination Contact  Care Coordination Transition Communication    Clinical Data: Patient was hospitalized at [facility]     Transition to Facility:              Facility Name: [facility]              Contact name and phone number/fax: [facility] Phone (Main Phone:[phone] Admissions Phone:[phone] Fax: [phone])    Plan: will close to       Tania MAHER, RN, PHN  Care Coordination    [id number] [place] [address]  Office: [phone]  Fax [phone]   [facility]  [address] [place] [address]  Office: [phone] Fax [phone]  [email or web address]   [email or web address]   Connect with [facility] on social media.

## 2019-04-12 NOTE — PROGRESS NOTES
S-(situation): Patient discharged to [facility] via wheelchair with handivan.    B-(background): Sepsis    A-(assessment): Pt's vitals stable.Slept in this morning to 9:30am. Woke up for medications and breakfast. Bg was 140 before breakfast. Ate all of oatmeal and orange juice. Denies pain. Pleasant, calm and cooperative with cares.  Last bowel movement: 4/9/19     R-(recommendations):Report called to [facility]. Listed belongings gathered and sent with patient.     Discharge Nursing Criteria:     Care Plan and Patient education resolved: Yes    Core Measures   Core Measures applicable during stay (Stroke/TIA, MI, Pneumonia and SSI): n/a    Vaccines  Influenza status verified at discharge:  Yes    Intentionally Retained Items (examples: Drains, Wound packing, ureteral stents, pascal, PICC line or IV)  Patient was sent to Nursing Home with pascal catheter left in place.   Information you need to know about your procedure form filled out and copy given to patient: Yes, Nursing home aware. Removal date will be determined based on follow up appt with urology and nursing home.    [facility]  Home and hospital aquired medications returned to patient: NA  Medication Bin checked and emptied on discharge Yes  All paperwork sent with patient/Copy of AVS given to patient or family Yes.

## 2019-04-12 NOTE — DISCHARGE SUMMARY
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  Recommend Urology referral to   assess urinary retention and need for pascal catheter.             Unresulted Labs Ordered in the Past 30 Days of this Admission     Date and Time Order Name Status Description    4/9/2019 1200 Blood culture Preliminary     4/9/2019 1200 Blood culture Preliminary       These results will be followed up by Nursing home provider    Discharge Disposition   Discharged to nursing home  Condition at discharge: Stable    Hospital Course   Smiley Acuña is a 84 year old female who was admitted on 4/9/2019 with concern for possible sepsis after she had recently been hospitalized for E. coli UTI with sepsis. However, persistent or new infection has not been identified, so infection is no longer suspected. Rather, initial clinical presentation and abnormal lab results including elevated lactic acid level were more likely due to hypovolemia associated with a combination of severe hyperglycemia from refusing to use her previous insulin therapy and inadequate oral intake also from refusing to take oral intake. Refusal to cooperate with oral intake and medication  administration was likely due to behavioral disturbance associated with suspected underlying dementia. An acute encephalopathy is possible potentially exacerbated by urinary retention.     Principal Problem:   Encephalopathy   Assessment: Any acute encephalopathy seems to now have subsided with mentation that appears to be at her baseline   Dementia with behavioral disturbance - suspected   Assessment: Seems to have significant dementia although this does not appear to have been formally diagnosed previously, behavioral disturbance has improved and she is tolerating present antipsychotic therapy well so far   Plan: Continue twice daily Seroquel, may use Risperdal dissolvable tablet as needed if she refuses to take oral Seroquel at night, may also use intramuscular doses of Zyprexa if needed for agitation if she is refusing oral medications   CKD (chronic kidney disease) stage 4, GFR 15-29 ml/min (H)   Assessment: Renal function has recovered to baseline and appears to have been worsened initially probably due to prerenal azotemia from hypovolemia   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)   Assessment: Blood sugar reportedly over 600 at the TCU prior to hospital admission and has trended toward improvement in the hospital but continues to have widely fluctuating blood sugars as well as fluctuating oral intake putting her at significant risk for hypoglycemia from use of long-acting insulin therapies, recent A1c 5.8 on March 31   Plan: Continue NovoLog sliding scale with meals   Elevated lactic acid level - hypovolemia/severe hyperglycemia   Assessment: Infection not suspected, initially elevated lactic acid level was probably due to the combination of diabetes with severe hyperglycemia and initial hypovolemia, lactic acid normalized with previous treatment including IV fluids, pro calcitonin was undetectable and cultures are negative arguing against bacterial infection     Urinary retention    Assessment: Recurrent and may have contributed to worsening encephalopathy and behavioral disturbance, improved with Vázquez catheter, cause unclear but negative urine culture argues against ongoing bladder dysfunction because of persistent infection   Plan: Continue Vázquez catheter including after discharge until outpatient follow-up with urology   Anemia due to stage 4 chronic kidney disease (H)   Assessment: Mildly anemic most likely due to chronic kidney disease   Plan: No acute changes   Essential hypertension with goal blood pressure less than 130/80   Assessment: Adequate blood pressure control   Plan: No acute changes    Consultations This Hospital Stay   PHYSICAL THERAPY ADULT IP CONSULT  SOCIAL WORK IP CONSULT  PHYSICAL THERAPY ADULT IP CONSULT    Code Status   DNR/DNI    Time Spent on this Encounter   I, Jose Horvath, personally saw the patient today and spent greater than 30 minutes discharging this patient.       Jose Horvath MD  [facility]  ______________________________________________________________________    Physical Exam   Vital Signs: Temp: 96.6  F (35.9  C) Temp src: Axillary BP: 113/53 Pulse: 78 Heart Rate: 80 Resp: 20 SpO2: 97 % O2 Device: None (Room air)    Weight: 0 lbs 0 oz  Constitutional: awake, alert, cooperative, no apparent distress, and appears stated age  Respiratory: No increased work of breathing, good air exchange, clear to auscultation bilaterally, no crackles or wheezing  Cardiovascular: Normal apical impulse, regular rate and rhythm, normal S1 and S2, no S3 or S4, and no murmur noted  GI: normal bowel sounds, soft, non-distended and non-tender  Neurologic: Awake, alert, oriented to name, place  Cranial nerves II-XII are grossly intact.  Motor is 5 out of 5 bilaterally.         Primary Care Physician   Margarito Hoffman    Discharge Orders      General info for SNF    Length of Stay Estimate: Long Term Care  Condition at Discharge:  Stable  Level of care:skilled   Rehabilitation Potential: Poor  Admission H&P remains valid and up-to-date: Yes  Recent Chemotherapy: N/A  Use Nursing Home Standing Orders: Yes     Mantoux instructions    Give two-step Mantoux (PPD) Per Facility Policy Yes     Reason for your hospital stay    Hospitalized due to worsening agitation and improved     Glucose monitor nursing POCT    Four times daily before meals and at bedtime     Pascal catheter    To straight gravity drainage. Change catheter every 2 weeks and PRN for leaking or decreased uring output with signs of bladder distention. DO NOT change catheter without a specific MD order IF diagnosis of benign prostatic hypertrophy (BPH), neurogenic bladder, or other urological conditions     Follow Up and recommended labs and tests    Follow up with Nursing home physician.  Recommend Urology referral to assess urinary retention and need for pascal catheter.     Activity - Up with nursing assistance     DNR/DNI     Physical Therapy Adult Consult    Evaluate and treat as clinically indicated.    Reason:  Abnormal gait, suspected dementia     Advance Diet as Tolerated    Follow this diet upon discharge: Orders Placed This Encounter      Consistent Carbohydrate Diet 1250-2896 Calories: Moderate Consistent CHO (4-6 CHO units/meal)       Significant Results and Procedures   Most Recent 3 CBC's:  Recent Labs   Lab Test 04/11/19  0528 04/10/19  0626 04/09/19  1110   WBC 9.2 10.1 15.5*   HGB 9.9* 10.4* 11.6*   MCV 97 95 96    220 306     Most Recent 3 BMP's:  Recent Labs   Lab Test 04/11/19 0528 04/10/19  0626 04/09/19  1110    145* 141   POTASSIUM 3.7 4.6 5.0   CHLORIDE 110* 113* 107   CO2 25 23 25   BUN 24 31* 43*   CR 1.52* 1.57* 1.87*   ANIONGAP 7 9 9   KEANU 8.0* 8.2* 9.2   * 114* 325*       Discharge Medications   Current Discharge Medication List      START taking these medications    Details   magnesium oxide (MAG-OX) 400 MG tablet Take 1 tablet (400  mg) by mouth daily    Associated Diagnoses: Hypomagnesemia      risperiDONE (RISPERDAL) 0.5 MG tablet Take 1 tablet (0.5 mg) by mouth nightly as needed (unable or refusing to take Seroquel)    Associated Diagnoses: Dementia with behavioral disturbance, unspecified dementia type         CONTINUE these medications which have NOT CHANGED    Details   acetaminophen (TYLENOL) 325 MG tablet Take 2 tablets (650 mg) by mouth every 4 hours as needed for mild pain  Qty: 100 tablet    Associated Diagnoses: Pain in thoracic spine      calcium carbonate (TUMS) 500 MG chewable tablet Take 2 chew tab by mouth 4 times daily      docusate sodium (COLACE) 100 MG capsule Take 1 capsule (100 mg) by mouth 2 times daily Hold for loose stools    Associated Diagnoses: Constipation, unspecified constipation type      hydrALAZINE (APRESOLINE) 100 MG tablet Take 1 tablet (100 mg) by mouth 3 times daily    Associated Diagnoses: Hypertension goal BP (blood pressure) < 130/80      !! insulin aspart (NOVOLOG PEN) 100 UNIT/ML injection Inject 1-7 Units Subcutaneous 3 times daily (before meals) For  - 189 give 1 unit.   For  - 239 give 2 units.   For  - 289 give 3 units.   For  - 339 give 4 units.   For - 399 give 5 units.   For -449 give 6 units  For  or higher give 7 units.    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 insulin aspart (NOVOLOG PEN) 100 UNIT/ML pen Inject 1-5 Units Subcutaneous At Bedtime Do Not give Bedtime Correction Insulin if BG less than  200.   For  - 249 give 1 units.   For  - 299 give 2 units.   For  - 349 give 3 units.   For  -399 give 4 units.   For BG greater than or equal to 400 give 5 units.  Notify provider if glucose greater than or equal to 350 mg/dL after administration of correction dose.    Associated Diagnoses: Type 2 diabetes mellitus with stage 4 chronic kidney disease, with long-term current use  "of insulin (H)      !! insulin aspart (NOVOLOG PEN) 100 UNIT/ML pen Inject 2 units per carb unit three times a day with meals    Associated Diagnoses: Type 2 diabetes mellitus with stage 4 chronic kidney disease, with long-term current use of insulin (H)      MULTIVITAMINS OR TABS 1 TABLET DAILY  Qty: 30, Refills: 0      polyethylene glycol (MIRALAX/GLYCOLAX) packet Take 17 g by mouth daily    Associated Diagnoses: Constipation, unspecified constipation type      !! QUEtiapine (SEROQUEL) 25 MG tablet Take 1 tablet (25 mg) by mouth 2 times daily    Associated Diagnoses: Encephalopathy      albuterol (PROAIR HFA, PROVENTIL HFA, VENTOLIN HFA) 108 (90 BASE) MCG/ACT inhaler Inhale 2 puffs into the lungs every 6 hours as needed for shortness of breath / dyspnea or wheezing Ventolin inhaler  Qty: 1 Inhaler, Refills: 5    Comments: Profile  Associated Diagnoses: Bronchitis      ASPIRIN NOT PRESCRIBED, INTENTIONAL, by Other route continuous prn. 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    Refills: 0    Associated Diagnoses: Type 2 diabetes, HbA1C goal < 8% (H)      B-D INSULIN SYRINGE 30G X 1/2\" 0.5 ML USE 1 SYRINGE FOUR TIMES A DAY OR AS DIRECTED  Qty: 100 each, Refills: 11    Associated Diagnoses: Type 2 diabetes, HbA1C goal < 8% (H)      !! 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 blood glucose monitoring (PRODIGY TWIST TOP 28G) lancets Use to test blood sugar two times daily or as directed.  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 QUEtiapine (SEROQUEL) 25 MG tablet Take 1 tablet (25 mg) by mouth every 6 hours as needed (agitation)    Associated Diagnoses: Encephalopathy      simvastatin (ZOCOR) 80 MG tablet Take 1 tablet (80 mg) by mouth At Bedtime  Qty: 90 tablet, Refills: 3    Associated Diagnoses: Hyperlipidemia LDL goal <100      Spacer/Aero-Holding Chambers (OPTIHALER) BOGDAN As directed  Qty: 1 Device, Refills: 0    Associated Diagnoses: Bronchitis; Cough; SOB (shortness of breath)       !! - Potential duplicate medications found. Please discuss with provider.      STOP taking these medications       cefdinir (OMNICEF) 300 MG capsule Comments:   Reason for Stopping:             Allergies   Allergies   Allergen Reactions     Lisinopril      upper resp symptoms, cough

## 2019-04-12 NOTE — PROGRESS NOTES
Smiley Acuña  Gender: female  : 1935  [address]  [facility] [address]  [phone] (home)     Medical Record: [id number]  Pharmacy: [facility] [address] [facility]   Primary Care Provider: Margarito Hoffman    Parent's names are: Data Unavailable (mother) and Data Unavailable (father).      [facility]  2019     Discharge Phone Call:  Discharge to [facility]

## 2019-04-12 NOTE — PROGRESS NOTES
Name: Smiley Acuña    MRN#: [id number]    Reason for Hospitalization: Acute cystitis with hematuria [N30.01]    Discharge Date: 4/12/2019    Patient / Family response to discharge plan: Care Transitions had spoken with patient's son, Vernon, yesterday and he was in agreement to discharge to [facility] for today.  Writer has left messages with Vernon and daughter, Theresa, this morning.  [facility] scheduled for transport at 1030 today.      Writer has spoken with admissions at [facility] and they are aware of the discharge time.     Other Providers (Care Coordinator, County Services, PCA services etc): No    CTS Hand Off Completed: Yes: due to going to long term care     PAS #: [id number]    CARLINE Score: Average     Future Appointments: No future appointments.    Discharge Disposition: long term care facility    Discharge Planner   Discharge Plans in progress: Completed. Patient discharging to long term care at [facility] in [place].  Barriers to discharge plan: None  Follow up plan: per nursing home/family       Entered by: RONALD OSULLIVAN 04/12/2019 9:28 AM            MARILYNN Wasserman       0938- Patient's son, Vernon, has called back.  Reviewed the discharge plan for today.  Son in agreement.  He stated that the had spoken with patient's , Fermin, and he is aware and in agreement with the plan as well.   is having some memory issues, so son and daughter have been working with him regarding patient's needs and placement.      1037- Patient's daughter, Theresa, called back and she is also in agreement with discharge to [facility] for patient today.      MARILYNN Wasserman

## 2019-04-12 NOTE — PLAN OF CARE
Vss. Pt is alert, confused to place, time and situation. Has been cooperative with cares. Vázquez catheter with cloudy urine, sediment noted.  during night, Dr. Horvath notified and order received for 4 units novolog insulin x1. Pt has slept between cares, moves independently in bed. Plan for pt to discharge to extended care facility today. Will continue with plan of care.

## 2019-04-12 NOTE — PROGRESS NOTES
[place] [facility]  PRIMARY CARE PROVIDER AND CLINIC:  Margarito Hoffman MD, [address] [facility] [address]  Chief Complaint   Patient presents with     [facility] Care   [place] Medical Record Number:  [id number]  Place of Service where encounter took place:  [facility]  ([facility]) [[id number]]    Smiley Acuña  is a 84 year old  (1935), admitted to the above facility from  [facility]. Hospital stay 4/9/19 through 4/12/19..  Admitted to this facility for  rehab, medical management and nursing care. HPI: HPI information obtained from: facility chart records, facility staff, patient report, [facility] chart review and Care Everywhere Epic chart review.     Brief Summary of Hospital Course: Patient presented to hospital as noted above for hypovolemia and hyperglycemia, from a TCU/rehab unit. These sxs appeared like sepsis but was confirmed not. She had obvious encephalopathy and associated urinary retention. Recently was treated for UTI/Sepsis. All cultures negative his admit. She has underlying suspected dementia. Vázquez catheter relieved much of her behaviors, and she is keeping this until outpatient follow-up w/ urology. Her case is complicated by anemia, CKD Stage IV, HTN, DM II. She was discharged to LTC, with eventual placement in memory care anticipated.    Updates since admission to long-term skilled nursing unit: Patient admitted to the LTC unit at this facility, with potential plans for memory care. She presented on 4/12/19. Patient denies CP, dizziness, nausea, fever, and nursing reports patient started taking off her clothing in the dining room this morning during breakfast, but has otherwise not had behaviors. Noting her Seroquel dosing is higher than warranted at this time. Also noting high dose Zocor at 80mg, with last Lipid panel was several years ago. We are currently checking BG QID ac/hs. Chart review shows the most recent labs as noted  below and VS are overall stable, w/ BPs/BGs as noted below:  BPs:  4/14/2019 10:47 151/65 mmHg   4/14/2019 02:54 122/58 mmHg   4/13/2019 08:37 166/86 mmHg   4/12/2019 18:27 102/62 mmHg   4/12/2019 12:00 94/57 mmHg  BGs:  4/14/2019 11:30 385.0 mg/dL   4/14/2019 08:11 232.0 mg/dL   4/14/2019 08:08 132.0 mg/dL    4/13/2019 20:31 340.0 mg/dL    4/13/2019 20:31 136.0 mg/dL  4/13/2019 16:18 204.0 mg/dL  4/13/2019 12:09 240.0 mg/dL   4/13/2019 07:48 343.0 mg/dL  4/12/2019 20:23 216.0 mg/dL  4/12/2019 18:14 283.0 mg/dL    CODE STATUS/ADVANCE DIRECTIVES DISCUSSION: DNR/DNI.  Patient's living condition: lives alone     ALLERGIES: Lisinopril PAST MEDICAL HISTORY:  has a past medical history of Abnormality of gait, Anemia, unspecified, Chronic ischemic heart disease, unspecified, Closed fracture of patella (06/21/10), Coronary artery disease, Degenerative disc disease, Depressive disorder, not elsewhere classified, History of blood transfusion, Other and unspecified hyperlipidemia, Renal failure, unspecified, Septicemia due to Escherichia coli (E. coli)(038.42) (H) (05/03/2007), Syncope and collapse (4/8/2005), Syncope and collapse (05/26/10), Traumatic subdural hematomas (05/27/10), Type II or unspecified type diabetes mellitus without mention of complication, not stated as uncontrolled, Unspecified essential hypertension, Unspecified sleep apnea, and Urinary tract infection, site not specified (4/4/2008). She also has no past medical history of Asthma, Congestive heart failure, unspecified, COPD (chronic obstructive pulmonary disease) (H), Malignant neoplasm (H), Thyroid disease, or Unspecified cerebral artery occlusion with cerebral infarction. PAST SURGICAL HISTORY:   has a past surgical history that includes AMPUTATION TOE,MT-P JT (2000); APPENDECTOMY; ANESTH,UPPER LEG SURGERY; UPPER GI ENDOSCOPY,EXAM (04/27/2005); Colonoscopy w/wo Brush **Performed** (12/05/2005); UGI ENDOSCOPY DIAG W OR W/O BRUSH/WASH (12/05/2005); BX  SYNOVIUM INTERPHALANG JT; craniotomy (05/28/10); REPAIR ROTATOR CUFF,ACUTE (11/21/2002); EXCISION LESION/TENDON-SHEATH/CAPSULE, FOOT (04/30/07); EXCISION LESION/TENDON-SHEATH/CAPSULE, FOOT (8/14/2007); bunionectomy rt/lt (10/24/07); and OPEN TX FEMORAL SUPRACONDYLAR FRACTURE W EXTENSION (12/14/12). FAMILY HISTORY: family history includes C.A.D. in her brother; Diabetes in her brother, brother, and mother; Osteoporosis in her mother. SOCIAL HISTORY:   reports that she has never smoked. She has never used smokeless tobacco. She reports that she does not drink alcohol or use drugs.    Post Discharge Medication Reconciliation Status: discharge medications reconciled and changed, per note/orders (see AVS)  Medication Sig     acetaminophen (TYLENOL) 325 MG tablet Take 2 tablets (650 mg) by mouth every 4 hours as needed for mild pain     albuterol (PROAIR HFA, PROVENTIL HFA, VENTOLIN HFA) 108 (90 BASE) MCG/ACT inhaler Inhale 2 puffs into the lungs every 6 hours as needed for shortness of breath / dyspnea or wheezing Ventolin inhaler     ASPIRIN NOT PRESCRIBED, INTENTIONAL, by Other route continuous prn. 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       calcium carbonate (TUMS) 500 MG chewable tablet Take 1 tablet (500 mg) by mouth 2 times daily     hydrALAZINE (APRESOLINE) 100 MG tablet Take 1 tablet (100 mg) by mouth 3 times daily     insulin aspart (NOVOLOG PEN) 100 UNIT/ML injection Inject 1-7 Units Subcutaneous 3 times daily (before meals) For  - 189 give 1 unit.   For  - 239 give 2 units.   For  - 289 give 3 units.   For  - 339 give 4 units.   For - 399 give 5 units.   For -449 give 6 units  For  or higher give 7 units.     insulin aspart (NOVOLOG PEN) 100 UNIT/ML pen Inject 1-5 Units Subcutaneous At Bedtime Do Not give Bedtime Correction Insulin if BG less than  200.   For  - 249 give 1 units.   For  - 299 give 2 units.   For  - 349 give 3 units.   For   -399 give 4 units.   For BG greater than or equal to 400 give 5 units.  Notify provider if glucose greater than or equal to 350 mg/dL after administration of correction dose.     insulin aspart (NOVOLOG PEN) 100 UNIT/ML pen Inject 2 units per carb unit three times a day with meals     insulin glargine (LANTUS SOLOSTAR PEN) 100 UNIT/ML pen Inject 5 Units Subcutaneous daily     magnesium oxide (MAG-OX) 400 MG tablet Take 1 tablet (400 mg) by mouth daily     melatonin 3 MG tablet Take 1 tablet (3 mg) by mouth At Bedtime     MULTIVITAMINS OR TABS 1 TABLET DAILY     ondansetron (ZOFRAN-ODT) 4 MG ODT tab Take 1 tablet (4 mg) by mouth every 6 hours as needed for nausea or vomiting     polyethylene glycol (MIRALAX/GLYCOLAX) packet Take 17 g by mouth daily     QUEtiapine (SEROQUEL) 25 MG tablet Take 0.5 tablets (12.5 mg) by mouth 2 times daily     QUEtiapine (SEROQUEL) 25 MG tablet Take 1 tablet (25 mg) by mouth every 6 hours as needed (agitation)     risperiDONE (RISPERDAL) 0.5 MG tablet Take 1 tablet (0.5 mg) by mouth nightly as needed (sleep or agitation if unable or refusing to take Seroquel)     simvastatin (ZOCOR) 40 MG tablet Take 1.5 tablets (60 mg) by mouth At Bedtime     ROS: Limited secondary to cognitive impairment but today pt reports the above and 6 point ROS of systems including Constitutional, Eyes, Respiratory, Cardiovascular, Gastroenterology, Genitourinary, Integumentary, Musculoskeletal, Psychiatric were all negative except for pertinent positives noted in my HPI.    Vitals:  /76   Pulse 66   Temp 97.9  F (36.6  C)   Resp 18   Wt 68.5 kg (151 lb)   SpO2 95%   BMI 25.13 kg/m    Exam:  GENERAL APPEARANCE: Alert, in no distress, cooperative.   ENT: Mouth/posterior oropharynx intact w/ moist mucous membranes, hearing acuity [place].  EYES: EOM, conjunctivae, lids, pupils and irises normal, PERRL2.   RESP: Respiratory effort good, no respiratory distress, Lung sounds clear. On RA.   CV:  Auscultation of heart reveals S1, S2, rate and rhythm regular, no murmur, no rub or gallop, Edema 0+ BLE. Peripheral pulses are 2+.  ABDOMEN: Normal bowel sounds, soft, non-tender abdomen, and no masses palpated.  SKIN: Inspection/Palpation of skin and subcutaneous tissue baseline w/ fragility. No wounds/rashes noted.   NEURO: CN II-XII at patient's baseline, sensation baseline PPS.  PSYCH: Insight, judgement, and memory are impaired at baseline, affect and mood are happy and sometimes nonsensical.    Lab/Diagnostic data:  Most Recent 3 CBC's:  Recent Labs   Lab Test 04/11/19  0528 04/10/19  0626 04/09/19  1110   WBC 9.2 10.1 15.5*   HGB 9.9* 10.4* 11.6*   MCV 97 95 96    220 306     Most Recent 3 BMP's:  Recent Labs   Lab Test 04/11/19  0528 04/10/19  0626 04/09/19  1110    145* 141   POTASSIUM 3.7 4.6 5.0   CHLORIDE 110* 113* 107   CO2 25 23 25   BUN 24 31* 43*   CR 1.52* 1.57* 1.87*   ANIONGAP 7 9 9   KEANU 8.0* 8.2* 9.2   * 114* 325*     Most Recent Cholesterol Panel:  Recent Labs   Lab Test 03/29/16  0952   CHOL 147   LDL 62   HDL 54   TRIG 156*     Most Recent TSH and T4:  Recent Labs   Lab Test 09/19/18  1100   TSH 1.41     Most Recent Hemoglobin A1c:  Recent Labs   Lab Test 03/31/19  1914   A1C 5.8*     ASSESSMENT/PLAN:  Encephalopathy  Alzheimer's dementia without behavioral disturbance, unspecified timing of dementia onset  H/O urinary tract infection  H/O sepsis  Urinary retention  CKD (chronic kidney disease) stage 4, GFR 15-29 ml/min (H)  Polypharmacy  Acute-on-chronic. Stable. Will obtain lab work to r/o ongoing metabolic causes. Noting Seroquel and Zyprexa dosing likely not needed, will discontinue Zyprexa, start GDR of scheduled Seroquel, and address PRN dosing w/ next visit. Unclear why QID Tums is scheduled, krish change this with goal of eventual PRN dosing given CKD. Follow-up w/in 1 month.    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 Tenuous. Noting  risks of insulin sliding scale in older adults. Will start Lantus given renal function not candidate for Metformin, with goal of increasing Lantus and decreasing sliding scale use. No constipation noted, therefore will discontinue colace. XI available. Follow-up w/in 1 month.     Essential hypertension with goal blood pressure less than 130/80  History of amputation of lesser toe, left (H)  Hyperlipidemia  Chronic. Stable. Controlled. Noting Inappropriate dosing of Zocor at 80mg. Will lower to 60mg now, obtain Lipid level, and continue reductions w/ next visit. Noting some of her other medications can cause changes in lipids. Follow-up w/ next lipid panel.      Orders written by provider at facility and transcribed by : April Good  1. Discontinue IM zyprexa.   2. Lantus 5 unit(s) subcutaneous every day. Dx: DM II.   3. Decrease scheduled seroquel to 12.5 mg po BID -prn stays the same for now. Dx: psychosis.  4. Decrease zocor from 80 mg to 60 mg po every day. Dx HLD.  5. CBC, CMP, TSH, Fasting lipids 0n 4/18/29. Dx: encephalopathy.  6. Decrease Tums from QID to BID. Dx: supplement.  7. Discontinue colace.    FYI for next visit.  Reduce Zocor more in the future.  Reduce Accuchecks, sliding scale, and up lantus in future.   Decrease PRN Seroquel at next visit.   Change Tums to PRN at next visit.     Total time spent with patient visit at the [facility] nursing facility was 45 min including patient visit and review of past records. Greater than 50% of total time spent with counseling and coordinating care with nursing due to polypharmacy.     Electronically signed by:  Dr. Ligia Peters, APRN CNP DNP

## 2019-04-12 NOTE — PLAN OF CARE
Patient is alert to self; disoriented X3. Calm but uncooperative beginning of the shift until later in the evening; then calm, confused but pleasant. PRN seroquel given however spit most out but took bedtime medications without difficulties.      Refused dinner, held Novolog. However bedtime blood glucose 417- 5 units Novolog given per sliding scale orders and informed Md, no new orders. Will check blood glucose at 0200.     Up with assist of two and mechanical lift to chair. Tolerates well.     Vázquez catheter in place for urinary retention and to remain in place with discharge to see outpatient urology. Urine clear yellow with sediment. Completed catheter cares with wipes. Completed antiobitics. Afebrile. VSS.    Plans to discharge to a memory care unit at [facility] in [place].     Will continue to monitor.

## 2019-04-15 ENCOUNTER — NURSING HOME VISIT (OUTPATIENT)
Dept: GERIATRICS | Facility: CLINIC | Age: 84
End: 2019-04-15
Payer: MEDICARE

## 2019-04-15 VITALS
HEART RATE: 66 BPM | BODY MASS INDEX: 25.13 KG/M2 | SYSTOLIC BLOOD PRESSURE: 156 MMHG | DIASTOLIC BLOOD PRESSURE: 76 MMHG | OXYGEN SATURATION: 95 % | WEIGHT: 151 LBS | RESPIRATION RATE: 18 BRPM | TEMPERATURE: 97.9 F

## 2019-04-15 DIAGNOSIS — Z79.899 POLYPHARMACY: ICD-10-CM

## 2019-04-15 DIAGNOSIS — G30.9 ALZHEIMER'S DEMENTIA WITHOUT BEHAVIORAL DISTURBANCE, UNSPECIFIED TIMING OF DEMENTIA ONSET: ICD-10-CM

## 2019-04-15 DIAGNOSIS — F02.80 ALZHEIMER'S DEMENTIA WITHOUT BEHAVIORAL DISTURBANCE, UNSPECIFIED TIMING OF DEMENTIA ONSET: ICD-10-CM

## 2019-04-15 DIAGNOSIS — E78.5 HYPERLIPIDEMIA LDL GOAL <100: ICD-10-CM

## 2019-04-15 DIAGNOSIS — Z89.422 HISTORY OF AMPUTATION OF LESSER TOE, LEFT (H): ICD-10-CM

## 2019-04-15 DIAGNOSIS — I10 ESSENTIAL HYPERTENSION WITH GOAL BLOOD PRESSURE LESS THAN 130/80: ICD-10-CM

## 2019-04-15 DIAGNOSIS — R33.9 URINARY RETENTION: ICD-10-CM

## 2019-04-15 DIAGNOSIS — Z86.19 H/O SEPSIS: ICD-10-CM

## 2019-04-15 DIAGNOSIS — E11.49 DIABETES MELLITUS TYPE 2 WITH NEUROLOGICAL MANIFESTATIONS (H): ICD-10-CM

## 2019-04-15 DIAGNOSIS — G93.40 ENCEPHALOPATHY: Primary | ICD-10-CM

## 2019-04-15 DIAGNOSIS — Z87.440 H/O URINARY TRACT INFECTION: ICD-10-CM

## 2019-04-15 DIAGNOSIS — N18.4 CKD (CHRONIC KIDNEY DISEASE) STAGE 4, GFR 15-29 ML/MIN (H): ICD-10-CM

## 2019-04-15 LAB
BACTERIA SPEC CULT: NO GROWTH
BACTERIA SPEC CULT: NO GROWTH
Lab: NORMAL
Lab: NORMAL
SPECIMEN SOURCE: NORMAL
SPECIMEN SOURCE: NORMAL

## 2019-04-15 PROCEDURE — 99310 SBSQ NF CARE HIGH MDM 45: CPT | Performed by: NURSE PRACTITIONER

## 2019-04-15 RX ORDER — SIMVASTATIN 40 MG
60 TABLET ORAL AT BEDTIME
Start: 2019-04-15 | End: 2019-08-30

## 2019-04-15 RX ORDER — QUETIAPINE FUMARATE 25 MG/1
12.5 TABLET, FILM COATED ORAL 2 TIMES DAILY
Start: 2019-04-15 | End: 2020-01-01 | Stop reason: DRUGHIGH

## 2019-04-15 RX ORDER — CALCIUM CARBONATE 500 MG/1
1 TABLET, CHEWABLE ORAL 2 TIMES DAILY
Start: 2019-04-15 | End: 2019-06-18

## 2019-04-15 NOTE — LETTER
4/15/2019        RE: Smiley Acuña  [address]  [facility] [address]        [place] [facility]  PRIMARY CARE PROVIDER AND CLINIC:  Margarito Hoffman MD, [address] [facility] [address]  Chief Complaint   Patient presents with     Establish Care   [place] Medical Record Number:  [id number]  Place of Service where encounter took place:  [facility]  ([facility]) [[id number]]    Smiley Acuña  is a 84 year old  (1935), admitted to the above facility from  [facility]. Hospital stay 4/9/19 through 4/12/19..  Admitted to this facility for  rehab, medical management and nursing care. HPI: HPI information obtained from: facility chart records, facility staff, patient report, [facility] chart review and Care Everywhere [facility] chart review.     Brief Summary of Hospital Course: Patient presented to hospital as noted above for hypovolemia and hyperglycemia, from a TCU/rehab unit. These sxs appeared like sepsis but was confirmed not. She had obvious encephalopathy and associated urinary retention. Recently was treated for UTI/Sepsis. All cultures negative his admit. She has underlying suspected dementia. Vázquez catheter relieved much of her behaviors, and she is keeping this until outpatient follow-up w/ urology. Her case is complicated by anemia, CKD Stage IV, HTN, DM II. She was discharged to LTC, with eventual placement in memory care anticipated.    Updates since admission to long-term skilled nursing unit: Patient admitted to the LTC unit at this facility, with potential plans for memory care. She presented on 4/12/19. Patient denies CP, dizziness, nausea, fever, and nursing reports patient started taking off her clothing in the dining room this morning during breakfast, but has otherwise not had behaviors. Noting her Seroquel dosing is higher than warranted at this time. Also noting high dose Zocor at 80mg, with last Lipid panel was several  years ago. We are currently checking BG QID ac/hs. Chart review shows the most recent labs as noted below and VS are overall stable, w/ BPs/BGs as noted below:  BPs:  4/14/2019 10:47 151/65 mmHg   4/14/2019 02:54 122/58 mmHg   4/13/2019 08:37 166/86 mmHg   4/12/2019 18:27 102/62 mmHg   4/12/2019 12:00 94/57 mmHg  BGs:  4/14/2019 11:30 385.0 mg/dL   4/14/2019 08:11 232.0 mg/dL   4/14/2019 08:08 132.0 mg/dL    4/13/2019 20:31 340.0 mg/dL    4/13/2019 20:31 136.0 mg/dL  4/13/2019 16:18 204.0 mg/dL  4/13/2019 12:09 240.0 mg/dL   4/13/2019 07:48 343.0 mg/dL  4/12/2019 20:23 216.0 mg/dL  4/12/2019 18:14 283.0 mg/dL    CODE STATUS/ADVANCE DIRECTIVES DISCUSSION: DNR/DNI.  Patient's living condition: lives alone     ALLERGIES: Lisinopril PAST MEDICAL HISTORY:  has a past medical history of Abnormality of gait, Anemia, unspecified, Chronic ischemic heart disease, unspecified, Closed fracture of patella (06/21/10), Coronary artery disease, Degenerative disc disease, Depressive disorder, not elsewhere classified, History of blood transfusion, Other and unspecified hyperlipidemia, Renal failure, unspecified, Septicemia due to Escherichia coli (E. coli)(038.42) (H) (05/03/2007), Syncope and collapse (4/8/2005), Syncope and collapse (05/26/10), Traumatic subdural hematomas (05/27/10), Type II or unspecified type diabetes mellitus without mention of complication, not stated as uncontrolled, Unspecified essential hypertension, Unspecified sleep apnea, and Urinary tract infection, site not specified (4/4/2008). She also has no past medical history of Asthma, Congestive heart failure, unspecified, COPD (chronic obstructive pulmonary disease) (H), Malignant neoplasm (H), Thyroid disease, or Unspecified cerebral artery occlusion with cerebral infarction. PAST SURGICAL HISTORY:   has a past surgical history that includes AMPUTATION TOE,ASHLEY ROSE (2000); APPENDECTOMY; ANESTH,UPPER LEG SURGERY; UPPER GI ENDOSCOPY,EXAM (04/27/2005);  Colonoscopy w/wo Brush **Performed** (12/05/2005); UGI ENDOSCOPY DIAG W OR W/O BRUSH/WASH (12/05/2005); BX SYNOVIUM INTERPHALANG JT; craniotomy (05/28/10); REPAIR ROTATOR CUFF,ACUTE (11/21/2002); EXCISION LESION/TENDON-SHEATH/CAPSULE, FOOT (04/30/07); EXCISION LESION/TENDON-SHEATH/CAPSULE, FOOT (8/14/2007); bunionectomy rt/lt (10/24/07); and OPEN TX FEMORAL SUPRACONDYLAR FRACTURE W EXTENSION (12/14/12). FAMILY HISTORY: family history includes C.A.D. in her brother; Diabetes in her brother, brother, and mother; Osteoporosis in her mother. SOCIAL HISTORY:   reports that she has never smoked. She has never used smokeless tobacco. She reports that she does not drink alcohol or use drugs.    Post Discharge Medication Reconciliation Status: discharge medications reconciled and changed, per note/orders (see AVS)  Medication Sig     acetaminophen (TYLENOL) 325 MG tablet Take 2 tablets (650 mg) by mouth every 4 hours as needed for mild pain     albuterol (PROAIR HFA, PROVENTIL HFA, VENTOLIN HFA) 108 (90 BASE) MCG/ACT inhaler Inhale 2 puffs into the lungs every 6 hours as needed for shortness of breath / dyspnea or wheezing Ventolin inhaler     ASPIRIN NOT PRESCRIBED, INTENTIONAL, by Other route continuous prn. 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       calcium carbonate (TUMS) 500 MG chewable tablet Take 1 tablet (500 mg) by mouth 2 times daily     hydrALAZINE (APRESOLINE) 100 MG tablet Take 1 tablet (100 mg) by mouth 3 times daily     insulin aspart (NOVOLOG PEN) 100 UNIT/ML injection Inject 1-7 Units Subcutaneous 3 times daily (before meals) For  - 189 give 1 unit.   For  - 239 give 2 units.   For  - 289 give 3 units.   For  - 339 give 4 units.   For - 399 give 5 units.   For -449 give 6 units  For  or higher give 7 units.     insulin aspart (NOVOLOG PEN) 100 UNIT/ML pen Inject 1-5 Units Subcutaneous At Bedtime Do Not give Bedtime Correction Insulin if BG less than   200.   For  - 249 give 1 units.   For  - 299 give 2 units.   For  - 349 give 3 units.   For  -399 give 4 units.   For BG greater than or equal to 400 give 5 units.  Notify provider if glucose greater than or equal to 350 mg/dL after administration of correction dose.     insulin aspart (NOVOLOG PEN) 100 UNIT/ML pen Inject 2 units per carb unit three times a day with meals     insulin glargine (LANTUS SOLOSTAR PEN) 100 UNIT/ML pen Inject 5 Units Subcutaneous daily     magnesium oxide (MAG-OX) 400 MG tablet Take 1 tablet (400 mg) by mouth daily     melatonin 3 MG tablet Take 1 tablet (3 mg) by mouth At Bedtime     MULTIVITAMINS OR TABS 1 TABLET DAILY     ondansetron (ZOFRAN-ODT) 4 MG ODT tab Take 1 tablet (4 mg) by mouth every 6 hours as needed for nausea or vomiting     polyethylene glycol (MIRALAX/GLYCOLAX) packet Take 17 g by mouth daily     QUEtiapine (SEROQUEL) 25 MG tablet Take 0.5 tablets (12.5 mg) by mouth 2 times daily     QUEtiapine (SEROQUEL) 25 MG tablet Take 1 tablet (25 mg) by mouth every 6 hours as needed (agitation)     risperiDONE (RISPERDAL) 0.5 MG tablet Take 1 tablet (0.5 mg) by mouth nightly as needed (sleep or agitation if unable or refusing to take Seroquel)     simvastatin (ZOCOR) 40 MG tablet Take 1.5 tablets (60 mg) by mouth At Bedtime     ROS: Limited secondary to cognitive impairment but today pt reports the above and 6 point ROS of systems including Constitutional, Eyes, Respiratory, Cardiovascular, Gastroenterology, Genitourinary, Integumentary, Musculoskeletal, Psychiatric were all negative except for pertinent positives noted in my HPI.    Vitals:  /76   Pulse 66   Temp 97.9  F (36.6  C)   Resp 18   Wt 68.5 kg (151 lb)   SpO2 95%   BMI 25.13 kg/m     Exam:  GENERAL APPEARANCE: Alert, in no distress, cooperative.   ENT: Mouth/posterior oropharynx intact w/ moist mucous membranes, hearing acuity [place].  EYES: EOM, conjunctivae, lids, pupils and irises  normal, PERRL2.   RESP: Respiratory effort good, no respiratory distress, Lung sounds clear. On RA.   CV: Auscultation of heart reveals S1, S2, rate and rhythm regular, no murmur, no rub or gallop, Edema 0+ BLE. Peripheral pulses are 2+.  ABDOMEN: Normal bowel sounds, soft, non-tender abdomen, and no masses palpated.  SKIN: Inspection/Palpation of skin and subcutaneous tissue baseline w/ fragility. No wounds/rashes noted.   NEURO: CN II-XII at patient's baseline, sensation baseline PPS.  PSYCH: Insight, judgement, and memory are impaired at baseline, affect and mood are happy and sometimes nonsensical.    Lab/Diagnostic data:  Most Recent 3 CBC's:  Recent Labs   Lab Test 04/11/19  0528 04/10/19  0626 04/09/19  1110   WBC 9.2 10.1 15.5*   HGB 9.9* 10.4* 11.6*   MCV 97 95 96    220 306     Most Recent 3 BMP's:  Recent Labs   Lab Test 04/11/19  0528 04/10/19  0626 04/09/19  1110    145* 141   POTASSIUM 3.7 4.6 5.0   CHLORIDE 110* 113* 107   CO2 25 23 25   BUN 24 31* 43*   CR 1.52* 1.57* 1.87*   ANIONGAP 7 9 9   KEANU 8.0* 8.2* 9.2   * 114* 325*     Most Recent Cholesterol Panel:  Recent Labs   Lab Test 03/29/16  0952   CHOL 147   LDL 62   HDL 54   TRIG 156*     Most Recent TSH and T4:  Recent Labs   Lab Test 09/19/18  1100   TSH 1.41     Most Recent Hemoglobin A1c:  Recent Labs   Lab Test 03/31/19  1914   A1C 5.8*     ASSESSMENT/PLAN:  Encephalopathy  Alzheimer's dementia without behavioral disturbance, unspecified timing of dementia onset  H/O urinary tract infection  H/O sepsis  Urinary retention  CKD (chronic kidney disease) stage 4, GFR 15-29 ml/min (H)  Polypharmacy  Acute-on-chronic. Stable. Will obtain lab work to r/o ongoing metabolic causes. Noting Seroquel and Zyprexa dosing likely not needed, will discontinue Zyprexa, start GDR of scheduled Seroquel, and address PRN dosing w/ next visit. Unclear why QID Tums is scheduled, kirsh change this with goal of eventual PRN dosing given CKD. Follow-up  w/in 1 month.    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 Tenuous. Noting risks of insulin sliding scale in older adults. Will start Lantus given renal function not candidate for Metformin, with goal of increasing Lantus and decreasing sliding scale use. No constipation noted, therefore will discontinue colace. XI available. Follow-up w/in 1 month.     Essential hypertension with goal blood pressure less than 130/80  History of amputation of lesser toe, left (H)  Hyperlipidemia  Chronic. Stable. Controlled. Noting Inappropriate dosing of Zocor at 80mg. Will lower to 60mg now, obtain Lipid level, and continue reductions w/ next visit. Noting some of her other medications can cause changes in lipids. Follow-up w/ next lipid panel.      Orders written by provider at facility and transcribed by : April Good  1. Discontinue IM zyprexa.   2. Lantus 5 unit(s) subcutaneous every day. Dx: DM II.   3. Decrease scheduled seroquel to 12.5 mg po BID -prn stays the same for now. Dx: psychosis.  4. Decrease zocor from 80 mg to 60 mg po every day. Dx HLD.  5. CBC, CMP, TSH, Fasting lipids 0n 4/18/29. Dx: encephalopathy.  6. Decrease Tums from QID to BID. Dx: supplement.  7. Discontinue colace.    FYI for next visit.  Reduce Zocor more in the future.  Reduce Accuchecks, sliding scale, and up lantus in future.   Decrease PRN Seroquel at next visit.   Change Tums to PRN at next visit.     Total time spent with patient visit at the skilled nursing facility was 45 min including patient visit and review of past records. Greater than 50% of total time spent with counseling and coordinating care with nursing due to polypharmacy.     Electronically signed by:  Dr. Ligia Peters, APRN CNP DNP                      Sincerely,        Ligia Peters, ALBA CNP

## 2019-04-15 NOTE — LETTER
4/15/2019        RE: Smiley Acuña  [address]  [facility] [address]        [place] [facility]  PRIMARY CARE PROVIDER AND CLINIC:  Margarito Hoffman MD, [address] [facility] / [facility] [address]  Chief Complaint   Patient presents with     Establish Care   [place] Medical Record Number:  [id number]  Place of Service where encounter took place:  [facility]  ([facility]) [[id number]]    Smiley Acuña  is a 84 year old  (1935), admitted to the above facility from  [facility]. Hospital stay 4/9/19 through 4/12/19..  Admitted to this facility for  rehab, medical management and nursing care. HPI: HPI information obtained from: facility chart records, facility staff, patient report, [facility] chart review and Care Everywhere [facility] chart review.     Brief Summary of Hospital Course: Patient presented to hospital as noted above for hypovolemia and hyperglycemia, from a TCU/rehab unit. These sxs appeared like sepsis but was confirmed not. She had obvious encephalopathy and associated urinary retention. Recently was treated for UTI/Sepsis. All cultures negative his admit. She has underlying suspected dementia. Vázquez catheter relieved much of her behaviors, and she is keeping this until outpatient follow-up w/ urology. Her case is complicated by anemia, CKD Stage IV, HTN, DM II. She was discharged to LTC, with eventual placement in memory care anticipated.    Updates since admission to long-term skilled nursing unit: Patient admitted to the LTC unit at this facility, with potential plans for memory care. She presented on 4/12/19. Patient denies CP, dizziness, nausea, fever, and nursing reports patient started taking off her clothing in the dining room this morning during breakfast, but has otherwise not had behaviors. Noting her Seroquel dosing is higher than warranted at this time. Also noting high dose Zocor at 80mg, with last Lipid panel was several  years ago. We are currently checking BG QID ac/hs. Chart review shows the most recent labs as noted below and VS are overall stable, w/ BPs/BGs as noted below:  BPs:  4/14/2019 10:47 151/65 mmHg   4/14/2019 02:54 122/58 mmHg   4/13/2019 08:37 166/86 mmHg   4/12/2019 18:27 102/62 mmHg   4/12/2019 12:00 94/57 mmHg  BGs:  4/14/2019 11:30 385.0 mg/dL   4/14/2019 08:11 232.0 mg/dL   4/14/2019 08:08 132.0 mg/dL    4/13/2019 20:31 340.0 mg/dL    4/13/2019 20:31 136.0 mg/dL  4/13/2019 16:18 204.0 mg/dL  4/13/2019 12:09 240.0 mg/dL   4/13/2019 07:48 343.0 mg/dL  4/12/2019 20:23 216.0 mg/dL  4/12/2019 18:14 283.0 mg/dL    CODE STATUS/ADVANCE DIRECTIVES DISCUSSION: DNR/DNI.  Patient's living condition: lives alone     ALLERGIES: Lisinopril PAST MEDICAL HISTORY:  has a past medical history of Abnormality of gait, Anemia, unspecified, Chronic ischemic heart disease, unspecified, Closed fracture of patella (06/21/10), Coronary artery disease, Degenerative disc disease, Depressive disorder, not elsewhere classified, History of blood transfusion, Other and unspecified hyperlipidemia, Renal failure, unspecified, Septicemia due to Escherichia coli (E. coli)(038.42) (H) (05/03/2007), Syncope and collapse (4/8/2005), Syncope and collapse (05/26/10), Traumatic subdural hematomas (05/27/10), Type II or unspecified type diabetes mellitus without mention of complication, not stated as uncontrolled, Unspecified essential hypertension, Unspecified sleep apnea, and Urinary tract infection, site not specified (4/4/2008). She also has no past medical history of Asthma, Congestive heart failure, unspecified, COPD (chronic obstructive pulmonary disease) (H), Malignant neoplasm (H), Thyroid disease, or Unspecified cerebral artery occlusion with cerebral infarction. PAST SURGICAL HISTORY:   has a past surgical history that includes AMPUTATION TOE,ASHLEY ROSE (2000); APPENDECTOMY; ANESTH,UPPER LEG SURGERY; UPPER GI ENDOSCOPY,EXAM (04/27/2005);  Colonoscopy w/wo Brush **Performed** (12/05/2005); UGI ENDOSCOPY DIAG W OR W/O BRUSH/WASH (12/05/2005); BX SYNOVIUM INTERPHALANG JT; craniotomy (05/28/10); REPAIR ROTATOR CUFF,ACUTE (11/21/2002); EXCISION LESION/TENDON-SHEATH/CAPSULE, FOOT (04/30/07); EXCISION LESION/TENDON-SHEATH/CAPSULE, FOOT (8/14/2007); bunionectomy rt/lt (10/24/07); and OPEN TX FEMORAL SUPRACONDYLAR FRACTURE W EXTENSION (12/14/12). FAMILY HISTORY: family history includes C.A.D. in her brother; Diabetes in her brother, brother, and mother; Osteoporosis in her mother. SOCIAL HISTORY:   reports that she has never smoked. She has never used smokeless tobacco. She reports that she does not drink alcohol or use drugs.    Post Discharge Medication Reconciliation Status: discharge medications reconciled and changed, per note/orders (see AVS)  Medication Sig     acetaminophen (TYLENOL) 325 MG tablet Take 2 tablets (650 mg) by mouth every 4 hours as needed for mild pain     albuterol (PROAIR HFA, PROVENTIL HFA, VENTOLIN HFA) 108 (90 BASE) MCG/ACT inhaler Inhale 2 puffs into the lungs every 6 hours as needed for shortness of breath / dyspnea or wheezing Ventolin inhaler     ASPIRIN NOT PRESCRIBED, INTENTIONAL, by Other route continuous prn. 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       calcium carbonate (TUMS) 500 MG chewable tablet Take 1 tablet (500 mg) by mouth 2 times daily     hydrALAZINE (APRESOLINE) 100 MG tablet Take 1 tablet (100 mg) by mouth 3 times daily     insulin aspart (NOVOLOG PEN) 100 UNIT/ML injection Inject 1-7 Units Subcutaneous 3 times daily (before meals) For  - 189 give 1 unit.   For  - 239 give 2 units.   For  - 289 give 3 units.   For  - 339 give 4 units.   For - 399 give 5 units.   For -449 give 6 units  For  or higher give 7 units.     insulin aspart (NOVOLOG PEN) 100 UNIT/ML pen Inject 1-5 Units Subcutaneous At Bedtime Do Not give Bedtime Correction Insulin if BG less than   200.   For  - 249 give 1 units.   For  - 299 give 2 units.   For  - 349 give 3 units.   For  -399 give 4 units.   For BG greater than or equal to 400 give 5 units.  Notify provider if glucose greater than or equal to 350 mg/dL after administration of correction dose.     insulin aspart (NOVOLOG PEN) 100 UNIT/ML pen Inject 2 units per carb unit three times a day with meals     insulin glargine (LANTUS SOLOSTAR PEN) 100 UNIT/ML pen Inject 5 Units Subcutaneous daily     magnesium oxide (MAG-OX) 400 MG tablet Take 1 tablet (400 mg) by mouth daily     melatonin 3 MG tablet Take 1 tablet (3 mg) by mouth At Bedtime     MULTIVITAMINS OR TABS 1 TABLET DAILY     ondansetron (ZOFRAN-ODT) 4 MG ODT tab Take 1 tablet (4 mg) by mouth every 6 hours as needed for nausea or vomiting     polyethylene glycol (MIRALAX/GLYCOLAX) packet Take 17 g by mouth daily     QUEtiapine (SEROQUEL) 25 MG tablet Take 0.5 tablets (12.5 mg) by mouth 2 times daily     QUEtiapine (SEROQUEL) 25 MG tablet Take 1 tablet (25 mg) by mouth every 6 hours as needed (agitation)     risperiDONE (RISPERDAL) 0.5 MG tablet Take 1 tablet (0.5 mg) by mouth nightly as needed (sleep or agitation if unable or refusing to take Seroquel)     simvastatin (ZOCOR) 40 MG tablet Take 1.5 tablets (60 mg) by mouth At Bedtime     ROS: Limited secondary to cognitive impairment but today pt reports the above and 6 point ROS of systems including Constitutional, Eyes, Respiratory, Cardiovascular, Gastroenterology, Genitourinary, Integumentary, Musculoskeletal, Psychiatric were all negative except for pertinent positives noted in my HPI.    Vitals:  /76   Pulse 66   Temp 97.9  F (36.6  C)   Resp 18   Wt 68.5 kg (151 lb)   SpO2 95%   BMI 25.13 kg/m     Exam:  GENERAL APPEARANCE: Alert, in no distress, cooperative.   ENT: Mouth/posterior oropharynx intact w/ moist mucous membranes, hearing acuity [place].  EYES: EOM, conjunctivae, lids, pupils and irises  normal, PERRL2.   RESP: Respiratory effort good, no respiratory distress, Lung sounds clear. On RA.   CV: Auscultation of heart reveals S1, S2, rate and rhythm regular, no murmur, no rub or gallop, Edema 0+ BLE. Peripheral pulses are 2+.  ABDOMEN: Normal bowel sounds, soft, non-tender abdomen, and no masses palpated.  SKIN: Inspection/Palpation of skin and subcutaneous tissue baseline w/ fragility. No wounds/rashes noted.   NEURO: CN II-XII at patient's baseline, sensation baseline PPS.  PSYCH: Insight, judgement, and memory are impaired at baseline, affect and mood are happy and sometimes nonsensical.    Lab/Diagnostic data:  Most Recent 3 CBC's:  Recent Labs   Lab Test 04/11/19  0528 04/10/19  0626 04/09/19  1110   WBC 9.2 10.1 15.5*   HGB 9.9* 10.4* 11.6*   MCV 97 95 96    220 306     Most Recent 3 BMP's:  Recent Labs   Lab Test 04/11/19  0528 04/10/19  0626 04/09/19  1110    145* 141   POTASSIUM 3.7 4.6 5.0   CHLORIDE 110* 113* 107   CO2 25 23 25   BUN 24 31* 43*   CR 1.52* 1.57* 1.87*   ANIONGAP 7 9 9   KEANU 8.0* 8.2* 9.2   * 114* 325*     Most Recent Cholesterol Panel:  Recent Labs   Lab Test 03/29/16  0952   CHOL 147   LDL 62   HDL 54   TRIG 156*     Most Recent TSH and T4:  Recent Labs   Lab Test 09/19/18  1100   TSH 1.41     Most Recent Hemoglobin A1c:  Recent Labs   Lab Test 03/31/19  1914   A1C 5.8*     ASSESSMENT/PLAN:  Encephalopathy  Alzheimer's dementia without behavioral disturbance, unspecified timing of dementia onset  H/O urinary tract infection  H/O sepsis  Urinary retention  CKD (chronic kidney disease) stage 4, GFR 15-29 ml/min (H)  Polypharmacy  Acute-on-chronic. Stable. Will obtain lab work to r/o ongoing metabolic causes. Noting Seroquel and Zyprexa dosing likely not needed, will discontinue Zyprexa, start GDR of scheduled Seroquel, and address PRN dosing w/ next visit. Unclear why QID Tums is scheduled, krish change this with goal of eventual PRN dosing given CKD. Follow-up  w/in 1 month.    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 Tenuous. Noting risks of insulin sliding scale in older adults. Will start Lantus given renal function not candidate for Metformin, with goal of increasing Lantus and decreasing sliding scale use. No constipation noted, therefore will discontinue colace. XI available. Follow-up w/in 1 month.     Essential hypertension with goal blood pressure less than 130/80  History of amputation of lesser toe, left (H)  Hyperlipidemia  Chronic. Stable. Controlled. Noting Inappropriate dosing of Zocor at 80mg. Will lower to 60mg now, obtain Lipid level, and continue reductions w/ next visit. Noting some of her other medications can cause changes in lipids. Follow-up w/ next lipid panel.      Orders written by provider at facility and transcribed by : April Good  1. Discontinue IM zyprexa.   2. Lantus 5 unit(s) subcutaneous every day. Dx: DM II.   3. Decrease scheduled seroquel to 12.5 mg po BID -prn stays the same for now. Dx: psychosis.  4. Decrease zocor from 80 mg to 60 mg po every day. Dx HLD.  5. CBC, CMP, TSH, Fasting lipids 0n 4/18/29. Dx: encephalopathy.  6. Decrease Tums from QID to BID. Dx: supplement.  7. Discontinue colace.    FYI for next visit.  Reduce Zocor more in the future.  Reduce Accuchecks, sliding scale, and up lantus in future.   Decrease PRN Seroquel at next visit.   Change Tums to PRN at next visit.     Total time spent with patient visit at the skilled nursing facility was 45 min including patient visit and review of past records. Greater than 50% of total time spent with counseling and coordinating care with nursing due to polypharmacy.     Electronically signed by:  Dr. Ligia Peters, APRN CNP DNP                      Sincerely,        Ligia Peters, ALBA CNP

## 2019-04-17 ENCOUNTER — DOCUMENTATION ONLY (OUTPATIENT)
Dept: OTHER | Facility: CLINIC | Age: 84
End: 2019-04-17

## 2019-04-18 ENCOUNTER — RECORDS - HEALTHEAST (OUTPATIENT)
Dept: LAB | Facility: CLINIC | Age: 84
End: 2019-04-18

## 2019-04-18 ENCOUNTER — TRANSFERRED RECORDS (OUTPATIENT)
Dept: HEALTH INFORMATION MANAGEMENT | Facility: CLINIC | Age: 84
End: 2019-04-18

## 2019-04-18 LAB
ALBUMIN SERPL-MCNC: 2.9 G/DL (ref 3.5–5)
ALP SERPL-CCNC: 67 U/L (ref 45–120)
ALT SERPL W P-5'-P-CCNC: 11 U/L (ref 0–45)
ANION GAP SERPL CALCULATED.3IONS-SCNC: 9 MMOL/L (ref 5–18)
AST SERPL W P-5'-P-CCNC: 19 U/L (ref 0–40)
BILIRUB SERPL-MCNC: 0.2 MG/DL (ref 0–1)
BUN SERPL-MCNC: 41 MG/DL (ref 8–28)
CALCIUM SERPL-MCNC: 9.8 MG/DL (ref 8.5–10.5)
CHLORIDE BLD-SCNC: 96 MMOL/L (ref 98–107)
CHOLEST SERPL-MCNC: 150 MG/DL
CHOLEST SERPL-MCNC: 150 MG/DL
CO2 SERPL-SCNC: 28 MMOL/L (ref 22–31)
CREAT SERPL-MCNC: 2.3 MG/DL (ref 0.6–1.1)
FASTING STATUS PATIENT QL REPORTED: YES
GFR SERPL CREATININE-BSD FRML MDRD: 20 ML/MIN/1.73M2
GLUCOSE BLD-MCNC: 422 MG/DL (ref 70–125)
HDLC SERPL-MCNC: 39 MG/DL
HDLC SERPL-MCNC: 39 MG/DL
LDLC SERPL CALC-MCNC: 59 MG/DL
LDLC SERPL CALC-MCNC: 59 MG/DL
POTASSIUM BLD-SCNC: 4.4 MMOL/L (ref 3.5–5)
PROT SERPL-MCNC: 6.8 G/DL (ref 6–8)
SODIUM SERPL-SCNC: 133 MMOL/L (ref 136–145)
TRIGL SERPL-MCNC: 259 MG/DL
TRIGL SERPL-MCNC: 259 MG/DL
TSH SERPL DL<=0.005 MIU/L-ACNC: 1.45 UIU/ML (ref 0.3–5)

## 2019-04-19 ENCOUNTER — TRANSFERRED RECORDS (OUTPATIENT)
Dept: HEALTH INFORMATION MANAGEMENT | Facility: CLINIC | Age: 84
End: 2019-04-19

## 2019-04-19 ENCOUNTER — RECORDS - HEALTHEAST (OUTPATIENT)
Dept: LAB | Facility: CLINIC | Age: 84
End: 2019-04-19

## 2019-04-19 LAB
ALBUMIN SERPL-MCNC: 2.8 G/DL (ref 3.5–5)
ALBUMIN SERPL-MCNC: 2.8 G/DL (ref 3.5–5)
ALP SERPL-CCNC: 66 U/L (ref 45–120)
ALP SERPL-CCNC: 66 U/L (ref 45–120)
ALT SERPL W P-5'-P-CCNC: 11 U/L (ref 0–45)
ALT SERPL-CCNC: 11 U/L (ref 0–45)
ANION GAP SERPL CALCULATED.3IONS-SCNC: 9 MMOL/L (ref 5–18)
ANION GAP SERPL CALCULATED.3IONS-SCNC: 9 MMOL/L (ref 5–18)
AST SERPL W P-5'-P-CCNC: 19 U/L (ref 0–40)
AST SERPL-CCNC: 19 U/L (ref 0–40)
BASOPHILS # BLD AUTO: 0.1 THOU/UL (ref 0–0.2)
BASOPHILS NFR BLD AUTO: 1 % (ref 0–2)
BILIRUB SERPL-MCNC: 0.3 MG/DL (ref 0–1)
BILIRUB SERPL-MCNC: 0.3 MG/DL (ref 0–1)
BUN SERPL-MCNC: 44 MG/DL (ref 8–28)
BUN SERPL-MCNC: 44 MG/DL (ref 8–28)
CALCIUM SERPL-MCNC: 9.8 MG/DL (ref 8.5–10.5)
CALCIUM SERPL-MCNC: 9.8 MG/DL (ref 8.5–10.5)
CHLORIDE BLD-SCNC: 100 MMOL/L (ref 98–107)
CHLORIDE SERPLBLD-SCNC: 100 MMOL/L (ref 98–107)
CHOLEST SERPL-MCNC: 150 MG/DL
CHOLEST SERPL-MCNC: 150 MG/DL
CO2 SERPL-SCNC: 30 MMOL/L (ref 22–31)
CO2 SERPL-SCNC: 30 MMOL/L (ref 22–31)
CREAT SERPL-MCNC: 2.13 MG/DL (ref 0.6–1.1)
CREAT SERPL-MCNC: 2.13 MG/DL (ref 0.6–1.1)
EOSINOPHIL # BLD AUTO: 0.2 THOU/UL (ref 0–0.4)
EOSINOPHIL NFR BLD AUTO: 3 % (ref 0–6)
ERYTHROCYTE [DISTWIDTH] IN BLOOD BY AUTOMATED COUNT: 13.1 % (ref 11–14.5)
FASTING STATUS PATIENT QL REPORTED: ABNORMAL
GFR SERPL CREATININE-BSD FRML MDRD: 22 ML/MIN/1.73M2
GFR SERPL CREATININE-BSD FRML MDRD: 22 ML/MIN/1.73M2
GLUCOSE BLD-MCNC: 197 MG/DL (ref 70–125)
GLUCOSE SERPL-MCNC: 197 MG/DL (ref 70–125)
HCT VFR BLD AUTO: 34.3 % (ref 35–47)
HDLC SERPL-MCNC: 40 MG/DL
HDLC SERPL-MCNC: 40 MG/DL
HGB BLD-MCNC: 11.2 G/DL (ref 12–16)
LDLC SERPL CALC-MCNC: 68 MG/DL
LDLC SERPL CALC-MCNC: 68 MG/DL
LYMPHOCYTES # BLD AUTO: 1.2 THOU/UL (ref 0.8–4.4)
LYMPHOCYTES NFR BLD AUTO: 14 % (ref 20–40)
MCH RBC QN AUTO: 31.2 PG (ref 27–34)
MCHC RBC AUTO-ENTMCNC: 32.7 G/DL (ref 32–36)
MCV RBC AUTO: 96 FL (ref 80–100)
MONOCYTES # BLD AUTO: 0.8 THOU/UL (ref 0–0.9)
MONOCYTES NFR BLD AUTO: 8 % (ref 2–10)
NEUTROPHILS # BLD AUTO: 6.6 THOU/UL (ref 2–7.7)
NEUTROPHILS NFR BLD AUTO: 74 % (ref 50–70)
PLATELET # BLD AUTO: 190 THOU/UL (ref 140–440)
PMV BLD AUTO: 11.5 FL (ref 8.5–12.5)
POTASSIUM BLD-SCNC: 4.2 MMOL/L (ref 3.5–5)
POTASSIUM SERPL-SCNC: 4.2 MMOL/L (ref 3.5–5)
PROT SERPL-MCNC: 6.5 G/DL (ref 6–8)
PROT SERPL-MCNC: 6.5 G/DL (ref 6–8)
RBC # BLD AUTO: 3.59 MILL/UL (ref 3.8–5.4)
SODIUM SERPL-SCNC: 139 MMOL/L (ref 136–145)
SODIUM SERPL-SCNC: 139 MMOL/L (ref 136–145)
TRIGL SERPL-MCNC: 208 MG/DL
TRIGL SERPL-MCNC: 208 MG/DL
TSH SERPL DL<=0.005 MIU/L-ACNC: 1.2 UIU/ML (ref 0.3–5)
TSH SERPL-ACNC: 1.2 UIU/ML (ref 0.3–5)
WBC: 8.9 THOU/UL (ref 4–11)

## 2019-04-23 ENCOUNTER — NURSING HOME VISIT (OUTPATIENT)
Dept: GERIATRICS | Facility: CLINIC | Age: 84
End: 2019-04-23
Payer: MEDICARE

## 2019-04-23 VITALS
SYSTOLIC BLOOD PRESSURE: 129 MMHG | RESPIRATION RATE: 18 BRPM | DIASTOLIC BLOOD PRESSURE: 71 MMHG | TEMPERATURE: 97.9 F | HEART RATE: 73 BPM | WEIGHT: 151 LBS | BODY MASS INDEX: 25.13 KG/M2 | OXYGEN SATURATION: 97 %

## 2019-04-23 DIAGNOSIS — M19.91 PRIMARY OSTEOARTHRITIS, UNSPECIFIED SITE: ICD-10-CM

## 2019-04-23 DIAGNOSIS — D63.1 ANEMIA DUE TO STAGE 4 CHRONIC KIDNEY DISEASE (H): ICD-10-CM

## 2019-04-23 DIAGNOSIS — F03.91 DEMENTIA WITH BEHAVIORAL DISTURBANCE, UNSPECIFIED DEMENTIA TYPE: Primary | ICD-10-CM

## 2019-04-23 DIAGNOSIS — I10 ESSENTIAL HYPERTENSION WITH GOAL BLOOD PRESSURE LESS THAN 130/80: ICD-10-CM

## 2019-04-23 DIAGNOSIS — E11.49 DIABETES MELLITUS TYPE 2 WITH NEUROLOGICAL MANIFESTATIONS (H): ICD-10-CM

## 2019-04-23 DIAGNOSIS — N18.4 CKD (CHRONIC KIDNEY DISEASE) STAGE 4, GFR 15-29 ML/MIN (H): ICD-10-CM

## 2019-04-23 DIAGNOSIS — N18.4 ANEMIA DUE TO STAGE 4 CHRONIC KIDNEY DISEASE (H): ICD-10-CM

## 2019-04-23 PROCEDURE — 99207 ZZC CDG-MDM COMPONENT: MEETS LOW - DOWN CODED: CPT | Performed by: NURSE PRACTITIONER

## 2019-04-23 PROCEDURE — 99309 SBSQ NF CARE MODERATE MDM 30: CPT | Performed by: NURSE PRACTITIONER

## 2019-04-23 NOTE — LETTER
"    4/23/2019        RE: Smiley Acuña  [address]  [facility] [address]        [place] [facility]  [place] Medical Record Number:  [id number]  Place of Service where encounter took place:  [facility]  ([facility]) [[id number]]  Chief Complaint   Patient presents with     Nursing Home Acute       HPI:    Smiley Acuña  is a 84 year old (1935), who is being seen today for an episodic care visit.  HPI information obtained from: facility chart records, facility staff, patient report and [facility] chart review. 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  She has been observed to throw things, refusing cares and medications, and disrobing in the common areas.  Today, she is pleasant and cooperative.     Past Medical and Surgical History reviewed in Epic today.    MEDICATIONS:  Current Outpatient Medications   Medication Sig Dispense Refill     acetaminophen (TYLENOL) 325 MG tablet Take 2 tablets (650 mg) by mouth every 4 hours as needed for mild pain 100 tablet      albuterol (PROAIR HFA, PROVENTIL HFA, VENTOLIN HFA) 108 (90 BASE) MCG/ACT inhaler Inhale 2 puffs into the lungs every 6 hours as needed for shortness of breath / dyspnea or wheezing Ventolin inhaler 1 Inhaler 5     ASPIRIN NOT PRESCRIBED, INTENTIONAL, by Other route continuous prn. 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    0     B-D INSULIN SYRINGE 30G X 1/2\" 0.5 ML USE 1 SYRINGE FOUR TIMES A DAY OR AS DIRECTED 100 each 11     blood glucose monitoring (PRODIGY TWIST TOP 28G) lancets USE TO " TEST BLOOD SUGAR THREE TIMES A DAY OR AS DIRECTED 100 each 2     blood glucose monitoring (PRODIGY TWIST TOP 28G) lancets Use to test blood sugar two times daily or as directed. 100 each 2     calcium carbonate (TUMS) 500 MG chewable tablet Take 1 tablet (500 mg) by mouth 2 times daily       hydrALAZINE (APRESOLINE) 100 MG tablet Take 1 tablet (100 mg) by mouth 3 times daily       insulin aspart (NOVOLOG PEN) 100 UNIT/ML injection Inject 1-7 Units Subcutaneous 3 times daily (before meals) For  - 189 give 1 unit.   For  - 239 give 2 units.   For  - 289 give 3 units.   For  - 339 give 4 units.   For - 399 give 5 units.   For -449 give 6 units  For  or higher give 7 units.       insulin aspart (NOVOLOG PEN) 100 UNIT/ML pen Inject 1-5 Units Subcutaneous At Bedtime Do Not give Bedtime Correction Insulin if BG less than  200.   For  - 249 give 1 units.   For  - 299 give 2 units.   For  - 349 give 3 units.   For  -399 give 4 units.   For BG greater than or equal to 400 give 5 units.  Notify provider if glucose greater than or equal to 350 mg/dL after administration of correction dose.       insulin aspart (NOVOLOG PEN) 100 UNIT/ML pen Inject 2 units per carb unit three times a day with meals       insulin glargine (LANTUS SOLOSTAR PEN) 100 UNIT/ML pen Inject 5 Units Subcutaneous daily       insulin pen needle (B-D U/F) 31G X 8 MM miscellaneous USE 1 DAILY OR AS DIRECTED 100 each prn     magnesium oxide (MAG-OX) 400 MG tablet Take 1 tablet (400 mg) by mouth daily       melatonin 3 MG tablet Take 1 tablet (3 mg) by mouth At Bedtime       MULTIVITAMINS OR TABS 1 TABLET DAILY 30 0     ondansetron (ZOFRAN-ODT) 4 MG ODT tab Take 1 tablet (4 mg) by mouth every 6 hours as needed for nausea or vomiting       polyethylene glycol (MIRALAX/GLYCOLAX) packet Take 17 g by mouth daily       PRODIGY NO CODING BLOOD GLUC test strip USE TO TEST BLOOD SUGAR THREE TIMES A DAY OR  AS DIRECTED 100 each 4     PRODIGY NO CODING BLOOD GLUC test strip USE TO TEST BLOOD SUGAR THREE TIMES A DAY OR AS DIRECTED 100 each 2     QUEtiapine (SEROQUEL) 25 MG tablet Take 0.5 tablets (12.5 mg) by mouth 2 times daily       QUEtiapine (SEROQUEL) 25 MG tablet Take 1 tablet (25 mg) by mouth every 6 hours as needed (agitation)       risperiDONE (RISPERDAL) 0.5 MG tablet Take 1 tablet (0.5 mg) by mouth nightly as needed (sleep or agitation if unable or refusing to take Seroquel)       simvastatin (ZOCOR) 40 MG tablet Take 1.5 tablets (60 mg) by mouth At Bedtime       Spacer/Aero-Holding Chambers (OPTIHALER) BOGDAN As directed 1 Device 0     REVIEW OF SYSTEMS:  Limited secondary to cognitive impairment but today pt reports no pain, no other concerns.     Objective:  /71   Pulse 73   Temp 97.9  F (36.6  C)   Resp 18   Wt 68.5 kg (151 lb)   SpO2 97%   BMI 25.13 kg/m     Exam:  GENERAL APPEARANCE:  Alert, in no distress   HEAD:  Normal, normocephalic, atraumatic  EYE EXAM: normal external eye, conjunctiva, lids, RACHEL  NECK EXAM: supple, no JVD  CHEST/RESP:  respiratory effort normal, lung sounds CTA , no respiratory distress  CV:  Rate reg, rhythm reg, no murmur, no peripheral edema   M/S:   extremities normal, gait abnormal-using wheelchair for most mobility, normal muscle tone, and range of motion limited at baseline  NEUROLOGIC EXAM: Normal gross motor movement, tone and coordination. No tremor. Cranial nerves 2-12 are normal tested and grossly at patient's baseline  PSYCH:  Alert and oriented to self with confusion, affect pleasant , judgement impaired by cognitive losses      Labs:     Most Recent 3 Creatinines:  Recent Labs   Lab Test 04/19/19 04/11/19  0528 04/10/19  0626   CR 2.13* 1.52* 1.57*     Most Recent 3 Hemoglobins:  Recent Labs   Lab Test 04/11/19  0528 04/10/19  0626 04/09/19  1110   HGB 9.9* 10.4* 11.6*     Most Recent Cholesterol Panel:  Recent Labs   Lab Test 04/19/19   CHOL 150   LDL 68    HDL 40*   TRIG 208*     Most Recent TSH and T4:  Recent Labs   Lab Test 04/19/19   TSH 1.20     Most Recent Hemoglobin A1c:  Recent Labs   Lab Test 03/31/19  1914   A1C 5.8*       ASSESSMENT/PLAN:  Dementia with behavioral disturbance, unspecified dementia type  Patient with significant dementia, forgetful and confused, delusional.  She is sometimes pleasant and sometimes very belligerent with cares and with medications.  She has been on seroquel 12.5 mg BID-given in the am and the afternoon.  She has prn seroquel as well which has been only rarely used over last days since move to [facility].  She appears to have more cognitive issues and increased confusion in the afternoon (sundown syndrome).  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  A1C as noted above within normal limits.  On low dose lantus and low dose asaprt with meals and prn.  Blood sugar varies dramatically based on her po intake, 129-500.  She is more cooperative in the mornings, and may tolerate insulin injection better then.    -change time of lantus to am and increase to 8 U daily  -discontinue sliding scale and meal time insulin for now  -continue to check blood sugar qid if she tolerates for now while we are titrating her medications.   -goal for once daily lantus dosing with blood sugar trending 150-200 consistently    Anemia due to stage 4 chronic kidney disease (H)  Hemoglobin 9.9 at last check.  Stable.      CKD (chronic kidney disease) stage 4, GFR 15-29 ml/min (H)  Creat trending 1.5-2, Avoid nephrotoxic medications, Renal dosing of medications, monitor kidney function routinely       Essential hypertension with goal blood pressure less than 130/80  On hydralazine TID.  BP OK today.  Will monitor.  Has allergy to lisinopril.    BP Readings from Last 6 Encounters:   04/23/19 129/71   04/15/19 156/76   04/12/19 133/58   04/08/19 (!) 180/94   04/07/19 168/71   09/19/18 138/88          transcribed by : Tj King  1. discontinue sliding scale insulin  2. Continue Blood sugars QID as patient allows  3. No Lantus tonight, discontinue Lantus 5 U subcutaneous at hs  4.. Change times of Seroquel 12.5 to 12 noon and 4 pm for dementia  5. discontinue aspart 2 units with meals  6. Lantus 8 units subcutaneous QAM starting 4/24/19  Dx: Dm2  7. Tylenol 650 mg PO TID and 650 mg BID PRN Dx: Pain  8. Seroquel 25 mg PO Q6 hours PRN x14 days Dx: Agitation and encephalopathy with dementia    Electronically signed by:  ALBA Gutierrez CNP         Sincerely,        ALBA Gutierrez CNP
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
## 2019-04-26 RX ORDER — QUETIAPINE FUMARATE 25 MG/1
25 TABLET, FILM COATED ORAL EVERY 6 HOURS PRN
Start: 2019-04-26 | End: 2019-06-02

## 2019-04-26 RX ORDER — ACETAMINOPHEN 325 MG/1
TABLET ORAL
Start: 2019-04-26 | End: 2019-05-11

## 2019-04-29 VITALS
BODY MASS INDEX: 25.13 KG/M2 | OXYGEN SATURATION: 97 % | WEIGHT: 151 LBS | HEART RATE: 71 BPM | DIASTOLIC BLOOD PRESSURE: 64 MMHG | TEMPERATURE: 97.9 F | RESPIRATION RATE: 16 BRPM | SYSTOLIC BLOOD PRESSURE: 119 MMHG
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
## 2019-04-30 ENCOUNTER — NURSING HOME VISIT (OUTPATIENT)
Dept: GERIATRICS | Facility: CLINIC | Age: 84
End: 2019-04-30
Payer: MEDICARE

## 2019-04-30 DIAGNOSIS — E11.49 DIABETES MELLITUS TYPE 2 WITH NEUROLOGICAL MANIFESTATIONS (H): Primary | ICD-10-CM

## 2019-04-30 DIAGNOSIS — N18.4 CKD (CHRONIC KIDNEY DISEASE) STAGE 4, GFR 15-29 ML/MIN (H): ICD-10-CM

## 2019-04-30 DIAGNOSIS — F03.91 DEMENTIA WITH BEHAVIORAL DISTURBANCE, UNSPECIFIED DEMENTIA TYPE: ICD-10-CM

## 2019-04-30 DIAGNOSIS — M19.91 PRIMARY OSTEOARTHRITIS, UNSPECIFIED SITE: ICD-10-CM

## 2019-04-30 PROCEDURE — 99309 SBSQ NF CARE MODERATE MDM 30: CPT | Performed by: NURSE PRACTITIONER
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
## 2019-05-01 RX ORDER — ACETAMINOPHEN 325 MG/1
TABLET ORAL
Start: 2019-05-01 | End: 2019-06-02

## 2019-05-03 VITALS
SYSTOLIC BLOOD PRESSURE: 107 MMHG | TEMPERATURE: 97.1 F | WEIGHT: 151 LBS | HEART RATE: 65 BPM | DIASTOLIC BLOOD PRESSURE: 62 MMHG | BODY MASS INDEX: 25.13 KG/M2 | RESPIRATION RATE: 17 BRPM | OXYGEN SATURATION: 96 %

## 2019-05-03 NOTE — PROGRESS NOTES
[facility]  PRIMARY CARE PROVIDER AND CLINIC:  ALBA Gutierrez CNP, [address] / [place] [address]  Chief Complaint   Patient presents with     Hospital F/U     [place] Medical Record Number:  [id number]  Place of Service where encounter took place:  [facility]  ([facility]) [[id number]]    Smiley Acuña  is a 84 year old  (1935), admitted to the above facility from  [facility]. Hospital stay 4/9/19 through 4/12/19..  Admitted to this facility for  rehab, medical management and nursing care.    HPI:    HPI information obtained from: facility staff, [facility] chart review, Care Everywhere Epic chart review and DNP.   Brief Summary of Hospital Course:   -Patient was hospitalized in the end of March with mental status changes was found to have E. coli UTI treated with antibiotic.  Hospitalization was complicated with urinary retention resulted in worsening behavior, required intermittent straight cath admitted to [address] [place] [address] for rehab.  Transferred to the above hospital for encephalopathy felt to be secondary to hypovolemia and severe hyperglycemia managed retention required Pascal's catheter insertion which noted to help with her behaviors with recommendation to follow-up with urology on outpatient basis.  -   Updates on Status Since Skilled nursing Admission:   -The zyprexa IM stopped and Seroquel reduced, became very agitated, transferred to memory car unit at this facility.  Resident pulled the pascal's cath out, continued to have sundown behaviors but has decreased in general;  staff reports that family wants urinary catheter to be removed and to wait on  urology consultation.  -Patient seen and examined today in her room.  Clear speech is clear but pulses are not relevant.  -Patient self reports appetite and sleep are good no issue with bowel movement    CODE STATUS/ADVANCE DIRECTIVES DISCUSSION:   DNR / DNI  Patient's living  condition: lives alone  ALLERGIES: Lisinopril  PAST MEDICAL HISTORY:  has a past medical history of Abnormality of gait, Anemia, unspecified, Chronic ischemic heart disease, unspecified, Closed fracture of patella (06/21/10), Coronary artery disease, Degenerative disc disease, Depressive disorder, not elsewhere classified, History of blood transfusion, Other and unspecified hyperlipidemia, Renal failure, unspecified, Septicemia due to Escherichia coli (E. coli)(038.42) (H) (05/03/2007), Syncope and collapse (4/8/2005), Syncope and collapse (05/26/10), Traumatic subdural hematomas (05/27/10), Type II or unspecified type diabetes mellitus without mention of complication, not stated as uncontrolled, Unspecified essential hypertension, Unspecified sleep apnea, and Urinary tract infection, site not specified (4/4/2008). She also has no past medical history of Asthma, Congestive heart failure, unspecified, COPD (chronic obstructive pulmonary disease) (H), Malignant neoplasm (H), Thyroid disease, or Unspecified cerebral artery occlusion with cerebral infarction.  PAST SURGICAL HISTORY:   has a past surgical history that includes AMPUTATION TOE,MT-P JT (2000); APPENDECTOMY; ANESTH,UPPER LEG SURGERY; UPPER GI ENDOSCOPY,EXAM (04/27/2005); Colonoscopy w/wo Brush **Performed** (12/05/2005); UGI ENDOSCOPY DIAG W OR W/O BRUSH/WASH (12/05/2005); BX SYNOVIUM INTERPHALANG JT; craniotomy (05/28/10); REPAIR ROTATOR CUFF,ACUTE (11/21/2002); EXCISION LESION/TENDON-SHEATH/CAPSULE, FOOT (04/30/07); EXCISION LESION/TENDON-SHEATH/CAPSULE, FOOT (8/14/2007); bunionectomy rt/lt (10/24/07); and OPEN TX FEMORAL SUPRACONDYLAR FRACTURE W EXTENSION (12/14/12).  FAMILY HISTORY: family history includes C.A.D. in her brother; Diabetes in her brother, brother, and mother; Osteoporosis in her mother.  SOCIAL HISTORY:   reports that she has never smoked. She has never used smokeless tobacco. She reports that she does not drink alcohol or use  "drugs.    Post Discharge Medication Reconciliation Status: discharge medications reconciled and changed, per note/orders (see AVS)  Current Outpatient Medications   Medication Sig Dispense Refill     acetaminophen (TYLENOL) 325 MG tablet Take 2 tablets (650 mg) by mouth 3 times daily. May also take 2 tablets (650 mg) 2 times daily as needed for mild pain. 60 tablet 0     acetaminophen (TYLENOL) 325 MG tablet Take 2 tablets (650 mg) by mouth 3 times daily. May also take 2 tablets (650 mg) 2 times daily as needed for mild pain.       albuterol (PROAIR HFA, PROVENTIL HFA, VENTOLIN HFA) 108 (90 BASE) MCG/ACT inhaler Inhale 2 puffs into the lungs every 6 hours as needed for shortness of breath / dyspnea or wheezing Ventolin inhaler 1 Inhaler 5     ASPIRIN NOT PRESCRIBED, INTENTIONAL, by Other route continuous prn. 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    0     B-D INSULIN SYRINGE 30G X 1/2\" 0.5 ML USE 1 SYRINGE FOUR TIMES A DAY OR AS DIRECTED 100 each 11     blood glucose monitoring (PRODIGY TWIST TOP 28G) lancets USE TO TEST BLOOD SUGAR THREE TIMES A DAY OR AS DIRECTED 100 each 2     blood glucose monitoring (PRODIGY TWIST TOP 28G) lancets Use to test blood sugar two times daily or as directed. 100 each 2     calcium carbonate (TUMS) 500 MG chewable tablet Take 1 tablet (500 mg) by mouth 2 times daily       hydrALAZINE (APRESOLINE) 100 MG tablet Take 1 tablet (100 mg) by mouth 3 times daily       insulin glargine (LANTUS SOLOSTAR PEN) 100 UNIT/ML pen Inject 8 Units Subcutaneous every morning       insulin pen needle (B-D U/F) 31G X 8 MM miscellaneous USE 1 DAILY OR AS DIRECTED 100 each prn     magnesium oxide (MAG-OX) 400 MG tablet Take 1 tablet (400 mg) by mouth daily       melatonin 3 MG tablet Take 1 tablet (3 mg) by mouth At Bedtime       MULTIVITAMINS OR TABS 1 TABLET DAILY 30 0     ondansetron (ZOFRAN-ODT) 4 MG ODT tab Take 1 tablet (4 mg) by mouth every 6 hours as needed for nausea or vomiting       " polyethylene glycol (MIRALAX/GLYCOLAX) packet Take 17 g by mouth daily       PRODIGY NO CODING BLOOD GLUC test strip USE TO TEST BLOOD SUGAR THREE TIMES A DAY OR AS DIRECTED 100 each 4     PRODIGY NO CODING BLOOD GLUC test strip USE TO TEST BLOOD SUGAR THREE TIMES A DAY OR AS DIRECTED 100 each 2     QUEtiapine (SEROQUEL) 25 MG tablet Take 0.5 tablets (12.5 mg) by mouth 2 times daily       QUEtiapine (SEROQUEL) 25 MG tablet Take 1 tablet (25 mg) by mouth every 6 hours as needed (agitation)       risperiDONE (RISPERDAL) 0.5 MG tablet Take 1 tablet (0.5 mg) by mouth nightly as needed (sleep or agitation if unable or refusing to take Seroquel)       simvastatin (ZOCOR) 40 MG tablet Take 1.5 tablets (60 mg) by mouth At Bedtime       Spacer/Aero-Holding Chambers (OPTIHALER) BOGDAN As directed 1 Device 0     acetaminophen (TYLENOL) 325 MG tablet Take 2 tablets (650 mg) by mouth every 4 hours as needed for mild pain 100 tablet      QUEtiapine (SEROQUEL) 25 MG tablet Take 1 tablet (25 mg) by mouth every 6 hours as needed (dementia with delusions)       ROS:  Unobtainable secondary to cognitive impairment.     Vitals:  /62   Pulse 65   Temp 97.1  F (36.2  C)   Resp 17   Wt 68.5 kg (151 lb)   SpO2 96%   BMI 25.13 kg/m    Exam:   GENERAL APPEARANCE:  in no distress, cooperative  ENT:  Mouth and posterior oropharynx normal, moist mucous membranes, oral mucosa moist, no lesion noted.   EYES:  EOMI, Pupil rounded and equal.  RESP:  lungs clear to auscultation   CV:  S1S2 audible, regular HR, no murmur appreciated.   ABDOMEN:  soft, NT/ND, BS audible. no mass appreciated on palpation.   M/S:   no joint deformity noted on observation.   SKIN:  No rash.   NEURO:   No NFD appreciated on observation. Hand  5/5 b/l  PSYCH:  Speech clear but irrelevant, pleasantly confused.     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 However, noted to have hyperglycemia during last admission to the hospital.  -  In this frail elderly adult with a limited life expectancy, the goal of  the management is to address the hyperglycemia sx if any,  rather than the  BGs numbers per se.       Anemia due to stage 4 chronic kidney disease (H)  CKD (chronic kidney disease) stage 4, GFR 15-29 ml/min (H)  - Avoid nephrotoxic drugs  - Renal dose the medications.   - consider nephrology consult.   - may check PTH, Mg and Phosph.     Urinary retention  - causes behavioral changes, however, pulled it out one time, and family would like to remove it, deferring urology consult at this stage.  - will discontinue urinary indwelling catheter, start straight cath prn, if require more frequent, will re-insert it.     Secondary hypertension   *- controlled.     Primary osteoarthritis, unspecified site  - acetaminophen (TYLENOL) 325 MG tablet; Take 2 tablets (650 mg) by mouth 3 times daily. May also take 2 tablets (650 mg) 2 times daily as needed for mild pain.    Dementia with behavioral disturbance, unspecified dementia type  - Continue to anticipate needs. Chronic condition, ongoing decline expected.   -  Continue to provide redirection and reassurance as needed. Maintain safe living situation with goals focused on comfort.  - on Seroquel.       transcribed by : Tj King  1. Remove Vázquez cath   2. Scan bladder Q8 hours if >250 ml  Straith cath  3. If required 3 strait cath insert pascal back in.      Electronically signed by:  Trino Traore MD

## 2019-05-06 ENCOUNTER — NURSING HOME VISIT (OUTPATIENT)
Dept: GERIATRICS | Facility: CLINIC | Age: 84
End: 2019-05-06
Payer: MEDICARE

## 2019-05-06 DIAGNOSIS — M19.91 PRIMARY OSTEOARTHRITIS, UNSPECIFIED SITE: ICD-10-CM

## 2019-05-06 DIAGNOSIS — F03.91 DEMENTIA WITH BEHAVIORAL DISTURBANCE, UNSPECIFIED DEMENTIA TYPE: ICD-10-CM

## 2019-05-06 DIAGNOSIS — I10 ESSENTIAL HYPERTENSION WITH GOAL BLOOD PRESSURE LESS THAN 130/80: ICD-10-CM

## 2019-05-06 DIAGNOSIS — E11.49 DIABETES MELLITUS TYPE 2 WITH NEUROLOGICAL MANIFESTATIONS (H): Primary | ICD-10-CM

## 2019-05-06 DIAGNOSIS — N18.4 CKD (CHRONIC KIDNEY DISEASE) STAGE 4, GFR 15-29 ML/MIN (H): ICD-10-CM

## 2019-05-06 DIAGNOSIS — N18.4 ANEMIA DUE TO STAGE 4 CHRONIC KIDNEY DISEASE (H): ICD-10-CM

## 2019-05-06 DIAGNOSIS — D63.1 ANEMIA DUE TO STAGE 4 CHRONIC KIDNEY DISEASE (H): ICD-10-CM

## 2019-05-06 DIAGNOSIS — R33.9 URINARY RETENTION: ICD-10-CM

## 2019-05-06 PROCEDURE — 99305 1ST NF CARE MODERATE MDM 35: CPT | Performed by: FAMILY MEDICINE

## 2019-05-06 NOTE — LETTER
5/6/2019        RE: Smiley cAuña  [address]  [place] [address]        [facility]  PRIMARY CARE PROVIDER AND CLINIC:  ALBA Gutierrez CNP, [address] / [place] [address]  Chief Complaint   Patient presents with     Hospital F/U     [place] Medical Record Number:  [id number]  Place of Service where encounter took place:  [facility]  ([facility]) [[id number]]    Smiley Acuña  is a 84 year old  (1935), admitted to the above facility from  [facility]. Hospital stay 4/9/19 through 4/12/19..  Admitted to this facility for  rehab, medical management and nursing care.    HPI:    HPI information obtained from: facility staff, [facility] chart review, Care Everywhere Epic chart review and DNP.   Brief Summary of Hospital Course:   -Patient was hospitalized in the end of March with mental status changes was found to have E. coli UTI treated with antibiotic.  Hospitalization was complicated with urinary retention resulted in worsening behavior, required intermittent straight cath admitted to [address] [place] [address] for rehab.  Transferred to the above hospital for encephalopathy felt to be secondary to hypovolemia and severe hyperglycemia managed retention required Pascal's catheter insertion which noted to help with her behaviors with recommendation to follow-up with urology on outpatient basis.  -   Updates on Status Since Skilled nursing Admission:   -The zyprexa IM stopped and Seroquel reduced, became very agitated, transferred to memory car unit at this facility.  Resident pulled the pascal's cath out, continued to have sundown behaviors but has decreased in general;  staff reports that family wants urinary catheter to be removed and to wait on  urology consultation.  -Patient seen and examined today in her room.  Clear speech is clear but pulses are not relevant.  -Patient self reports appetite and sleep are good no issue  with bowel movement    CODE STATUS/ADVANCE DIRECTIVES DISCUSSION:   DNR / DNI  Patient's living condition: lives alone  ALLERGIES: Lisinopril  PAST MEDICAL HISTORY:  has a past medical history of Abnormality of gait, Anemia, unspecified, Chronic ischemic heart disease, unspecified, Closed fracture of patella (06/21/10), Coronary artery disease, Degenerative disc disease, Depressive disorder, not elsewhere classified, History of blood transfusion, Other and unspecified hyperlipidemia, Renal failure, unspecified, Septicemia due to Escherichia coli (E. coli)(038.42) (H) (05/03/2007), Syncope and collapse (4/8/2005), Syncope and collapse (05/26/10), Traumatic subdural hematomas (05/27/10), Type II or unspecified type diabetes mellitus without mention of complication, not stated as uncontrolled, Unspecified essential hypertension, Unspecified sleep apnea, and Urinary tract infection, site not specified (4/4/2008). She also has no past medical history of Asthma, Congestive heart failure, unspecified, COPD (chronic obstructive pulmonary disease) (H), Malignant neoplasm (H), Thyroid disease, or Unspecified cerebral artery occlusion with cerebral infarction.  PAST SURGICAL HISTORY:   has a past surgical history that includes AMPUTATION TOE,MT-P JT (2000); APPENDECTOMY; ANESTH,UPPER LEG SURGERY; UPPER GI ENDOSCOPY,EXAM (04/27/2005); Colonoscopy w/wo Brush **Performed** (12/05/2005); UGI ENDOSCOPY DIAG W OR W/O BRUSH/WASH (12/05/2005); BX SYNOVIUM INTERPHALANG JT; craniotomy (05/28/10); REPAIR ROTATOR CUFF,ACUTE (11/21/2002); EXCISION LESION/TENDON-SHEATH/CAPSULE, FOOT (04/30/07); EXCISION LESION/TENDON-SHEATH/CAPSULE, FOOT (8/14/2007); bunionectomy rt/lt (10/24/07); and OPEN TX FEMORAL SUPRACONDYLAR FRACTURE W EXTENSION (12/14/12).  FAMILY HISTORY: family history includes C.A.D. in her brother; Diabetes in her brother, brother, and mother; Osteoporosis in her mother.  SOCIAL HISTORY:   reports that she has never smoked. She  "has never used smokeless tobacco. She reports that she does not drink alcohol or use drugs.    Post Discharge Medication Reconciliation Status: discharge medications reconciled and changed, per note/orders (see AVS)  Current Outpatient Medications   Medication Sig Dispense Refill     acetaminophen (TYLENOL) 325 MG tablet Take 2 tablets (650 mg) by mouth 3 times daily. May also take 2 tablets (650 mg) 2 times daily as needed for mild pain. 60 tablet 0     acetaminophen (TYLENOL) 325 MG tablet Take 2 tablets (650 mg) by mouth 3 times daily. May also take 2 tablets (650 mg) 2 times daily as needed for mild pain.       albuterol (PROAIR HFA, PROVENTIL HFA, VENTOLIN HFA) 108 (90 BASE) MCG/ACT inhaler Inhale 2 puffs into the lungs every 6 hours as needed for shortness of breath / dyspnea or wheezing Ventolin inhaler 1 Inhaler 5     ASPIRIN NOT PRESCRIBED, INTENTIONAL, by Other route continuous prn. 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    0     B-D INSULIN SYRINGE 30G X 1/2\" 0.5 ML USE 1 SYRINGE FOUR TIMES A DAY OR AS DIRECTED 100 each 11     blood glucose monitoring (PRODIGY TWIST TOP 28G) lancets USE TO TEST BLOOD SUGAR THREE TIMES A DAY OR AS DIRECTED 100 each 2     blood glucose monitoring (PRODIGY TWIST TOP 28G) lancets Use to test blood sugar two times daily or as directed. 100 each 2     calcium carbonate (TUMS) 500 MG chewable tablet Take 1 tablet (500 mg) by mouth 2 times daily       hydrALAZINE (APRESOLINE) 100 MG tablet Take 1 tablet (100 mg) by mouth 3 times daily       insulin glargine (LANTUS SOLOSTAR PEN) 100 UNIT/ML pen Inject 8 Units Subcutaneous every morning       insulin pen needle (B-D U/F) 31G X 8 MM miscellaneous USE 1 DAILY OR AS DIRECTED 100 each prn     magnesium oxide (MAG-OX) 400 MG tablet Take 1 tablet (400 mg) by mouth daily       melatonin 3 MG tablet Take 1 tablet (3 mg) by mouth At Bedtime       MULTIVITAMINS OR TABS 1 TABLET DAILY 30 0     ondansetron (ZOFRAN-ODT) 4 MG ODT tab " Take 1 tablet (4 mg) by mouth every 6 hours as needed for nausea or vomiting       polyethylene glycol (MIRALAX/GLYCOLAX) packet Take 17 g by mouth daily       PRODIGY NO CODING BLOOD GLUC test strip USE TO TEST BLOOD SUGAR THREE TIMES A DAY OR AS DIRECTED 100 each 4     PRODIGY NO CODING BLOOD GLUC test strip USE TO TEST BLOOD SUGAR THREE TIMES A DAY OR AS DIRECTED 100 each 2     QUEtiapine (SEROQUEL) 25 MG tablet Take 0.5 tablets (12.5 mg) by mouth 2 times daily       QUEtiapine (SEROQUEL) 25 MG tablet Take 1 tablet (25 mg) by mouth every 6 hours as needed (agitation)       risperiDONE (RISPERDAL) 0.5 MG tablet Take 1 tablet (0.5 mg) by mouth nightly as needed (sleep or agitation if unable or refusing to take Seroquel)       simvastatin (ZOCOR) 40 MG tablet Take 1.5 tablets (60 mg) by mouth At Bedtime       Spacer/Aero-Holding Chambers (OPTIHALER) BOGDAN As directed 1 Device 0     acetaminophen (TYLENOL) 325 MG tablet Take 2 tablets (650 mg) by mouth every 4 hours as needed for mild pain 100 tablet      QUEtiapine (SEROQUEL) 25 MG tablet Take 1 tablet (25 mg) by mouth every 6 hours as needed (dementia with delusions)       ROS:  Unobtainable secondary to cognitive impairment.     Vitals:  /62   Pulse 65   Temp 97.1  F (36.2  C)   Resp 17   Wt 68.5 kg (151 lb)   SpO2 96%   BMI 25.13 kg/m     Exam:   GENERAL APPEARANCE:  in no distress, cooperative  ENT:  Mouth and posterior oropharynx normal, moist mucous membranes, oral mucosa moist, no lesion noted.   EYES:  EOMI, Pupil rounded and equal.  RESP:  lungs clear to auscultation   CV:  S1S2 audible, regular HR, no murmur appreciated.   ABDOMEN:  soft, NT/ND, BS audible. no mass appreciated on palpation.   M/S:   no joint deformity noted on observation.   SKIN:  No rash.   NEURO:   No NFD appreciated on observation. Hand  5/5 b/l  PSYCH:  Speech clear but irrelevant, pleasantly confused.     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 However, noted to have hyperglycemia during last admission to the hospital.  -  In this frail elderly adult with a limited life expectancy, the goal of  the management is to address the hyperglycemia sx if any,  rather than the  BGs numbers per se.       Anemia due to stage 4 chronic kidney disease (H)  CKD (chronic kidney disease) stage 4, GFR 15-29 ml/min (H)  - Avoid nephrotoxic drugs  - Renal dose the medications.   - consider nephrology consult.   - may check PTH, Mg and Phosph.     Urinary retention  - causes behavioral changes, however, pulled it out one time, and family would like to remove it, deferring urology consult at this stage.  - will discontinue urinary indwelling catheter, start straight cath prn, if require more frequent, will re-insert it.     Secondary hypertension   *- controlled.     Primary osteoarthritis, unspecified site  - acetaminophen (TYLENOL) 325 MG tablet; Take 2 tablets (650 mg) by mouth 3 times daily. May also take 2 tablets (650 mg) 2 times daily as needed for mild pain.    Dementia with behavioral disturbance, unspecified dementia type  - Continue to anticipate needs. Chronic condition, ongoing decline expected.   -  Continue to provide redirection and reassurance as needed. Maintain safe living situation with goals focused on comfort.  - on Seroquel.       transcribed by team  coordinator: Tj King  1. Remove Pascal cath   2. Scan bladder Q8 hours if >250 ml Straith cath  3. If required 3 strait cath insert pascal back in.      Electronically signed by:  Trino Traore MD             Sincerely,        Trino Traore MD

## 2019-05-11 RX ORDER — ACETAMINOPHEN 325 MG/1
TABLET ORAL
Qty: 60 TABLET | Refills: 0 | Status: SHIPPED | OUTPATIENT
Start: 2019-05-11 | End: 2019-09-04 | Stop reason: DRUGHIGH

## 2019-05-31 ENCOUNTER — NURSING HOME VISIT (OUTPATIENT)
Dept: GERIATRICS | Facility: CLINIC | Age: 84
End: 2019-05-31
Payer: MEDICARE

## 2019-05-31 VITALS
BODY MASS INDEX: 24.03 KG/M2 | HEART RATE: 76 BPM | WEIGHT: 144.4 LBS | OXYGEN SATURATION: 98 % | SYSTOLIC BLOOD PRESSURE: 120 MMHG | DIASTOLIC BLOOD PRESSURE: 74 MMHG | TEMPERATURE: 97.2 F | RESPIRATION RATE: 16 BRPM

## 2019-05-31 DIAGNOSIS — Z87.440 H/O URINARY TRACT INFECTION: ICD-10-CM

## 2019-05-31 DIAGNOSIS — F03.91 DEMENTIA WITH BEHAVIORAL DISTURBANCE, UNSPECIFIED DEMENTIA TYPE: ICD-10-CM

## 2019-05-31 DIAGNOSIS — R33.9 URINARY RETENTION: ICD-10-CM

## 2019-05-31 DIAGNOSIS — E11.49 DIABETES MELLITUS TYPE 2 WITH NEUROLOGICAL MANIFESTATIONS (H): Primary | ICD-10-CM

## 2019-05-31 PROCEDURE — 99309 SBSQ NF CARE MODERATE MDM 30: CPT | Performed by: NURSE PRACTITIONER

## 2019-05-31 NOTE — PROGRESS NOTES
"[place] [facility]  [place] Medical Record Number:  [id number]  Place of Service where encounter took place:  [facility]  ([facility]) [[id number]]  Chief Complaint   Patient presents with     Nursing Home Acute       HPI:    Smiley Acuña  is a 84 year old (1935), who is being seen today for an episodic care visit.  HPI information obtained from: facility chart records, facility staff and patient report. 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   Nursing reports that patient has dark urine, foul smelling overnight without fever, pain, dysuria.     Past Medical and Surgical History reviewed in Epic today.    MEDICATIONS:  Current Outpatient Medications   Medication Sig Dispense Refill     acetaminophen (TYLENOL) 325 MG tablet Take 2 tablets (650 mg) by mouth 3 times daily. May also take 2 tablets (650 mg) 2 times daily as needed for mild pain.       albuterol (PROAIR HFA, PROVENTIL HFA, VENTOLIN HFA) 108 (90 BASE) MCG/ACT inhaler Inhale 2 puffs into the lungs every 6 hours as needed for shortness of breath / dyspnea or wheezing Ventolin inhaler 1 Inhaler 5     ASPIRIN NOT PRESCRIBED, INTENTIONAL, by Other route continuous prn. 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    0     B-D INSULIN SYRINGE 30G X 1/2\" 0.5 ML USE 1 SYRINGE FOUR TIMES A DAY OR AS DIRECTED 100 each 11     blood glucose monitoring (PRODIGY TWIST TOP 28G) lancets USE TO TEST BLOOD SUGAR THREE TIMES A DAY OR AS DIRECTED 100 each 2     blood glucose monitoring (PRODIGY TWIST TOP 28G) lancets Use to test blood sugar two times daily or as directed. 100 each 2     calcium carbonate (TUMS) 500 MG chewable tablet Take 1 tablet (500 mg) by mouth 2 times daily       hydrALAZINE (APRESOLINE) 100 MG " tablet Take 1 tablet (100 mg) by mouth 3 times daily       insulin glargine (LANTUS SOLOSTAR PEN) 100 UNIT/ML pen Inject 8 Units Subcutaneous every morning       insulin pen needle (B-D U/F) 31G X 8 MM miscellaneous USE 1 DAILY OR AS DIRECTED 100 each prn     magnesium oxide (MAG-OX) 400 MG tablet Take 1 tablet (400 mg) by mouth daily       melatonin 3 MG tablet Take 1 tablet (3 mg) by mouth At Bedtime       MULTIVITAMINS OR TABS 1 TABLET DAILY 30 0     ondansetron (ZOFRAN-ODT) 4 MG ODT tab Take 1 tablet (4 mg) by mouth every 6 hours as needed for nausea or vomiting       polyethylene glycol (MIRALAX/GLYCOLAX) packet Take 17 g by mouth daily       PRODIGY NO CODING BLOOD GLUC test strip USE TO TEST BLOOD SUGAR THREE TIMES A DAY OR AS DIRECTED 100 each 4     PRODIGY NO CODING BLOOD GLUC test strip USE TO TEST BLOOD SUGAR THREE TIMES A DAY OR AS DIRECTED 100 each 2     QUEtiapine (SEROQUEL) 25 MG tablet Take 0.5 tablets (12.5 mg) by mouth 2 times daily       simvastatin (ZOCOR) 40 MG tablet Take 1.5 tablets (60 mg) by mouth At Bedtime       acetaminophen (TYLENOL) 325 MG tablet Take 2 tablets (650 mg) by mouth 3 times daily. May also take 2 tablets (650 mg) 2 times daily as needed for mild pain. 60 tablet 0     acetaminophen (TYLENOL) 325 MG tablet Take 2 tablets (650 mg) by mouth every 4 hours as needed for mild pain 100 tablet      QUEtiapine (SEROQUEL) 25 MG tablet Take 1 tablet (25 mg) by mouth every 6 hours as needed (dementia with delusions)       QUEtiapine (SEROQUEL) 25 MG tablet Take 1 tablet (25 mg) by mouth every 6 hours as needed (agitation)       risperiDONE (RISPERDAL) 0.5 MG tablet Take 1 tablet (0.5 mg) by mouth nightly as needed (sleep or agitation if unable or refusing to take Seroquel)       Spacer/Aero-Holding Chambers (OPTIHALER) BOGDAN As directed 1 Device 0     REVIEW OF SYSTEMS:  Limited secondary to cognitive impairment but today pt reports no new concerns.     Objective:  /74   Pulse 76    Temp 97.2  F (36.2  C)   Resp 16   Wt 65.5 kg (144 lb 6.4 oz)   SpO2 98%   BMI 24.03 kg/m    Exam:  GENERAL APPEARANCE:  Alert, in no distress   HEAD:  Normal, normocephalic, atraumatic  EYE EXAM: normal external eye, conjunctiva, lids, RACHEL  NECK EXAM: supple, no JVD  CHEST/RESP:  respiratory effort normal, lung sounds CTA , no respiratory distress  CV:  Rate reg, rhythm reg, no murmur, no peripheral edema   GI/ABDOMEN:  normal bowel sounds, soft, nontender, no palpable masses  M/S:   extremities normal, gait abnormal-requires assist of staff to walk with her, normal muscle tone, and range of motion normal  SKIN EXAM: CDI  NEUROLOGIC EXAM: Normal gross motor movement, tone and coordination. No tremor. Cranial nerves 2-12 are normal tested and grossly at patient's baseline  PSYCH:  Alert and oriented to self and surroundings with forgetfulness, affect pleasant but sometimes resistive to cares, judgement impaired by cognitive losses , often exit seeking, wandering about the unit in her wheelchair. Nursing reports sleeping well.      Labs:   Recent labs in [facility] reviewed by me today.     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  The current medical regimen is effective;  continue present plan and medications.     Urinary retention  H/O urinary tract infection  Patient reports no symptoms and is not ill appearing. Nursing had reports of foul smelling urine overnight. Will push fluids and continue to monitor.  Consider UA/C if symptoms occur.     Dementia with behavioral disturbance, unspecified dementia type  She continues to be very confused, confabulating, exit seeking.  She is generally calmer and more cooperative with cares.       Orders written by provider at facility and transcribed by : Tj King  1. 240 ML PO TID fluids x7 days Dx: foul smelling urine      Electronically signed by:  Livia King  APRN CNP

## 2019-05-31 NOTE — LETTER
"    5/31/2019        RE: Smiley Acuña  [address]  [facility] [address]        [place] [facility]  [place] Medical Record Number:  [id number]  Place of Service where encounter took place:  [facility]  (S) [[id number]]  Chief Complaint   Patient presents with     Nursing Home Acute       HPI:    Smiley Acuña  is a 84 year old (1935), who is being seen today for an episodic care visit.  HPI information obtained from: facility chart records, facility staff and patient report. 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   Nursing reports that patient has dark urine, foul smelling overnight without fever, pain, dysuria.     Past Medical and Surgical History reviewed in Epic today.    MEDICATIONS:  Current Outpatient Medications   Medication Sig Dispense Refill     acetaminophen (TYLENOL) 325 MG tablet Take 2 tablets (650 mg) by mouth 3 times daily. May also take 2 tablets (650 mg) 2 times daily as needed for mild pain.       albuterol (PROAIR HFA, PROVENTIL HFA, VENTOLIN HFA) 108 (90 BASE) MCG/ACT inhaler Inhale 2 puffs into the lungs every 6 hours as needed for shortness of breath / dyspnea or wheezing Ventolin inhaler 1 Inhaler 5     ASPIRIN NOT PRESCRIBED, INTENTIONAL, by Other route continuous prn. 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    0     B-D INSULIN SYRINGE 30G X 1/2\" 0.5 ML USE 1 SYRINGE FOUR TIMES A DAY OR AS DIRECTED 100 each 11     blood glucose monitoring (PRODIGY TWIST TOP 28G) lancets USE TO TEST BLOOD SUGAR THREE TIMES A DAY OR AS DIRECTED 100 each 2     blood glucose monitoring (PRODIGY TWIST TOP 28G) lancets Use to test blood sugar two times daily or as directed. 100 each 2     calcium carbonate (TUMS) 500 MG " chewable tablet Take 1 tablet (500 mg) by mouth 2 times daily       hydrALAZINE (APRESOLINE) 100 MG tablet Take 1 tablet (100 mg) by mouth 3 times daily       insulin glargine (LANTUS SOLOSTAR PEN) 100 UNIT/ML pen Inject 8 Units Subcutaneous every morning       insulin pen needle (B-D U/F) 31G X 8 MM miscellaneous USE 1 DAILY OR AS DIRECTED 100 each prn     magnesium oxide (MAG-OX) 400 MG tablet Take 1 tablet (400 mg) by mouth daily       melatonin 3 MG tablet Take 1 tablet (3 mg) by mouth At Bedtime       MULTIVITAMINS OR TABS 1 TABLET DAILY 30 0     ondansetron (ZOFRAN-ODT) 4 MG ODT tab Take 1 tablet (4 mg) by mouth every 6 hours as needed for nausea or vomiting       polyethylene glycol (MIRALAX/GLYCOLAX) packet Take 17 g by mouth daily       PRODIGY NO CODING BLOOD GLUC test strip USE TO TEST BLOOD SUGAR THREE TIMES A DAY OR AS DIRECTED 100 each 4     PRODIGY NO CODING BLOOD GLUC test strip USE TO TEST BLOOD SUGAR THREE TIMES A DAY OR AS DIRECTED 100 each 2     QUEtiapine (SEROQUEL) 25 MG tablet Take 0.5 tablets (12.5 mg) by mouth 2 times daily       simvastatin (ZOCOR) 40 MG tablet Take 1.5 tablets (60 mg) by mouth At Bedtime       acetaminophen (TYLENOL) 325 MG tablet Take 2 tablets (650 mg) by mouth 3 times daily. May also take 2 tablets (650 mg) 2 times daily as needed for mild pain. 60 tablet 0     acetaminophen (TYLENOL) 325 MG tablet Take 2 tablets (650 mg) by mouth every 4 hours as needed for mild pain 100 tablet      QUEtiapine (SEROQUEL) 25 MG tablet Take 1 tablet (25 mg) by mouth every 6 hours as needed (dementia with delusions)       QUEtiapine (SEROQUEL) 25 MG tablet Take 1 tablet (25 mg) by mouth every 6 hours as needed (agitation)       risperiDONE (RISPERDAL) 0.5 MG tablet Take 1 tablet (0.5 mg) by mouth nightly as needed (sleep or agitation if unable or refusing to take Seroquel)       Spacer/Aero-Holding Chambers (OPTIHALER) BOGDAN As directed 1 Device 0     REVIEW OF SYSTEMS:  Limited secondary  to cognitive impairment but today pt reports no new concerns.     Objective:  /74   Pulse 76   Temp 97.2  F (36.2  C)   Resp 16   Wt 65.5 kg (144 lb 6.4 oz)   SpO2 98%   BMI 24.03 kg/m     Exam:  GENERAL APPEARANCE:  Alert, in no distress   HEAD:  Normal, normocephalic, atraumatic  EYE EXAM: normal external eye, conjunctiva, lids, RACHEL  NECK EXAM: supple, no JVD  CHEST/RESP:  respiratory effort normal, lung sounds CTA , no respiratory distress  CV:  Rate reg, rhythm reg, no murmur, no peripheral edema   GI/ABDOMEN:  normal bowel sounds, soft, nontender, no palpable masses  M/S:   extremities normal, gait abnormal-requires assist of staff to walk with her, normal muscle tone, and range of motion normal  SKIN EXAM: CDI  NEUROLOGIC EXAM: Normal gross motor movement, tone and coordination. No tremor. Cranial nerves 2-12 are normal tested and grossly at patient's baseline  PSYCH:  Alert and oriented to self and surroundings with forgetfulness, affect pleasant but sometimes resistive to cares, judgement impaired by cognitive losses , often exit seeking, wandering about the unit in her wheelchair. Nursing reports sleeping well.      Labs:   Recent labs in EP[facility] reviewed by me today.     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  The current medical regimen is effective;  continue present plan and medications.     Urinary retention  H/O urinary tract infection  Patient reports no symptoms and is not ill appearing. Nursing had reports of foul smelling urine overnight. Will push fluids and continue to monitor.  Consider UA/C if symptoms occur.     Dementia with behavioral disturbance, unspecified dementia type  She continues to be very confused, confabulating, exit seeking.  She is generally calmer and more cooperative with cares.       Orders written by provider at facility and transcribed by : Tj  Fernando  1. 240 ML PO TID fluids x7 days Dx: foul smelling urine      Electronically signed by:  ALBA Gutierrez CNP           Sincerely,        ALBA Gutierrez CNP

## 2019-06-07 ENCOUNTER — RECORDS - HEALTHEAST (OUTPATIENT)
Dept: LAB | Facility: CLINIC | Age: 84
End: 2019-06-07

## 2019-06-07 LAB — HBA1C MFR BLD: 6.4 % (ref 4.2–6.1)

## 2019-06-18 VITALS
HEART RATE: 66 BPM | BODY MASS INDEX: 23.8 KG/M2 | DIASTOLIC BLOOD PRESSURE: 61 MMHG | WEIGHT: 143 LBS | OXYGEN SATURATION: 99 % | SYSTOLIC BLOOD PRESSURE: 103 MMHG | TEMPERATURE: 98.1 F | RESPIRATION RATE: 16 BRPM

## 2019-06-18 NOTE — PROGRESS NOTES
[place] [facility]  Chief Complaint   Patient presents with     assisted Regulatory     [place] Medical Record Number:  [id number]  Place of Service where encounter took place:  [facility]  ([facility]) [[id number]]    HPI:    Smiley Acuña  is 84 year old (1935), who is being seen today for a federally mandated E/M visit.  HPI information obtained from: facility chart records, facility staff and patient report. 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  Nursing reports no new concerns-still with some behaviors in the evening.       ALLERGIES:Lisinopril  PAST MEDICAL HISTORY:   has a past medical history of Abnormality of gait, Anemia, unspecified, Chronic ischemic heart disease, unspecified, Closed fracture of patella (06/21/10), Coronary artery disease, Degenerative disc disease, Depressive disorder, not elsewhere classified, History of blood transfusion, Other and unspecified hyperlipidemia, Renal failure, unspecified, Septicemia due to Escherichia coli (E. coli)(038.42) (H) (05/03/2007), Syncope and collapse (4/8/2005), Syncope and collapse (05/26/10), Traumatic subdural hematomas (05/27/10), Type II or unspecified type diabetes mellitus without mention of complication, not stated as uncontrolled, Unspecified essential hypertension, Unspecified sleep apnea, and Urinary tract infection, site not specified (4/4/2008). She also has no past medical history of Asthma, Congestive heart failure, unspecified, COPD (chronic obstructive pulmonary disease) (H), Malignant neoplasm (H), Thyroid disease, or Unspecified cerebral artery occlusion with cerebral infarction.  PAST SURGICAL HISTORY:   has a past surgical history that includes  "AMPUTATION TOE,MT-P JT (2000); APPENDECTOMY; ANESTH,UPPER LEG SURGERY; UPPER GI ENDOSCOPY,EXAM (04/27/2005); Colonoscopy w/wo Brush **Performed** (12/05/2005); UGI ENDOSCOPY DIAG W OR W/O BRUSH/WASH (12/05/2005); BX SYNOVIUM INTERPHALANG JT; craniotomy (05/28/10); REPAIR ROTATOR CUFF,ACUTE (11/21/2002); EXCISION LESION/TENDON-SHEATH/CAPSULE, FOOT (04/30/07); EXCISION LESION/TENDON-SHEATH/CAPSULE, FOOT (8/14/2007); bunionectomy rt/lt (10/24/07); and OPEN TX FEMORAL SUPRACONDYLAR FRACTURE W EXTENSION (12/14/12).  FAMILY HISTORY: family history includes C.A.D. in her brother; Diabetes in her brother, brother, and mother; Osteoporosis in her mother.  SOCIAL HISTORY:  reports that she has never smoked. She has never used smokeless tobacco. She reports that she does not drink alcohol or use drugs.    MEDICATIONS:  Current Outpatient Medications   Medication Sig Dispense Refill     acetaminophen (TYLENOL) 325 MG tablet Take 2 tablets (650 mg) by mouth 3 times daily. May also take 2 tablets (650 mg) 2 times daily as needed for mild pain. 60 tablet 0     albuterol (PROAIR HFA, PROVENTIL HFA, VENTOLIN HFA) 108 (90 BASE) MCG/ACT inhaler Inhale 2 puffs into the lungs every 6 hours as needed for shortness of breath / dyspnea or wheezing Ventolin inhaler 1 Inhaler 5     ASPIRIN NOT PRESCRIBED, INTENTIONAL, by Other route continuous prn. 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    0     B-D INSULIN SYRINGE 30G X 1/2\" 0.5 ML USE 1 SYRINGE FOUR TIMES A DAY OR AS DIRECTED 100 each 11     blood glucose monitoring (PRODIGY TWIST TOP 28G) lancets USE TO TEST BLOOD SUGAR THREE TIMES A DAY OR AS DIRECTED 100 each 2     blood glucose monitoring (PRODIGY TWIST TOP 28G) lancets Use to test blood sugar two times daily or as directed. 100 each 2     calcium carbonate (TUMS) 500 MG chewable tablet Take 1 tablet (500 mg) by mouth 2 times daily       hydrALAZINE (APRESOLINE) 100 MG tablet Take 1 tablet (100 mg) by mouth 3 times daily   "     insulin glargine (LANTUS SOLOSTAR PEN) 100 UNIT/ML pen Inject 8 Units Subcutaneous every morning       insulin pen needle (B-D U/F) 31G X 8 MM miscellaneous USE 1 DAILY OR AS DIRECTED 100 each prn     magnesium oxide (MAG-OX) 400 MG tablet Take 1 tablet (400 mg) by mouth daily       melatonin 3 MG tablet Take 1 tablet (3 mg) by mouth At Bedtime       MULTIVITAMINS OR TABS 1 TABLET DAILY 30 0     ondansetron (ZOFRAN-ODT) 4 MG ODT tab Take 1 tablet (4 mg) by mouth every 6 hours as needed for nausea or vomiting       polyethylene glycol (MIRALAX/GLYCOLAX) packet Take 17 g by mouth daily       PRODIGY NO CODING BLOOD GLUC test strip USE TO TEST BLOOD SUGAR THREE TIMES A DAY OR AS DIRECTED 100 each 4     PRODIGY NO CODING BLOOD GLUC test strip USE TO TEST BLOOD SUGAR THREE TIMES A DAY OR AS DIRECTED 100 each 2     QUEtiapine (SEROQUEL) 25 MG tablet Take 0.5 tablets (12.5 mg) by mouth 2 times daily       simvastatin (ZOCOR) 40 MG tablet Take 1.5 tablets (60 mg) by mouth At Bedtime       Case Management:  I have reviewed the care plan and MDS and do agree with the plan. Patient's desire to return to the community is not present. Information reviewed:  Medications, vital signs, orders, and nursing notes.    ROS:  Limited secondary to cognitive impairment but today pt reports no new concerns.     Vitals:  /61   Pulse 66   Temp 98.1  F (36.7  C)   Resp 16   Wt 64.9 kg (143 lb)   SpO2 99%   BMI 23.80 kg/m    Body mass index is 23.8 kg/m .  Exam:  GENERAL APPEARANCE:  Alert, in no distress   HEAD:  Normal, normocephalic, atraumatic  EYE EXAM: normal external eye, conjunctiva, lids, RACHEL  CHEST/RESP:  respiratory effort normal, lung sounds CTA , no respiratory distress  CV:  Rate reg, rhythm reg, no  murmur, no peripheral edema  M/S:   extremities normal, gait abnormal-transfers with assist and uses wheelchair for most mobility    NEUROLOGIC EXAM: Normal gross motor movement, tone and coordination. No tremor.  Cranial nerves 2-12 are normal tested and grossly at patient's baseline  PSYCH:  Alert and oriented to self with forgetfulness, affect pleasant      Lab/Diagnostic data:   Recent labs in [facility] reviewed by me today.     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 Blood sugar trending . The current medical regimen is effective;  continue present plan and medications with decrease in lantus to 5 U   - insulin glargine (LANTUS SOLOSTAR PEN) 100 UNIT/ML pen; Inject 5 Units Subcutaneous every morning    CKD (chronic kidney disease) stage 4, GFR 15-29 ml/min (H)  Baseline creat 1.5-2.    -Avoid nephrotoxic medications  -Renal dosing of medications  -monitor kidney function routinely      Anemia due to stage 4 chronic kidney disease (H)  Last hemoglobin 9.9.  Without symptoms.  Monitor.     Dementia with behavioral disturbance, unspecified dementia type  Chronic condition, ongoing decline expected. Maintain safe living situation with goals focused on comfort.        Orders written by provider at facility and transcribed by : Tj King  1. discontinue Lantus 8 units every day  2. Lantus 5 units subcutaneous every day Dx: Dm2      Electronically signed by:  ALBA Gutierrez CNP

## 2019-06-19 ENCOUNTER — NURSING HOME VISIT (OUTPATIENT)
Dept: GERIATRICS | Facility: CLINIC | Age: 84
End: 2019-06-19
Payer: MEDICARE

## 2019-06-19 DIAGNOSIS — F03.91 DEMENTIA WITH BEHAVIORAL DISTURBANCE, UNSPECIFIED DEMENTIA TYPE: ICD-10-CM

## 2019-06-19 DIAGNOSIS — N18.4 CKD (CHRONIC KIDNEY DISEASE) STAGE 4, GFR 15-29 ML/MIN (H): ICD-10-CM

## 2019-06-19 DIAGNOSIS — E11.49 DIABETES MELLITUS TYPE 2 WITH NEUROLOGICAL MANIFESTATIONS (H): Primary | ICD-10-CM

## 2019-06-19 DIAGNOSIS — N18.4 ANEMIA DUE TO STAGE 4 CHRONIC KIDNEY DISEASE (H): ICD-10-CM

## 2019-06-19 DIAGNOSIS — D63.1 ANEMIA DUE TO STAGE 4 CHRONIC KIDNEY DISEASE (H): ICD-10-CM

## 2019-06-19 PROCEDURE — 99309 SBSQ NF CARE MODERATE MDM 30: CPT | Performed by: NURSE PRACTITIONER

## 2019-06-19 NOTE — LETTER
6/19/2019        RE: Smiley Acuña  [address]  [facility] [address]        [place] [facility]  Chief Complaint   Patient presents with     care home Regulatory     [place] Medical Record Number:  [id number]  Place of Service where encounter took place:  [facility]  (S) [[id number]]    HPI:    Smiley Acuña  is 84 year old (1935), who is being seen today for a federally mandated E/M visit.  HPI information obtained from: facility chart records, facility staff and patient report. 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  Nursing reports no new concerns-still with some behaviors in the evening.       ALLERGIES:Lisinopril  PAST MEDICAL HISTORY:   has a past medical history of Abnormality of gait, Anemia, unspecified, Chronic ischemic heart disease, unspecified, Closed fracture of patella (06/21/10), Coronary artery disease, Degenerative disc disease, Depressive disorder, not elsewhere classified, History of blood transfusion, Other and unspecified hyperlipidemia, Renal failure, unspecified, Septicemia due to Escherichia coli (E. coli)(038.42) (H) (05/03/2007), Syncope and collapse (4/8/2005), Syncope and collapse (05/26/10), Traumatic subdural hematomas (05/27/10), Type II or unspecified type diabetes mellitus without mention of complication, not stated as uncontrolled, Unspecified essential hypertension, Unspecified sleep apnea, and Urinary tract infection, site not specified (4/4/2008). She also has no past medical history of Asthma, Congestive heart failure, unspecified, COPD (chronic obstructive pulmonary disease) (H), Malignant neoplasm (H), Thyroid disease, or Unspecified cerebral artery occlusion  "with cerebral infarction.  PAST SURGICAL HISTORY:   has a past surgical history that includes AMPUTATION TOE,MT-P JT (2000); APPENDECTOMY; ANESTH,UPPER LEG SURGERY; UPPER GI ENDOSCOPY,EXAM (04/27/2005); Colonoscopy w/wo Brush **Performed** (12/05/2005); UGI ENDOSCOPY DIAG W OR W/O BRUSH/WASH (12/05/2005); BX SYNOVIUM INTERPHALANG JT; craniotomy (05/28/10); REPAIR ROTATOR CUFF,ACUTE (11/21/2002); EXCISION LESION/TENDON-SHEATH/CAPSULE, FOOT (04/30/07); EXCISION LESION/TENDON-SHEATH/CAPSULE, FOOT (8/14/2007); bunionectomy rt/lt (10/24/07); and OPEN TX FEMORAL SUPRACONDYLAR FRACTURE W EXTENSION (12/14/12).  FAMILY HISTORY: family history includes C.A.D. in her brother; Diabetes in her brother, brother, and mother; Osteoporosis in her mother.  SOCIAL HISTORY:  reports that she has never smoked. She has never used smokeless tobacco. She reports that she does not drink alcohol or use drugs.    MEDICATIONS:  Current Outpatient Medications   Medication Sig Dispense Refill     acetaminophen (TYLENOL) 325 MG tablet Take 2 tablets (650 mg) by mouth 3 times daily. May also take 2 tablets (650 mg) 2 times daily as needed for mild pain. 60 tablet 0     albuterol (PROAIR HFA, PROVENTIL HFA, VENTOLIN HFA) 108 (90 BASE) MCG/ACT inhaler Inhale 2 puffs into the lungs every 6 hours as needed for shortness of breath / dyspnea or wheezing Ventolin inhaler 1 Inhaler 5     ASPIRIN NOT PRESCRIBED, INTENTIONAL, by Other route continuous prn. 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    0     B-D INSULIN SYRINGE 30G X 1/2\" 0.5 ML USE 1 SYRINGE FOUR TIMES A DAY OR AS DIRECTED 100 each 11     blood glucose monitoring (PRODIGY TWIST TOP 28G) lancets USE TO TEST BLOOD SUGAR THREE TIMES A DAY OR AS DIRECTED 100 each 2     blood glucose monitoring (PRODIGY TWIST TOP 28G) lancets Use to test blood sugar two times daily or as directed. 100 each 2     calcium carbonate (TUMS) 500 MG chewable tablet Take 1 tablet (500 mg) by mouth 2 times daily   "     hydrALAZINE (APRESOLINE) 100 MG tablet Take 1 tablet (100 mg) by mouth 3 times daily       insulin glargine (LANTUS SOLOSTAR PEN) 100 UNIT/ML pen Inject 8 Units Subcutaneous every morning       insulin pen needle (B-D U/F) 31G X 8 MM miscellaneous USE 1 DAILY OR AS DIRECTED 100 each prn     magnesium oxide (MAG-OX) 400 MG tablet Take 1 tablet (400 mg) by mouth daily       melatonin 3 MG tablet Take 1 tablet (3 mg) by mouth At Bedtime       MULTIVITAMINS OR TABS 1 TABLET DAILY 30 0     ondansetron (ZOFRAN-ODT) 4 MG ODT tab Take 1 tablet (4 mg) by mouth every 6 hours as needed for nausea or vomiting       polyethylene glycol (MIRALAX/GLYCOLAX) packet Take 17 g by mouth daily       PRODIGY NO CODING BLOOD GLUC test strip USE TO TEST BLOOD SUGAR THREE TIMES A DAY OR AS DIRECTED 100 each 4     PRODIGY NO CODING BLOOD GLUC test strip USE TO TEST BLOOD SUGAR THREE TIMES A DAY OR AS DIRECTED 100 each 2     QUEtiapine (SEROQUEL) 25 MG tablet Take 0.5 tablets (12.5 mg) by mouth 2 times daily       simvastatin (ZOCOR) 40 MG tablet Take 1.5 tablets (60 mg) by mouth At Bedtime       Case Management:  I have reviewed the care plan and MDS and do agree with the plan. Patient's desire to return to the community is not present. Information reviewed:  Medications, vital signs, orders, and nursing notes.    ROS:  Limited secondary to cognitive impairment but today pt reports no new concerns.     Vitals:  /61   Pulse 66   Temp 98.1  F (36.7  C)   Resp 16   Wt 64.9 kg (143 lb)   SpO2 99%   BMI 23.80 kg/m     Body mass index is 23.8 kg/m .  Exam:  GENERAL APPEARANCE:  Alert, in no distress   HEAD:  Normal, normocephalic, atraumatic  EYE EXAM: normal external eye, conjunctiva, lids, RACHEL  CHEST/RESP:  respiratory effort normal, lung sounds CTA , no respiratory distress  CV:  Rate reg, rhythm reg, no  murmur, no peripheral edema  M/S:   extremities normal, gait abnormal-transfers with assist and uses wheelchair for most  mobility    NEUROLOGIC EXAM: Normal gross motor movement, tone and coordination. No tremor. Cranial nerves 2-12 are normal tested and grossly at patient's baseline  PSYCH:  Alert and oriented to self with forgetfulness, affect pleasant      Lab/Diagnostic data:   Recent labs in [facility] reviewed by me today.     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 Blood sugar trending . The current medical regimen is effective;  continue present plan and medications with decrease in lantus to 5 U   - insulin glargine (LANTUS SOLOSTAR PEN) 100 UNIT/ML pen; Inject 5 Units Subcutaneous every morning    CKD (chronic kidney disease) stage 4, GFR 15-29 ml/min (H)  Baseline creat 1.5-2.    -Avoid nephrotoxic medications  -Renal dosing of medications  -monitor kidney function routinely      Anemia due to stage 4 chronic kidney disease (H)  Last hemoglobin 9.9.  Without symptoms.  Monitor.     Dementia with behavioral disturbance, unspecified dementia type  Chronic condition, ongoing decline expected. Maintain safe living situation with goals focused on comfort.        Orders written by provider at facility and transcribed by : Tj King  1. discontinue Lantus 8 units every day  2. Lantus 5 units subcutaneous every day Dx: Dm2      Electronically signed by:  ALBA Gutierrez CNP              Sincerely,        ALBA Gutierrez CNP

## 2019-06-21 RX ORDER — CALCIUM CARBONATE 500 MG/1
2 TABLET, CHEWABLE ORAL 2 TIMES DAILY
Start: 2019-06-21 | End: 2019-09-04

## 2019-06-25 ENCOUNTER — NURSING HOME VISIT (OUTPATIENT)
Dept: GERIATRICS | Facility: CLINIC | Age: 84
End: 2019-06-25
Payer: MEDICARE

## 2019-06-25 ENCOUNTER — TRANSFERRED RECORDS (OUTPATIENT)
Dept: HEALTH INFORMATION MANAGEMENT | Facility: CLINIC | Age: 84
End: 2019-06-25

## 2019-06-25 ENCOUNTER — RECORDS - HEALTHEAST (OUTPATIENT)
Dept: LAB | Facility: CLINIC | Age: 84
End: 2019-06-25

## 2019-06-25 VITALS
DIASTOLIC BLOOD PRESSURE: 67 MMHG | TEMPERATURE: 99.8 F | HEART RATE: 61 BPM | RESPIRATION RATE: 16 BRPM | BODY MASS INDEX: 23.46 KG/M2 | OXYGEN SATURATION: 97 % | WEIGHT: 141 LBS | SYSTOLIC BLOOD PRESSURE: 132 MMHG

## 2019-06-25 DIAGNOSIS — R50.9 LOW GRADE FEVER: ICD-10-CM

## 2019-06-25 DIAGNOSIS — N18.4 CKD (CHRONIC KIDNEY DISEASE) STAGE 4, GFR 15-29 ML/MIN (H): ICD-10-CM

## 2019-06-25 DIAGNOSIS — E11.49 DIABETES MELLITUS TYPE 2 WITH NEUROLOGICAL MANIFESTATIONS (H): ICD-10-CM

## 2019-06-25 DIAGNOSIS — R40.0 SOMNOLENCE: Primary | ICD-10-CM

## 2019-06-25 DIAGNOSIS — N39.0 RECURRENT UTI: ICD-10-CM

## 2019-06-25 LAB
ALBUMIN UR-MCNC: ABNORMAL MG/DL
APPEARANCE UR: ABNORMAL
BACTERIA #/AREA URNS HPF: ABNORMAL HPF
BILIRUB UR QL STRIP: NEGATIVE
COLOR UR AUTO: YELLOW
GLUCOSE UR STRIP-MCNC: NEGATIVE MG/DL
HGB UR QL STRIP: ABNORMAL
KETONES UR STRIP-MCNC: NEGATIVE MG/DL
LEUKOCYTE ESTERASE UR QL STRIP: ABNORMAL
NITRATE UR QL: NEGATIVE
PH UR STRIP: 6 [PH] (ref 4.5–8)
RBC #/AREA URNS AUTO: ABNORMAL HPF
SP GR UR STRIP: 1.01 (ref 1–1.03)
SQUAMOUS #/AREA URNS AUTO: ABNORMAL LPF
UROBILINOGEN UR STRIP-ACNC: ABNORMAL
WBC #/AREA URNS AUTO: >100 HPF
WBC CLUMPS #/AREA URNS HPF: PRESENT /[HPF]

## 2019-06-25 PROCEDURE — 99309 SBSQ NF CARE MODERATE MDM 30: CPT | Performed by: NURSE PRACTITIONER

## 2019-06-25 NOTE — PROGRESS NOTES
"[place] [facility]  [place] Medical Record Number:  [id number]  Place of Service where encounter took place:  [facility]  ([facility]GS) [[id number]]  Chief Complaint   Patient presents with     Nursing Home Acute       HPI:    Smiley Acuña  is a 84 year old (1935), who is being seen today for an episodic care visit.  HPI information obtained from: facility chart records, facility staff and patient report. 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  Nursing reports low grade fever with increased somnolence, foul smelling urine and concern for UTI due to history of recurrent UTI.       Past Medical and Surgical History reviewed in Epic today.    MEDICATIONS:  Current Outpatient Medications   Medication Sig Dispense Refill     acetaminophen (TYLENOL) 325 MG tablet Take 2 tablets (650 mg) by mouth 3 times daily. May also take 2 tablets (650 mg) 2 times daily as needed for mild pain. 60 tablet 0     ASPIRIN NOT PRESCRIBED, INTENTIONAL, by Other route continuous prn. 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    0     B-D INSULIN SYRINGE 30G X 1/2\" 0.5 ML USE 1 SYRINGE FOUR TIMES A DAY OR AS DIRECTED 100 each 11     blood glucose monitoring (PRODIGY TWIST TOP 28G) lancets USE TO TEST BLOOD SUGAR THREE TIMES A DAY OR AS DIRECTED 100 each 2     blood glucose monitoring (PRODIGY TWIST TOP 28G) lancets Use to test blood sugar two times daily or as directed. 100 each 2     calcium carbonate (TUMS) 500 MG chewable tablet Take 2 tablets (1,000 mg) by mouth 2 times daily       hydrALAZINE (APRESOLINE) 100 MG tablet Take 1 tablet (100 mg) by mouth 3 times daily       insulin glargine (LANTUS SOLOSTAR PEN) 100 UNIT/ML pen Inject 5 Units Subcutaneous every morning       insulin pen " needle (B-D U/F) 31G X 8 MM miscellaneous USE 1 DAILY OR AS DIRECTED 100 each prn     magnesium oxide (MAG-OX) 400 MG tablet Take 1 tablet (400 mg) by mouth daily       melatonin 3 MG tablet Take 1 tablet (3 mg) by mouth At Bedtime       MULTIVITAMINS OR TABS 1 TABLET DAILY 30 0     polyethylene glycol (MIRALAX/GLYCOLAX) packet Take 17 g by mouth daily       PRODIGY NO CODING BLOOD GLUC test strip USE TO TEST BLOOD SUGAR THREE TIMES A DAY OR AS DIRECTED 100 each 4     PRODIGY NO CODING BLOOD GLUC test strip USE TO TEST BLOOD SUGAR THREE TIMES A DAY OR AS DIRECTED 100 each 2     QUEtiapine (SEROQUEL) 25 MG tablet Take 0.5 tablets (12.5 mg) by mouth 2 times daily       simvastatin (ZOCOR) 40 MG tablet Take 1.5 tablets (60 mg) by mouth At Bedtime       albuterol (PROAIR HFA, PROVENTIL HFA, VENTOLIN HFA) 108 (90 BASE) MCG/ACT inhaler Inhale 2 puffs into the lungs every 6 hours as needed for shortness of breath / dyspnea or wheezing Ventolin inhaler 1 Inhaler 5     ondansetron (ZOFRAN-ODT) 4 MG ODT tab Take 1 tablet (4 mg) by mouth every 6 hours as needed for nausea or vomiting       REVIEW OF SYSTEMS:  Limited secondary to cognitive impairment but today pt reports no dysuria, no pain, no burning, no fever    Objective:  /67   Pulse 61   Temp 99.8  F (37.7  C)   Resp 16   Wt 64 kg (141 lb)   SpO2 97%   BMI 23.46 kg/m    Exam:  GENERAL APPEARANCE:  Alert, in no acute distress   HEAD:  Normal, normocephalic, atraumatic  EYE EXAM: normal external eye, conjunctiva, lids, RACHEL  CHEST/RESP:  respiratory effort normal, lung sounds CTA , no respiratory distress  CV:  Rate reg, rhythm reg, no murmur, no peripheral edema  M/S:   extremities normal, gait abnormal-rare ambulation and wheelchair for most mobility  NEUROLOGIC EXAM: Normal gross motor movement, tone and coordination. No tremor. Cranial nerves 2-12 are normal tested and grossly at patient's baseline  PSYCH:  Alert and oriented to self with forgetfulness,  affect pleasant      Labs:   Recent labs in [facility] reviewed by me today.  and UA results faxed for scanning into [facility]   UA + for nitrites, bacteria, leukocyte esterace    ASSESSMENT/PLAN:  Somnolence  Low grade fever  Recurrent UTI  Symptoms and UA results highly suggestive of UTI, with known past medical history of recurrent UTI.  Will start cipro and re-evaluate ongoing need when UC results with sensitivities are known  - ciprofloxacin (CIPRO) 250 MG tablet; Take 1 tablet (250 mg) by mouth 2 times daily for 5 days    CKD (chronic kidney disease) stage 4, GFR 15-29 ml/min (H)  Baseline creat 1.5-2.1.  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       Orders written by provider at facility  Ciprofloxacin 250 mg po daily x 5 days for UTI, pending UC results.       Electronically signed by:  ALBA Gutierrez CNP

## 2019-06-25 NOTE — LETTER
"    6/25/2019        RE: Smiley Acuña  [address]  [facility] [address]        [place] [facility]  [place] Medical Record Number:  [id number]  Place of Service where encounter took place:  [facility]  ([facility]) [[id number]]  Chief Complaint   Patient presents with     Nursing Home Acute       HPI:    Smiley Acuña  is a 84 year old (1935), who is being seen today for an episodic care visit.  HPI information obtained from: facility chart records, facility staff and patient report. 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  Nursing reports low grade fever with increased somnolence, foul smelling urine and concern for UTI due to history of recurrent UTI.       Past Medical and Surgical History reviewed in Epic today.    MEDICATIONS:  Current Outpatient Medications   Medication Sig Dispense Refill     acetaminophen (TYLENOL) 325 MG tablet Take 2 tablets (650 mg) by mouth 3 times daily. May also take 2 tablets (650 mg) 2 times daily as needed for mild pain. 60 tablet 0     ASPIRIN NOT PRESCRIBED, INTENTIONAL, by Other route continuous prn. 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    0     B-D INSULIN SYRINGE 30G X 1/2\" 0.5 ML USE 1 SYRINGE FOUR TIMES A DAY OR AS DIRECTED 100 each 11     blood glucose monitoring (PRODIGY TWIST TOP 28G) lancets USE TO TEST BLOOD SUGAR THREE TIMES A DAY OR AS DIRECTED 100 each 2     blood glucose monitoring (PRODIGY TWIST TOP 28G) lancets Use to test blood sugar two times daily or as directed. 100 each 2     calcium carbonate (TUMS) 500 MG chewable tablet Take 2 tablets (1,000 mg) by mouth 2 times daily       hydrALAZINE (APRESOLINE) 100 MG tablet Take 1 tablet (100 mg) by mouth 3 times daily       insulin glargine " (LANTUS SOLOSTAR PEN) 100 UNIT/ML pen Inject 5 Units Subcutaneous every morning       insulin pen needle (B-D U/F) 31G X 8 MM miscellaneous USE 1 DAILY OR AS DIRECTED 100 each prn     magnesium oxide (MAG-OX) 400 MG tablet Take 1 tablet (400 mg) by mouth daily       melatonin 3 MG tablet Take 1 tablet (3 mg) by mouth At Bedtime       MULTIVITAMINS OR TABS 1 TABLET DAILY 30 0     polyethylene glycol (MIRALAX/GLYCOLAX) packet Take 17 g by mouth daily       PRODIGY NO CODING BLOOD GLUC test strip USE TO TEST BLOOD SUGAR THREE TIMES A DAY OR AS DIRECTED 100 each 4     PRODIGY NO CODING BLOOD GLUC test strip USE TO TEST BLOOD SUGAR THREE TIMES A DAY OR AS DIRECTED 100 each 2     QUEtiapine (SEROQUEL) 25 MG tablet Take 0.5 tablets (12.5 mg) by mouth 2 times daily       simvastatin (ZOCOR) 40 MG tablet Take 1.5 tablets (60 mg) by mouth At Bedtime       albuterol (PROAIR HFA, PROVENTIL HFA, VENTOLIN HFA) 108 (90 BASE) MCG/ACT inhaler Inhale 2 puffs into the lungs every 6 hours as needed for shortness of breath / dyspnea or wheezing Ventolin inhaler 1 Inhaler 5     ondansetron (ZOFRAN-ODT) 4 MG ODT tab Take 1 tablet (4 mg) by mouth every 6 hours as needed for nausea or vomiting       REVIEW OF SYSTEMS:  Limited secondary to cognitive impairment but today pt reports no dysuria, no pain, no burning, no fever    Objective:  /67   Pulse 61   Temp 99.8  F (37.7  C)   Resp 16   Wt 64 kg (141 lb)   SpO2 97%   BMI 23.46 kg/m     Exam:  GENERAL APPEARANCE:  Alert, in no acute distress   HEAD:  Normal, normocephalic, atraumatic  EYE EXAM: normal external eye, conjunctiva, lids, RACHEL  CHEST/RESP:  respiratory effort normal, lung sounds CTA , no respiratory distress  CV:  Rate reg, rhythm reg, no murmur, no peripheral edema  M/S:   extremities normal, gait abnormal-rare ambulation and wheelchair for most mobility  NEUROLOGIC EXAM: Normal gross motor movement, tone and coordination. No tremor. Cranial nerves 2-12 are normal  tested and grossly at patient's baseline  PSYCH:  Alert and oriented to self with forgetfulness, affect pleasant      Labs:   Recent labs in [facility] reviewed by me today.  and UA results faxed for scanning into [facility]   UA + for nitrites, bacteria, leukocyte esterace    ASSESSMENT/PLAN:  Somnolence  Low grade fever  Recurrent UTI  Symptoms and UA results highly suggestive of UTI, with known past medical history of recurrent UTI.  Will start cipro and re-evaluate ongoing need when UC results with sensitivities are known  - ciprofloxacin (CIPRO) 250 MG tablet; Take 1 tablet (250 mg) by mouth 2 times daily for 5 days    CKD (chronic kidney disease) stage 4, GFR 15-29 ml/min (H)  Baseline creat 1.5-2.1.  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       Orders written by provider at facility  Ciprofloxacin 250 mg po daily x 5 days for UTI, pending UC results.       Electronically signed by:  ALBA Gutierrez CNP               Sincerely,        ALBA Gutierrez CNP

## 2019-06-27 LAB — BACTERIA SPEC CULT: ABNORMAL

## 2019-06-27 RX ORDER — CIPROFLOXACIN 250 MG/1
250 TABLET, FILM COATED ORAL DAILY
Qty: 5 TABLET | Refills: 0
Start: 2019-06-27 | End: 2019-08-30

## 2019-06-28 VITALS
WEIGHT: 141 LBS | SYSTOLIC BLOOD PRESSURE: 128 MMHG | RESPIRATION RATE: 16 BRPM | BODY MASS INDEX: 23.46 KG/M2 | DIASTOLIC BLOOD PRESSURE: 75 MMHG | TEMPERATURE: 98.4 F | OXYGEN SATURATION: 98 % | HEART RATE: 61 BPM

## 2019-06-28 NOTE — PROGRESS NOTES
"[place] [facility]  Chief Complaint   Patient presents with     shelter Regulatory     [place] Medical Record Number:  [id number]  Place of Service where encounter took place:  [facility]  ([facility]) [[id number]]    HPI:    Smiley Acuña  is 84 year old (1935), who is being seen today for a federally mandated E/M visit.  HPI information obtained from: facility staff, patient report and [facility] chart review.     Today's concerns are:   - Resident seen and examined.   - Reports aching right knee chronic pain, mild, no aggravating factors, better with medicine.  -  Reports sleep, appetite and BM are fine.   - RN and GNP have no concern  --------------------------------  - Past Medical, social, family histories, medications, and allergies reviewed and updated  - Medications reviewed: in the chart and EHR.   - Case Management:   I have reviewed the care plan and MDS and do agree with the plan. Patient's desire to return to the community is not present.  Information reviewed:  Medications, vital signs, orders, and nursing notes.    MEDICATIONS:  Current Outpatient Medications   Medication Sig Dispense Refill     acetaminophen (TYLENOL) 325 MG tablet Take 2 tablets (650 mg) by mouth 3 times daily. May also take 2 tablets (650 mg) 2 times daily as needed for mild pain. 60 tablet 0     ASPIRIN NOT PRESCRIBED, INTENTIONAL, by Other route continuous prn. 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    0     B-D INSULIN SYRINGE 30G X 1/2\" 0.5 ML USE 1 SYRINGE FOUR TIMES A DAY OR AS DIRECTED 100 each 11     blood glucose monitoring (PRODIGY TWIST TOP 28G) lancets USE TO TEST BLOOD SUGAR THREE TIMES A DAY OR AS DIRECTED 100 each 2     blood glucose monitoring (PRODIGY TWIST TOP 28G) lancets Use to test blood sugar two times daily or as directed. 100 each 2     calcium carbonate (TUMS) 500 MG chewable tablet Take 2 tablets (1,000 mg) by mouth 2 times daily       hydrALAZINE " (APRESOLINE) 100 MG tablet Take 1 tablet (100 mg) by mouth 3 times daily       insulin glargine (LANTUS SOLOSTAR PEN) 100 UNIT/ML pen Inject 5 Units Subcutaneous every morning       insulin pen needle (B-D U/F) 31G X 8 MM miscellaneous USE 1 DAILY OR AS DIRECTED 100 each prn     magnesium oxide (MAG-OX) 400 MG tablet Take 1 tablet (400 mg) by mouth daily       melatonin 3 MG tablet Take 1 tablet (3 mg) by mouth At Bedtime       MULTIVITAMINS OR TABS 1 TABLET DAILY 30 0     polyethylene glycol (MIRALAX/GLYCOLAX) packet Take 17 g by mouth daily       PRODIGY NO CODING BLOOD GLUC test strip USE TO TEST BLOOD SUGAR THREE TIMES A DAY OR AS DIRECTED 100 each 4     PRODIGY NO CODING BLOOD GLUC test strip USE TO TEST BLOOD SUGAR THREE TIMES A DAY OR AS DIRECTED 100 each 2     QUEtiapine (SEROQUEL) 25 MG tablet Take 0.5 tablets (12.5 mg) by mouth 2 times daily       simvastatin (ZOCOR) 40 MG tablet Take 1.5 tablets (60 mg) by mouth At Bedtime       ROS:  Limited secondary to cognitive impairment but today pt reports- see HPI    Vitals:  /75   Pulse 61   Temp 98.4  F (36.9  C)   Resp 16   Wt 64 kg (141 lb)   SpO2 98%   BMI 23.46 kg/m    Body mass index is 23.46 kg/m .  Exam:  GENERAL APPEARANCE:  in no distress, cooperative  RESP:  lungs clear to auscultation   CV:  S1S2 audible, regular HR, no murmur appreciated.   ABDOMEN:  soft, NT/ND, BS audible. no mass appreciated on palpation.   M/S:   no joint deformity noted on observation.   SKIN:  No rash.   NEURO:   No NFD appreciated on observation. Hand  5/5 b/l  PSYCH:  AAOx Person, and day but not place, month or year.     Lab/Diagnostic data: no new data to review.     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 On lantus 5 units.   - In this frail elderly adult with a limited life expectancy, the goal of  the management is to address the hyperglycemia sx if any,  rather than  the  BGs numbers per se.         Anemia due to stage 4 chronic kidney disease (H)  CKD (chronic kidney disease) stage 4, GFR 15-29 ml/min (H)  - Avoid nephrotoxic drugs  - Renal dose the medications.   - consider nephrology consult.   - Mg and Phosph normal.   - consider repeating BMP     Secondary hypertension : BP controlled.    Hyperlipidemia: LDL 69. On simvastatin 60 mg from 80 mg (April 2019).   - Given limited life expectancy, age > 75 yr and LDL is < 190 we recommend  further reduction in statin to 40 mg (moderate intensity).      Primary osteoarthritis, unspecified site: analgesia optimal.      Frailty:  - Significant  Deficits requiring NH placement. Requiring extensive assistance from nursing. Up for meals only o/w spends the day resting in bed    Dementia with behavioral disturbance, unspecified dementia type  - Continue to anticipate needs. Chronic condition, ongoing decline expected.   -  Continue to provide redirection and reassurance as needed. Maintain safe living situation with goals focused on comfort.  - on Seroquel. GDR when feasible.         Order: See above, otherwise, continue the rest of the current POC.     Electronically signed by:  Trino Traore MD

## 2019-07-01 ENCOUNTER — NURSING HOME VISIT (OUTPATIENT)
Dept: GERIATRICS | Facility: CLINIC | Age: 84
End: 2019-07-01
Payer: MEDICARE

## 2019-07-01 DIAGNOSIS — I15.9 SECONDARY HYPERTENSION: ICD-10-CM

## 2019-07-01 DIAGNOSIS — E11.49 DIABETES MELLITUS TYPE 2 WITH NEUROLOGICAL MANIFESTATIONS (H): Primary | ICD-10-CM

## 2019-07-01 DIAGNOSIS — N18.4 ANEMIA DUE TO STAGE 4 CHRONIC KIDNEY DISEASE (H): ICD-10-CM

## 2019-07-01 DIAGNOSIS — R54 FRAIL ELDERLY: ICD-10-CM

## 2019-07-01 DIAGNOSIS — N18.4 CKD (CHRONIC KIDNEY DISEASE) STAGE 4, GFR 15-29 ML/MIN (H): ICD-10-CM

## 2019-07-01 DIAGNOSIS — E78.5 HYPERLIPIDEMIA LDL GOAL <160: ICD-10-CM

## 2019-07-01 DIAGNOSIS — D63.1 ANEMIA DUE TO STAGE 4 CHRONIC KIDNEY DISEASE (H): ICD-10-CM

## 2019-07-01 DIAGNOSIS — M19.91 PRIMARY OSTEOARTHRITIS, UNSPECIFIED SITE: ICD-10-CM

## 2019-07-01 PROCEDURE — 99309 SBSQ NF CARE MODERATE MDM 30: CPT | Performed by: FAMILY MEDICINE

## 2019-08-27 ENCOUNTER — NURSING HOME VISIT (OUTPATIENT)
Dept: GERIATRICS | Facility: CLINIC | Age: 84
End: 2019-08-27
Payer: MEDICARE

## 2019-08-27 VITALS
BODY MASS INDEX: 24.06 KG/M2 | HEIGHT: 65 IN | RESPIRATION RATE: 16 BRPM | OXYGEN SATURATION: 96 % | DIASTOLIC BLOOD PRESSURE: 71 MMHG | TEMPERATURE: 96.8 F | SYSTOLIC BLOOD PRESSURE: 135 MMHG | HEART RATE: 66 BPM | WEIGHT: 144.4 LBS

## 2019-08-27 DIAGNOSIS — E78.5 HYPERLIPIDEMIA LDL GOAL <160: Primary | ICD-10-CM

## 2019-08-27 DIAGNOSIS — I10 ESSENTIAL HYPERTENSION WITH GOAL BLOOD PRESSURE LESS THAN 130/80: ICD-10-CM

## 2019-08-27 DIAGNOSIS — G30.9 ALZHEIMER'S DEMENTIA WITHOUT BEHAVIORAL DISTURBANCE, UNSPECIFIED TIMING OF DEMENTIA ONSET: ICD-10-CM

## 2019-08-27 DIAGNOSIS — E11.49 DIABETES MELLITUS TYPE 2 WITH NEUROLOGICAL MANIFESTATIONS (H): ICD-10-CM

## 2019-08-27 DIAGNOSIS — F02.80 ALZHEIMER'S DEMENTIA WITHOUT BEHAVIORAL DISTURBANCE, UNSPECIFIED TIMING OF DEMENTIA ONSET: ICD-10-CM

## 2019-08-27 PROCEDURE — 99309 SBSQ NF CARE MODERATE MDM 30: CPT | Performed by: NURSE PRACTITIONER

## 2019-08-27 ASSESSMENT — MIFFLIN-ST. JEOR: SCORE: 1105.87

## 2019-08-27 NOTE — PROGRESS NOTES
[place] [facility]  [place] Medical Record Number:  [id number]  Place of Service where encounter took place:  [facility]  ([facility]) [[id number]]  Chief Complaint   Patient presents with     Nursing Home Acute       HPI:    Smiley Acuña  is a 84 year old (1935), who is being seen today for an episodic care visit.  HPI information obtained from: facility chart records, facility staff, patient report and [facility] chart review. 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  Nursing reports no new concerns.  Pharmacy review of records and requested reevaluation of need for simvastatin 60 mg daily.       Past Medical and Surgical History reviewed in Epic today.    MEDICATIONS:  Current Outpatient Medications   Medication Sig Dispense Refill     acetaminophen (TYLENOL) 325 MG tablet Take 2 tablets (650 mg) by mouth 3 times daily. May also take 2 tablets (650 mg) 2 times daily as needed for mild pain. 60 tablet 0     calcium carbonate (TUMS) 500 MG chewable tablet Take 2 tablets (1,000 mg) by mouth 2 times daily       hydrALAZINE (APRESOLINE) 100 MG tablet Take 1 tablet (100 mg) by mouth 3 times daily       insulin glargine (LANTUS SOLOSTAR PEN) 100 UNIT/ML pen Inject 5 Units Subcutaneous every morning       magnesium oxide (MAG-OX) 400 MG tablet Take 1 tablet (400 mg) by mouth daily       melatonin 3 MG tablet Take 1 tablet (3 mg) by mouth At Bedtime       MULTIVITAMINS OR TABS 1 TABLET DAILY 30 0     polyethylene glycol (MIRALAX/GLYCOLAX) packet Take 17 g by mouth daily       QUEtiapine (SEROQUEL) 25 MG tablet Take 0.5 tablets (12.5 mg) by mouth 2 times daily       simvastatin (ZOCOR) 40 MG tablet Take 1.5  "tablets (60 mg) by mouth At Bedtime       ASPIRIN NOT PRESCRIBED, INTENTIONAL, by Other route continuous prn. 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    0     B-D INSULIN SYRINGE 30G X 1/2\" 0.5 ML USE 1 SYRINGE FOUR TIMES A DAY OR AS DIRECTED 100 each 11     blood glucose monitoring (PRODIGY TWIST TOP 28G) lancets USE TO TEST BLOOD SUGAR THREE TIMES A DAY OR AS DIRECTED 100 each 2     blood glucose monitoring (PRODIGY TWIST TOP 28G) lancets Use to test blood sugar two times daily or as directed. 100 each 2     insulin pen needle (B-D U/F) 31G X 8 MM miscellaneous USE 1 DAILY OR AS DIRECTED 100 each prn     PRODIGY NO CODING BLOOD GLUC test strip USE TO TEST BLOOD SUGAR THREE TIMES A DAY OR AS DIRECTED 100 each 4     PRODIGY NO CODING BLOOD GLUC test strip USE TO TEST BLOOD SUGAR THREE TIMES A DAY OR AS DIRECTED 100 each 2     REVIEW OF SYSTEMS:  Limited secondary to cognitive impairment but today pt reports no new concerns     Objective:  /71   Pulse 66   Temp 96.8  F (36  C)   Resp 16   Ht 1.651 m (5' 5\")   Wt 65.5 kg (144 lb 6.4 oz)   SpO2 96%   BMI 24.03 kg/m    Exam:  GENERAL APPEARANCE:  Alert, in no distress   HEAD:  Normal, normocephalic, atraumatic  ENT:  Mouth and posterior oropharynx normal, moist mucous membranes, hearing acuity - within normal limits   EYE EXAM: normal external eye, conjunctiva, lids, RACHEL  CHEST/RESP:  respiratory effort normal, lung sounds CTA , no respiratory distress  CV:  Rate reg, rhythm reg, no  murmur, no peripheral edema  M/S:   extremities normal, gait abnormal-does not ambulate but uses wheelchair for most mobility   NEUROLOGIC EXAM: Normal gross motor movement, tone and coordination. No tremor. Cranial nerves 2-12 are normal tested and grossly at patient's baseline  PSYCH:  Alert and oriented to self and surroundings with forgetfulness , affect pleasant      Labs:   Most Recent 3 BMP's:  Recent Labs   Lab Test 04/19/19 04/11/19  0528 04/10/19  0626   NA " 139 142 145*   POTASSIUM 4.2 3.7 4.6   CHLORIDE 100 110* 113*   CO2 30 25 23   BUN 44* 24 31*   CR 2.13* 1.52* 1.57*   ANIONGAP 9 7 9   KEANU 9.8 8.0* 8.2*   * 128* 114*     Most Recent 2 LFT's:  Recent Labs   Lab Test 04/19/19 04/10/19  0626   AST 19 25   ALT 11 36   ALKPHOS 66 56   BILITOTAL 0.3 0.4     Most Recent 3 Hemoglobins:  Recent Labs   Lab Test 04/11/19  0528 04/10/19  0626 04/09/19  1110   HGB 9.9* 10.4* 11.6*     Most Recent Cholesterol Panel:  Recent Labs   Lab Test 04/19/19   CHOL 150   LDL 68   HDL 40*   TRIG 208*     Most Recent TSH and T4:  Recent Labs   Lab Test 04/19/19   TSH 1.20       ASSESSMENT/PLAN:  Hyperlipidemia LDL goal <160  Patient on high dose statin, is 85 yo, does have history of coronary artery disease, ischemic heart disease.  Last lipid panel as noted above.  In this elderly female with remote history of CAD, lipids as noted above, it is likely of minimal benefit to continue statin.    -discontinue simvastatin  -consider re-check of lipid panel in 3-6 months    Essential hypertension with goal blood pressure less than 130/80  Generally normotensive on current regimen, goal BP <130/80 to reduce risk of falls, hypotension. The current medical regimen is effective;  continue present plan and medications, including hydralazine.      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 The current medical regimen is effective;  continue present plan and medications.      Alzheimer's dementia without behavioral disturbance, unspecified timing of dementia onset  Chronic condition, ongoing decline expected. Maintain safe living situation with goals focused on comfort.        Orders written by provider at facility and transcribed by : Tj King  1. discontinue Simvastatin Dx: Likely no longer benefits in this elderly patient, dose reduction recommended by pharmacy      Electronically signed by:  Livia  Fernando, ALBA DOBBS

## 2019-08-27 NOTE — LETTER
8/27/2019        RE: Smiley Acuña  [address]  [facility] [address]        [facility]  [place] Medical Record Number:  [id number]  Place of Service where encounter took place:  [facility]  (S) [[id number]]  Chief Complaint   Patient presents with     Nursing Home Acute       HPI:    Smiley Acuña  is a 84 year old (1935), who is being seen today for an episodic care visit.  HPI information obtained from: facility chart records, facility staff, patient report and [facility] chart review. 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  Nursing reports no new concerns.  Pharmacy review of records and requested reevaluation of need for simvastatin 60 mg daily.       Past Medical and Surgical History reviewed in Epic today.    MEDICATIONS:  Current Outpatient Medications   Medication Sig Dispense Refill     acetaminophen (TYLENOL) 325 MG tablet Take 2 tablets (650 mg) by mouth 3 times daily. May also take 2 tablets (650 mg) 2 times daily as needed for mild pain. 60 tablet 0     calcium carbonate (TUMS) 500 MG chewable tablet Take 2 tablets (1,000 mg) by mouth 2 times daily       hydrALAZINE (APRESOLINE) 100 MG tablet Take 1 tablet (100 mg) by mouth 3 times daily       insulin glargine (LANTUS SOLOSTAR PEN) 100 UNIT/ML pen Inject 5 Units Subcutaneous every morning       magnesium oxide (MAG-OX) 400 MG tablet Take 1 tablet (400 mg) by mouth daily       melatonin 3 MG tablet Take 1 tablet (3 mg) by mouth At Bedtime       MULTIVITAMINS OR TABS 1 TABLET DAILY 30 0     polyethylene glycol (MIRALAX/GLYCOLAX) packet Take 17 g by mouth daily       QUEtiapine (SEROQUEL) 25 MG tablet  "Take 0.5 tablets (12.5 mg) by mouth 2 times daily       simvastatin (ZOCOR) 40 MG tablet Take 1.5 tablets (60 mg) by mouth At Bedtime       ASPIRIN NOT PRESCRIBED, INTENTIONAL, by Other route continuous prn. 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    0     B-D INSULIN SYRINGE 30G X 1/2\" 0.5 ML USE 1 SYRINGE FOUR TIMES A DAY OR AS DIRECTED 100 each 11     blood glucose monitoring (PRODIGY TWIST TOP 28G) lancets USE TO TEST BLOOD SUGAR THREE TIMES A DAY OR AS DIRECTED 100 each 2     blood glucose monitoring (PRODIGY TWIST TOP 28G) lancets Use to test blood sugar two times daily or as directed. 100 each 2     insulin pen needle (B-D U/F) 31G X 8 MM miscellaneous USE 1 DAILY OR AS DIRECTED 100 each prn     PRODIGY NO CODING BLOOD GLUC test strip USE TO TEST BLOOD SUGAR THREE TIMES A DAY OR AS DIRECTED 100 each 4     PRODIGY NO CODING BLOOD GLUC test strip USE TO TEST BLOOD SUGAR THREE TIMES A DAY OR AS DIRECTED 100 each 2     REVIEW OF SYSTEMS:  Limited secondary to cognitive impairment but today pt reports no new concerns     Objective:  /71   Pulse 66   Temp 96.8  F (36  C)   Resp 16   Ht 1.651 m (5' 5\")   Wt 65.5 kg (144 lb 6.4 oz)   SpO2 96%   BMI 24.03 kg/m     Exam:  GENERAL APPEARANCE:  Alert, in no distress   HEAD:  Normal, normocephalic, atraumatic  ENT:  Mouth and posterior oropharynx normal, moist mucous membranes, hearing acuity - within normal limits   EYE EXAM: normal external eye, conjunctiva, lids, RACHEL  CHEST/RESP:  respiratory effort normal, lung sounds CTA , no respiratory distress  CV:  Rate reg, rhythm reg, no  murmur, no peripheral edema  M/S:   extremities normal, gait abnormal-does not ambulate but uses wheelchair for most mobility   NEUROLOGIC EXAM: Normal gross motor movement, tone and coordination. No tremor. Cranial nerves 2-12 are normal tested and grossly at patient's baseline  PSYCH:  Alert and oriented to self and surroundings with forgetfulness , affect pleasant  "     Labs:   Most Recent 3 BMP's:  Recent Labs   Lab Test 04/19/19 04/11/19  0528 04/10/19  0626    142 145*   POTASSIUM 4.2 3.7 4.6   CHLORIDE 100 110* 113*   CO2 30 25 23   BUN 44* 24 31*   CR 2.13* 1.52* 1.57*   ANIONGAP 9 7 9   KEANU 9.8 8.0* 8.2*   * 128* 114*     Most Recent 2 LFT's:  Recent Labs   Lab Test 04/19/19 04/10/19  0626   AST 19 25   ALT 11 36   ALKPHOS 66 56   BILITOTAL 0.3 0.4     Most Recent 3 Hemoglobins:  Recent Labs   Lab Test 04/11/19  0528 04/10/19  0626 04/09/19  1110   HGB 9.9* 10.4* 11.6*     Most Recent Cholesterol Panel:  Recent Labs   Lab Test 04/19/19   CHOL 150   LDL 68   HDL 40*   TRIG 208*     Most Recent TSH and T4:  Recent Labs   Lab Test 04/19/19   TSH 1.20       ASSESSMENT/PLAN:  Hyperlipidemia LDL goal <160  Patient on high dose statin, is 83 yo, does have history of coronary artery disease, ischemic heart disease.  Last lipid panel as noted above.  In this elderly female with remote history of CAD, lipids as noted above, it is likely of minimal benefit to continue statin.    -discontinue simvastatin  -consider re-check of lipid panel in 3-6 months    Essential hypertension with goal blood pressure less than 130/80  Generally normotensive on current regimen, goal BP <130/80 to reduce risk of falls, hypotension. The current medical regimen is effective;  continue present plan and medications, including hydralazine.      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 The current medical regimen is effective;  continue present plan and medications.      Alzheimer's dementia without behavioral disturbance, unspecified timing of dementia onset  Chronic condition, ongoing decline expected. Maintain safe living situation with goals focused on comfort.        Orders written by provider at facility and transcribed by : Tj King  1. discontinue Simvastatin Dx: Likely no longer benefits in  this elderly patient, dose reduction recommended by pharmacy      Electronically signed by:  ALBA Gutierrez CNP               Sincerely,        ALBA Gutierrez CNP

## 2019-09-04 ENCOUNTER — NURSING HOME VISIT (OUTPATIENT)
Dept: GERIATRICS | Facility: CLINIC | Age: 84
End: 2019-09-04
Payer: MEDICARE

## 2019-09-04 VITALS
TEMPERATURE: 96.6 F | SYSTOLIC BLOOD PRESSURE: 144 MMHG | WEIGHT: 142.9 LBS | RESPIRATION RATE: 16 BRPM | DIASTOLIC BLOOD PRESSURE: 72 MMHG | BODY MASS INDEX: 23.78 KG/M2 | HEART RATE: 71 BPM | OXYGEN SATURATION: 97 %

## 2019-09-04 DIAGNOSIS — N18.4 ANEMIA DUE TO STAGE 4 CHRONIC KIDNEY DISEASE (H): ICD-10-CM

## 2019-09-04 DIAGNOSIS — G30.9 ALZHEIMER'S DEMENTIA WITHOUT BEHAVIORAL DISTURBANCE, UNSPECIFIED TIMING OF DEMENTIA ONSET: ICD-10-CM

## 2019-09-04 DIAGNOSIS — Z00.00 ANNUAL PHYSICAL EXAM: Primary | ICD-10-CM

## 2019-09-04 DIAGNOSIS — N18.4 CKD (CHRONIC KIDNEY DISEASE) STAGE 4, GFR 15-29 ML/MIN (H): ICD-10-CM

## 2019-09-04 DIAGNOSIS — F02.80 ALZHEIMER'S DEMENTIA WITHOUT BEHAVIORAL DISTURBANCE, UNSPECIFIED TIMING OF DEMENTIA ONSET: ICD-10-CM

## 2019-09-04 DIAGNOSIS — D63.1 ANEMIA DUE TO STAGE 4 CHRONIC KIDNEY DISEASE (H): ICD-10-CM

## 2019-09-04 DIAGNOSIS — R54 FRAIL ELDERLY: ICD-10-CM

## 2019-09-04 DIAGNOSIS — I10 ESSENTIAL HYPERTENSION WITH GOAL BLOOD PRESSURE LESS THAN 130/80: ICD-10-CM

## 2019-09-04 DIAGNOSIS — E78.5 HYPERLIPIDEMIA LDL GOAL <100: ICD-10-CM

## 2019-09-04 PROCEDURE — 99318 ZZC ANNUAL NURSING FAC ASSESSMNT, STABLE: CPT | Performed by: NURSE PRACTITIONER

## 2019-09-04 RX ORDER — CALCIUM CARBONATE 500 MG/1
2 TABLET, CHEWABLE ORAL 2 TIMES DAILY PRN
Refills: 0
Start: 2019-09-04 | End: 2020-01-01

## 2019-09-04 RX ORDER — ACETAMINOPHEN 325 MG/1
TABLET ORAL
Start: 2019-09-04 | End: 2019-10-29

## 2019-09-04 NOTE — PROGRESS NOTES
[facility]  Chief Complaint   Patient presents with     Annual Comprehensive Nursing Home   [place] Medical Record Number:  [id number]  Place of Service where encounter took place:  [facility]  ([facility]) [[id number]]    HPI: Smiley Acuña  is a 84 year old  (1935), who is being seen today for an annual comprehensive visit. HPI information obtained from: facility chart records, facility staff, patient report, [facility] Epic chart review and Care Everywhere [facility] chart review.  Today's concerns are:    Annual physical exam  Essential hypertension with goal blood pressure less than 130/80  CKD (chronic kidney disease) stage 4, GFR 15-29 ml/min (H)  Anemia due to stage 4 chronic kidney disease (H)  Hyperlipidemia LDL goal <100  Alzheimer's dementia without behavioral disturbance, unspecified timing of dementia onset  Frail elderly  Patient denies CP, SOB, wheezing, fever, chills. She states she has regular BMs and her bladder is working fine. She denies pain. Labs from April 2019 are as noted below with obvious CKD and anemia. HLD is well-controlled w/o medication. DM controlled w/ Glargine. Nursing reports no new behaviors, and last PHQ9 was 7/27 (mild depression) and BIMS was 5/15 (severe cognitive impairment) in July 2019. Patient states she is eating well, and her daily BG readings are as noted below. BP is controlled w/ TID Hydralazine and weekly trend is noted below:  9/3/2019 07:12 122.0 mg/dL  9/2/2019 07:06 117.0 mg/dL   8/31/2019 07:34 115.0 mg/dL   8/30/2019 07:34 122.0 mg/dL   8/29/2019 07:02 116.0 mg/dL    8/28/2019 08:45 104.0 mg/dL  8/27/2019 08:00 113.0 mg/dL  8/26/2019 07:21 120.0 mg/dL   8/25/2019 07:20 114.0 mg/dL  8/24/2019 08:01 119.0 mg/dL   8/23/2019 07:15 105.0 mg/dL   8/22/2019 08:09 119.0 mg/dL    BPs:  8/28/2019 10:49 138 / 76 mmHg   8/21/2019 10:45 135 / 71 mmHg   8/14/2019 07:16 151 / 76 mmHg   8/7/2019 07:14 151 / 74 mmHg   7/31/2019 07:58 176/82 mmHg    7/24/2019 08:09 129 / 70 mmHg     ALLERGIES: Lisinopril PAST MEDICAL HISTORY:  has a past medical history of Abnormality of gait, Anemia, unspecified, Chronic ischemic heart disease, unspecified, Closed fracture of patella (06/21/10), Coronary artery disease, Degenerative disc disease, Depressive disorder, not elsewhere classified, History of blood transfusion, Other and unspecified hyperlipidemia, Renal failure, unspecified, Septicemia due to Escherichia coli (E. coli)(038.42) (H) (05/03/2007), Syncope and collapse (4/8/2005), Syncope and collapse (05/26/10), Traumatic subdural hematomas (05/27/10), Type II or unspecified type diabetes mellitus without mention of complication, not stated as uncontrolled, Unspecified essential hypertension, Unspecified sleep apnea, and Urinary tract infection, site not specified (4/4/2008). She also has no past medical history of Asthma, Congestive heart failure, unspecified, COPD (chronic obstructive pulmonary disease) (H), Malignant neoplasm (H), Thyroid disease, or Unspecified cerebral artery occlusion with cerebral infarction. PAST SURGICAL HISTORY:  has a past surgical history that includes AMPUTATION TOE,MT-P JT (2000); APPENDECTOMY; ANESTH,UPPER LEG SURGERY; UPPER GI ENDOSCOPY,EXAM (04/27/2005); Colonoscopy w/wo Brush **Performed** (12/05/2005); UGI ENDOSCOPY DIAG W OR W/O BRUSH/WASH (12/05/2005); BX SYNOVIUM INTERPHALANG JT; craniotomy (05/28/10); REPAIR ROTATOR CUFF,ACUTE (11/21/2002); EXCISION LESION/TENDON-SHEATH/CAPSULE, FOOT (04/30/07); EXCISION LESION/TENDON-SHEATH/CAPSULE, FOOT (8/14/2007); bunionectomy rt/lt (10/24/07); and OPEN TX FEMORAL SUPRACONDYLAR FRACTURE W EXTENSION (12/14/12).  IMMUNIZATIONS:  Immunization History   Administered Date(s) Administered     HepB 01/21/2004, 02/18/2004, 07/20/2004     Influenza (High Dose) 3 valent vaccine 11/19/2010, 10/26/2011, 09/11/2012, 11/05/2013, 10/15/2014, 10/21/2015, 09/07/2016, 11/08/2017, 09/19/2018     Influenza  (IIV3) PF 11/19/1997, 10/19/1998, 10/04/1999, 11/24/2000, 11/06/2001, 10/28/2002, 10/31/2003, 10/20/2004, 11/03/2005, 11/14/2006, 11/19/2007, 11/19/2008, 12/02/2009     Pneumo Conj 13-V (2010&after) 12/08/2017     Pneumococcal 23 valent 01/21/2004     TD (ADULT, 7+) 07/19/1999     Above immunizations pulled from [facility] [place]. [facility] and facility records also reconciled. Outstanding information sent to  to update [facility].  Future immunizations needed:  TDAP  Medication Sig     acetaminophen (TYLENOL) 325 MG tablet Take 2 tablets (650 mg) by mouth 2 times daily. May also take 2 tablets (650 mg) every 4 hours as needed for mild pain.     calcium carbonate (TUMS) 500 MG chewable tablet Take 2 tablets (1,000 mg) by mouth 2 times daily as needed for heartburn     hydrALAZINE (APRESOLINE) 100 MG tablet Take 1 tablet (100 mg) by mouth 3 times daily     insulin glargine (LANTUS SOLOSTAR PEN) 100 UNIT/ML pen Inject 5 Units Subcutaneous every morning     magnesium oxide (MAG-OX) 400 MG tablet Take 1 tablet (400 mg) by mouth daily     melatonin 3 MG tablet Take 1 tablet (3 mg) by mouth At Bedtime     polyethylene glycol (MIRALAX/GLYCOLAX) packet Take 17 g by mouth daily     QUEtiapine (SEROQUEL) 25 MG tablet Take 0.5 tablets (12.5 mg) by mouth 2 times daily     Case Management: I have reviewed the facility/SNF care plan/MDS, including the falls risk, nutrition and pain screening. I also reviewed the current immunizations, and preventive care. .Future cancer screening is not clinically indicated secondary to age/goals of care Patient's desire to return to the community is not assessible due to cognitive impairment. Current Level of Care is appropriate.    Advance Directive Discussion: I reviewed the current advanced directives as reflected in EPIC, the POLST and the facility chart, and verified the congruency of orders. I contacted the first party Theresa (daughter) and discussed the plan of Care.  I did not  due to cognitive impairment review the advance directives with the resident.     Team Discussion: I communicated with the appropriate disciplines involved with the Plan of Care:   Nursing    Patient's goal is pain control and comfort. Information reviewed:  Medications, vital signs, orders, and nursing notes.    ROS: Limited secondary to cognitive impairment but today pt reports the above and 10 point ROS of systems including Constitutional, Eyes, Respiratory, Cardiovascular, Gastroenterology, Genitourinary, Integumentary, Musculoskeletal, Psychiatric were all negative except for pertinent positives noted in my HPI.    Vitals:  BP (!) 144/72   Pulse 71   Temp 96.6  F (35.9  C)   Resp 16   Wt 64.8 kg (142 lb 14.4 oz)   SpO2 97%   BMI 23.78 kg/m   Body mass index is 23.78 kg/m .  Exam:  GENERAL APPEARANCE: Alert, in no distress, cooperative.   ENT: Mouth/posterior oropharynx intact w/ moist mucous membranes, hearing acuity [place].  EYES: EOM, conjunctivae, lids, pupils and irises normal, PERRL2.   RESP: Respiratory effort good, no respiratory distress, Lung sounds clear. On RA.   CV: Auscultation of heart reveals S1, S2, rate and rhythm regular, no murmur, no rub or gallop, Edema 0+ BLE. Peripheral pulses are 2+.  ABDOMEN: Normal bowel sounds, soft, non-tender abdomen, and no masses palpated.  SKIN: Inspection/Palpation of skin and subcutaneous tissue baseline w/ fragility. No wounds/rashes noted.   NEURO: CN II-XII at patient's baseline, sensation baseline PPS.  PSYCH: Insight, judgement, and memory are impaired at baseline, affect and mood are happy/pleasant.    Lab/Diagnostic data:   Most Recent 3 CBC's:  Recent Labs   Lab Test 04/11/19  0528 04/10/19  0626 04/09/19  1110   WBC 9.2 10.1 15.5*   HGB 9.9* 10.4* 11.6*   MCV 97 95 96    220 306     Most Recent 3 BMP's:  Recent Labs   Lab Test 04/19/19 04/11/19  0528 04/10/19  0626    142 145*   POTASSIUM 4.2 3.7 4.6   CHLORIDE 100 110* 113*   CO2 30 25  "23   BUN 44* 24 31*   CR 2.13* 1.52* 1.57*   ANIONGAP 9 7 9   KEANU 9.8 8.0* 8.2*   * 128* 114*     Most Recent Cholesterol Panel:  Recent Labs   Lab Test 04/19/19   CHOL 150   LDL 68   HDL 40*   TRIG 208*     Most Recent TSH and T4:  Recent Labs   Lab Test 04/19/19   TSH 1.20     Most Recent Hemoglobin A1c:  Recent Labs   Lab Test 03/31/19  1914   A1C 5.8*     ASSESSMENT/PLAN  Annual physical exam  Essential hypertension with goal blood pressure less than 130/80  CKD (chronic kidney disease) stage 4, GFR 15-29 ml/min (H)  Anemia due to stage 4 chronic kidney disease (H)  Hyperlipidemia LDL goal <100  Alzheimer's dementia without behavioral disturbance, unspecified timing of dementia onset  Frail elderly  Chronic. Stable. After discussion with daughter and In effort to decrease polypharmacy, and given patient is stable, we will discontinue several orders, change Tums from scheduled to PRN, decrease Tylenol dosing, order TDAP, and routine blood screenings. Follow up routinely or as needed.      Orders written by provider at facility and transcribed by : Tj King  1. Discontinue MVI, discontinue \"Push fluids\" order.  2. Decrease scheduled Tylenol form TID, to 650 mg PO BID. Dx: Pain.  3. Change Tums from scheuled to PRN Dx; Supplement.  4. Tdap Vaccination, IM, x1. Dx: Annual physical  5. TSH, Lipids, A1c, CBC, CMP.  Dx: Annual physical.    Electronically signed by:  Dr. Ligia Peters, APRN CNP DNP    "

## 2019-09-04 NOTE — LETTER
9/4/2019        RE: Smiley Acuña  [address]  [facility] [address]        [place] [facility]  Chief Complaint   Patient presents with     Annual Comprehensive Nursing Home   [place] Medical Record Number:  [id number]  Place of Service where encounter took place:  [facility]  (S) [[id number]]    HPI: Smiley Acuña  is a 84 year old  (1935), who is being seen today for an annual comprehensive visit. HPI information obtained from: facility chart records, facility staff, patient report, [facility] chart review and Care Everywhere [facility] chart review.  Today's concerns are:    Annual physical exam  Essential hypertension with goal blood pressure less than 130/80  CKD (chronic kidney disease) stage 4, GFR 15-29 ml/min (H)  Anemia due to stage 4 chronic kidney disease (H)  Hyperlipidemia LDL goal <100  Alzheimer's dementia without behavioral disturbance, unspecified timing of dementia onset  Frail elderly  Patient denies CP, SOB, wheezing, fever, chills. She states she has regular BMs and her bladder is working fine. She denies pain. Labs from April 2019 are as noted below with obvious CKD and anemia. HLD is well-controlled w/o medication. DM controlled w/ Glargine. Nursing reports no new behaviors, and last PHQ9 was 7/27 (mild depression) and BIMS was 5/15 (severe cognitive impairment) in July 2019. Patient states she is eating well, and her daily BG readings are as noted below. BP is controlled w/ TID Hydralazine and weekly trend is noted below:  9/3/2019 07:12 122.0 mg/dL  9/2/2019 07:06 117.0 mg/dL   8/31/2019 07:34 115.0 mg/dL   8/30/2019 07:34 122.0 mg/dL   8/29/2019 07:02 116.0 mg/dL    8/28/2019 08:45 104.0 mg/dL  8/27/2019 08:00 113.0 mg/dL  8/26/2019 07:21 120.0 mg/dL   8/25/2019 07:20 114.0 mg/dL  8/24/2019 08:01 119.0 mg/dL   8/23/2019 07:15 105.0 mg/dL   8/22/2019 08:09 119.0 mg/dL    BPs:  8/28/2019 10:49 138 / 76 mmHg   8/21/2019 10:45 135 / 71 mmHg    8/14/2019 07:16 151 / 76 mmHg   8/7/2019 07:14 151 / 74 mmHg   7/31/2019 07:58 176/82 mmHg   7/24/2019 08:09 129 / 70 mmHg     ALLERGIES: Lisinopril PAST MEDICAL HISTORY:  has a past medical history of Abnormality of gait, Anemia, unspecified, Chronic ischemic heart disease, unspecified, Closed fracture of patella (06/21/10), Coronary artery disease, Degenerative disc disease, Depressive disorder, not elsewhere classified, History of blood transfusion, Other and unspecified hyperlipidemia, Renal failure, unspecified, Septicemia due to Escherichia coli (E. coli)(038.42) (H) (05/03/2007), Syncope and collapse (4/8/2005), Syncope and collapse (05/26/10), Traumatic subdural hematomas (05/27/10), Type II or unspecified type diabetes mellitus without mention of complication, not stated as uncontrolled, Unspecified essential hypertension, Unspecified sleep apnea, and Urinary tract infection, site not specified (4/4/2008). She also has no past medical history of Asthma, Congestive heart failure, unspecified, COPD (chronic obstructive pulmonary disease) (H), Malignant neoplasm (H), Thyroid disease, or Unspecified cerebral artery occlusion with cerebral infarction. PAST SURGICAL HISTORY:  has a past surgical history that includes AMPUTATION TOE,MT-P JT (2000); APPENDECTOMY; ANESTH,UPPER LEG SURGERY; UPPER GI ENDOSCOPY,EXAM (04/27/2005); Colonoscopy w/wo Brush **Performed** (12/05/2005); UGI ENDOSCOPY DIAG W OR W/O BRUSH/WASH (12/05/2005); BX SYNOVIUM INTERPHALANG JT; craniotomy (05/28/10); REPAIR ROTATOR CUFF,ACUTE (11/21/2002); EXCISION LESION/TENDON-SHEATH/CAPSULE, FOOT (04/30/07); EXCISION LESION/TENDON-SHEATH/CAPSULE, FOOT (8/14/2007); bunionectomy rt/lt (10/24/07); and OPEN TX FEMORAL SUPRACONDYLAR FRACTURE W EXTENSION (12/14/12).  IMMUNIZATIONS:  Immunization History   Administered Date(s) Administered     HepB 01/21/2004, 02/18/2004, 07/20/2004     Influenza (High Dose) 3 valent vaccine 11/19/2010, 10/26/2011,  09/11/2012, 11/05/2013, 10/15/2014, 10/21/2015, 09/07/2016, 11/08/2017, 09/19/2018     Influenza (IIV3) PF 11/19/1997, 10/19/1998, 10/04/1999, 11/24/2000, 11/06/2001, 10/28/2002, 10/31/2003, 10/20/2004, 11/03/2005, 11/14/2006, 11/19/2007, 11/19/2008, 12/02/2009     Pneumo Conj 13-V (2010&after) 12/08/2017     Pneumococcal 23 valent 01/21/2004     TD (ADULT, 7+) 07/19/1999     Above immunizations pulled from [facility]. [facility] and facility records also reconciled. Outstanding information sent to  to update [facility].  Future immunizations needed:  TDAP  Medication Sig     acetaminophen (TYLENOL) 325 MG tablet Take 2 tablets (650 mg) by mouth 2 times daily. May also take 2 tablets (650 mg) every 4 hours as needed for mild pain.     calcium carbonate (TUMS) 500 MG chewable tablet Take 2 tablets (1,000 mg) by mouth 2 times daily as needed for heartburn     hydrALAZINE (APRESOLINE) 100 MG tablet Take 1 tablet (100 mg) by mouth 3 times daily     insulin glargine (LANTUS SOLOSTAR PEN) 100 UNIT/ML pen Inject 5 Units Subcutaneous every morning     magnesium oxide (MAG-OX) 400 MG tablet Take 1 tablet (400 mg) by mouth daily     melatonin 3 MG tablet Take 1 tablet (3 mg) by mouth At Bedtime     polyethylene glycol (MIRALAX/GLYCOLAX) packet Take 17 g by mouth daily     QUEtiapine (SEROQUEL) 25 MG tablet Take 0.5 tablets (12.5 mg) by mouth 2 times daily     Case Management: I have reviewed the facility/SNF care plan/MDS, including the falls risk, nutrition and pain screening. I also reviewed the current immunizations, and preventive care. .Future cancer screening is not clinically indicated secondary to age/goals of care Patient's desire to return to the community is not assessible due to cognitive impairment. Current Level of Care is appropriate.    Advance Directive Discussion: I reviewed the current advanced directives as reflected in EPIC, the POLST and the facility chart, and verified the congruency of  orders. I contacted the first party Theresa (daughter) and discussed the plan of Care.  I did not due to cognitive impairment review the advance directives with the resident.     Team Discussion: I communicated with the appropriate disciplines involved with the Plan of Care:   Nursing    Patient's goal is pain control and comfort. Information reviewed:  Medications, vital signs, orders, and nursing notes.    ROS: Limited secondary to cognitive impairment but today pt reports the above and 10 point ROS of systems including Constitutional, Eyes, Respiratory, Cardiovascular, Gastroenterology, Genitourinary, Integumentary, Musculoskeletal, Psychiatric were all negative except for pertinent positives noted in my HPI.    Vitals:  BP (!) 144/72   Pulse 71   Temp 96.6  F (35.9  C)   Resp 16   Wt 64.8 kg (142 lb 14.4 oz)   SpO2 97%   BMI 23.78 kg/m    Body mass index is 23.78 kg/m .  Exam:  GENERAL APPEARANCE: Alert, in no distress, cooperative.   ENT: Mouth/posterior oropharynx intact w/ moist mucous membranes, hearing acuity [place].  EYES: EOM, conjunctivae, lids, pupils and irises normal, PERRL2.   RESP: Respiratory effort good, no respiratory distress, Lung sounds clear. On RA.   CV: Auscultation of heart reveals S1, S2, rate and rhythm regular, no murmur, no rub or gallop, Edema 0+ BLE. Peripheral pulses are 2+.  ABDOMEN: Normal bowel sounds, soft, non-tender abdomen, and no masses palpated.  SKIN: Inspection/Palpation of skin and subcutaneous tissue baseline w/ fragility. No wounds/rashes noted.   NEURO: CN II-XII at patient's baseline, sensation baseline PPS.  PSYCH: Insight, judgement, and memory are impaired at baseline, affect and mood are happy/pleasant.    Lab/Diagnostic data:   Most Recent 3 CBC's:  Recent Labs   Lab Test 04/11/19  0528 04/10/19  0626 04/09/19  1110   WBC 9.2 10.1 15.5*   HGB 9.9* 10.4* 11.6*   MCV 97 95 96    220 306     Most Recent 3 BMP's:  Recent Labs   Lab Test 04/19/19  "04/11/19  0528 04/10/19  0626    142 145*   POTASSIUM 4.2 3.7 4.6   CHLORIDE 100 110* 113*   CO2 30 25 23   BUN 44* 24 31*   CR 2.13* 1.52* 1.57*   ANIONGAP 9 7 9   KEANU 9.8 8.0* 8.2*   * 128* 114*     Most Recent Cholesterol Panel:  Recent Labs   Lab Test 04/19/19   CHOL 150   LDL 68   HDL 40*   TRIG 208*     Most Recent TSH and T4:  Recent Labs   Lab Test 04/19/19   TSH 1.20     Most Recent Hemoglobin A1c:  Recent Labs   Lab Test 03/31/19  1914   A1C 5.8*     ASSESSMENT/PLAN  Annual physical exam  Essential hypertension with goal blood pressure less than 130/80  CKD (chronic kidney disease) stage 4, GFR 15-29 ml/min (H)  Anemia due to stage 4 chronic kidney disease (H)  Hyperlipidemia LDL goal <100  Alzheimer's dementia without behavioral disturbance, unspecified timing of dementia onset  Frail elderly  Chronic. Stable. After discussion with daughter and In effort to decrease polypharmacy, and given patient is stable, we will discontinue several orders, change Tums from scheduled to PRN, decrease Tylenol dosing, order TDAP, and routine blood screenings. Follow up routinely or as needed.      Orders written by provider at facility and transcribed by : Tj King  1. Discontinue MVI, discontinue \"Push fluids\" order.  2. Decrease scheduled Tylenol form TID, to 650 mg PO BID. Dx: Pain.  3. Change Tums from scheuled to PRN Dx; Supplement.  4. Tdap Vaccination, IM, x1. Dx: Annual physical  5. TSH, Lipids, A1c, CBC, CMP.  Dx: Annual physical.    Electronically signed by:  Dr. Ligia Peters, APRN CNP DNP        Sincerely,        Ligia Peters, ALBA CNP    "

## 2019-09-05 ENCOUNTER — RECORDS - HEALTHEAST (OUTPATIENT)
Dept: LAB | Facility: CLINIC | Age: 84
End: 2019-09-05

## 2019-09-05 LAB
ALBUMIN SERPL-MCNC: 3.1 G/DL (ref 3.5–5)
ALP SERPL-CCNC: 58 U/L (ref 45–120)
ALT SERPL W P-5'-P-CCNC: <9 U/L (ref 0–45)
ANION GAP SERPL CALCULATED.3IONS-SCNC: 11 MMOL/L (ref 5–18)
AST SERPL W P-5'-P-CCNC: 16 U/L (ref 0–40)
BASOPHILS # BLD AUTO: 0.1 THOU/UL (ref 0–0.2)
BASOPHILS NFR BLD AUTO: 1 % (ref 0–2)
BILIRUB SERPL-MCNC: 0.2 MG/DL (ref 0–1)
BUN SERPL-MCNC: 62 MG/DL (ref 8–28)
CALCIUM SERPL-MCNC: 9.8 MG/DL (ref 8.5–10.5)
CHLORIDE BLD-SCNC: 99 MMOL/L (ref 98–107)
CHOLEST SERPL-MCNC: 162 MG/DL
CO2 SERPL-SCNC: 28 MMOL/L (ref 22–31)
CREAT SERPL-MCNC: 2.95 MG/DL (ref 0.6–1.1)
EOSINOPHIL # BLD AUTO: 0.1 THOU/UL (ref 0–0.4)
EOSINOPHIL NFR BLD AUTO: 2 % (ref 0–6)
ERYTHROCYTE [DISTWIDTH] IN BLOOD BY AUTOMATED COUNT: 12.6 % (ref 11–14.5)
FASTING STATUS PATIENT QL REPORTED: ABNORMAL
GFR SERPL CREATININE-BSD FRML MDRD: 15 ML/MIN/1.73M2
GLUCOSE BLD-MCNC: 140 MG/DL (ref 70–125)
HCT VFR BLD AUTO: 33.9 % (ref 35–47)
HDLC SERPL-MCNC: 39 MG/DL
HGB BLD-MCNC: 10.6 G/DL (ref 12–16)
LDLC SERPL CALC-MCNC: 85 MG/DL
LYMPHOCYTES # BLD AUTO: 2.1 THOU/UL (ref 0.8–4.4)
LYMPHOCYTES NFR BLD AUTO: 26 % (ref 20–40)
MCH RBC QN AUTO: 31.4 PG (ref 27–34)
MCHC RBC AUTO-ENTMCNC: 31.3 G/DL (ref 32–36)
MCV RBC AUTO: 100 FL (ref 80–100)
MONOCYTES # BLD AUTO: 0.6 THOU/UL (ref 0–0.9)
MONOCYTES NFR BLD AUTO: 7 % (ref 2–10)
NEUTROPHILS # BLD AUTO: 5.3 THOU/UL (ref 2–7.7)
NEUTROPHILS NFR BLD AUTO: 65 % (ref 50–70)
PLATELET # BLD AUTO: 274 THOU/UL (ref 140–440)
PMV BLD AUTO: 10.8 FL (ref 8.5–12.5)
POTASSIUM BLD-SCNC: 4.9 MMOL/L (ref 3.5–5)
PROT SERPL-MCNC: 7.5 G/DL (ref 6–8)
RBC # BLD AUTO: 3.38 MILL/UL (ref 3.8–5.4)
SODIUM SERPL-SCNC: 138 MMOL/L (ref 136–145)
TRIGL SERPL-MCNC: 191 MG/DL
TSH SERPL DL<=0.005 MIU/L-ACNC: 0.81 UIU/ML (ref 0.3–5)
WBC: 8.2 THOU/UL (ref 4–11)

## 2019-09-06 LAB — HBA1C MFR BLD: 7.1 % (ref 4.2–6.1)

## 2019-09-30 ENCOUNTER — NURSING HOME VISIT (OUTPATIENT)
Dept: GERIATRICS | Facility: CLINIC | Age: 84
End: 2019-09-30
Payer: MEDICARE

## 2019-09-30 VITALS
HEART RATE: 70 BPM | OXYGEN SATURATION: 97 % | TEMPERATURE: 98.6 F | WEIGHT: 143.6 LBS | SYSTOLIC BLOOD PRESSURE: 173 MMHG | BODY MASS INDEX: 23.9 KG/M2 | RESPIRATION RATE: 16 BRPM | DIASTOLIC BLOOD PRESSURE: 77 MMHG

## 2019-09-30 DIAGNOSIS — F02.80 ALZHEIMER'S DEMENTIA WITHOUT BEHAVIORAL DISTURBANCE, UNSPECIFIED TIMING OF DEMENTIA ONSET: Primary | ICD-10-CM

## 2019-09-30 DIAGNOSIS — G30.9 ALZHEIMER'S DEMENTIA WITHOUT BEHAVIORAL DISTURBANCE, UNSPECIFIED TIMING OF DEMENTIA ONSET: Primary | ICD-10-CM

## 2019-09-30 DIAGNOSIS — G62.9 NEUROPATHY: ICD-10-CM

## 2019-09-30 DIAGNOSIS — N18.4 CKD (CHRONIC KIDNEY DISEASE) STAGE 4, GFR 15-29 ML/MIN (H): ICD-10-CM

## 2019-09-30 DIAGNOSIS — E11.49 DIABETES MELLITUS TYPE 2 WITH NEUROLOGICAL MANIFESTATIONS (H): ICD-10-CM

## 2019-09-30 PROCEDURE — 99309 SBSQ NF CARE MODERATE MDM 30: CPT | Performed by: NURSE PRACTITIONER

## 2019-09-30 NOTE — LETTER
9/30/2019        RE: Smiley Acuña  [address]  [facility] [address]        [place] [facility]  [place] Medical Record Number:  [id number]  Place of Service where encounter took place:  [facility]  (FGS) [[id number]]  Chief Complaint   Patient presents with     Nursing Home Acute   HPI: Smiley Acuña  is a 84 year old (1935), who is being seen today for an episodic care visit.  HPI information obtained from: facility chart records, facility staff, patient report, [facility] chart review and Care Everywhere [facility] chart review. Today's concern is:    Alzheimer's dementia without behavioral disturbance, unspecified timing of dementia onset (H)  Nursing consulted provider today for potential GDR of Seroquel. Nursing notes her behaviors have vastly improved. Patient states her mood is pretty good, but then she made a paranoid statement about being accused of murder. She was pleasantly and easily redirected.    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 We note her hx of CKD w/ creatinine of 2.95 in September 2019. Previous dosing w/ gabapentin was discontinued secondary to renal impairment. Lyrica dosing has not been attempted, although this medication carries the same concern for CKD. Patient states her legs are the same, that she has a good appetite. Chart review shows BG as noted below:  9/30/2019 07:22 103.0 mg/dL  9/29/2019 08:02 104.0 mg/dL  9/27/2019 07:13 105.0 mg/dL   9/26/2019 08:29 109.0 mg/dL  9/25/2019 08:44 171.0 mg/dL    9/24/2019 07:12 147.0 mg/dL   9/23/2019 07:30 100.0 mg/dL  9/22/2019 07:08 111.0 mg/dL   9/21/2019 07:00 118.0 mg/dL    9/20/2019 07:51 227.0 mg/dL   9/19/2019 07:33 111.0 mg/dL  9/18/2019 07:05 106.0 mg/dL  9/17/2019 07:39 129.0 mg/dL   9/16/2019 07:28 99.0 mg/dL  9/15/2019 07:31 109.0 mg/dL  9/14/2019 07:01 93.0 mg/dL    9/13/2019 07:37 105.0 mg/dL  9/12/2019 07:29 108.0 mg/dL     Past Medical and Surgical History reviewed in Epic today.  REVIEW OF SYSTEMS: Limited secondary to cognitive impairment but today pt reports the above and 4 point ROS including Respiratory, CV, GI and , other than that noted in the HPI, is negative.    Objective:  BP (!) 173/77   Pulse 70   Temp 98.6  F (37  C)   Resp 16   Wt 65.1 kg (143 lb 9.6 oz)   SpO2 97%   BMI 23.90 kg/m     Exam:  GENERAL APPEARANCE: Alert, in no distress, cooperative.   ENT: Mouth/posterior oropharynx intact w/ moist mucous membranes, hearing acuity [place].  EYES: EOM, conjunctivae, lids, pupils and irises normal, PERRL2.   RESP: Respiratory effort good, no respiratory distress, Lung sounds clear. On RA.   CV: Auscultation of heart reveals S1, S2, rate and rhythm regular, no murmur, no rub or gallop, Edema 0+ BLE. Peripheral pulses are 2+.  ABDOMEN: Normal bowel sounds, soft, non-tender abdomen, and no masses palpated.  SKIN: Inspection/Palpation of skin and subcutaneous tissue baseline w/ fragility. No wounds/rashes noted.   NEURO: CN II-XII at patient's baseline, sensation baseline PPS.  PSYCH: Insight, judgement, and memory are impaired at baseline, affect and mood are happy/pleasant.    Labs:   Labs done in SNF are in [facility]. Please refer to them using [employer]/Care Everywhere. and Recent labs in [facility] and on-site reviewed by me today.     ASSESSMENT/PLAN:  Alzheimer's dementia w/o behavioral disturbance, unspecified timing of dementia onset (H)  Chronic. Stable. Will attempt GDR of Seroquel as noted below and ask nursing to carefully monitor behavior. Follow-up routinely or as needed.    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 Ongoing. Will treat neuropathic pain locally w/ Capsaicin. Noting good BG control. Will discontinue PRN Tums. Follow-up routinely or as  needed.    Orders written by provider at facility and transcribed by : Tj King  1. Decrease Seroquel form BID to 12.5 mg PO at bedtime Dx: Psychosis.  2. Discontinue PRN Tums.  3. Capsaicin 0.075% cream, apply to BLE QID. Dx: Neuropathy.     Electronically signed by:   Dr. Ligia Peters, ALBA CNP DNP        Sincerely,        ALBA Jolly CNP

## 2019-09-30 NOTE — PROGRESS NOTES
[place] [facility]  [place] Medical Record Number:  [id number]  Place of Service where encounter took place:  [facility]  (FGS) [[id number]]  Chief Complaint   Patient presents with     Nursing Home Acute   HPI: Smiley Acuña  is a 84 year old (1935), who is being seen today for an episodic care visit.  HPI information obtained from: facility chart records, facility staff, patient report, [facility] chart review and Care Everywhere [facility] chart review. Today's concern is:    Alzheimer's dementia without behavioral disturbance, unspecified timing of dementia onset (H)  Nursing consulted provider today for potential GDR of Seroquel. Nursing notes her behaviors have vastly improved. Patient states her mood is pretty good, but then she made a paranoid statement about being accused of murder. She was pleasantly and easily redirected.    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 We note her hx of CKD w/ creatinine of 2.95 in September 2019. Previous dosing w/ gabapentin was discontinued secondary to renal impairment. Lyrica dosing has not been attempted, although this medication carries the same concern for CKD. Patient states her legs are the same, that she has a good appetite. Chart review shows BG as noted below:  9/30/2019 07:22 103.0 mg/dL  9/29/2019 08:02 104.0 mg/dL  9/27/2019 07:13 105.0 mg/dL   9/26/2019 08:29 109.0 mg/dL  9/25/2019 08:44 171.0 mg/dL    9/24/2019 07:12 147.0 mg/dL   9/23/2019 07:30 100.0 mg/dL  9/22/2019 07:08 111.0 mg/dL   9/21/2019 07:00 118.0 mg/dL    9/20/2019 07:51 227.0 mg/dL   9/19/2019 07:33 111.0 mg/dL  9/18/2019 07:05 106.0 mg/dL  9/17/2019 07:39 129.0 mg/dL   9/16/2019 07:28 99.0 mg/dL  9/15/2019 07:31 109.0 mg/dL  9/14/2019 07:01 93.0 mg/dL   9/13/2019 07:37 105.0 mg/dL  9/12/2019 07:29 108.0 mg/dL     Past Medical and Surgical  History reviewed in Epic today.  REVIEW OF SYSTEMS: Limited secondary to cognitive impairment but today pt reports the above and 4 point ROS including Respiratory, CV, GI and , other than that noted in the HPI, is negative.    Objective:  BP (!) 173/77   Pulse 70   Temp 98.6  F (37  C)   Resp 16   Wt 65.1 kg (143 lb 9.6 oz)   SpO2 97%   BMI 23.90 kg/m    Exam:  GENERAL APPEARANCE: Alert, in no distress, cooperative.   ENT: Mouth/posterior oropharynx intact w/ moist mucous membranes, hearing acuity [place].  EYES: EOM, conjunctivae, lids, pupils and irises normal, PERRL2.   RESP: Respiratory effort good, no respiratory distress, Lung sounds clear. On RA.   CV: Auscultation of heart reveals S1, S2, rate and rhythm regular, no murmur, no rub or gallop, Edema 0+ BLE. Peripheral pulses are 2+.  ABDOMEN: Normal bowel sounds, soft, non-tender abdomen, and no masses palpated.  SKIN: Inspection/Palpation of skin and subcutaneous tissue baseline w/ fragility. No wounds/rashes noted.   NEURO: CN II-XII at patient's baseline, sensation baseline PPS.  PSYCH: Insight, judgement, and memory are impaired at baseline, affect and mood are happy/pleasant.    Labs:   Labs done in SNF are in [facility]. Please refer to them using [employer]/Care Everywhere. and Recent labs in [facility] and on-site reviewed by me today.     ASSESSMENT/PLAN:  Alzheimer's dementia w/o behavioral disturbance, unspecified timing of dementia onset (H)  Chronic. Stable. Will attempt GDR of Seroquel as noted below and ask nursing to carefully monitor behavior. Follow-up routinely or as needed.    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 Ongoing. Will treat neuropathic pain locally w/ Capsaicin. Noting good BG control. Will discontinue PRN Tums. Follow-up routinely or as needed.    Orders written by provider at facility and transcribed by : Tj  Gray  1. Decrease Seroquel form BID to 12.5 mg PO at bedtime Dx: Psychosis.  2. Discontinue PRN Tums.  3. Capsaicin 0.075% cream, apply to BLE QID. Dx: Neuropathy.     Electronically signed by:   Dr. Ligia Peters, APRN CNP DNP

## 2019-10-17 NOTE — PROGRESS NOTES
"[place] [facility]  [place] Medical Record Number:  [id number]  Place of Service where encounter took place:  [facility]  (FGS) [[id number]]  Chief Complaint   Patient presents with     Nursing Home Acute   HPI: Smiley Acuña  is a 84 year old (1935), who is being seen today for an episodic care visit.  HPI information obtained from: facility chart records, facility staff, patient report, [facility] chart review and Care Everywhere [facility] chart review. Today's concern is:    Alzheimer's dementia without behavioral disturbance, unspecified timing of dementia onset (H)  Last visit, we started GDR of Seroquel, decreasing her 12.5mg from BID to @HS only. Nursing had noted worsening behaviors within days and her BID dosing was reinstated. Today, nursing notes her behaviors have returned to previous baseline. Patient states her mood is pretty good. She was pleasantly and easily redirected.    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 Nursing states that her toe is swollen and bothering her. Patient states her legs are \"fine\", that she has a good appetite. Chart review shows BG as noted below:  10/17/2019 07:19 97.0 mg/dL  10/16/2019 07:32 106.0 mg/dL  10/15/2019 07:20 100.0 mg/dL    10/14/2019 07:57 98.0 mg/dL   10/13/2019 08:18 165.0 mg/dL   10/12/2019 07:13 94.0 mg/dL  10/11/2019 07:03 90.0 mg/dL   10/10/2019 07:10 99.0 mg/dL   10/9/2019 07:32 121.0 mg/dL     Past Medical and Surgical History reviewed in Epic today.  REVIEW OF SYSTEMS: Limited secondary to cognitive impairment but today pt reports the above and 4 point ROS including Respiratory, CV, GI and , other than that noted in the HPI, is negative.    Objective:  BP (!) 185/85   Pulse 68   Temp 97.1  F (36.2  C)   Resp 16  "  Wt 64.8 kg (142 lb 14.4 oz)   SpO2 94%   BMI 23.78 kg/m    Exam:  GENERAL APPEARANCE: Alert, in no distress, cooperative.   ENT: Mouth/posterior oropharynx intact w/ moist mucous membranes, hearing acuity [place].  EYES: EOM, conjunctivae, lids, pupils and irises normal, PERRL2.   RESP: Respiratory effort good, no respiratory distress, Lung sounds clear. On RA.   CV: Auscultation of heart reveals S1, S2, rate and rhythm regular, no murmur, no rub or gallop, Edema 0+ BLE. Peripheral pulses are 2+.  ABDOMEN: Normal bowel sounds, soft, non-tender abdomen, and no masses palpated.  SKIN: Inspection/Palpation of skin and subcutaneous tissue baseline w/ fragility. No wounds/rashes noted. We note severe onychomycosis to right great and 2nd toes.   NEURO: CN II-XII at patient's baseline, sensation baseline PPS.  PSYCH: Insight, judgement, and memory are impaired at baseline, affect and mood are happy/pleasant.    Labs:   Labs done in SNF are in [facility] [employer]. Please refer to them using [employer]/Care Everywhere. and Recent labs in [employer] and on-site reviewed by me today.     ASSESSMENT/PLAN:  Alzheimer's dementia w/o behavioral disturbance, unspecified timing of dementia onset (H)  Chronic. Stable. GDR of Seroquel was unsuccessful, nursing to carefully monitor behavior. Follow-up routinely or as needed.     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 Ongoing. Capsaicin helpful. Will discontinue long-acting insulin and continue BG checks. Will recheck a1c in 4 weeks. Follow-up routinely or as needed.    Orders written by provider at facility and transcribed by : Tj King  1. Discontinue insulin Glargine.  2. Recheck a1c and Mag level x1 in 1 month. Dx: DM II.    Electronically signed by:   Dr. Ligia Peters, APRN CNP DNP

## 2019-10-18 ENCOUNTER — NURSING HOME VISIT (OUTPATIENT)
Dept: GERIATRICS | Facility: CLINIC | Age: 84
End: 2019-10-18
Payer: MEDICARE

## 2019-10-18 VITALS
WEIGHT: 142.9 LBS | SYSTOLIC BLOOD PRESSURE: 185 MMHG | RESPIRATION RATE: 16 BRPM | OXYGEN SATURATION: 94 % | BODY MASS INDEX: 23.78 KG/M2 | TEMPERATURE: 97.1 F | DIASTOLIC BLOOD PRESSURE: 85 MMHG | HEART RATE: 68 BPM

## 2019-10-18 DIAGNOSIS — N18.4 CKD (CHRONIC KIDNEY DISEASE) STAGE 4, GFR 15-29 ML/MIN (H): ICD-10-CM

## 2019-10-18 DIAGNOSIS — F02.80 ALZHEIMER'S DEMENTIA WITHOUT BEHAVIORAL DISTURBANCE, UNSPECIFIED TIMING OF DEMENTIA ONSET: Primary | ICD-10-CM

## 2019-10-18 DIAGNOSIS — G62.9 NEUROPATHY: ICD-10-CM

## 2019-10-18 DIAGNOSIS — G30.9 ALZHEIMER'S DEMENTIA WITHOUT BEHAVIORAL DISTURBANCE, UNSPECIFIED TIMING OF DEMENTIA ONSET: Primary | ICD-10-CM

## 2019-10-18 DIAGNOSIS — E11.49 DIABETES MELLITUS TYPE 2 WITH NEUROLOGICAL MANIFESTATIONS (H): ICD-10-CM

## 2019-10-18 PROCEDURE — 99309 SBSQ NF CARE MODERATE MDM 30: CPT | Performed by: NURSE PRACTITIONER

## 2019-10-18 NOTE — LETTER
"    10/18/2019        RE: Smiley Acuña  [address]  [facility] [address]        [place] [facility]  [place] Medical Record Number:  [id number]  Place of Service where encounter took place:  [facility]  (S) [[id number]]  Chief Complaint   Patient presents with     Nursing Home Acute   HPI: Smiley Acuña  is a 84 year old (1935), who is being seen today for an episodic care visit.  HPI information obtained from: facility chart records, facility staff, patient report, [facility] chart review and Care Everywhere [facility] chart review. Today's concern is:    Alzheimer's dementia without behavioral disturbance, unspecified timing of dementia onset (H)  Last visit, we started GDR of Seroquel, decreasing her 12.5mg from BID to @HS only. Nursing had noted worsening behaviors within days and her BID dosing was reinstated. Today, nursing notes her behaviors have returned to previous baseline. Patient states her mood is pretty good. She was pleasantly and easily redirected.    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 Nursing states that her toe is swollen and bothering her. Patient states her legs are \"fine\", that she has a good appetite. Chart review shows BG as noted below:  10/17/2019 07:19 97.0 mg/dL  10/16/2019 07:32 106.0 mg/dL  10/15/2019 07:20 100.0 mg/dL    10/14/2019 07:57 98.0 mg/dL   10/13/2019 08:18 165.0 mg/dL   10/12/2019 07:13 94.0 mg/dL  10/11/2019 07:03 90.0 mg/dL   10/10/2019 07:10 99.0 mg/dL   10/9/2019 07:32 121.0 mg/dL     Past Medical and Surgical History reviewed in Epic today.  REVIEW OF SYSTEMS: Limited secondary to cognitive impairment but today pt reports the above and 4 point ROS including Respiratory, CV, GI and , other than that noted in the " HPI, is negative.    Objective:  BP (!) 185/85   Pulse 68   Temp 97.1  F (36.2  C)   Resp 16   Wt 64.8 kg (142 lb 14.4 oz)   SpO2 94%   BMI 23.78 kg/m     Exam:  GENERAL APPEARANCE: Alert, in no distress, cooperative.   ENT: Mouth/posterior oropharynx intact w/ moist mucous membranes, hearing acuity [place].  EYES: EOM, conjunctivae, lids, pupils and irises normal, PERRL2.   RESP: Respiratory effort good, no respiratory distress, Lung sounds clear. On RA.   CV: Auscultation of heart reveals S1, S2, rate and rhythm regular, no murmur, no rub or gallop, Edema 0+ BLE. Peripheral pulses are 2+.  ABDOMEN: Normal bowel sounds, soft, non-tender abdomen, and no masses palpated.  SKIN: Inspection/Palpation of skin and subcutaneous tissue baseline w/ fragility. No wounds/rashes noted. We note severe onychomycosis to right great and 2nd toes.   NEURO: CN II-XII at patient's baseline, sensation baseline PPS.  PSYCH: Insight, judgement, and memory are impaired at baseline, affect and mood are happy/pleasant.    Labs:   Labs done in SNF are in [place] [facility]. Please refer to them using [place]/Care Everywhere. and Recent labs in [place] and on-site reviewed by me today.     ASSESSMENT/PLAN:  Alzheimer's dementia w/o behavioral disturbance, unspecified timing of dementia onset (H)  Chronic. Stable. GDR of Seroquel was unsuccessful, nursing to carefully monitor behavior. Follow-up routinely or as needed.     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 Ongoing. Capsaicin helpful. Will discontinue long-acting insulin and continue BG checks. Will recheck a1c in 4 weeks. Follow-up routinely or as needed.    Orders written by provider at facility and transcribed by : Tj King  1. Discontinue insulin Glargine.  2. Recheck a1c and Mag level x1 in 1 month. Dx: DM II.    Electronically signed by:   Dr. Ligia Peters, APRN CNP  DNP      Sincerely,        ALBA Jolly CNP

## 2019-10-29 ENCOUNTER — NURSING HOME VISIT (OUTPATIENT)
Dept: GERIATRICS | Facility: CLINIC | Age: 84
End: 2019-10-29
Payer: MEDICARE

## 2019-10-29 VITALS
DIASTOLIC BLOOD PRESSURE: 76 MMHG | OXYGEN SATURATION: 98 % | TEMPERATURE: 96.3 F | SYSTOLIC BLOOD PRESSURE: 188 MMHG | HEART RATE: 59 BPM | RESPIRATION RATE: 16 BRPM | BODY MASS INDEX: 23.93 KG/M2 | WEIGHT: 143.8 LBS

## 2019-10-29 DIAGNOSIS — G62.9 NEUROPATHY: ICD-10-CM

## 2019-10-29 DIAGNOSIS — M19.91 PRIMARY OSTEOARTHRITIS, UNSPECIFIED SITE: ICD-10-CM

## 2019-10-29 DIAGNOSIS — N18.4 CKD (CHRONIC KIDNEY DISEASE) STAGE 4, GFR 15-29 ML/MIN (H): ICD-10-CM

## 2019-10-29 DIAGNOSIS — E11.49 DIABETES MELLITUS TYPE 2 WITH NEUROLOGICAL MANIFESTATIONS (H): Primary | ICD-10-CM

## 2019-10-29 PROCEDURE — 99309 SBSQ NF CARE MODERATE MDM 30: CPT | Performed by: NURSE PRACTITIONER

## 2019-10-29 RX ORDER — ACETAMINOPHEN 500 MG
1000 TABLET ORAL 3 TIMES DAILY
Start: 2019-10-29 | End: 2020-01-01 | Stop reason: ALTCHOICE

## 2019-10-29 NOTE — PROGRESS NOTES
[place] [facility]  [place] Medical Record Number:  [id number]  Place of Service where encounter took place:  [facility]  (FGS) [[id number]]  Chief Complaint   Patient presents with     Nursing Home Acute   HPI: Smiley Acuña  is a 84 year old (1935), who is being seen today for an episodic care visit.  HPI information obtained from: facility chart records, facility staff, patient report, [facility] chart review and Care Everywhere [facility] chart review. 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 CKD inhibits use of Flora or Lyrica, so we started Capsaicin QID which does temporarily help w/ pain. Tylenol (low dose) is scheduled to BID. Last visit, we discontinued Lantus, and DM is now diet-controlled. A1c and Mag are already scheduled for next month. Patient states she knows she has arthritis, sometimes the cream helps. She denies CP, SOB, fever, constipation. Her appetite is good. Her BG readings are noted below:  10/29/2019 09:05 150.0 mg/dL   10/28/2019 07:07 127.0 mg/dL   10/27/2019 07:30 146.0 mg/dL   10/26/2019 07:16 77.0 mg/dL   10/25/2019 07:15 116.0 mg/dL   10/24/2019 07:22 125.0 mg/dL   10/23/2019 07:11 99.0 mg/dL   10/22/2019 07:35 101.0 mg/dL   10/21/2019 07:24 97.0 mg/dL  10/20/2019 07:08 109.0 mg/dL   10/19/2019 07:05 101.0 mg/dL   10/18/2019 07:17 98.0 mg/dL   10/17/2019 07:19 97.0 mg/dL     Past Medical and Surgical History reviewed in Epic today.  REVIEW OF SYSTEMS: Limited secondary to cognitive impairment but today pt reports the above and 4 point ROS including Respiratory, CV, GI and , other than that noted in the HPI, is  negative.    Objective:  BP (!) 188/76   Pulse 59   Temp 96.3  F (35.7  C)   Resp 16   Wt 65.2 kg (143 lb 12.8 oz)   SpO2 98%   BMI 23.93 kg/m    Exam:  GENERAL APPEARANCE: Alert, in no distress, cooperative.   ENT: Mouth/posterior oropharynx intact w/ moist mucous membranes, hearing acuity [place].  EYES: EOM, conjunctivae, lids, pupils and irises normal, PERRL2.   RESP: Respiratory effort good, no respiratory distress, Lung sounds clear. On RA.   CV: Auscultation of heart reveals S1, S2, rate and rhythm regular, no murmur, no rub or gallop, Edema 0+ BLE. Peripheral pulses are 2+.  ABDOMEN: Normal bowel sounds, soft, non-tender abdomen, and no masses palpated.  SKIN: Inspection/Palpation of skin and subcutaneous tissue baseline w/ fragility. No wounds/rashes noted. We note severe onychomycosis to right great and 2nd toes.   NEURO: CN II-XII at patient's baseline, sensation baseline PPS.  PSYCH: Insight, judgement, and memory are impaired at baseline, affect and mood are happy/pleasant.    Labs:   Recent labs in [place] reviewed by me today.     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 Progressive. Ongoing. Will discontinue current low dose Tylenol and schedule TID ES dosing. Will recheck CMP w/ next/upcoming labs to r/o metabolic causes for leg pain. Follow-up routinely or as needed.    Orders written by provider at facility and transcribed by : Tj King  1. DC Current scheduled tylenol  2. Tylenol 1000 mg PO TID Dx: OA  3. CMP x1 with next scheduled labs in Nov Dx: CKD    Electronically signed by:  Dr. Ligia Peters, APRN CNP DNP

## 2019-10-29 NOTE — LETTER
10/29/2019        RE: Smiley Acuña  [address]  [facility] [address]        [place] [facility]  [place] Medical Record Number:  [id number]  Place of Service where encounter took place:  [facility]  (S) [[id number]]  Chief Complaint   Patient presents with     Nursing Home Acute   HPI: Smiley Acuña  is a 84 year old (1935), who is being seen today for an episodic care visit.  HPI information obtained from: facility chart records, facility staff, patient report, [facility] chart review and Care Everywhere [facility] chart review. 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 CKD inhibits use of Flora or Lyrica, so we started Capsaicin QID which does temporarily help w/ pain. Tylenol (low dose) is scheduled to BID. Last visit, we discontinued Lantus, and DM is now diet-controlled. A1c and Mag are already scheduled for next month. Patient states she knows she has arthritis, sometimes the cream helps. She denies CP, SOB, fever, constipation. Her appetite is good. Her BG readings are noted below:  10/29/2019 09:05 150.0 mg/dL   10/28/2019 07:07 127.0 mg/dL   10/27/2019 07:30 146.0 mg/dL   10/26/2019 07:16 77.0 mg/dL   10/25/2019 07:15 116.0 mg/dL   10/24/2019 07:22 125.0 mg/dL   10/23/2019 07:11 99.0 mg/dL   10/22/2019 07:35 101.0 mg/dL   10/21/2019 07:24 97.0 mg/dL  10/20/2019 07:08 109.0 mg/dL   10/19/2019 07:05 101.0 mg/dL   10/18/2019 07:17 98.0 mg/dL   10/17/2019 07:19 97.0 mg/dL     Past Medical and Surgical History reviewed in Epic today.  REVIEW OF SYSTEMS: Limited secondary to cognitive impairment but today pt reports the above and  4 point ROS including Respiratory, CV, GI and , other than that noted in the HPI, is negative.    Objective:  BP (!) 188/76   Pulse 59   Temp 96.3  F (35.7  C)   Resp 16   Wt 65.2 kg (143 lb 12.8 oz)   SpO2 98%   BMI 23.93 kg/m     Exam:  GENERAL APPEARANCE: Alert, in no distress, cooperative.   ENT: Mouth/posterior oropharynx intact w/ moist mucous membranes, hearing acuity [place].  EYES: EOM, conjunctivae, lids, pupils and irises normal, PERRL2.   RESP: Respiratory effort good, no respiratory distress, Lung sounds clear. On RA.   CV: Auscultation of heart reveals S1, S2, rate and rhythm regular, no murmur, no rub or gallop, Edema 0+ BLE. Peripheral pulses are 2+.  ABDOMEN: Normal bowel sounds, soft, non-tender abdomen, and no masses palpated.  SKIN: Inspection/Palpation of skin and subcutaneous tissue baseline w/ fragility. No wounds/rashes noted. We note severe onychomycosis to right great and 2nd toes.   NEURO: CN II-XII at patient's baseline, sensation baseline PPS.  PSYCH: Insight, judgement, and memory are impaired at baseline, affect and mood are happy/pleasant.    Labs:   Recent labs in [facility] reviewed by me today.     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 Progressive. Ongoing. Will discontinue current low dose Tylenol and schedule TID ES dosing. Will recheck CMP w/ next/upcoming labs to r/o metabolic causes for leg pain. Follow-up routinely or as needed.    Orders written by provider at facility and transcribed by : Tj King  1. DC Current scheduled tylenol  2. Tylenol 1000 mg PO TID Dx: OA  3. CMP x1 with next scheduled labs in Nov Dx: CKD    Electronically signed by:  Dr. Ligia Peters, APRN CNP [facility]          Sincerely,        ALBA Jolly CNP

## 2019-11-15 ENCOUNTER — RECORDS - HEALTHEAST (OUTPATIENT)
Dept: LAB | Facility: CLINIC | Age: 84
End: 2019-11-15

## 2019-11-15 ENCOUNTER — TRANSFERRED RECORDS (OUTPATIENT)
Dept: HEALTH INFORMATION MANAGEMENT | Facility: CLINIC | Age: 84
End: 2019-11-15

## 2019-11-15 LAB
ALBUMIN SERPL-MCNC: 3.5 G/DL (ref 3.5–5)
ALBUMIN SERPL-MCNC: 3.5 G/DL (ref 3.5–5)
ALP SERPL-CCNC: 58 U/L (ref 45–120)
ALP SERPL-CCNC: 58 U/L (ref 45–120)
ALT SERPL W P-5'-P-CCNC: <9 U/L (ref 0–45)
ALT SERPL-CCNC: <9 U/L (ref 0–45)
ANION GAP SERPL CALCULATED.3IONS-SCNC: 8 MMOL/L (ref 5–16)
ANION GAP SERPL CALCULATED.3IONS-SCNC: 8 MMOL/L (ref 5–18)
AST SERPL W P-5'-P-CCNC: 16 U/L (ref 0–40)
AST SERPL-CCNC: 16 U/L (ref 0–40)
BILIRUB SERPL-MCNC: 0.3 MG/DL (ref 0–1)
BILIRUB SERPL-MCNC: 0.3 MG/DL (ref 0–1)
BUN SERPL-MCNC: 46 MG/DL (ref 8–28)
BUN SERPL-MCNC: 46 MG/DL (ref 8–28)
CALCIUM SERPL-MCNC: 9.5 MG/DL (ref 8.5–10.5)
CALCIUM SERPL-MCNC: 9.5 MG/DL (ref 8.5–10.5)
CHLORIDE BLD-SCNC: 103 MMOL/L (ref 98–107)
CHLORIDE SERPLBLD-SCNC: 103 MMOL/L (ref 98–107)
CO2 SERPL-SCNC: 28 MMOL/L (ref 22–31)
CO2 SERPL-SCNC: 28 MMOL/L (ref 22–31)
CREAT SERPL-MCNC: 2.39 MG/DL (ref 0.6–1.1)
CREAT SERPL-MCNC: 2.39 MG/DL (ref 0.6–1.1)
GFR SERPL CREATININE-BSD FRML MDRD: 19 ML/MIN/1.73M2
GFR SERPL CREATININE-BSD FRML MDRD: 19 ML/MIN/1.73M2
GLUCOSE BLD-MCNC: 94 MG/DL (ref 70–125)
GLUCOSE SERPL-MCNC: 94 MG/DL (ref 70–125)
HBA1C MFR BLD: 6.8 % (ref 4.2–6.1)
HBA1C MFR BLD: 6.8 % (ref 4.2–6.1)
MAGNESIUM SERPL-MCNC: 2.3 MG/DL (ref 1.8–2.6)
POTASSIUM BLD-SCNC: 4.7 MMOL/L (ref 3.5–5)
POTASSIUM SERPL-SCNC: 4.7 MMOL/L (ref 3.5–5)
PROT SERPL-MCNC: 7.4 G/DL (ref 6–8)
PROT SERPL-MCNC: 7.4 G/DL (ref 6–8)
SODIUM SERPL-SCNC: 139 MMOL/L (ref 136–145)
SODIUM SERPL-SCNC: 139 MMOL/L (ref 136–145)

## 2019-11-17 VITALS
SYSTOLIC BLOOD PRESSURE: 186 MMHG | OXYGEN SATURATION: 95 % | BODY MASS INDEX: 23.55 KG/M2 | TEMPERATURE: 97.6 F | HEART RATE: 60 BPM | WEIGHT: 141.5 LBS | DIASTOLIC BLOOD PRESSURE: 72 MMHG | RESPIRATION RATE: 16 BRPM

## 2019-11-17 NOTE — PROGRESS NOTES
[place] [facility]  Chief Complaint   Patient presents with     senior living Regulatory     [place] Medical Record Number:  [id number]  Place of Service where encounter took place:  [facility]  (S) [[id number]]    HPI:    Smiley Acuña  is 84 year old (1935), who is being seen today for a federally mandated E/M visit.  HPI information obtained from: facility staff, patient report and [facility] chart review.     Today's concerns are:  - DNP reports started on scheduled tylenol and capsaicin for neuropathy and OA pain in knees.    - Resident seen and examined. Reports aching right knee chronic pain, mild, no aggravating factors, better with medicine, in general is better.   -  Reports sleep, appetite and BM are fine.   - RN and DNP have no concern  --------------------------------  - Past Medical, social, family histories, medications, and allergies reviewed and updated  - Medications reviewed: in the chart and EHR.   - Case Management:   I have reviewed the care plan and MDS and do agree with the plan. Patient's desire to return to the community is not present.  Information reviewed:  Medications, vital signs, orders, and nursing notes.    MEDICATIONS:  Current Outpatient Medications   Medication Sig Dispense Refill     acetaminophen (TYLENOL) 500 MG tablet Take 2 tablets (1,000 mg) by mouth 3 times daily       ASPIRIN NOT PRESCRIBED, INTENTIONAL, by Other route continuous prn. 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    0     calcium carbonate (TUMS) 500 MG chewable tablet Take 2 tablets (1,000 mg) by mouth 2 times daily as needed for heartburn  0     hydrALAZINE (APRESOLINE) 100 MG tablet Take 1 tablet (100 mg) by mouth 3 times daily       magnesium oxide (MAG-OX) 400 MG tablet Take 1 tablet (400 mg) by mouth daily       melatonin 3 MG tablet Take 1 tablet (3 mg) by mouth At Bedtime       polyethylene glycol (MIRALAX/GLYCOLAX) packet Take 17 g by mouth daily        QUEtiapine (SEROQUEL) 25 MG tablet Take 0.5 tablets (12.5 mg) by mouth 2 times daily       ROS:  Limited secondary to cognitive impairment but today pt reports- see HPI    Vitals:  BP (!) 186/72   Pulse 60   Temp 97.6  F (36.4  C)   Resp 16   Wt 64.2 kg (141 lb 8 oz)   SpO2 95%   BMI 23.55 kg/m    Body mass index is 23.55 kg/m .  Exam:  GENERAL APPEARANCE:  in no distress, cooperative  RESP:  lungs clear to auscultation   CV:  S1S2 audible, regular HR, no murmur appreciated.   ABDOMEN:  soft, NT/ND, BS audible. no mass appreciated on palpation.   M/S:   no joint deformity noted on observation.   SKIN:  No rash.   NEURO:   No NFD appreciated on observation. Hand  5/5 b/l  PSYCH:  AAOx Person, and day but not place, month or year.     Lab/Diagnostic data: no new data to review.     ASSESSMENT/PLAN  ----------------------------------  Dementia with behavioral disturbance, unspecified dementia type (H)  - Continue to anticipate needs. Chronic condition, ongoing decline expected.   -  Continue to provide redirection and reassurance as needed. Maintain safe living situation with goals focused on comfort.  - on Seroquel, tolerating GDR, continue GDR when feasible.       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 Recommended level is 8-9%.   - In this frail elderly adult with a limited life expectancy, the goal of  the management is to address the hyperglycemia sx if any,  rather than the  BGs numbers per se.         Anemia due to stage 4 chronic kidney disease (H)  CKD (chronic kidney disease) stage 4, GFR 19 ml/min (H)  - Avoid nephrotoxic drugs  - Renal dose the medications.   - consider nephrology consult.   - Mg and Phosph normal.      Secondary hypertension : BP controlled.    Hyperlipidemia: LDL 69. Statin stopped.      Primary osteoarthritis, unspecified site: analgesia optimal.      Frailty:  Significant  Deficits requiring NH placement. Requiring extensive assistance from nursing. Up for meals only o/w spends the day resting in bed          Order: See above, otherwise, continue the rest of the current POC.     Electronically signed by:  Trino Traore MD

## 2019-11-18 ENCOUNTER — NURSING HOME VISIT (OUTPATIENT)
Dept: GERIATRICS | Facility: CLINIC | Age: 84
End: 2019-11-18
Payer: COMMERCIAL

## 2019-11-18 DIAGNOSIS — F02.80 ALZHEIMER'S DEMENTIA WITHOUT BEHAVIORAL DISTURBANCE, UNSPECIFIED TIMING OF DEMENTIA ONSET: Primary | ICD-10-CM

## 2019-11-18 DIAGNOSIS — E78.5 HYPERLIPIDEMIA LDL GOAL <100: ICD-10-CM

## 2019-11-18 DIAGNOSIS — D63.1 ANEMIA DUE TO STAGE 4 CHRONIC KIDNEY DISEASE (H): ICD-10-CM

## 2019-11-18 DIAGNOSIS — N18.4 ANEMIA DUE TO STAGE 4 CHRONIC KIDNEY DISEASE (H): ICD-10-CM

## 2019-11-18 DIAGNOSIS — R54 FRAIL ELDERLY: ICD-10-CM

## 2019-11-18 DIAGNOSIS — M19.91 PRIMARY OSTEOARTHRITIS, UNSPECIFIED SITE: ICD-10-CM

## 2019-11-18 DIAGNOSIS — E11.49 DIABETES MELLITUS TYPE 2 WITH NEUROLOGICAL MANIFESTATIONS (H): ICD-10-CM

## 2019-11-18 DIAGNOSIS — N18.4 CKD (CHRONIC KIDNEY DISEASE) STAGE 4, GFR 15-29 ML/MIN (H): ICD-10-CM

## 2019-11-18 DIAGNOSIS — G30.9 ALZHEIMER'S DEMENTIA WITHOUT BEHAVIORAL DISTURBANCE, UNSPECIFIED TIMING OF DEMENTIA ONSET: Primary | ICD-10-CM

## 2019-11-18 DIAGNOSIS — I10 ESSENTIAL HYPERTENSION WITH GOAL BLOOD PRESSURE LESS THAN 130/80: ICD-10-CM

## 2019-11-18 PROCEDURE — 99309 SBSQ NF CARE MODERATE MDM 30: CPT | Performed by: FAMILY MEDICINE

## 2019-11-18 NOTE — LETTER
11/18/2019        RE: Smiley Acuña  [facility]  [address] [facility] [address]        [place] [facility]  Chief Complaint   Patient presents with     correction Regulatory     [place] Medical Record Number:  [id number]  Place of Service where encounter took place:  [facility]  (FGS) [[id number]]    HPI:    Smiley Acuña  is 84 year old (1935), who is being seen today for a federally mandated E/M visit.  HPI information obtained from: facility staff, patient report and [facility] chart review.     Today's concerns are:  - DNP reports started on scheduled tylenol and capsaicin for neuropathy and OA pain in knees.    - Resident seen and examined. Reports aching right knee chronic pain, mild, no aggravating factors, better with medicine, in general is better.   -  Reports sleep, appetite and BM are fine.   - RN and DNP have no concern  --------------------------------  - Past Medical, social, family histories, medications, and allergies reviewed and updated  - Medications reviewed: in the chart and EHR.   - Case Management:   I have reviewed the care plan and MDS and do agree with the plan. Patient's desire to return to the community is not present.  Information reviewed:  Medications, vital signs, orders, and nursing notes.    MEDICATIONS:  Current Outpatient Medications   Medication Sig Dispense Refill     acetaminophen (TYLENOL) 500 MG tablet Take 2 tablets (1,000 mg) by mouth 3 times daily       ASPIRIN NOT PRESCRIBED, INTENTIONAL, by Other route continuous prn. 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    0     calcium carbonate (TUMS) 500 MG chewable tablet Take 2 tablets (1,000 mg) by mouth 2 times daily as needed for heartburn  0     hydrALAZINE (APRESOLINE) 100 MG tablet Take 1 tablet (100 mg) by mouth 3 times daily       magnesium oxide (MAG-OX) 400 MG tablet Take 1 tablet (400 mg) by mouth daily       melatonin 3 MG tablet  Take 1 tablet (3 mg) by mouth At Bedtime       polyethylene glycol (MIRALAX/GLYCOLAX) packet Take 17 g by mouth daily       QUEtiapine (SEROQUEL) 25 MG tablet Take 0.5 tablets (12.5 mg) by mouth 2 times daily       ROS:  Limited secondary to cognitive impairment but today pt reports- see HPI    Vitals:  BP (!) 186/72   Pulse 60   Temp 97.6  F (36.4  C)   Resp 16   Wt 64.2 kg (141 lb 8 oz)   SpO2 95%   BMI 23.55 kg/m     Body mass index is 23.55 kg/m .  Exam:  GENERAL APPEARANCE:  in no distress, cooperative  RESP:  lungs clear to auscultation   CV:  S1S2 audible, regular HR, no murmur appreciated.   ABDOMEN:  soft, NT/ND, BS audible. no mass appreciated on palpation.   M/S:   no joint deformity noted on observation.   SKIN:  No rash.   NEURO:   No NFD appreciated on observation. Hand  5/5 b/l  PSYCH:  AAOx Person, and day but not place, month or year.     Lab/Diagnostic data: no new data to review.     ASSESSMENT/PLAN  ----------------------------------  Dementia with behavioral disturbance, unspecified dementia type (H)  - Continue to anticipate needs. Chronic condition, ongoing decline expected.   -  Continue to provide redirection and reassurance as needed. Maintain safe living situation with goals focused on comfort.  - on Seroquel, tolerating GDR, continue GDR when feasible.       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 Recommended level is 8-9%.   - In this frail elderly adult with a limited life expectancy, the goal of  the management is to address the hyperglycemia sx if any,  rather than the  BGs numbers per se.         Anemia due to stage 4 chronic kidney disease (H)  CKD (chronic kidney disease) stage 4, GFR 19 ml/min (H)  - Avoid nephrotoxic drugs  - Renal dose the medications.   - consider nephrology consult.   - Mg and Phosph normal.      Secondary hypertension : BP controlled.     Hyperlipidemia: LDL 69. Statin stopped.      Primary osteoarthritis, unspecified site: analgesia optimal.      Frailty: Significant  Deficits requiring NH placement. Requiring extensive assistance from nursing. Up for meals only o/w spends the day resting in bed          Order: See above, otherwise, continue the rest of the current POC.     Electronically signed by:  Trino Traore MD              Sincerely,        Trino Traore MD

## 2019-12-18 NOTE — PROGRESS NOTES
[place] [facility]  [place] Medical Record Number:  [id number]  Place of Service where encounter took place:  [facility]  (FGS) [[id number]]  Chief Complaint   Patient presents with     Nursing Home Acute   HPI: Smiley Acuña  is a 84 year old (1935), who is being seen today for an episodic care visit.  HPI information obtained from: facility chart records, facility staff, patient report, [facility] chart review and Care Everywhere [facility] chart review. 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 Capsaicin was helping significantly w/ BLE neuropathy. Today, we are following up and note patient is tired. She denies SOB, headache, dizziness, constipation/diarrhea. But is not always intelligible with her answers. Chart review shows her recheck a1c last month was 6.8%. Nursing reports behaviors as baseline.  12/23/2019 08:00 321.0 mg/dL   12/22/2019 07:19 338.0 mg/dL  12/21/2019 08:53 282.0 mg/dL  12/20/2019 07:29 256.0 mg/dL   12/19/2019 07:19 216.0 mg/dL   12/18/2019 09:32 112.0 mg/dL   12/17/2019 11:14 91.0 mg/dL   12/16/2019 07:33 110.0 mg/dL   12/15/2019 07:35 96.0 mg/dL   12/14/2019 07:13 102.0 mg/dL   12/13/2019 07:26 109.0 mg/dL   12/12/2019 07:07 101.0 mg/dL   12/11/2019 07:06 112.0 mg/dL   12/10/2019 08:16 82.0 mg/dL    Past Medical and Surgical History reviewed in Epic today.  REVIEW OF SYSTEMS: Limited secondary to cognitive impairment but today pt reports the above and 4 point ROS including Respiratory, CV, GI and , other than that noted in the HPI, is negative.    Objective:  BP (!) 152/75   Pulse 69   Temp 98.4  F (36.9  C)   Resp 16   Wt 64.9 kg (143 lb)   SpO2 (!) 85%   BMI  23.80 kg/m    Exam:  GENERAL APPEARANCE: Alert, in no distress, cooperative.   ENT: Mouth/posterior oropharynx intact w/ moist mucous membranes, hearing acuity [place].  EYES: EOM, conjunctivae, lids, pupils and irises normal, PERRL2.   RESP: Respiratory effort good, no respiratory distress, Lung sounds clear. On RA.   CV: Auscultation of heart reveals S1, S2, rate and rhythm regular, no murmur, no rub or gallop, Edema 0+ BLE. Peripheral pulses are 2+.  ABDOMEN: Normal bowel sounds, soft, non-tender abdomen, and no masses palpated.  SKIN: Inspection/Palpation of skin and subcutaneous tissue baseline w/ fragility. No wounds/rashes noted.   NEURO: CN II-XII at patient's baseline, sensation baseline PPS.  PSYCH: Insight, judgement, and memory are impaired, affect and mood are engaged/smiling.    Labs:   Labs done in SNF are in [place] EPIC. Please refer to them using [facility]/Care Everywhere.    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 Ongoing. Will decrease accu-checks and suspect recent hyperglycemia is secondary to viral illness. Will restart her Gabapentin, but will not go beyond 100mg given CKD. Follow-up routinely or as needed.    Orders written by provider at facility and transcribed by : Tj King  1. Decrease BG checks to 1x/week. Dx: DM II.   2. Gabapentin 100 mg PO at bedtime Dx: Neuropathy.    Electronically signed by:  Dr. Ligia Peters, APRN CNP DNP

## 2019-12-23 NOTE — LETTER
12/23/2019        RE: Smiley Acuña  [facility]  [address] [facility] [address]        [place] [facility]  [place] [facility] Record Number:  [id number]  Place of Service where encounter took place:  [facility]  (FGS) [[id number]]  Chief Complaint   Patient presents with     Nursing Home Acute   HPI: Smiley Acuña  is a 84 year old (1935), who is being seen today for an episodic care visit.  HPI information obtained from: facility chart records, facility staff, patient report, [facility] chart review and Care Everywhere [facility] chart review. 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 Capsaicin was helping significantly w/ BLE neuropathy. Today, we are following up and note patient is tired. She denies SOB, headache, dizziness, constipation/diarrhea. But is not always intelligible with her answers. Chart review shows her recheck a1c last month was 6.8%. Nursing reports behaviors as baseline.  12/23/2019 08:00 321.0 mg/dL   12/22/2019 07:19 338.0 mg/dL  12/21/2019 08:53 282.0 mg/dL  12/20/2019 07:29 256.0 mg/dL   12/19/2019 07:19 216.0 mg/dL   12/18/2019 09:32 112.0 mg/dL   12/17/2019 11:14 91.0 mg/dL   12/16/2019 07:33 110.0 mg/dL   12/15/2019 07:35 96.0 mg/dL   12/14/2019 07:13 102.0 mg/dL   12/13/2019 07:26 109.0 mg/dL   12/12/2019 07:07 101.0 mg/dL   12/11/2019 07:06 112.0 mg/dL   12/10/2019 08:16 82.0 mg/dL    Past Medical and Surgical History reviewed in Epic today.  REVIEW OF SYSTEMS: Limited secondary to cognitive impairment but today pt reports the above and 4 point ROS including Respiratory, CV, GI and , other than that noted in the HPI, is  negative.    Objective:  BP (!) 152/75   Pulse 69   Temp 98.4  F (36.9  C)   Resp 16   Wt 64.9 kg (143 lb)   SpO2 (!) 85%   BMI 23.80 kg/m     Exam:  GENERAL APPEARANCE: Alert, in no distress, cooperative.   ENT: Mouth/posterior oropharynx intact w/ moist mucous membranes, hearing acuity [place].  EYES: EOM, conjunctivae, lids, pupils and irises normal, PERRL2.   RESP: Respiratory effort good, no respiratory distress, Lung sounds clear. On RA.   CV: Auscultation of heart reveals S1, S2, rate and rhythm regular, no murmur, no rub or gallop, Edema 0+ BLE. Peripheral pulses are 2+.  ABDOMEN: Normal bowel sounds, soft, non-tender abdomen, and no masses palpated.  SKIN: Inspection/Palpation of skin and subcutaneous tissue baseline w/ fragility. No wounds/rashes noted.   NEURO: CN II-XII at patient's baseline, sensation baseline PPS.  PSYCH: Insight, judgement, and memory are impaired, affect and mood are engaged/smiling.    Labs:   Labs done in SNF are in [facility]. Please refer to them using [employer]/Care Everywhere.    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 Ongoing. Will decrease accu-checks and suspect recent hyperglycemia is secondary to viral illness. Will restart her Gabapentin, but will not go beyond 100mg given CKD. Follow-up routinely or as needed.    Orders written by provider at facility and transcribed by : Tj King  1. Decrease BG checks to 1x/week. Dx: DM II.   2. Gabapentin 100 mg PO at bedtime Dx: Neuropathy.    Electronically signed by:  ALBA Del Rosario CNP [facility]        Sincerely,        ALBA Jolly CNP

## 2019-12-25 NOTE — TELEPHONE ENCOUNTER
"Oxygen sats around 82%.  Has a \"virus\".  Oxygen order given 1-4L/min to keep O2 at 90% prn    Electronically signed by Maria Fernanda Giang RN, CNP    "

## 2020-01-01 ENCOUNTER — RECORDS - HEALTHEAST (OUTPATIENT)
Dept: LAB | Facility: CLINIC | Age: 85
End: 2020-01-01

## 2020-01-01 ENCOUNTER — TELEPHONE (OUTPATIENT)
Dept: GERIATRICS | Facility: CLINIC | Age: 85
End: 2020-01-01

## 2020-01-01 ENCOUNTER — NURSING HOME VISIT (OUTPATIENT)
Dept: GERIATRICS | Facility: CLINIC | Age: 85
End: 2020-01-01
Payer: COMMERCIAL

## 2020-01-01 ENCOUNTER — VIRTUAL VISIT (OUTPATIENT)
Dept: GERIATRICS | Facility: CLINIC | Age: 85
End: 2020-01-01
Payer: COMMERCIAL

## 2020-01-01 ENCOUNTER — TRANSFERRED RECORDS (OUTPATIENT)
Dept: HEALTH INFORMATION MANAGEMENT | Facility: CLINIC | Age: 85
End: 2020-01-01

## 2020-01-01 ENCOUNTER — CLINICAL UPDATE (OUTPATIENT)
Dept: PHARMACY | Facility: CLINIC | Age: 85
End: 2020-01-01
Payer: COMMERCIAL

## 2020-01-01 VITALS
OXYGEN SATURATION: 96 % | DIASTOLIC BLOOD PRESSURE: 68 MMHG | BODY MASS INDEX: 23.66 KG/M2 | TEMPERATURE: 97.6 F | RESPIRATION RATE: 16 BRPM | WEIGHT: 142.2 LBS | HEART RATE: 62 BPM | SYSTOLIC BLOOD PRESSURE: 117 MMHG

## 2020-01-01 VITALS
RESPIRATION RATE: 18 BRPM | DIASTOLIC BLOOD PRESSURE: 83 MMHG | SYSTOLIC BLOOD PRESSURE: 166 MMHG | BODY MASS INDEX: 23.73 KG/M2 | WEIGHT: 142.6 LBS | TEMPERATURE: 97.9 F | HEART RATE: 69 BPM | OXYGEN SATURATION: 96 %

## 2020-01-01 VITALS
SYSTOLIC BLOOD PRESSURE: 134 MMHG | OXYGEN SATURATION: 97 % | DIASTOLIC BLOOD PRESSURE: 77 MMHG | RESPIRATION RATE: 16 BRPM | WEIGHT: 141.2 LBS | TEMPERATURE: 96.9 F | BODY MASS INDEX: 23.5 KG/M2 | HEART RATE: 67 BPM

## 2020-01-01 VITALS
SYSTOLIC BLOOD PRESSURE: 166 MMHG | TEMPERATURE: 97.6 F | HEART RATE: 69 BPM | DIASTOLIC BLOOD PRESSURE: 83 MMHG | WEIGHT: 142.6 LBS | BODY MASS INDEX: 23.73 KG/M2 | RESPIRATION RATE: 18 BRPM | OXYGEN SATURATION: 98 %

## 2020-01-01 VITALS
OXYGEN SATURATION: 97 % | DIASTOLIC BLOOD PRESSURE: 71 MMHG | HEART RATE: 73 BPM | SYSTOLIC BLOOD PRESSURE: 146 MMHG | RESPIRATION RATE: 16 BRPM | BODY MASS INDEX: 23.83 KG/M2 | WEIGHT: 143.2 LBS | TEMPERATURE: 98.1 F

## 2020-01-01 VITALS
TEMPERATURE: 98 F | HEART RATE: 68 BPM | WEIGHT: 137.2 LBS | BODY MASS INDEX: 22.83 KG/M2 | RESPIRATION RATE: 16 BRPM | DIASTOLIC BLOOD PRESSURE: 57 MMHG | SYSTOLIC BLOOD PRESSURE: 112 MMHG | OXYGEN SATURATION: 95 %

## 2020-01-01 VITALS
TEMPERATURE: 96.8 F | SYSTOLIC BLOOD PRESSURE: 147 MMHG | RESPIRATION RATE: 16 BRPM | DIASTOLIC BLOOD PRESSURE: 72 MMHG | HEART RATE: 62 BPM | OXYGEN SATURATION: 92 %

## 2020-01-01 VITALS
RESPIRATION RATE: 18 BRPM | SYSTOLIC BLOOD PRESSURE: 158 MMHG | HEART RATE: 66 BPM | BODY MASS INDEX: 23.6 KG/M2 | TEMPERATURE: 97.5 F | DIASTOLIC BLOOD PRESSURE: 73 MMHG | OXYGEN SATURATION: 95 % | WEIGHT: 141.8 LBS

## 2020-01-01 VITALS
TEMPERATURE: 99.1 F | RESPIRATION RATE: 19 BRPM | HEIGHT: 62 IN | HEART RATE: 68 BPM | DIASTOLIC BLOOD PRESSURE: 76 MMHG | BODY MASS INDEX: 27.33 KG/M2 | SYSTOLIC BLOOD PRESSURE: 114 MMHG | OXYGEN SATURATION: 92 % | WEIGHT: 148.5 LBS

## 2020-01-01 VITALS
HEART RATE: 61 BPM | WEIGHT: 148.5 LBS | RESPIRATION RATE: 16 BRPM | HEIGHT: 62 IN | BODY MASS INDEX: 27.33 KG/M2 | DIASTOLIC BLOOD PRESSURE: 76 MMHG | OXYGEN SATURATION: 92 % | SYSTOLIC BLOOD PRESSURE: 114 MMHG | TEMPERATURE: 98.5 F

## 2020-01-01 VITALS
DIASTOLIC BLOOD PRESSURE: 60 MMHG | WEIGHT: 141 LBS | TEMPERATURE: 97.4 F | SYSTOLIC BLOOD PRESSURE: 110 MMHG | RESPIRATION RATE: 16 BRPM | BODY MASS INDEX: 23.46 KG/M2 | OXYGEN SATURATION: 96 % | HEART RATE: 73 BPM

## 2020-01-01 VITALS
OXYGEN SATURATION: 97 % | BODY MASS INDEX: 23.73 KG/M2 | RESPIRATION RATE: 18 BRPM | SYSTOLIC BLOOD PRESSURE: 158 MMHG | WEIGHT: 142.6 LBS | TEMPERATURE: 97.9 F | DIASTOLIC BLOOD PRESSURE: 73 MMHG | HEART RATE: 66 BPM

## 2020-01-01 VITALS
WEIGHT: 140.6 LBS | BODY MASS INDEX: 23.4 KG/M2 | HEART RATE: 65 BPM | RESPIRATION RATE: 16 BRPM | OXYGEN SATURATION: 98 % | DIASTOLIC BLOOD PRESSURE: 80 MMHG | SYSTOLIC BLOOD PRESSURE: 194 MMHG | TEMPERATURE: 97 F

## 2020-01-01 VITALS
HEART RATE: 65 BPM | WEIGHT: 140.6 LBS | DIASTOLIC BLOOD PRESSURE: 73 MMHG | OXYGEN SATURATION: 93 % | SYSTOLIC BLOOD PRESSURE: 161 MMHG | BODY MASS INDEX: 23.4 KG/M2 | TEMPERATURE: 96.1 F | RESPIRATION RATE: 16 BRPM

## 2020-01-01 VITALS — TEMPERATURE: 98.5 F | HEART RATE: 94 BPM | RESPIRATION RATE: 20 BRPM

## 2020-01-01 VITALS
RESPIRATION RATE: 18 BRPM | SYSTOLIC BLOOD PRESSURE: 120 MMHG | DIASTOLIC BLOOD PRESSURE: 69 MMHG | BODY MASS INDEX: 25.76 KG/M2 | HEIGHT: 62 IN | TEMPERATURE: 97.5 F | HEART RATE: 76 BPM | WEIGHT: 140 LBS

## 2020-01-01 DIAGNOSIS — M19.91 PRIMARY OSTEOARTHRITIS, UNSPECIFIED SITE: ICD-10-CM

## 2020-01-01 DIAGNOSIS — I10 ESSENTIAL HYPERTENSION: ICD-10-CM

## 2020-01-01 DIAGNOSIS — M79.661 PAIN IN BOTH LOWER LEGS: ICD-10-CM

## 2020-01-01 DIAGNOSIS — F33.41 RECURRENT MAJOR DEPRESSIVE DISORDER, IN PARTIAL REMISSION (H): ICD-10-CM

## 2020-01-01 DIAGNOSIS — N18.4 CKD (CHRONIC KIDNEY DISEASE) STAGE 4, GFR 15-29 ML/MIN (H): ICD-10-CM

## 2020-01-01 DIAGNOSIS — F03.91 DEMENTIA WITH BEHAVIORAL DISTURBANCE, UNSPECIFIED DEMENTIA TYPE: Primary | ICD-10-CM

## 2020-01-01 DIAGNOSIS — I10 ESSENTIAL HYPERTENSION WITH GOAL BLOOD PRESSURE LESS THAN 130/80: ICD-10-CM

## 2020-01-01 DIAGNOSIS — E11.9 DIET-CONTROLLED DIABETES MELLITUS (H): ICD-10-CM

## 2020-01-01 DIAGNOSIS — N18.4 CKD (CHRONIC KIDNEY DISEASE) STAGE 4, GFR 15-29 ML/MIN (H): Primary | ICD-10-CM

## 2020-01-01 DIAGNOSIS — R54 FRAIL ELDERLY: ICD-10-CM

## 2020-01-01 DIAGNOSIS — F03.91 DEMENTIA WITH BEHAVIORAL DISTURBANCE, UNSPECIFIED DEMENTIA TYPE: ICD-10-CM

## 2020-01-01 DIAGNOSIS — Z00.00 ANNUAL PHYSICAL EXAM: Primary | ICD-10-CM

## 2020-01-01 DIAGNOSIS — R51.9 NONINTRACTABLE EPISODIC HEADACHE, UNSPECIFIED HEADACHE TYPE: ICD-10-CM

## 2020-01-01 DIAGNOSIS — E78.5 HYPERLIPIDEMIA LDL GOAL <100: ICD-10-CM

## 2020-01-01 DIAGNOSIS — G30.9 ALZHEIMER'S DEMENTIA WITH BEHAVIORAL DISTURBANCE, UNSPECIFIED TIMING OF DEMENTIA ONSET: Primary | ICD-10-CM

## 2020-01-01 DIAGNOSIS — D63.1 ANEMIA DUE TO STAGE 4 CHRONIC KIDNEY DISEASE (H): ICD-10-CM

## 2020-01-01 DIAGNOSIS — F51.04 PSYCHOPHYSIOLOGICAL INSOMNIA: ICD-10-CM

## 2020-01-01 DIAGNOSIS — F03.90 DEMENTIA WITHOUT BEHAVIORAL DISTURBANCE, UNSPECIFIED DEMENTIA TYPE: Primary | ICD-10-CM

## 2020-01-01 DIAGNOSIS — E11.49 DIABETES MELLITUS TYPE 2 WITH NEUROLOGICAL MANIFESTATIONS (H): ICD-10-CM

## 2020-01-01 DIAGNOSIS — G30.9 ALZHEIMER'S DEMENTIA WITHOUT BEHAVIORAL DISTURBANCE, UNSPECIFIED TIMING OF DEMENTIA ONSET: Primary | ICD-10-CM

## 2020-01-01 DIAGNOSIS — G62.9 NEUROPATHY: ICD-10-CM

## 2020-01-01 DIAGNOSIS — U07.1 COVID-19 VIRUS INFECTION: Primary | ICD-10-CM

## 2020-01-01 DIAGNOSIS — U07.1 INFECTION DUE TO 2019 NOVEL CORONAVIRUS: Primary | ICD-10-CM

## 2020-01-01 DIAGNOSIS — N18.4 ANEMIA DUE TO STAGE 4 CHRONIC KIDNEY DISEASE (H): ICD-10-CM

## 2020-01-01 DIAGNOSIS — M79.662 PAIN IN BOTH LOWER LEGS: ICD-10-CM

## 2020-01-01 DIAGNOSIS — M15.0 PRIMARY OSTEOARTHRITIS INVOLVING MULTIPLE JOINTS: ICD-10-CM

## 2020-01-01 DIAGNOSIS — F02.81 ALZHEIMER'S DEMENTIA WITH BEHAVIORAL DISTURBANCE, UNSPECIFIED TIMING OF DEMENTIA ONSET: Primary | ICD-10-CM

## 2020-01-01 DIAGNOSIS — I10 ESSENTIAL HYPERTENSION WITH GOAL BLOOD PRESSURE LESS THAN 130/80: Primary | ICD-10-CM

## 2020-01-01 DIAGNOSIS — N17.9 ACUTE RENAL FAILURE, UNSPECIFIED ACUTE RENAL FAILURE TYPE (H): Primary | ICD-10-CM

## 2020-01-01 DIAGNOSIS — E87.5 HYPERKALEMIA: ICD-10-CM

## 2020-01-01 DIAGNOSIS — F02.80 ALZHEIMER'S DEMENTIA WITHOUT BEHAVIORAL DISTURBANCE, UNSPECIFIED TIMING OF DEMENTIA ONSET: Primary | ICD-10-CM

## 2020-01-01 DIAGNOSIS — N30.00 ACUTE CYSTITIS WITHOUT HEMATURIA: ICD-10-CM

## 2020-01-01 DIAGNOSIS — H53.9 VISION CHANGES: Primary | ICD-10-CM

## 2020-01-01 LAB
ALBUMIN SERPL-MCNC: 3.5 G/DL (ref 3.5–5)
ALBUMIN SERPL-MCNC: 3.5 G/DL (ref 3.5–5)
ALP SERPL-CCNC: 58 U/L (ref 45–120)
ALP SERPL-CCNC: 58 U/L (ref 45–120)
ALT SERPL W P-5'-P-CCNC: <9 U/L (ref 0–45)
ALT SERPL-CCNC: <9 U/L (ref 0–45)
ANION GAP SERPL CALCULATED.3IONS-SCNC: 10 MMOL/L (ref 5–18)
ANION GAP SERPL CALCULATED.3IONS-SCNC: 5 MMOL/L (ref 5–18)
ANION GAP SERPL CALCULATED.3IONS-SCNC: 5 MMOL/L (ref 5–18)
ANION GAP SERPL CALCULATED.3IONS-SCNC: 7 MMOL/L (ref 5–18)
AST SERPL W P-5'-P-CCNC: 15 U/L (ref 0–40)
AST SERPL-CCNC: 15 U/L (ref 0–40)
BACTERIA SPEC CULT: ABNORMAL
BASOPHILS # BLD AUTO: 0.1 THOU/UL (ref 0–0.2)
BASOPHILS NFR BLD AUTO: 1 % (ref 0–2)
BILIRUB SERPL-MCNC: 0.3 MG/DL (ref 0–1)
BILIRUB SERPL-MCNC: 0.3 MG/DL (ref 0–1)
BUN SERPL-MCNC: 48 MG/DL (ref 8–28)
BUN SERPL-MCNC: 48 MG/DL (ref 8–28)
BUN SERPL-MCNC: 51 MG/DL (ref 8–28)
BUN SERPL-MCNC: 51 MG/DL (ref 8–28)
BUN SERPL-MCNC: 54 MG/DL (ref 8–28)
BUN SERPL-MCNC: 54 MG/DL (ref 8–28)
BUN SERPL-MCNC: 65 MG/DL (ref 8–28)
BUN SERPL-MCNC: 98 MG/DL (ref 8–28)
CALCIUM SERPL-MCNC: 9 MG/DL (ref 8.5–10.5)
CALCIUM SERPL-MCNC: 9 MG/DL (ref 8.5–10.5)
CALCIUM SERPL-MCNC: 9.2 MG/DL (ref 8.5–10.5)
CALCIUM SERPL-MCNC: 9.4 MG/DL (ref 8.5–10.5)
CALCIUM SERPL-MCNC: 9.5 MG/DL (ref 8.5–10.5)
CHLORIDE BLD-SCNC: 103 MMOL/L (ref 98–107)
CHLORIDE BLD-SCNC: 105 MMOL/L (ref 98–107)
CHLORIDE BLD-SCNC: 106 MMOL/L (ref 98–107)
CHLORIDE BLD-SCNC: 107 MMOL/L (ref 98–107)
CHLORIDE BLD-SCNC: 107 MMOL/L (ref 98–107)
CHLORIDE SERPLBLD-SCNC: 105 MMOL/L (ref 98–107)
CHLORIDE SERPLBLD-SCNC: 107 MMOL/L (ref 98–107)
CHLORIDE SERPLBLD-SCNC: 107 MMOL/L (ref 98–107)
CHOLEST SERPL-MCNC: 192 MG/DL
CHOLEST SERPL-MCNC: 192 MG/DL
CO2 SERPL-SCNC: 25 MMOL/L (ref 22–31)
CO2 SERPL-SCNC: 26 MMOL/L (ref 22–31)
CO2 SERPL-SCNC: 26 MMOL/L (ref 22–31)
CO2 SERPL-SCNC: 29 MMOL/L (ref 22–31)
CO2 SERPL-SCNC: 29 MMOL/L (ref 22–31)
CREAT SERPL-MCNC: 2.23 MG/DL (ref 0.6–1.1)
CREAT SERPL-MCNC: 2.23 MG/DL (ref 0.6–1.1)
CREAT SERPL-MCNC: 2.34 MG/DL (ref 0.6–1.1)
CREAT SERPL-MCNC: 2.34 MG/DL (ref 0.6–1.1)
CREAT SERPL-MCNC: 2.41 MG/DL (ref 0.6–1.1)
CREAT SERPL-MCNC: 2.41 MG/DL (ref 0.6–1.1)
CREAT SERPL-MCNC: 2.52 MG/DL (ref 0.6–1.1)
CREAT SERPL-MCNC: 4.14 MG/DL (ref 0.6–1.1)
DIFFERENTIAL: ABNORMAL
EOSINOPHIL # BLD AUTO: 0.2 THOU/UL (ref 0–0.4)
EOSINOPHIL NFR BLD AUTO: 3 % (ref 0–6)
ERYTHROCYTE [DISTWIDTH] IN BLOOD BY AUTOMATED COUNT: 11.9 % (ref 11–14.5)
ERYTHROCYTE [DISTWIDTH] IN BLOOD BY AUTOMATED COUNT: 11.9 % (ref 11–14.5)
FASTING STATUS PATIENT QL REPORTED: ABNORMAL
GFR SERPL CREATININE-BSD FRML MDRD: 10 ML/MIN/1.73M2
GFR SERPL CREATININE-BSD FRML MDRD: 18 ML/MIN/1.73M2
GFR SERPL CREATININE-BSD FRML MDRD: 19 ML/MIN/1.73M2
GFR SERPL CREATININE-BSD FRML MDRD: 19 ML/MIN/1.73M2
GFR SERPL CREATININE-BSD FRML MDRD: 20 ML/MIN/1.73M2
GFR SERPL CREATININE-BSD FRML MDRD: 20 ML/MIN/1.73M2
GFR SERPL CREATININE-BSD FRML MDRD: 21 ML/MIN/1.73M2
GFR SERPL CREATININE-BSD FRML MDRD: 21 ML/MIN/1.73M2
GLUCOSE BLD-MCNC: 106 MG/DL (ref 70–125)
GLUCOSE BLD-MCNC: 110 MG/DL (ref 70–125)
GLUCOSE BLD-MCNC: 87 MG/DL (ref 70–125)
GLUCOSE BLD-MCNC: 88 MG/DL (ref 70–125)
GLUCOSE BLD-MCNC: 96 MG/DL (ref 70–125)
GLUCOSE SERPL-MCNC: 106 MG/DL (ref 70–125)
GLUCOSE SERPL-MCNC: 87 MG/DL (ref 70–125)
GLUCOSE SERPL-MCNC: 96 MG/DL (ref 70–125)
HBA1C MFR BLD: 7.5 %
HBA1C MFR BLD: 7.5 % (ref 0–5.7)
HCT VFR BLD AUTO: 34.4 % (ref 35–47)
HCT VFR BLD AUTO: 34.4 % (ref 35–47)
HDLC SERPL-MCNC: 36 MG/DL
HDLC SERPL-MCNC: 36 MG/DL
HEMOGLOBIN: 10.9 G/DL (ref 12–16)
HGB BLD-MCNC: 10.9 G/DL (ref 12–16)
IMM GRANULOCYTES # BLD: 0 THOU/UL
IMM GRANULOCYTES NFR BLD: 0 %
LDLC SERPL CALC-MCNC: 127 MG/DL
LDLC SERPL CALC-MCNC: 127 MG/DL
LYMPHOCYTES # BLD AUTO: 1.7 THOU/UL (ref 0.8–4.4)
LYMPHOCYTES NFR BLD AUTO: 28 % (ref 20–40)
MAGNESIUM SERPL-MCNC: 2 MG/DL (ref 1.8–2.6)
MCH RBC QN AUTO: 31.4 PG (ref 27–34)
MCH RBC QN AUTO: 31.4 PG (ref 27–34)
MCHC RBC AUTO-ENTMCNC: 31.7 G/DL (ref 32–36)
MCHC RBC AUTO-ENTMCNC: 31.7 G/DL (ref 32–36)
MCV RBC AUTO: 99 FL (ref 80–100)
MCV RBC AUTO: 99 FL (ref 80–100)
MONOCYTES # BLD AUTO: 0.6 THOU/UL (ref 0–0.9)
MONOCYTES NFR BLD AUTO: 10 % (ref 2–10)
NEUTROPHILS # BLD AUTO: 3.7 THOU/UL (ref 2–7.7)
NEUTROPHILS NFR BLD AUTO: 59 % (ref 50–70)
PLATELET # BLD AUTO: 181 THOU/UL (ref 140–440)
PLATELET # BLD AUTO: 181 THOU/UL (ref 140–440)
PMV BLD AUTO: 11.6 FL (ref 8.5–12.5)
POTASSIUM BLD-SCNC: 4 MMOL/L (ref 3.5–5)
POTASSIUM BLD-SCNC: 4.5 MMOL/L (ref 3.5–5)
POTASSIUM BLD-SCNC: 4.7 MMOL/L (ref 3.5–5)
POTASSIUM BLD-SCNC: 4.8 MMOL/L (ref 3.5–5)
POTASSIUM BLD-SCNC: 5.2 MMOL/L (ref 3.5–5)
POTASSIUM SERPL-SCNC: 4.5 MMOL/L (ref 3.5–5)
POTASSIUM SERPL-SCNC: 4.7 MMOL/L (ref 3.5–5)
POTASSIUM SERPL-SCNC: 4.8 MMOL/L (ref 3.5–5)
PROT SERPL-MCNC: 6.9 G/DL (ref 6–8)
PROT SERPL-MCNC: 6.9 G/DL (ref 6–8)
RBC # BLD AUTO: 3.47 MILL/UL (ref 3.8–5.4)
RBC # BLD AUTO: 3.47 MILL/UL (ref 3.8–5.4)
SODIUM SERPL-SCNC: 139 MMOL/L (ref 136–145)
SODIUM SERPL-SCNC: 140 MMOL/L (ref 136–145)
SODIUM SERPL-SCNC: 140 MMOL/L (ref 136–145)
SODIUM SERPL-SCNC: 141 MMOL/L (ref 136–145)
SODIUM SERPL-SCNC: 141 MMOL/L (ref 136–145)
TRIGL SERPL-MCNC: 145 MG/DL
TRIGL SERPL-MCNC: 145 MG/DL
WBC # BLD AUTO: 6.3 THOU/UL (ref 4–11)
WBC: 6.3 THOU/UL (ref 4–11)

## 2020-01-01 PROCEDURE — 99309 SBSQ NF CARE MODERATE MDM 30: CPT | Mod: 95 | Performed by: NURSE PRACTITIONER

## 2020-01-01 PROCEDURE — 99309 SBSQ NF CARE MODERATE MDM 30: CPT | Performed by: NURSE PRACTITIONER

## 2020-01-01 PROCEDURE — 99207 ZZC CDG-CODE CATEGORY CHANGED: CPT | Performed by: NURSE PRACTITIONER

## 2020-01-01 PROCEDURE — 99316 NF DSCHRG MGMT 30 MIN+: CPT | Mod: 95 | Performed by: NURSE PRACTITIONER

## 2020-01-01 PROCEDURE — 99309 SBSQ NF CARE MODERATE MDM 30: CPT | Performed by: FAMILY MEDICINE

## 2020-01-01 PROCEDURE — 99318 ZZC ANNUAL NURSING FAC ASSESSMNT, STABLE: CPT | Performed by: NURSE PRACTITIONER

## 2020-01-01 PROCEDURE — 99308 SBSQ NF CARE LOW MDM 20: CPT | Mod: GW | Performed by: NURSE PRACTITIONER

## 2020-01-01 PROCEDURE — 99207 ZZC NO CHARGE LOS: CPT | Performed by: PHARMACIST

## 2020-01-01 PROCEDURE — 99309 SBSQ NF CARE MODERATE MDM 30: CPT | Mod: 95 | Performed by: FAMILY MEDICINE

## 2020-01-01 RX ORDER — AMLODIPINE BESYLATE 2.5 MG/1
5 TABLET ORAL AT BEDTIME
Start: 2020-01-01 | End: 2020-01-01 | Stop reason: SINTOL

## 2020-01-01 RX ORDER — QUETIAPINE FUMARATE 25 MG/1
25 TABLET, FILM COATED ORAL AT BEDTIME
Start: 2020-01-01 | End: 2020-01-01

## 2020-01-01 RX ORDER — CIPROFLOXACIN 500 MG/1
500 TABLET, FILM COATED ORAL DAILY
COMMUNITY
Start: 2020-01-01 | End: 2020-01-01

## 2020-01-01 RX ORDER — IBUPROFEN 400 MG/1
400 TABLET, FILM COATED ORAL EVERY 6 HOURS
COMMUNITY
End: 2020-01-01 | Stop reason: SINTOL

## 2020-01-01 RX ORDER — QUETIAPINE FUMARATE 25 MG/1
TABLET, FILM COATED ORAL
Qty: 45 TABLET | Refills: 3 | Status: SHIPPED | OUTPATIENT
Start: 2020-01-01 | End: 2020-01-01

## 2020-01-01 RX ORDER — QUETIAPINE FUMARATE 25 MG/1
TABLET, FILM COATED ORAL
Start: 2020-01-01 | End: 2020-01-01

## 2020-01-01 RX ORDER — HALOPERIDOL 2 MG/ML
0.5 SOLUTION ORAL EVERY 6 HOURS PRN
Start: 2020-01-01

## 2020-01-01 RX ORDER — ROPINIROLE 0.25 MG/1
0.25 TABLET, FILM COATED ORAL AT BEDTIME
Start: 2020-01-01 | End: 2020-01-01 | Stop reason: SINTOL

## 2020-01-01 RX ORDER — MORPHINE SULFATE 100 MG/5ML
SOLUTION ORAL
Qty: 30 ML | Refills: 0 | Status: SHIPPED | OUTPATIENT
Start: 2020-01-01

## 2020-01-01 RX ORDER — ACETAMINOPHEN 650 MG/1
650 SUPPOSITORY RECTAL EVERY 4 HOURS PRN
Start: 2020-01-01

## 2020-01-01 RX ORDER — CAPSAICIN 0.75 MG/G
CREAM TOPICAL 4 TIMES DAILY
COMMUNITY

## 2020-01-01 RX ORDER — HYDRALAZINE HYDROCHLORIDE 100 MG/1
100 TABLET, FILM COATED ORAL 3 TIMES DAILY
COMMUNITY
End: 2020-01-01 | Stop reason: SINTOL

## 2020-01-01 RX ORDER — LOPERAMIDE HYDROCHLORIDE 2 MG/1
2 TABLET ORAL
Start: 2020-01-01 | End: 2020-01-01

## 2020-01-01 RX ORDER — AMLODIPINE BESYLATE 2.5 MG/1
2.5 TABLET ORAL AT BEDTIME
Start: 2020-01-01 | End: 2020-01-01

## 2020-01-01 ASSESSMENT — MIFFLIN-ST. JEOR
SCORE: 1033.29
SCORE: 1071.84
SCORE: 1071.84

## 2020-01-04 NOTE — TELEPHONE ENCOUNTER
results returned - + for >100,000 E. Coli. Sensitive to Cipro and Bactrim-DS.    Cipro adjusted for CrCl of 17.    Orders:  Cipro 500mg qDay x5d    Dr. Concepción Norris, APRN, DNP, A/GNP-[facility]  [address] [place] [address] [address]  [place] [address]     Cell: [phone]  Fax: [phone]  Email: [email or web address]

## 2020-01-06 NOTE — PROGRESS NOTES
[place] [facility]  Chief Complaint   Patient presents with     snf Regulatory   [place] [facility] Record Number:  [id number]  Place of Service where encounter took place:  [facility]  (FGS) [[id number]] HPI: Smiley Acuña  is 84 year old (1935), who is being seen today for a federally mandated E/M visit.  HPI information obtained from: facility chart records, facility staff, patient report, [facility] chart review and Care Everywhere [facility] chart review. 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 We note she is recovering from Viral URI and also was recently started on Cipro for positive UTI. Last visit, we started very low-dose Flora (CKD) for neuropathy. Attempt made a few months ago to start GDR of Seroquel, which was not effective. Today, patient denies pain, SOB, CP, fever/chills. She denies dysuria/frequency. She states she is very tired today. Chart review shows she is afebrile and her BP trend is as listed here:  1/6/2020 13:08 114/69 mmHg    1/6/2020 06:14 118/70 mmHg   1/6/2020 02:45 127/61 mmHg  1/6/2020 01:00 109/59 mmHg   1/6/2020 00:30 114/64 mmHg   1/6/2020 00:00 133/67 mmHg   1/5/2020 23:45 131/65 mmHg   1/5/2020 23:30 125/69 mmHg   1/5/2020 23:15 116/62 mmHg   1/1/2020 13:18 146/65 mmHg   12/31/2019 07:47 118/67 mmHg     ALLERGIES:Lisinopril and Lisinopril. PAST MEDICAL HISTORY:   has a past medical history of Abnormality of gait, Anemia, unspecified, Chronic ischemic heart disease, unspecified, Closed fracture of patella (06/21/10), Coronary artery disease,  Degenerative disc disease, Depressive disorder, not elsewhere classified, History of blood transfusion, Other and unspecified hyperlipidemia, Renal failure, unspecified, Septicemia due to Escherichia coli (E. coli)(038.42) (H) (05/03/2007), Syncope and collapse (4/8/2005), Syncope and collapse (05/26/10), Traumatic subdural hematomas (05/27/10), Type II or unspecified type diabetes mellitus without mention of complication, not stated as uncontrolled, Unspecified essential hypertension, Unspecified sleep apnea, and Urinary tract infection, site not specified (4/4/2008). She also has no past medical history of Asthma, Congestive heart failure, unspecified, COPD (chronic obstructive pulmonary disease) (H), Malignant neoplasm (H), Thyroid disease, or Unspecified cerebral artery occlusion with cerebral infarction. PAST SURGICAL HISTORY:   has a past surgical history that includes AMPUTATION TOE,MT-P JT (2000); APPENDECTOMY; ANESTH,UPPER LEG SURGERY; UPPER GI ENDOSCOPY,EXAM (04/27/2005); Colonoscopy w/wo Brush **Performed** (12/05/2005); UGI ENDOSCOPY DIAG W OR W/O BRUSH/WASH (12/05/2005); BX SYNOVIUM INTERPHALANG JT; craniotomy (05/28/10); REPAIR ROTATOR CUFF,ACUTE (11/21/2002); EXCISION LESION/TENDON-SHEATH/CAPSULE, FOOT (04/30/07); EXCISION LESION/TENDON-SHEATH/CAPSULE, FOOT (8/14/2007); bunionectomy rt/lt (10/24/07); and OPEN TX FEMORAL SUPRACONDYLAR FRACTURE W EXTENSION (12/14/12). FAMILY HISTORY: family history includes C.A.D. in her brother; Diabetes in her brother, brother, and mother; Osteoporosis in her mother. SOCIAL HISTORY:  reports that she has never smoked. She has never used smokeless tobacco. She reports that she does not drink alcohol or use drugs.  MEDICATIONS:  Medication Sig     acetaminophen (TYLENOL) 500 MG tablet Take 2 tablets (1,000 mg) by mouth 3 times daily     calcium carbonate (TUMS) 500 MG chewable tablet Take 2 tablets (1,000 mg) by mouth 2 times daily as needed for heartburn     gabapentin  100 MG capsule Take 100 mg by mouth At Bedtime     hydrALAZINE 100 MG tablet Take 1 tablet (100 mg) by mouth TID     magnesium oxide 400 MG tablet Take 1 tablet (400 mg) by mouth daily     polyethylene glycol packet Take 17 g by mouth daily     QUEtiapine (SEROQUEL) 25 MG tablet Take 0.5 tablets (12.5 mg) by mouth 2 times daily     Case Management:  I have reviewed the care plan and MDS and do agree with the plan. Patient's desire to return to the community is not assessible due to cognitive impairment. Information reviewed:  Medications, vital signs, orders, and nursing notes.    ROS: Limited secondary to cognitive impairment but today pt reports the above and 4 point ROS including Respiratory, CV, GI and , other than that noted in the HPI, is negative.    Vitals:  BP (!) 147/72   Pulse 62   Temp 96.8  F (36  C)   Resp 16   SpO2 92%   There is no height or weight on file to calculate BMI.  Exam:  GENERAL APPEARANCE: Alert, in no distress, cooperative.   ENT: Mouth/posterior oropharynx intact w/ moist mucous membranes, hearing acuity [place].  EYES: EOM, conjunctivae, lids, pupils and irises normal, PERRL2.   RESP: Respiratory effort good, no respiratory distress, Lung sounds clear. On RA.   CV: Auscultation of heart reveals S1, S2, rate and rhythm regular, no murmur, no rub or gallop, Edema 0+ BLE. Peripheral pulses are 2+.  ABDOMEN: Normal bowel sounds, soft, non-tender abdomen, and no masses palpated.  SKIN: Inspection/Palpation of skin and subcutaneous tissue baseline w/ fragility. No wounds/rashes noted.   NEURO: CN II-XII at patient's baseline, sensation baseline PPS.  PSYCH: Insight, judgement, and memory are impaired, affect and mood are flat/fatigued.    Lab/Diagnostic data:   Labs done in SNF are in [place] [employer]. Please refer to them using [employer]/Care Everywhere.    ASSESSMENT/PLAN  Alzheimer's dementia without behavioral disturbance, unspecified timing of dementia onset (H)  Diabetes mellitus type 2 with  neurological manifestations (H)  CKD (chronic kidney disease) stage 4, GFR 15-29 ml/min (H)  Neuropathy  Essential hypertension with goal blood pressure less than 130/80  Primary osteoarthritis, unspecified site  Chronic. Ongoing. Progressive. Given recent start of Gabapentin and given current illness, we will recheck BMP. Given drowsiness, we will discontinue Melatonin. Follow-up routinely or as needed.    Acute Cystitis w/o hematuria  Acute. Ongoing. Patient has several day of abx left to complete, but is making small improvement. Continue this POC and follow-up routinely or as needed.    Orders written by provider at facility and transcribed by : Tj King  1. Discontinue Melatonin  2. BMP x1 on 1/8/2020 Dx: CKD    Electronically signed by:  Dr. Ligia Peters, ALBA CNP DNP

## 2020-01-07 NOTE — LETTER
1/7/2020        RE: Smiley Acuña  [facility]  [address] [facility] [address]        [place] [facility]  Chief Complaint   Patient presents with     FDC Regulatory   [place] [facility] Record Number:  [id number]  Place of Service where encounter took place:  [facility]  (FGS) [[id number]] HPI: Smiley Acuña  is 84 year old (1935), who is being seen today for a federally mandated E/M visit.  HPI information obtained from: facility chart records, facility staff, patient report, [facility] chart review and Care Everywhere [facility] chart review. 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 We note she is recovering from Viral URI and also was recently started on Cipro for positive UTI. Last visit, we started very low-dose Flora (CKD) for neuropathy. Attempt made a few months ago to start GDR of Seroquel, which was not effective. Today, patient denies pain, SOB, CP, fever/chills. She denies dysuria/frequency. She states she is very tired today. Chart review shows she is afebrile and her BP trend is as listed here:  1/6/2020 13:08 114/69 mmHg    1/6/2020 06:14 118/70 mmHg   1/6/2020 02:45 127/61 mmHg  1/6/2020 01:00 109/59 mmHg   1/6/2020 00:30 114/64 mmHg   1/6/2020 00:00 133/67 mmHg   1/5/2020 23:45 131/65 mmHg   1/5/2020 23:30 125/69 mmHg   1/5/2020 23:15 116/62 mmHg   1/1/2020 13:18 146/65 mmHg   12/31/2019 07:47 118/67 mmHg     ALLERGIES:Lisinopril and Lisinopril. PAST MEDICAL HISTORY:   has a past medical history of Abnormality of gait, Anemia,  unspecified, Chronic ischemic heart disease, unspecified, Closed fracture of patella (06/21/10), Coronary artery disease, Degenerative disc disease, Depressive disorder, not elsewhere classified, History of blood transfusion, Other and unspecified hyperlipidemia, Renal failure, unspecified, Septicemia due to Escherichia coli (E. coli)(038.42) (H) (05/03/2007), Syncope and collapse (4/8/2005), Syncope and collapse (05/26/10), Traumatic subdural hematomas (05/27/10), Type II or unspecified type diabetes mellitus without mention of complication, not stated as uncontrolled, Unspecified essential hypertension, Unspecified sleep apnea, and Urinary tract infection, site not specified (4/4/2008). She also has no past medical history of Asthma, Congestive heart failure, unspecified, COPD (chronic obstructive pulmonary disease) (H), Malignant neoplasm (H), Thyroid disease, or Unspecified cerebral artery occlusion with cerebral infarction. PAST SURGICAL HISTORY:   has a past surgical history that includes AMPUTATION TOE,MT-P JT (2000); APPENDECTOMY; ANESTH,UPPER LEG SURGERY; UPPER GI ENDOSCOPY,EXAM (04/27/2005); Colonoscopy w/wo Brush **Performed** (12/05/2005); UGI ENDOSCOPY DIAG W OR W/O BRUSH/WASH (12/05/2005); BX SYNOVIUM INTERPHALANG JT; craniotomy (05/28/10); REPAIR ROTATOR CUFF,ACUTE (11/21/2002); EXCISION LESION/TENDON-SHEATH/CAPSULE, FOOT (04/30/07); EXCISION LESION/TENDON-SHEATH/CAPSULE, FOOT (8/14/2007); bunionectomy rt/lt (10/24/07); and OPEN TX FEMORAL SUPRACONDYLAR FRACTURE W EXTENSION (12/14/12). FAMILY HISTORY: family history includes C.A.D. in her brother; Diabetes in her brother, brother, and mother; Osteoporosis in her mother. SOCIAL HISTORY:  reports that she has never smoked. She has never used smokeless tobacco. She reports that she does not drink alcohol or use drugs.  MEDICATIONS:  Medication Sig     acetaminophen (TYLENOL) 500 MG tablet Take 2 tablets (1,000 mg) by mouth 3 times daily     calcium  carbonate (TUMS) 500 MG chewable tablet Take 2 tablets (1,000 mg) by mouth 2 times daily as needed for heartburn     gabapentin 100 MG capsule Take 100 mg by mouth At Bedtime     hydrALAZINE 100 MG tablet Take 1 tablet (100 mg) by mouth TID     magnesium oxide 400 MG tablet Take 1 tablet (400 mg) by mouth daily     polyethylene glycol packet Take 17 g by mouth daily     QUEtiapine (SEROQUEL) 25 MG tablet Take 0.5 tablets (12.5 mg) by mouth 2 times daily     Case Management:  I have reviewed the care plan and MDS and do agree with the plan. Patient's desire to return to the community is not assessible due to cognitive impairment. Information reviewed:  Medications, vital signs, orders, and nursing notes.    ROS: Limited secondary to cognitive impairment but today pt reports the above and 4 point ROS including Respiratory, CV, GI and , other than that noted in the HPI, is negative.    Vitals:  BP (!) 147/72   Pulse 62   Temp 96.8  F (36  C)   Resp 16   SpO2 92%   There is no height or weight on file to calculate BMI.  Exam:  GENERAL APPEARANCE: Alert, in no distress, cooperative.   ENT: Mouth/posterior oropharynx intact w/ moist mucous membranes, hearing acuity [place].  EYES: EOM, conjunctivae, lids, pupils and irises normal, PERRL2.   RESP: Respiratory effort good, no respiratory distress, Lung sounds clear. On RA.   CV: Auscultation of heart reveals S1, S2, rate and rhythm regular, no murmur, no rub or gallop, Edema 0+ BLE. Peripheral pulses are 2+.  ABDOMEN: Normal bowel sounds, soft, non-tender abdomen, and no masses palpated.  SKIN: Inspection/Palpation of skin and subcutaneous tissue baseline w/ fragility. No wounds/rashes noted.   NEURO: CN II-XII at patient's baseline, sensation baseline PPS.  PSYCH: Insight, judgement, and memory are impaired, affect and mood are flat/fatigued.    Lab/Diagnostic data:   Labs done in SNF are in [employer] EPIC. Please refer to them using [employer] [facility]/Care  Everywhere.    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 Ongoing. Progressive. Given recent start of Gabapentin and given current illness, we will recheck BMP. Given drowsiness, we will discontinue Melatonin. Follow-up routinely or as needed.    Acute Cystitis w/o hematuria  Acute. Ongoing. Patient has several day of abx left to complete, but is making small improvement. Continue this POC and follow-up routinely or as needed.    Orders written by provider at facility and transcribed by : Tj King  1. Discontinue Melatonin  2. BMP x1 on 1/8/2020 Dx: CKD    Electronically signed by:  Dr. Ligia Peters, ALBA CNP DNP        Sincerely,        ALBA Jolly CNP

## 2020-01-09 NOTE — PROGRESS NOTES
"[place] [facility]  [place] [facility] Record Number:  [id number]  Place of Service where encounter took place:  [facility]  (FGS) [[id number]]  Chief Complaint   Patient presents with     Nursing Home Acute   HPI: Smiley Acuña  is a 84 year old (1935), who is being seen today for an episodic care visit.  HPI information obtained from: facility chart records, facility staff, patient report, [facility] chart review and Care Everywhere [facility] chart review. Today's concern is:    Acute renal failure, unspecified acute renal failure type (H)  CKD (chronic kidney disease) stage 4, GFR 15-29 ml/min (H)  Hyperkalemia  Essential hypertension with goal blood pressure less than 130/80  Acute cystitis without hematuria  Patient seen today to follow-up on blood work. We note UTI and treatment w/ Cipro which was renally dosed. Recent labs reveal CÉSAR superimposed on CKD. Baseline creatinine usually 2-2.5 and returned yesterday at >3. Besides acute illness, we recently started low-dose Flora for neuropathy. Given CÉSAR, potassium is also elevated @ 5.2. Chart review shows VS are stable.    Today, patient denies fever, SOB, dysuria, constipation. Nursing denies any new behaviors. When asking HPI questions, patient smiling and singing about how \"life is beautiful, and hope is high.\" She is much more alert and improved from prior visit.     Past Medical and Surgical History reviewed in [facility] today.  REVIEW OF SYSTEMS:  Limited secondary to cognitive impairment but today pt reports the above and 4 point ROS including Respiratory, CV, GI and , other than that noted in the HPI, is negative.    Objective:  /57   Pulse 68   Temp 98  F (36.7  C)   Resp 16   Wt 62.2 kg (137 lb 3.2 oz)   SpO2 95%   BMI 22.83 kg/m    Exam:  GENERAL APPEARANCE: Alert, in no distress, cooperative.   ENT: Mouth/posterior oropharynx intact w/ moist mucous membranes, hearing acuity [place].  EYES: EOM, conjunctivae, lids, " pupils and irises normal, PERRL2.   RESP: Respiratory effort good, no respiratory distress, Lung sounds clear. On RA.   CV: Auscultation of heart reveals S1, S2, rate and rhythm regular, no murmur, no rub or gallop, Edema 0+ BLE. Peripheral pulses are 2+.  ABDOMEN: Normal bowel sounds, soft, non-tender abdomen, and no masses palpated.  SKIN: Inspection/Palpation of skin and subcutaneous tissue baseline w/ fragility. No wounds/rashes noted.   NEURO: CN II-XII at patient's baseline, sensation baseline PPS.  PSYCH: Insight, judgement, and memory are impaired, affect and mood are flat/fatigued.    Labs:   Labs done in SNF are in [place] EPIC. Please refer to them using [employer] Everywhere.    ASSESSMENT/PLAN:  Acute Renal Failure (H)  CKD (chronic kidney disease) stage 4, GFR 15-29 ml/min (H)  Hyperkalemia  Essential hypertension with goal blood pressure less than 130/80  Acute cystitis without hematuria  Acute-on-chronic. Tenuous. Noting CÉSAR on CKD. Will discontinue Gabapentin, as this is likely only offending medication (besides abx).  Will allow time for kidneys to recover and follow-up w/ BMP for recheck next week. Follow-up routinely or as needed.    Orders written by provider at facility and transcribed by : Tj King  1. Discontinue Gabapentin  2. Recheck BMP x1 in 1 week on 1/16/20 Dx: CKD    Electronically signed by:  Dr. Ligia Peters, APRN CNP DNP

## 2020-01-09 NOTE — LETTER
"    1/9/2020        RE: Smiley Acuña  [facility]  [address] [facility] [address]        [place] [facility]  [place] Medical Record Number:  [id number]  Place of Service where encounter took place:  [facility]  (FGS) [[id number]]  Chief Complaint   Patient presents with     Nursing Home Acute   HPI: Smiley Acuña  is a 84 year old (1935), who is being seen today for an episodic care visit.  HPI information obtained from: facility chart records, facility staff, patient report, [facility] chart review and Care Everywhere [facility] chart review. Today's concern is:    Acute renal failure, unspecified acute renal failure type (H)  CKD (chronic kidney disease) stage 4, GFR 15-29 ml/min (H)  Hyperkalemia  Essential hypertension with goal blood pressure less than 130/80  Acute cystitis without hematuria  Patient seen today to follow-up on blood work. We note UTI and treatment w/ Cipro which was renally dosed. Recent labs reveal CÉSAR superimposed on CKD. Baseline creatinine usually 2-2.5 and returned yesterday at >3. Besides acute illness, we recently started low-dose Flora for neuropathy. Given CÉSAR, potassium is also elevated @ 5.2. Chart review shows VS are stable.    Today, patient denies fever, SOB, dysuria, constipation. Nursing denies any new behaviors. When asking HPI questions, patient smiling and singing about how \"life is beautiful, and hope is high.\" She is much more alert and improved from prior visit.     Past Medical and Surgical History reviewed in [facility] today.  REVIEW OF SYSTEMS:  Limited secondary to cognitive impairment but today pt reports the above and 4 point ROS including Respiratory, CV, GI and , other than that noted in the HPI, is negative.    Objective:  /57   Pulse 68   Temp 98  F (36.7  C)   Resp 16   Wt 62.2 kg (137 lb 3.2 oz)   SpO2 95%   BMI 22.83 kg/m     Exam:  GENERAL APPEARANCE: Alert, in no distress, " cooperative.   ENT: Mouth/posterior oropharynx intact w/ moist mucous membranes, hearing acuity [place].  EYES: EOM, conjunctivae, lids, pupils and irises normal, PERRL2.   RESP: Respiratory effort good, no respiratory distress, Lung sounds clear. On RA.   CV: Auscultation of heart reveals S1, S2, rate and rhythm regular, no murmur, no rub or gallop, Edema 0+ BLE. Peripheral pulses are 2+.  ABDOMEN: Normal bowel sounds, soft, non-tender abdomen, and no masses palpated.  SKIN: Inspection/Palpation of skin and subcutaneous tissue baseline w/ fragility. No wounds/rashes noted.   NEURO: CN II-XII at patient's baseline, sensation baseline PPS.  PSYCH: Insight, judgement, and memory are impaired, affect and mood are flat/fatigued.    Labs:   Labs done in SNF are in [place] EPIC. Please refer to them using [employer]/Care Everywhere.    ASSESSMENT/PLAN:  Acute Renal Failure (H)  CKD (chronic kidney disease) stage 4, GFR 15-29 ml/min (H)  Hyperkalemia  Essential hypertension with goal blood pressure less than 130/80  Acute cystitis without hematuria  Acute-on-chronic. Tenuous. Noting CÉSAR on CKD. Will discontinue Gabapentin, as this is likely only offending medication (besides abx).  Will allow time for kidneys to recover and follow-up w/ BMP for recheck next week. Follow-up routinely or as needed.    Orders written by provider at facility and transcribed by : Tj King  1. Discontinue Gabapentin  2. Recheck BMP x1 in 1 week on 1/16/20 Dx: CKD    Electronically signed by:  ALBA Del Rosario CNP DNP        Sincerely,        ALBA Jolly CNP

## 2020-01-21 NOTE — PROGRESS NOTES
[place] [facility]  [place] Medical Record Number:  [id number]  Place of Service where encounter took place:  [facility]  (FGS) [[id number]]  Chief Complaint   Patient presents with     Nursing Home Acute   HPI: Smiley Acuña  is a 85 year old (1935), who is being seen today for an episodic care visit.  HPI information obtained from: facility chart records, facility staff, patient report, [facility] chart review and Care Everywhere [facility] chart review. Today's concern is:    Dementia with behavioral disturbance, unspecified dementia type (H)  Psychophysiological insomnia  Neuropathy  CKD (chronic kidney disease) stage 4, GFR 15-29 ml/min (H)  Nursing consulted provider today noting that patient has been up for multiple nights, has continued BLE pain. Previously, we had given gabapentin at low dose for pain, but CKD/functioned worsened and this was discontinued. Patient states she is up all night, she has never been a good sleeper, and she states sometimes she is scared/crying, and she continued to talk to provider non-sensically. We note previous GDR of Seroquel was unsuccessful as patient's behaviors worsened.     Past Medical and Surgical History reviewed in Epic today.  REVIEW OF SYSTEMS: Limited secondary to cognitive impairment but today pt reports the above and 4 point ROS including Respiratory, CV, GI and , other than that noted in the HPI, is negative.    Objective:  /60   Pulse 73   Temp 97.4  F (36.3  C)   Resp 16   Wt 64 kg (141 lb)   SpO2 96%   BMI 23.46 kg/m    Exam:  GENERAL APPEARANCE: Alert, in no distress, cooperative.   ENT: Mouth/posterior oropharynx intact w/ moist mucous membranes, hearing acuity [place].  EYES: EOM, conjunctivae, lids, pupils and irises normal, PERRL2.   RESP: Respiratory effort good, no respiratory distress, Lung sounds clear. On RA.   CV: Auscultation of heart reveals S1, S2, rate and rhythm regular, no murmur, no rub or gallop,  Edema 1+ BLE. Peripheral pulses are 2+.  ABDOMEN: Normal bowel sounds, soft, non-tender abdomen, and no masses palpated.  SKIN: Inspection/Palpation of skin and subcutaneous tissue baseline w/ fragility. No wounds/rashes noted.   NEURO: CN II-XII at patient's baseline, sensation baseline PPS.  PSYCH: Insight, judgement, and memory are impaired at baseline, affect and mood are happy/pleasant.    Labs:   [facility] SNF [facility]. Please refer to them using [employer]/Care Everywhere.    ASSESSMENT/PLAN:  Dementia with behavioral disturbance, unspecified dementia type (H)  Psychophysiological insomnia  Neuropathy  CKD (chronic kidney disease) stage 4, GFR 15-29 ml/min (H)  Acute-on-chronic. We note the relation of sleep w/ psychosis and exacerbation of dementia. We also note Mag supplementation in concurrence w/ CKD and BLE pain. Will evaluate electrolytes, including Mag. Follow-up routinely or as needed.    Orders written by provider at facility and transcribed by : Tj King  1. BMP and Mag level x1 on 1/31 Dx: Leg pains  2. Increase Seroquel from 12.5 mg BID to 12.5 mg  Every AM and 25 mg PO at bedtime Dx: atypical psychosis    Electronically signed by:  Dr. Ligia Peters, APRN CNP DNP

## 2020-01-21 NOTE — LETTER
1/21/2020        RE: Smiley Acuña  [facility]  [address] [facility] [address]        [place] [facility]  [place] Medical Record Number:  [id number]  Place of Service where encounter took place:  [facility]  (FGS) [[id number]]  Chief Complaint   Patient presents with     Nursing Home Acute   HPI: Smiley Acuña  is a 85 year old (1935), who is being seen today for an episodic care visit.  HPI information obtained from: facility chart records, facility staff, patient report, [facility] chart review and Care Everywhere [facility] chart review. Today's concern is:    Dementia with behavioral disturbance, unspecified dementia type (H)  Psychophysiological insomnia  Neuropathy  CKD (chronic kidney disease) stage 4, GFR 15-29 ml/min (H)  Nursing consulted provider today noting that patient has been up for multiple nights, has continued BLE pain. Previously, we had given gabapentin at low dose for pain, but CKD/functioned worsened and this was discontinued. Patient states she is up all night, she has never been a good sleeper, and she states sometimes she is scared/crying, and she continued to talk to provider non-sensically. We note previous GDR of Seroquel was unsuccessful as patient's behaviors worsened.     Past Medical and Surgical History reviewed in [facility] today.  REVIEW OF SYSTEMS: Limited secondary to cognitive impairment but today pt reports the above and 4 point ROS including Respiratory, CV, GI and , other than that noted in the HPI, is negative.    Objective:  /60   Pulse 73   Temp 97.4  F (36.3  C)   Resp 16   Wt 64 kg (141 lb)   SpO2 96%   BMI 23.46 kg/m     Exam:  GENERAL APPEARANCE: Alert, in no distress, cooperative.   ENT: Mouth/posterior oropharynx intact w/ moist mucous membranes, hearing acuity [place].  EYES: EOM, conjunctivae, lids, pupils and irises normal, PERRL2.   RESP: Respiratory effort good, no respiratory distress,  Lung sounds clear. On RA.   CV: Auscultation of heart reveals S1, S2, rate and rhythm regular, no murmur, no rub or gallop, Edema 1+ BLE. Peripheral pulses are 2+.  ABDOMEN: Normal bowel sounds, soft, non-tender abdomen, and no masses palpated.  SKIN: Inspection/Palpation of skin and subcutaneous tissue baseline w/ fragility. No wounds/rashes noted.   NEURO: CN II-XII at patient's baseline, sensation baseline PPS.  PSYCH: Insight, judgement, and memory are impaired at baseline, affect and mood are happy/pleasant.    Labs:   Labs done in SNF are in [facility]. Please refer to them using [email or web address] Everywhere.    ASSESSMENT/PLAN:  Dementia with behavioral disturbance, unspecified dementia type (H)  Psychophysiological insomnia  Neuropathy  CKD (chronic kidney disease) stage 4, GFR 15-29 ml/min (H)  Acute-on-chronic. We note the relation of sleep w/ psychosis and exacerbation of dementia. We also note Mag supplementation in concurrence w/ CKD and BLE pain. Will evaluate electrolytes, including Mag. Follow-up routinely or as needed.    Orders written by provider at facility and transcribed by : Tj King  1. BMP and Mag level x1 on 1/31 Dx: Leg pains  2. Increase Seroquel from 12.5 mg BID to 12.5 mg  Every AM and 25 mg PO at bedtime Dx: atypical psychosis    Electronically signed by:  ALBA Del Rosario CNP DNP        Sincerely,        ALBA Jolly CNP

## 2020-03-10 NOTE — LETTER
"    3/10/2020        RE: Smiley Acuña  [facility]  [address] [facility] [address]        [place] [facility]  [place] Medical Record Number:  [id number]. Place of Service where encounter took place:  [facility]  (FGS) [[id number]]  Chief Complaint   Patient presents with     Nursing Home Acute   HPI: Smiley Acuña  is a 85 year old (1935), who is being seen today for an episodic care visit.  HPI information obtained from: facility chart records, facility staff, patient report, [facility] chart review and Care Everywhere [facility] chart review. Today's concern is:    Vision changes  Nonintractable episodic headache, unspecified headache type  Dementia with behavioral disturbance, unspecified dementia type (H)  Frail elderly  Neuropathy  Primary osteoarthritis, unspecified site  Nursing consulted provider today secondary to patient's crying and anxiety. She states \"I can't see\" and when asked, she also states \"I can't open my eyes.\" She states she has a headache, but cannot provide more specific information. When asked if she had nausea, she said \"probably.\" We note her underlying dementia and chronic pain issues. She was redirected and later seen moving around the unit independently, looking out the window, talking to other residents/staff, and her trouble has resolved.     Past Medical and Surgical History reviewed in [facility] today.  REVIEW OF SYSTEMS: Limited secondary to cognitive impairment but today pt reports the above and 4 point ROS including Respiratory, CV, GI and , other than that noted in the HPI, is negative.    Objective:  BP (!) 161/73   Pulse 65   Temp 96.1  F (35.6  C)   Resp 16   Wt 63.8 kg (140 lb 9.6 oz)   SpO2 93%   BMI 23.40 kg/m    Exam:  GENERAL APPEARANCE: Alert, in no distress, cooperative.   ENT: Mouth/posterior oropharynx intact w/ moist mucous membranes, hearing acuity [place].  EYES: EOM, conjunctivae, lids, pupils and " "irises normal, PERRL2.   SKIN: Inspection/Palpation of skin and subcutaneous tissue baseline w/ fragility. No wounds/rashes noted.   NEURO: CN II-XII at patient's baseline, sensation baseline PPS.  PSYCH: Insight, judgement, and memory are impaired at baseline, affect and mood are at first anxious/crying, but later improved.    Labs:   Recent labs in [facility] reviewed by me today.     ASSESSMENT/PLAN:  Vision changes  Nonintractable episodic headache, unspecified headache type  Dementia with behavioral disturbance, unspecified dementia type (H)  Frail elderly  Neuropathy  Primary osteoarthritis, unspecified site  Acute-on-chronic. Vision changes resolved. Differential for headache/migraine w/ aura vs TIA. Either way, patient is now at baseline, and w/ her dementia, it is difficult to say if she truly had vision changes (as she was squeezing her eyes shut during episode and stating \"I can't see\"). Nursing to continue to assess, and notify provider if recurrence. Follow-up routinely or as needed.    Orders written by provider at facility and transcribed by : Tj King  1. No new orders at this time.    Electronically signed by:  Dr. Ligia Peters, APRN CNP DNP        Sincerely,        ALBA Jolly CNP    "

## 2020-03-10 NOTE — PROGRESS NOTES
"[place] [facility]  [place] Medical Record Number:  [id number]. Place of Service where encounter took place:  [facility]  (FGS) [[id number]]  Chief Complaint   Patient presents with     Nursing Home Acute   HPI: Smiley Acuña  is a 85 year old (1935), who is being seen today for an episodic care visit.  HPI information obtained from: facility chart records, facility staff, patient report, [facility] chart review and Care Everywhere [facility] chart review. Today's concern is:    Vision changes  Nonintractable episodic headache, unspecified headache type  Dementia with behavioral disturbance, unspecified dementia type (H)  Frail elderly  Neuropathy  Primary osteoarthritis, unspecified site  Nursing consulted provider today secondary to patient's crying and anxiety. She states \"I can't see\" and when asked, she also states \"I can't open my eyes.\" She states she has a headache, but cannot provide more specific information. When asked if she had nausea, she said \"probably.\" We note her underlying dementia and chronic pain issues. She was redirected and later seen moving around the unit independently, looking out the window, talking to other residents/staff, and her trouble has resolved.     Past Medical and Surgical History reviewed in [facility] today.  REVIEW OF SYSTEMS: Limited secondary to cognitive impairment but today pt reports the above and 4 point ROS including Respiratory, CV, GI and , other than that noted in the HPI, is negative.    Objective:  BP (!) 161/73   Pulse 65   Temp 96.1  F (35.6  C)   Resp 16   Wt 63.8 kg (140 lb 9.6 oz)   SpO2 93%   BMI 23.40 kg/m    Exam:  GENERAL APPEARANCE: Alert, in no distress, cooperative.   ENT: Mouth/posterior oropharynx intact w/ moist mucous membranes, hearing acuity [place].  EYES: EOM, conjunctivae, lids, pupils and irises normal, PERRL2.   SKIN: Inspection/Palpation of skin and subcutaneous tissue baseline w/ fragility. No wounds/rashes " "noted.   NEURO: CN II-XII at patient's baseline, sensation baseline PPS.  PSYCH: Insight, judgement, and memory are impaired at baseline, affect and mood are at first anxious/crying, but later improved.    Labs:   Recent labs in [facility] reviewed by me today.     ASSESSMENT/PLAN:  Vision changes  Nonintractable episodic headache, unspecified headache type  Dementia with behavioral disturbance, unspecified dementia type (H)  Frail elderly  Neuropathy  Primary osteoarthritis, unspecified site  Acute-on-chronic. Vision changes resolved. Differential for headache/migraine w/ aura vs TIA. Either way, patient is now at baseline, and w/ her dementia, it is difficult to say if she truly had vision changes (as she was squeezing her eyes shut during episode and stating \"I can't see\"). Nursing to continue to assess, and notify provider if recurrence. Follow-up routinely or as needed.    Orders written by provider at facility and transcribed by : Tj King  1. No new orders at this time.    Electronically signed by:  Dr. Ligia Peters, APRN CNP DNP    "

## 2020-03-16 NOTE — LETTER
3/16/2020        RE: Smiley Acuña  [facility]  [address] [facility] [address]        [place] [facility]  Chief Complaint   Patient presents with     penitentiary Regulatory     [place] Medical Record Number:  [id number]  Place of Service where encounter took place:  [facility]  (FGS) [[id number]]    HPI:    Smiley Acuña  is 84 year old (1935), who is being seen today for a federally mandated E/M visit.  HPI information obtained from: facility staff, patient report and [facility] chart review.     Today's concerns are:  - Resident seen and examined. Reports aching right knee chronic pain, mild, no aggravating factors, better with medicine, in general pain is manageable. .   -  Reports sleep, appetite and BM are fine.   - RN and DNP have no concern  --------------------------------  - Past Medical, social, family histories, medications, and allergies reviewed and updated  - Medications reviewed: in the chart and EHR.   - Case Management:   I have reviewed the care plan and MDS and do agree with the plan. Patient's desire to return to the community is not present.  Information reviewed:  Medications, vital signs, orders, and nursing notes.    MEDICATIONS:  Current Outpatient Medications   Medication Sig Dispense Refill     acetaminophen (TYLENOL) 500 MG tablet Take 2 tablets (1,000 mg) by mouth 3 times daily       ASPIRIN NOT PRESCRIBED, INTENTIONAL, by Other route continuous prn. 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    0     hydrALAZINE (APRESOLINE) 100 MG tablet Take 1 tablet (100 mg) by mouth 3 times daily       magnesium oxide (MAG-OX) 400 MG tablet Take 1 tablet (400 mg) by mouth daily       polyethylene glycol (MIRALAX/GLYCOLAX) packet Take 17 g by mouth daily       QUEtiapine (SEROQUEL) 25 MG tablet Take 0.5 tablets (12.5 mg) by mouth every morning AND 1 tablet (25 mg) At Bedtime.       ROS:  Limited secondary to cognitive  impairment but today pt reports- see HPI    Vitals:  BP (!) 194/80   Pulse 65   Temp 97  F (36.1  C)   Resp 16   Wt 63.8 kg (140 lb 9.6 oz)   SpO2 98%   BMI 23.40 kg/m    Body mass index is 23.4 kg/m .  Exam:  GENERAL APPEARANCE:  in no distress, cooperative  RESP:  lungs clear to auscultation   CV:  S1S2 audible, regular HR, no murmur appreciated.   ABDOMEN:  soft, NT/ND, BS audible. no mass appreciated on palpation.   M/S:   no joint deformity noted on observation.   SKIN:  No rash.   NEURO:   No NFD appreciated on observation. Hand  5/5 b/l  PSYCH:  AAOx Person, and day but not place, month or year. Pleasant.     Lab/Diagnostic data: no new data to review.     ASSESSMENT/PLAN  ----------------------------------  Dementia without behavioral disturbance, unspecified dementia type (H)  - Continue to anticipate needs. Chronic condition, ongoing decline expected.   -  Continue to provide redirection and reassurance as needed. Maintain safe living situation with goals focused on comfort.  - on Seroquel, tolerating GDR, continue GDR when feasible.       Diet controlled. Diabetes mellitus type 2 (H)   A1C 6.8 11/15/2019    A1C 5.8 03/31/2019   - Off Lantus, HbA1C now 6.8, controlled. Recommended level is 8-9%.   - In this frail elderly adult with a limited life expectancy, the goal of  the management is to address the hyperglycemia sx if any,  rather than the  BGs numbers per se.         Anemia due to stage 4 chronic kidney disease (H)  CKD (chronic kidney disease) stage 4, GFR 19 ml/min (H)  - Avoid nephrotoxic drugs  - Renal dose the medications.   - consider nephrology consult.   - Mg and Phosph normal.      Secondary hypertension : BP controlled.    Hyperlipidemia: LDL 69. Statin stopped.      Primary osteoarthritis, unspecified site: analgesia optimal.      Frailty: Significant  Deficits requiring NH placement. Requiring extensive assistance from nursing. Up for meals only o/w spends the day resting in  bed        Order: See above, otherwise, continue the rest of the current POC.     Electronically signed by:  Trino Traore MD              Sincerely,        Trino Traore MD

## 2020-03-16 NOTE — PROGRESS NOTES
[facility]  Chief Complaint   Patient presents with     [facility] Regulatory     [place] Medical Record Number:  [id number]  Place of Service where encounter took place:  [facility]  ([facility]) [[id number]]    HPI:    Smiley Acuña  is 84 year old (1935), who is being seen today for a federally mandated E/M visit.  HPI information obtained from: facility staff, patient report and [facility] chart review.     Today's concerns are:  - Resident seen and examined. Reports aching right knee chronic pain, mild, no aggravating factors, better with medicine, in general pain is manageable. .   -  Reports sleep, appetite and BM are fine.   - RN and DNP have no concern  --------------------------------  - Past Medical, social, family histories, medications, and allergies reviewed and updated  - Medications reviewed: in the chart and EHR.   - Case Management:   I have reviewed the care plan and MDS and do agree with the plan. Patient's desire to return to the community is not present.  Information reviewed:  Medications, vital signs, orders, and nursing notes.    MEDICATIONS:  Current Outpatient Medications   Medication Sig Dispense Refill     acetaminophen (TYLENOL) 500 MG tablet Take 2 tablets (1,000 mg) by mouth 3 times daily       ASPIRIN NOT PRESCRIBED, INTENTIONAL, by Other route continuous prn. 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    0     hydrALAZINE (APRESOLINE) 100 MG tablet Take 1 tablet (100 mg) by mouth 3 times daily       magnesium oxide (MAG-OX) 400 MG tablet Take 1 tablet (400 mg) by mouth daily       polyethylene glycol (MIRALAX/GLYCOLAX) packet Take 17 g by mouth daily       QUEtiapine (SEROQUEL) 25 MG tablet Take 0.5 tablets (12.5 mg) by mouth every morning AND 1 tablet (25 mg) At Bedtime.       ROS:  Limited secondary to cognitive impairment but today pt reports- see HPI    Vitals:  BP (!) 194/80   Pulse 65   Temp 97  F (36.1  C)   Resp 16   Wt  63.8 kg (140 lb 9.6 oz)   SpO2 98%   BMI 23.40 kg/m    Body mass index is 23.4 kg/m .  Exam:  GENERAL APPEARANCE:  in no distress, cooperative  RESP:  lungs clear to auscultation   CV:  S1S2 audible, regular HR, no murmur appreciated.   ABDOMEN:  soft, NT/ND, BS audible. no mass appreciated on palpation.   M/S:   no joint deformity noted on observation.   SKIN:  No rash.   NEURO:   No NFD appreciated on observation. Hand  5/5 b/l  PSYCH:  AAOx Person, and day but not place, month or year. Pleasant.     Lab/Diagnostic data: no new data to review.     ASSESSMENT/PLAN  ----------------------------------  Dementia without behavioral disturbance, unspecified dementia type (H)  - Continue to anticipate needs. Chronic condition, ongoing decline expected.   -  Continue to provide redirection and reassurance as needed. Maintain safe living situation with goals focused on comfort.  - on Seroquel, tolerating GDR, continue GDR when feasible.       Diet controlled. Diabetes mellitus type 2 (H)   A1C 6.8 11/15/2019    A1C 5.8 03/31/2019   - Off Lantus, HbA1C now 6.8, controlled. Recommended level is 8-9%.   - In this frail elderly adult with a limited life expectancy, the goal of  the management is to address the hyperglycemia sx if any,  rather than the  BGs numbers per se.         Anemia due to stage 4 chronic kidney disease (H)  CKD (chronic kidney disease) stage 4, GFR 19 ml/min (H)  - Avoid nephrotoxic drugs  - Renal dose the medications.   - consider nephrology consult.   - Mg and Phosph normal.      Secondary hypertension : BP controlled.    Hyperlipidemia: LDL 69. Statin stopped.      Primary osteoarthritis, unspecified site: analgesia optimal.      Frailty: Significant  Deficits requiring NH placement. Requiring extensive assistance from nursing. Up for meals only o/w spends the day resting in bed        Order: See above, otherwise, continue the rest of the current POC.     Electronically signed by:  Trino Traore  MD

## 2020-04-14 NOTE — PROGRESS NOTES
This patient's medication list and chart were reviewed as part of the service provided by [facility].    Assessment/Recommendations:  1. (HTN):  Appears BPs have been quite elevated at times, and possible that this has contributed to vision changes/potential headaches.  Continue to monitor, and if BPs continue to run high, and/or pt experiencing potential related symptoms, may consider addition of Amlodipine 5mg at bedtime (appears she was on Amlodipine in past).  2. (Dementia w/ behavioral disturbance):  Noted increase in Quetiapine from 12.5mg bid to 12.5mg qam and 25mg at bedtime on 1/21/20; had failed a prior dose reduction.  In another month, if pt has remained stable, and no active symptoms, may consider d'c of morning Quetiapine and monitor target symptoms.  Quetiapine may increase rate of cognitive decline, risk of falls, mortality in dementia, etc.      Yuli Nails, Pharm.D.,[facility]  Board Certified Geriatric Pharmacist  Medication Therapy Management Pharmacist  [phone]

## 2020-04-16 NOTE — LETTER
"    4/16/2020        RE: Smiley Acuña  [facility]  [address] [facility] [address]        [facility] Geriatrics Video Visit  Smiley Acuña is a 85 year old female who is being evaluated via a billable video visit. The patient has been notified of following: \"This video visit will be conducted via a call between you and your provider. We have found that certain health care needs can be provided without the need for an in-person physical exam.  This service lets us provide the care you need with a video conversation.  If a prescription is necessary we can send it directly to your pharmacy.  If lab work is needed we can place an order for that and you can then stop by our lab to have the test done at a later time. If during the course of the call the provider feels a video visit is not appropriate, you will not be charged for this service.\"     Proivder has received verbal consent for a Video Visit from the patient? Yes    Patient/facility would like the video invitation sent by: Send to e-mail at: [email or web address]    This visit took place virtually, with the patient located at [facility].  ________________________________________________  Video Start Time: 1337  Smiley Acuña complains of    Chief Complaint   Patient presents with     Video Visit     Epi   HPI/Subjective: Charlene was seen today based on PharmD recommendations to consider GDR of Seroquel. Nursing states that prior to this dosing patient would be sexually inappropriate with staff and residents, including stripping in common areas. Today, Charlene states that she misses her  and loves him so much. She states that she is anxious. She states she has a good appetite and sleeps well. She is tangential in her thinking and speaking, but has a happy disposition. She denies fever, SOB, cough, CP. Chart review shows her VS are stable, including her BP trend (with occasional " outliers), which is listed here:  BPs:  4/16/2020 11:01 134/77 mmHg   4/16/2020 06:58 144/76 mmHg   4/15/2020 17:19 168/86 mmHg   4/15/2020 07:11 169/83 mmHg  4/14/2020 17:19 147/84 mmHg   4/14/2020 12:37 138/71 mmHg   4/14/2020 06:58 144/72 mmHg   4/13/2020 18:42 106/64 mmHg   4/13/2020 12:05 169/74 mmHg   4/13/2020 07:31 127/64 mmHg  4/12/2020 18:24 125/74 mmHg  4/12/2020 11:34 158/85 mmHg      History: I have reviewed and updated the patient's Past Medical History, Social History, Family History and Medication List.  ALLERGIES  Lisinopril and Lisinopril  ROS: Limited secondary to cognitive impairment, but 4 point ROS neg other than the symptoms noted above in the HPI.    Objective:  /77   Pulse 67   Temp 96.9  F (36.1  C)   Resp 16   Wt 64 kg (141 lb 3.2 oz)   SpO2 97%   BMI 23.50 kg/m    GENERAL APPEARANCE: Alert, in no distress, cooperative.   EYES/ENT: EOM normal on camera, hearing acuity [place].  RESP: Respiratory effort appears unlabored over video screen, no respiratory distress apparent on video, On RA.  SKIN: Skin appears baseline w/ video inspection. No wounds/rashes noted.   NEURO: CN II-XII at patient's baseline, TORRES at baseline on video. Sensation baseline PPS.  PSYCH: Insight, judgement, and memory are baseline, affect and mood are happy/engaged.    Laboratory/Diagnostics: Reviewed in Epic by provider today.     Assessment/Plan:  Essential hypertension with goal blood pressure less than 130/80  Dementia with behavioral disturbance, unspecified dementia type (H)  CKD (chronic kidney disease) stage 4, GFR 15-29 ml/min (H)  Neuropathy  Chronic. Ongoing. Patient continues to express intermittent anxiety and explains delusions/hallucinations. We note her significant neuropathy and CKD, which causes difficulty in BP control options pharmacologically. Will continue to trend BPs and consider increase in Norvasc if BPs are sustained >150/90. Given her anxiety and behaviors in dementia, we will NOT  attempt a Seroquel GDR at this time. Follow-up routinely or as needed.      Orders:  1. No new orders as discussed above.     Video-Visit Details  Type of service:  Video Visit  Video Start Time: 1337  Video End Time (time video stopped): 1340  Originating Location (pt. Location): Long term Care  Distant Location (provider location):  [facility]   Mode of Communication:  Video Conference.    Electronically signed by:  ALBA Del Rosario CNP [place]          Sincerely,        LABA Jolly CNP

## 2020-04-16 NOTE — PROGRESS NOTES
"[facility] Geriatrics Video Visit  Smiley Acuña is a 85 year old female who is being evaluated via a billable video visit. The patient has been notified of following: \"This video visit will be conducted via a call between you and your provider. We have found that certain health care needs can be provided without the need for an in-person physical exam.  This service lets us provide the care you need with a video conversation.  If a prescription is necessary we can send it directly to your pharmacy.  If lab work is needed we can place an order for that and you can then stop by our lab to have the test done at a later time. If during the course of the call the provider feels a video visit is not appropriate, you will not be charged for this service.\"     Proivder has received verbal consent for a Video Visit from the patient? Yes    Patient/facility would like the video invitation sent by: Send to e-mail at: [email or web address]    This visit took place virtually, with the patient located at [facility].  ________________________________________________  Video Start Time: 1337  Smiley Acuña complains of    Chief Complaint   Patient presents with     Video Visit     Epi   HPI/Subjective: Charlene was seen today based on PharmD recommendations to consider GDR of Seroquel. Nursing states that prior to this dosing patient would be sexually inappropriate with staff and residents, including stripping in common areas. Today, Charlene states that she misses her  and loves him so much. She states that she is anxious. She states she has a good appetite and sleeps well. She is tangential in her thinking and speaking, but has a happy disposition. She denies fever, SOB, cough, CP. Chart review shows her VS are stable, including her BP trend (with occasional outliers), which is listed here:  BPs:  4/16/2020 11:01 134/77 mmHg   4/16/2020 06:58 144/76 mmHg   4/15/2020 17:19 " 168/86 mmHg   4/15/2020 07:11 169/83 mmHg  4/14/2020 17:19 147/84 mmHg   4/14/2020 12:37 138/71 mmHg   4/14/2020 06:58 144/72 mmHg   4/13/2020 18:42 106/64 mmHg   4/13/2020 12:05 169/74 mmHg   4/13/2020 07:31 127/64 mmHg  4/12/2020 18:24 125/74 mmHg  4/12/2020 11:34 158/85 mmHg      History: I have reviewed and updated the patient's Past Medical History, Social History, Family History and Medication List.  ALLERGIES  Lisinopril and Lisinopril  ROS: Limited secondary to cognitive impairment, but 4 point ROS neg other than the symptoms noted above in the HPI.    Objective:  /77   Pulse 67   Temp 96.9  F (36.1  C)   Resp 16   Wt 64 kg (141 lb 3.2 oz)   SpO2 97%   BMI 23.50 kg/m    GENERAL APPEARANCE: Alert, in no distress, cooperative.   EYES/ENT: EOM normal on camera, hearing acuity [place].  RESP: Respiratory effort appears unlabored over video screen, no respiratory distress apparent on video, On RA.  SKIN: Skin appears baseline w/ video inspection. No wounds/rashes noted.   NEURO: CN II-XII at patient's baseline, TORRES at baseline on video. Sensation baseline PPS.  PSYCH: Insight, judgement, and memory are baseline, affect and mood are happy/engaged.    Laboratory/Diagnostics: Reviewed in Epic by provider today.     Assessment/Plan:  Essential hypertension with goal blood pressure less than 130/80  Dementia with behavioral disturbance, unspecified dementia type (H)  CKD (chronic kidney disease) stage 4, GFR 15-29 ml/min (H)  Neuropathy  Chronic. Ongoing. Patient continues to express intermittent anxiety and explains delusions/hallucinations. We note her significant neuropathy and CKD, which causes difficulty in BP control options pharmacologically. Will continue to trend BPs and consider increase in Norvasc if BPs are sustained >150/90. Given her anxiety and behaviors in dementia, we will NOT attempt a Seroquel GDR at this time. Follow-up routinely or as needed.      Orders:  1. No new orders as discussed  above.     Video-Visit Details  Type of service:  Video Visit  Video Start Time: 1337  Video End Time (time video stopped): 1340  Originating Location (pt. Location): Long term Care  Distant Location (provider location):  [facility]   Mode of Communication:  Video Conference.    Electronically signed by:  ALBA Del Rosario CNP DNP

## 2020-05-09 NOTE — PROGRESS NOTES
"[facility] Geriatrics Video Visit  Smiley Acuña is a 85 year old female who is being evaluated via a billable video visit due to the restrictions of the COVID19 pandemic.The patient has been notified of following: \"This video visit will be conducted via a call between you and your provider. We have found that certain health care needs can be provided without the need for an in-person physical exam.  This service lets us provide the care you need with a video conversation.  If a prescription is necessary we can send it directly to your pharmacy.  If lab work is needed we can place an order for that and you can then stop by our lab to have the test done at a later time. If during the course of the call the provider feels a video visit is not appropriate, you will not be charged for this service.\"     Promatthewder has received verbal consent for a Video Visit from the patient? Yes    Patient/facility would like the video invitation sent by: Send to e-mail at: [email or web address]    This visit took place virtually, with the patient located at [facility].  ________________________________________________  Video Start Time: 1121  Smiley Acuña complains of    Chief Complaint   Patient presents with     Video Visit     Regulatory   [place] Medical Record Number:  [id number]. Smiley Acuña  is 85 year old (1935), who is being seen today for a federally mandated E/M visit.  HPI information obtained from: facility chart records, facility staff, patient report, [facility] chart review and Care Everywhere Epic chart review. Today's concerns are:    Dementia with behavioral disturbance, unspecified dementia type (H)  Essential hypertension with goal blood pressure less than 130/80  CKD (chronic kidney disease) stage 4, GFR 15-29 ml/min (H)  Primary osteoarthritis, unspecified site  Frail elderly  Charlene states she misses her  and is sad he cannot visit. " "She states she is tired and having an \"off day.\" We note she is typically bubbly and smiling. She apologizes for not being more engaged. We note her underlying CKD and OA. Chart review shows some HTN as noted below:  BPs:  05/04/2020 10:20 158/73 mmHg  04/28/2020 12:19 146/78 mmHg   04/28/2020 06:55 159/65 mmHg    04/27/2020 17:55 130/60 mmHg  04/27/2020 11:34 158/78 mmHg   04/27/2020 07:39 174/68 mmHg  04/26/2020 17:30 156/98 mmHg   04/26/2020 11:37 159/86 mmHg   04/26/2020 07:21 147/72 mmHg    04/25/2020 18:04 203/89 mmHg  04/25/2020 11:11 149/76 mmHg  04/25/2020 07:30 158/69 mmHg   04/24/2020 17:30 161/87 mmHg    ALLERGIES:Lisinopril and Lisinopril PAST MEDICAL HISTORY:   has a past medical history of Abnormality of gait, Anemia, unspecified, Chronic ischemic heart disease, unspecified, Closed fracture of patella (06/21/10), Coronary artery disease, Degenerative disc disease, Depressive disorder, not elsewhere classified, History of blood transfusion, Other and unspecified hyperlipidemia, Renal failure, unspecified, Septicemia due to Escherichia coli (E. coli)(038.42) (H) (05/03/2007), Syncope and collapse (4/8/2005), Syncope and collapse (05/26/10), Traumatic subdural hematomas (05/27/10), Type II or unspecified type diabetes mellitus without mention of complication, not stated as uncontrolled, Unspecified essential hypertension, Unspecified sleep apnea, and Urinary tract infection, site not specified (4/4/2008). She also has no past medical history of Asthma, Congestive heart failure, unspecified, COPD (chronic obstructive pulmonary disease) (H), Malignant neoplasm (H), Thyroid disease, or Unspecified cerebral artery occlusion with cerebral infarction. PAST SURGICAL HISTORY:   has a past surgical history that includes AMPUTATION TOE,MT-P JT (2000); APPENDECTOMY; ANESTH,UPPER LEG SURGERY; UPPER GI ENDOSCOPY,EXAM (04/27/2005); Colonoscopy w/wo Brush **Performed** (12/05/2005); UGI ENDOSCOPY DIAG W OR W/O " BRUSH/WASH (12/05/2005); BX SYNOVIUM INTERPHALANG JT; craniotomy (05/28/10); REPAIR ROTATOR CUFF,ACUTE (11/21/2002); EXCISION LESION/TENDON-SHEATH/CAPSULE, FOOT (04/30/07); EXCISION LESION/TENDON-SHEATH/CAPSULE, FOOT (8/14/2007); bunionectomy rt/lt (10/24/07); and OPEN TX FEMORAL SUPRACONDYLAR FRACTURE W EXTENSION (12/14/12). FAMILY HISTORY: family history includes C.A.D. in her brother; Diabetes in her brother, brother, and mother; Osteoporosis in her mother. SOCIAL HISTORY:  reports that she has never smoked. She has never used smokeless tobacco. She reports that she does not drink alcohol or use drugs.  MEDICATIONS:  Current Outpatient Medications   Medication Sig Dispense Refill     acetaminophen (TYLENOL) 500 MG tablet Take 2 tablets (1,000 mg) by mouth 3 times daily       ASPIRIN NOT PRESCRIBED, INTENTIONAL, by Other route continuous prn. 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    0     hydrALAZINE (APRESOLINE) 100 MG tablet Take 1 tablet (100 mg) by mouth 3 times daily       magnesium oxide (MAG-OX) 400 MG tablet Take 1 tablet (400 mg) by mouth daily       polyethylene glycol (MIRALAX/GLYCOLAX) packet Take 17 g by mouth daily       QUEtiapine (SEROQUEL) 25 MG tablet Take 0.5 tablets (12.5 mg) by mouth every morning AND 1 tablet (25 mg) At Bedtime.       Case Management:  I have reviewed the care plan and MDS and do agree with the plan. Patient's desire to return to the community is not assessible due to cognitive impairment. Information reviewed:  Medications, vital signs, orders, and nursing notes.    ROS: Limited secondary to cognitive impairment but today pt reports the above and 4 point ROS including Respiratory, CV, GI and , other than that noted in the HPI, is negative.    Objective:  BP (!) 158/73   Pulse 66   Temp 97.5  F (36.4  C)   Resp 18   Wt 64.3 kg (141 lb 12.8 oz)   SpO2 95%   BMI 23.60 kg/m    GENERAL APPEARANCE: Alert, in no distress, cooperative.   EYES/ENT: EOM normal on  camera, hearing acuity [place].  RESP: Respiratory effort appears unlabored over video screen, no respiratory distress apparent on video, On RA.   SKIN: Skin appears baseline w/ video inspection. No wounds/rashes noted.    NEURO: CN II-XII at patient's baseline, TORRES at baseline on video. Sensation baseline PPS.  PSYCH: Insight, judgement, and memory are baseline impaired, affect and mood are sad/flat.    Laboratory/Diagnostics: Reviewed in Epic by provider today.     Assessment/Plan:  Dementia with behavioral disturbance, unspecified dementia type (H)  Essential hypertension with goal blood pressure less than 130/80  CKD (chronic kidney disease) stage 4, GFR 15-29 ml/min (H)  Primary osteoarthritis, unspecified site  Frail elderly  Acute-on-chronic. Ongoing.    We note this patient may not require daily AM Seroquel dosing. We will attempt GDR (keeping HS dose). May consider a PRN ongoing if behaviors are sporadic.    OA is controlled at this time, though this is a common complaint for her.    Noting significant HTN w/ underlying CKD, which can make treatment options limited. Previous Norasc use noted, and we will therefore restart this and trend BPs.  Follow-up routinely or as needed.    Orders:  1. Decrease Seroquel to 25mg PO QHS. Dx: psychosis   2. Start Norvasc 2.5mg PO QHS. Dx: HTN.    Video-Visit Details  Type of service:  Video Visit  Video Start Time: 1121  Video End Time (time video stopped): 1124  Originating Location (pt. Location): Long term Care  Distant Location (provider location):  [facility]   Mode of Communication:  Video Conference.    Electronically signed by:  Dr. Ligia Peters, APRN CNP DNP

## 2020-05-11 NOTE — LETTER
"    5/11/2020        RE: Smiley Acuña  C/o Theresa Massey  [facility]  [address]  [facility] [id number] [place] [address] Geriatrics Video Visit  Smiley Acuña is a 85 year old female who is being evaluated via a billable video visit due to the restrictions of the COVID19 pandemic.The patient has been notified of following: \"This video visit will be conducted via a call between you and your provider. We have found that certain health care needs can be provided without the need for an in-person physical exam.  This service lets us provide the care you need with a video conversation.  If a prescription is necessary we can send it directly to your pharmacy.  If lab work is needed we can place an order for that and you can then stop by our lab to have the test done at a later time. If during the course of the call the provider feels a video visit is not appropriate, you will not be charged for this service.\"     Proivder has received verbal consent for a Video Visit from the patient? Yes    Patient/facility would like the video invitation sent by: Send to e-mail at: [email or web address]    This visit took place virtually, with the patient located at [facility].  ________________________________________________  Video Start Time: 1121  Smiley Acuña complains of    Chief Complaint   Patient presents with     Video Visit     Regulatory   [place] Medical Record Number:  [id number]. Smiley Acuña  is 85 year old (1935), who is being seen today for a federally mandated E/M visit.  HPI information obtained from: facility chart records, facility staff, patient report, [facility] chart review and Care Everywhere [facility] chart review. Today's concerns are:    Dementia with behavioral disturbance, unspecified dementia type (H)  Essential hypertension with goal blood pressure less than 130/80  CKD (chronic kidney disease) stage 4, GFR 15-29 ml/min " "(H)  Primary osteoarthritis, unspecified site  Frail elderly  Charlene states she misses her  and is sad he cannot visit. She states she is tired and having an \"off day.\" We note she is typically bubbly and smiling. She apologizes for not being more engaged. We note her underlying CKD and OA. Chart review shows some HTN as noted below:  BPs:  05/04/2020 10:20 158/73 mmHg  04/28/2020 12:19 146/78 mmHg   04/28/2020 06:55 159/65 mmHg    04/27/2020 17:55 130/60 mmHg  04/27/2020 11:34 158/78 mmHg   04/27/2020 07:39 174/68 mmHg  04/26/2020 17:30 156/98 mmHg   04/26/2020 11:37 159/86 mmHg   04/26/2020 07:21 147/72 mmHg    04/25/2020 18:04 203/89 mmHg  04/25/2020 11:11 149/76 mmHg  04/25/2020 07:30 158/69 mmHg   04/24/2020 17:30 161/87 mmHg    ALLERGIES:Lisinopril and Lisinopril PAST MEDICAL HISTORY:   has a past medical history of Abnormality of gait, Anemia, unspecified, Chronic ischemic heart disease, unspecified, Closed fracture of patella (06/21/10), Coronary artery disease, Degenerative disc disease, Depressive disorder, not elsewhere classified, History of blood transfusion, Other and unspecified hyperlipidemia, Renal failure, unspecified, Septicemia due to Escherichia coli (E. coli)(038.42) (H) (05/03/2007), Syncope and collapse (4/8/2005), Syncope and collapse (05/26/10), Traumatic subdural hematomas (05/27/10), Type II or unspecified type diabetes mellitus without mention of complication, not stated as uncontrolled, Unspecified essential hypertension, Unspecified sleep apnea, and Urinary tract infection, site not specified (4/4/2008). She also has no past medical history of Asthma, Congestive heart failure, unspecified, COPD (chronic obstructive pulmonary disease) (H), Malignant neoplasm (H), Thyroid disease, or Unspecified cerebral artery occlusion with cerebral infarction. PAST SURGICAL HISTORY:   has a past surgical history that includes AMPUTATION TOE,MT-P JT (2000); APPENDECTOMY; ANESTH,UPPER LEG SURGERY; " UPPER GI ENDOSCOPY,EXAM (04/27/2005); Colonoscopy w/wo Brush **Performed** (12/05/2005); UGI ENDOSCOPY DIAG W OR W/O BRUSH/WASH (12/05/2005); BX SYNOVIUM INTERPHALANG JT; craniotomy (05/28/10); REPAIR ROTATOR CUFF,ACUTE (11/21/2002); EXCISION LESION/TENDON-SHEATH/CAPSULE, FOOT (04/30/07); EXCISION LESION/TENDON-SHEATH/CAPSULE, FOOT (8/14/2007); bunionectomy rt/lt (10/24/07); and OPEN TX FEMORAL SUPRACONDYLAR FRACTURE W EXTENSION (12/14/12). FAMILY HISTORY: family history includes C.A.D. in her brother; Diabetes in her brother, brother, and mother; Osteoporosis in her mother. SOCIAL HISTORY:  reports that she has never smoked. She has never used smokeless tobacco. She reports that she does not drink alcohol or use drugs.  MEDICATIONS:  Current Outpatient Medications   Medication Sig Dispense Refill     acetaminophen (TYLENOL) 500 MG tablet Take 2 tablets (1,000 mg) by mouth 3 times daily       ASPIRIN NOT PRESCRIBED, INTENTIONAL, by Other route continuous prn. 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    0     hydrALAZINE (APRESOLINE) 100 MG tablet Take 1 tablet (100 mg) by mouth 3 times daily       magnesium oxide (MAG-OX) 400 MG tablet Take 1 tablet (400 mg) by mouth daily       polyethylene glycol (MIRALAX/GLYCOLAX) packet Take 17 g by mouth daily       QUEtiapine (SEROQUEL) 25 MG tablet Take 0.5 tablets (12.5 mg) by mouth every morning AND 1 tablet (25 mg) At Bedtime.       Case Management:  I have reviewed the care plan and MDS and do agree with the plan. Patient's desire to return to the community is not assessible due to cognitive impairment. Information reviewed:  Medications, vital signs, orders, and nursing notes.    ROS: Limited secondary to cognitive impairment but today pt reports the above and 4 point ROS including Respiratory, CV, GI and , other than that noted in the HPI, is negative.    Objective:  BP (!) 158/73   Pulse 66   Temp 97.5  F (36.4  C)   Resp 18   Wt 64.3 kg (141 lb 12.8 oz)    SpO2 95%   BMI 23.60 kg/m    GENERAL APPEARANCE: Alert, in no distress, cooperative.   EYES/ENT: EOM normal on camera, hearing acuity [place].  RESP: Respiratory effort appears unlabored over video screen, no respiratory distress apparent on video, On RA.   SKIN: Skin appears baseline w/ video inspection. No wounds/rashes noted.    NEURO: CN II-XII at patient's baseline, TORRES at baseline on video. Sensation baseline PPS.  PSYCH: Insight, judgement, and memory are baseline impaired, affect and mood are sad/flat.    Laboratory/Diagnostics: Reviewed in Epic by provider today.     Assessment/Plan:  Dementia with behavioral disturbance, unspecified dementia type (H)  Essential hypertension with goal blood pressure less than 130/80  CKD (chronic kidney disease) stage 4, GFR 15-29 ml/min (H)  Primary osteoarthritis, unspecified site  Frail elderly  Acute-on-chronic. Ongoing.    We note this patient may not require daily AM Seroquel dosing. We will attempt GDR (keeping HS dose). May consider a PRN ongoing if behaviors are sporadic.    OA is controlled at this time, though this is a common complaint for her.    Noting significant HTN w/ underlying CKD, which can make treatment options limited. Previous Norasc use noted, and we will therefore restart this and trend BPs.  Follow-up routinely or as needed.    Orders:  1. Decrease Seroquel to 25mg PO QHS. Dx: psychosis   2. Start Norvasc 2.5mg PO QHS. Dx: HTN.    Video-Visit Details  Type of service:  Video Visit  Video Start Time: 1121  Video End Time (time video stopped): 1124  Originating Location (pt. Location): Long term Care  Distant Location (provider location):  [facility]   Mode of Communication:  Video Conference.    Electronically signed by:  ALBA Del Rosario CNP [facility]            Sincerely,        ALBA Jolly CNP

## 2020-05-16 NOTE — PLAN OF CARE
ID Progress Note    Yessy Griffith is a 61 year old female with Covid    Today's reason for visit: Covid    S: stable, no new issue, no pain, no fever, + cough, + shortness of breath     ROS:       O:     Vitals:    05/16/20 1200   BP:    Pulse: 80   Resp: (!) 22   Temp: 99.2 °F (37.3 °C)           EXAM:  Intubated now  Exam Reviewed as below per hospitalsit and pulmonary sec to exposure issues and conservation of PPE and being only ID physician at this facility as per protocol here at this facility  GENERAL: intubated and sedated.   SKIN: normal color, warm, dry  HEAD: normocephalic, atraumatic  EYES: pupils are equal, sclera and conjunctiva are normal   MOUTH/THROAT: oropharynx appears small, oral mucosa is unremarkable, ETT in place  NECK: supple, trachea midline   CHEST: respiratory effort is labored, equal expansion bilaterally. Vent assisted  LUNGS: lungs CTA, no wheezing  HEART: normal rate and rhythm, S1 and S2 normal, no murmurs   ABDOMEN: soft, obese, +bowel sounds  NEUROLOGIC:  AMARIS, sedated. Moves all four extremities, no focal deficits  EXTREMITIES: no clubbing, no cyanosis, non pitting edema. pulses equal bilaterally   PSYCHIATRIC: AMARIS patient sedated     LABS:     WBC (K/mcL)   Date Value   05/16/2020 8.2   01/21/2020 5.7     RBC (mil/mcL)   Date Value   05/16/2020 3.14 (L)   01/21/2020 4.57     HCT (%)   Date Value   05/16/2020 27.4 (L)   01/21/2020 39.8     HGB (g/dL)   Date Value   05/16/2020 8.3 (L)   01/21/2020 12.4     PLT   Date Value   05/16/2020 490 K/mcL (H)   01/21/2020 307 K/mcL   10/06/2017 320 K/uL          CULTURES:  Reviewed: Blood:                                          Other:     IMAGING:    Reviewed: CXR:                                            Other     MEDS:  • famotidine (PEPCID) injection  20 mg Intravenous 2 times per day   • remdesivir infusion (adult)  100 mg Intravenous Q Evening   • chlorhexidine gluconate  15 mL Swish & Spit 2  Discharge Planner OT   Patient plan for discharge: Return home (if able)  Current status: OT order received and evaluation completed.  No formal cognitive screen completed this date, due to early hour of session.  Patient presents with slurred speech, lethargic manner, but did open eyes to respond with therapist.  Pleasant and cooperative, laughed and smiled during OT session and with others.  No active hallucinations present, this am during OT eval session.  Patient is oriented to person only, believes she is in [place] and does not know this is a hospital (this morning).  Self cares requires assist to complete, she is able to follow one step directions. Patient states was independent with all previous functional tasks: dressing, bathing, toilet ing, simple meal prep, simple housekeeping, pet care (parakeet).  Barriers to return to prior living situation: cognitive status, weakness  Recommendations for discharge: TCU  Rationale for recommendations: Advance strength and energy for completing self cares and simple home mgmt tasks.  Improve overall safety with BADL/IADLs.       Entered by: Julita Hough 04/01/2019 8:12 AM      times per day   • petrolatum (white)-mineral oil  1 application Both Eyes 6 times per day   • sodium chloride (PF)  10 mL Injection 3 times per day   • doxycycline monohydrate  100 mg Per NG tube 2 times per day   • piperacillin-tazobactam (ZOSYN) extended interval IVPB  3.375 g Intravenous 3 times per day   • zinc sulfate  440 mg Per NG tube Daily with breakfast   • sodium chloride (PF)  10 mL Injection 2 times per day   • sodium chloride (PF)  2 mL Intracatheter 2 times per day   • enoxaparin  1 mg/kg Subcutaneous 2 times per day        ASSESSMENT:    Impression:  COVID +    Bilateral Pneumonia: Secondary to above.    Acute hypoxemic respiratory failure: Secondary to above.    Elevated inflammatory   Secondary to above.    Generalized weakness: Secondary to above.    Dehydration   Hyponatremia  Hypertension.  Hyperlipidemia   Obstructive Sleep Apnea    Obesity  resp ailure s/p intubation       Recommendations:   Antibiotics: Doxycline , zosyn for aspiration  Antibiotics duration: for now   Follow final cultures results and sensitivities  Follow CBC, inflammatory markers   Check IL-6, hepatitis panel, quantiferon    Follow Imaging,   Also on HCQ   actemra will be given tonight  Plasma given   Remdisivir tonight  Isolation: Per protocol for COVID       Follow up as outpatient on discharge in 2 weeks    Discussed with: RN pulmonary    Will follow,    Chapo Prater MD  Please dial [phone] for answering service        .
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
## 2020-06-08 NOTE — LETTER
"    6/8/2020        RE: Smiley Massey  [address] [place] [address] [id number] [place] [address] Geriatrics Video Visit  Smiley Acuña is a 85 year old female who is being evaluated via a billable video visit due to the restrictions of the COVID19 pandemic.The patient has been notified of following: \"This video visit will be conducted via a call between you and your provider. We have found that certain health care needs can be provided without the need for an in-person physical exam.  This service lets us provide the care you need with a video conversation.  If a prescription is necessary we can send it directly to your pharmacy.  If lab work is needed we can place an order for that and you can then stop by our lab to have the test done at a later time. If during the course of the call the provider feels a video visit is not appropriate, you will not be charged for this service.\"     Proivder has received verbal consent for a Video Visit from the patient? Yes    Patient/facility would like the video invitation sent by: Send to e-mail at: [email or web address]    This visit took place virtually, with the patient located at [facility].  ________________________________________________  Video Start Time: 1341  Smiley Acuña complains of    Chief Complaint   Patient presents with     Video Visit     Episodic   HPI/Subjective:  Nursing reported significant weekend episodes of patient crying, stripping, exit-seeking behavior, and the on-call provider had to restart her previously discontinued AM Seroquel. Today, Charlene states she is sad, is having trouble sleeping, she states her legs hurt, they both jump and burn. We know of her longstanding neuropathy. Her poor renal function makes this difficult to treat. She also has underlying OA. She states she's eating pretty good. During our last visit, we started low-dose Norvasc for better HTN " control. Chart review shows VSS w/ BPs as noted below:  BPs:  05/04/2020 10:20 158/73 mmHg   04/28/2020 12:19 146/78 mmHg    04/28/2020 06:55 159/65 mmHg   04/27/2020 17:55 130/60 mmHg    04/27/2020 11:34 158/78 mmHg   04/27/2020 07:39 174/68 mmHg   04/26/2020 17:30 156/98 mmHg  04/26/2020 11:37 159/86 mmHg   04/26/2020 07:21 147/72 mmHg  04/25/2020 11:11 149/76 mmHg    04/25/2020 07:30 158/69 mmHg   04/24/2020 17:30 161/87 mmHg   04/24/2020 11:36 137/71 mmHg   04/24/2020 06:49 129/67 mmHg    04/23/2020 18:11 134/72 mmHg     PMH/PSH reviewed in EPIC today.  MEDICATIONS:  Current Outpatient Medications   Medication Sig Dispense Refill     acetaminophen (TYLENOL) 500 MG tablet Take 2 tablets (1,000 mg) by mouth 3 times daily       amLODIPine (NORVASC) 2.5 MG tablet Take 1 tablet (2.5 mg) by mouth At Bedtime       ASPIRIN NOT PRESCRIBED, INTENTIONAL, by Other route continuous prn. 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    0     hydrALAZINE (APRESOLINE) 100 MG tablet Take 1 tablet (100 mg) by mouth 3 times daily       magnesium oxide (MAG-OX) 400 MG tablet Take 1 tablet (400 mg) by mouth daily       polyethylene glycol (MIRALAX/GLYCOLAX) packet Take 17 g by mouth daily       QUEtiapine (SEROQUEL) 25 MG tablet Take 1 tablet (25 mg) by mouth At Bedtime       ROS: Limited secondary to cognitive impairment but today pt reports the above and 4 point ROS including Respiratory, CV, GI and , other than that noted in the HPI, is negative.    Objective:  BP (!) 158/73   Pulse 66   Temp 97.9  F (36.6  C)   Resp 18   Wt 64.7 kg (142 lb 9.6 oz)   SpO2 97%   BMI 23.73 kg/m    GENERAL APPEARANCE: Alert, in no distress, cooperative.   EYES/ENT: EOM normal on camera, hearing acuity [place].  RESP: Respiratory effort appears unlabored over video screen, no respiratory distress apparent on video, On RA.   SKIN: Skin appears baseline w/ video inspection. No wounds/rashes noted.    NEURO: CN II-XII at patient's baseline, MELISSA  at baseline on video. Sensation baseline PPS.  PSYCH: Insight, judgement, and memory are baseline impaired, affect and mood are sad/flat.    Laboratory/Diagnostics: Reviewed in Epic by provider today.     Assessment/Plan:  Dementia with behavioral disturbance, unspecified dementia type (H)  Major Depressive Disorder (H)  Essential hypertension with goal blood pressure less than 130/80  CKD (chronic kidney disease) stage 4, GFR 15-29 ml/min (H)  Primary osteoarthritis, unspecified site  Bilateral leg pain  Frail elderly  Acute-on-chronic. Ongoing.    Agree with Seroquel restart. Will consult in-house psych, and may consider Remeron addition given sleep complaints should patient continue to have ongoing depression sxs.    Unclear if OA vs RLS vs neuropathy. Gabapentin has not been helpful in the past and difficult w/ renal status. Charlene is already utilizing Capsaicin QID. Will attempt Requip at low-dose to assist w/ night-time leg pain.    HTN better controlled w/ low-dose Norvasc addition. May consider increase if needed.   Follow-up routinely or as needed.    Orders:  1. Requip 0.25mg PO QHS. Dx: leg pains.  2. BMP x1 on 6/19. Dx: HTN.  3. In-house psych eval and treat x1. Dx: depression.    Video-Visit Details  Type of service:  Video Visit  Video Start Time: 1341  Video End Time (time video stopped): 1349  Originating Location (pt. Location): Long term Care  Distant Location (provider location):  [facility]   Mode of Communication:  Video Conference.    Electronically signed by:  ALBA Del Rosario CNP [facility]            Sincerely,        ALBA Jolly CNP

## 2020-06-08 NOTE — PROGRESS NOTES
"[facility] Geriatrics Video Visit  Smiley Acuña is a 85 year old female who is being evaluated via a billable video visit due to the restrictions of the COVID19 pandemic.The patient has been notified of following: \"This video visit will be conducted via a call between you and your provider. We have found that certain health care needs can be provided without the need for an in-person physical exam.  This service lets us provide the care you need with a video conversation.  If a prescription is necessary we can send it directly to your pharmacy.  If lab work is needed we can place an order for that and you can then stop by our lab to have the test done at a later time. If during the course of the call the provider feels a video visit is not appropriate, you will not be charged for this service.\"     Proivder has received verbal consent for a Video Visit from the patient? Yes    Patient/facility would like the video invitation sent by: Send to e-mail at: [email or web address]    This visit took place virtually, with the patient located at [facility].  ________________________________________________  Video Start Time: 1341  Smiley Acuña complains of    Chief Complaint   Patient presents with     Video Visit     Episodic   HPI/Subjective:  Nursing reported significant weekend episodes of patient crying, stripping, exit-seeking behavior, and the on-call provider had to restart her previously discontinued AM Seroquel. Today, Charlene states she is sad, is having trouble sleeping, she states her legs hurt, they both jump and burn. We know of her longstanding neuropathy. Her poor renal function makes this difficult to treat. She also has underlying OA. She states she's eating pretty good. During our last visit, we started low-dose Norvasc for better HTN control. Chart review shows VSS w/ BPs as noted below:  BPs:  05/04/2020 10:20 158/73 mmHg   04/28/2020 12:19 146/78 mmHg "    04/28/2020 06:55 159/65 mmHg   04/27/2020 17:55 130/60 mmHg    04/27/2020 11:34 158/78 mmHg   04/27/2020 07:39 174/68 mmHg   04/26/2020 17:30 156/98 mmHg  04/26/2020 11:37 159/86 mmHg   04/26/2020 07:21 147/72 mmHg  04/25/2020 11:11 149/76 mmHg    04/25/2020 07:30 158/69 mmHg   04/24/2020 17:30 161/87 mmHg   04/24/2020 11:36 137/71 mmHg   04/24/2020 06:49 129/67 mmHg    04/23/2020 18:11 134/72 mmHg     PMH/PSH reviewed in EPIC today.  MEDICATIONS:  Current Outpatient Medications   Medication Sig Dispense Refill     acetaminophen (TYLENOL) 500 MG tablet Take 2 tablets (1,000 mg) by mouth 3 times daily       amLODIPine (NORVASC) 2.5 MG tablet Take 1 tablet (2.5 mg) by mouth At Bedtime       ASPIRIN NOT PRESCRIBED, INTENTIONAL, by Other route continuous prn. 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    0     hydrALAZINE (APRESOLINE) 100 MG tablet Take 1 tablet (100 mg) by mouth 3 times daily       magnesium oxide (MAG-OX) 400 MG tablet Take 1 tablet (400 mg) by mouth daily       polyethylene glycol (MIRALAX/GLYCOLAX) packet Take 17 g by mouth daily       QUEtiapine (SEROQUEL) 25 MG tablet Take 1 tablet (25 mg) by mouth At Bedtime       ROS: Limited secondary to cognitive impairment but today pt reports the above and 4 point ROS including Respiratory, CV, GI and , other than that noted in the HPI, is negative.    Objective:  BP (!) 158/73   Pulse 66   Temp 97.9  F (36.6  C)   Resp 18   Wt 64.7 kg (142 lb 9.6 oz)   SpO2 97%   BMI 23.73 kg/m    GENERAL APPEARANCE: Alert, in no distress, cooperative.   EYES/ENT: EOM normal on camera, hearing acuity [place].  RESP: Respiratory effort appears unlabored over video screen, no respiratory distress apparent on video, On RA.   SKIN: Skin appears baseline w/ video inspection. No wounds/rashes noted.    NEURO: CN II-XII at patient's baseline, TORRES at baseline on video. Sensation baseline PPS.  PSYCH: Insight, judgement, and memory are baseline impaired, affect and mood  are sad/flat.    Laboratory/Diagnostics: Reviewed in Epic by provider today.     Assessment/Plan:  Dementia with behavioral disturbance, unspecified dementia type (H)  Major Depressive Disorder (H)  Essential hypertension with goal blood pressure less than 130/80  CKD (chronic kidney disease) stage 4, GFR 15-29 ml/min (H)  Primary osteoarthritis, unspecified site  Bilateral leg pain  Frail elderly  Acute-on-chronic. Ongoing.    Agree with Seroquel restart. Will consult in-house psych, and may consider Remeron addition given sleep complaints should patient continue to have ongoing depression sxs.    Unclear if OA vs RLS vs neuropathy. Gabapentin has not been helpful in the past and difficult w/ renal status. Charlene is already utilizing Capsaicin QID. Will attempt Requip at low-dose to assist w/ night-time leg pain.    HTN better controlled w/ low-dose Norvasc addition. May consider increase if needed.   Follow-up routinely or as needed.    Orders:  1. Requip 0.25mg PO QHS. Dx: leg pains.  2. BMP x1 on 6/19. Dx: HTN.  3. In-house psych eval and treat x1. Dx: depression.    Video-Visit Details  Type of service:  Video Visit  Video Start Time: 1341  Video End Time (time video stopped): 1349  Originating Location (pt. Location): Long term Care  Distant Location (provider location):  [place] [facility]   Mode of Communication:  Video Conference.    Electronically signed by:  Dr. Ligia Peters, APRN CNP DNP

## 2020-06-17 NOTE — PROGRESS NOTES
"[facility] Geriatrics Video Visit  Smiley Acuña is a 85 year old female who is being evaluated via a billable video visit due to the restrictions of the COVID19 pandemic.The patient has been notified of following: \"This video visit will be conducted via a call between you and your provider. We have found that certain health care needs can be provided without the need for an in-person physical exam.  This service lets us provide the care you need with a video conversation.  If a prescription is necessary we can send it directly to your pharmacy.  If lab work is needed we can place an order for that and you can then stop by our lab to have the test done at a later time. If during the course of the call the provider feels a video visit is not appropriate, you will not be charged for this service.\"     Proivder has received verbal consent for a Video Visit from the patient? Yes    Patient/facility would like the video invitation sent by: Send to e-mail at: [email or web address]    This visit took place virtually, with the patient located at [facility].  ________________________________________________  Video Start Time: 1106  Smiley Acuña complains of    Chief Complaint   Patient presents with     Video Visit     Episodic   HPI/Subjective:  Charlene seen today after we evaluated her for behaviors that had worsened last week, including depressive sxs. We also had restarted Norvasc for ongoing HTN. We started Requip for overnight leg pain and awakening. Today, Charlene is happy, reminiscing, she denies headache, dizziness, SOB, cough. She denies sadness, anxiety. She states that she is sleeping great, and right now her legs don't hurt. Her BPs are as noted below:  BPs:  06/08/2020 19:46 166/83 mmHg   05/04/2020 10:20 158/73 mmHg   04/28/2020 12:19 146/78 mmHg   04/28/2020 06:55 159/65 mmHg    04/27/2020 17:55 130/60 mmHg   04/27/2020 11:34 158/78 mmHg   04/27/2020 07:39 " 174/68 mmHg  04/26/2020 17:30 156/98 mmHg    04/26/2020 11:37 159/86 mmHg     PMH/PSH reviewed in [facility] today.  MEDICATIONS:  Current Outpatient Medications   Medication Sig Dispense Refill     acetaminophen (TYLENOL) 500 MG tablet Take 2 tablets (1,000 mg) by mouth 3 times daily       amLODIPine (NORVASC) 2.5 MG tablet Take 1 tablet (2.5 mg) by mouth At Bedtime       ASPIRIN NOT PRESCRIBED, INTENTIONAL, by Other route continuous prn. 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    0     hydrALAZINE (APRESOLINE) 100 MG tablet Take 1 tablet (100 mg) by mouth 3 times daily       magnesium oxide (MAG-OX) 400 MG tablet Take 1 tablet (400 mg) by mouth daily       polyethylene glycol (MIRALAX/GLYCOLAX) packet Take 17 g by mouth daily       QUEtiapine (SEROQUEL) 25 MG tablet Take 1 tablet (25 mg) by mouth At Bedtime       rOPINIRole (REQUIP) 0.25 MG tablet Take 1 tablet (0.25 mg) by mouth At Bedtime       ROS: Limited secondary to cognitive impairment but today pt reports the above and 4 point ROS including Respiratory, CV, GI and , other than that noted in the HPI, is negative.    Objective:  BP (!) 166/83   Pulse 69   Temp 97.6  F (36.4  C)   Resp 18   Wt 64.7 kg (142 lb 9.6 oz)   SpO2 98%   BMI 23.73 kg/m    GENERAL APPEARANCE: Alert, in no distress, cooperative.   EYES/ENT: EOM normal on camera, hearing acuity [place].  RESP: Respiratory effort appears unlabored over video screen, no respiratory distress apparent on video, On RA.   SKIN: Skin appears baseline w/ video inspection. No wounds/rashes noted.    NEURO: CN II-XII at patient's baseline, TORRES at baseline on video. Sensation baseline PPS.  PSYCH: Insight, judgement, and memory are baseline impaired, affect and mood are sad/flat.    Laboratory/Diagnostics: Reviewed in Epic by provider today.     Assessment/Plan:  Dementia with behavioral disturbance, unspecified dementia type (H)  Major Depressive Disorder (H)  Essential hypertension with goal blood  pressure less than 130/80  CKD (chronic kidney disease) stage 4, GFR 15-29 ml/min (H)  Primary osteoarthritis, unspecified site  Bilateral leg pain  Frail elderly  Acute-on-chronic. Ongoing.    HTN is ongoing. We will increase Norvasc.    Noting addition of Requip may have been effective, we will continue this at low-dose given her renal impairment.   Follow-up routinely or as needed.    Orders:  1. Increase Norvasc from 2.5mg to 5mg PO at bedtime. Dx: HTN.    Video-Visit Details  Type of service:  Video Visit  Video Start Time: 1106  Video End Time (time video stopped): 1112  Originating Location (pt. Location): Long term [facility]  Distant Location (provider location):  [place] [facility]   Mode of Communication:  Video Conference.    Electronically signed by:  ALBA Del Rosario CNP DNP

## 2020-06-17 NOTE — LETTER
"    6/17/2020        RE: Smiley Massey  [address] [place] [address] [id number] [place] [address] Geriatrics Video Visit  Smiley Acuña is a 85 year old female who is being evaluated via a billable video visit due to the restrictions of the COVID19 pandemic.The patient has been notified of following: \"This video visit will be conducted via a call between you and your provider. We have found that certain health care needs can be provided without the need for an in-person physical exam.  This service lets us provide the care you need with a video conversation.  If a prescription is necessary we can send it directly to your pharmacy.  If lab work is needed we can place an order for that and you can then stop by our lab to have the test done at a later time. If during the course of the call the provider feels a video visit is not appropriate, you will not be charged for this service.\"     Proivder has received verbal consent for a Video Visit from the patient? Yes    Patient/facility would like the video invitation sent by: Send to e-mail at: [email or web address]    This visit took place virtually, with the patient located at [facility].  ________________________________________________  Video Start Time: 1106  Smiley Acuña complains of    Chief Complaint   Patient presents with     Video Visit     Episodic   HPI/Subjective:  Charlene seen today after we evaluated her for behaviors that had worsened last week, including depressive sxs. We also had restarted Norvasc for ongoing HTN. We started Requip for overnight leg pain and awakening. Today, Charlene is happy, reminiscing, she denies headache, dizziness, SOB, cough. She denies sadness, anxiety. She states that she is sleeping great, and right now her legs don't hurt. Her BPs are as noted below:  BPs:  06/08/2020 19:46 166/83 mmHg   05/04/2020 10:20 158/73 mmHg   04/28/2020 12:19 146/78 " mmHg   04/28/2020 06:55 159/65 mmHg    04/27/2020 17:55 130/60 mmHg   04/27/2020 11:34 158/78 mmHg   04/27/2020 07:39 174/68 mmHg  04/26/2020 17:30 156/98 mmHg    04/26/2020 11:37 159/86 mmHg     PMH/PSH reviewed in [facility] today.  MEDICATIONS:  Current Outpatient Medications   Medication Sig Dispense Refill     acetaminophen (TYLENOL) 500 MG tablet Take 2 tablets (1,000 mg) by mouth 3 times daily       amLODIPine (NORVASC) 2.5 MG tablet Take 1 tablet (2.5 mg) by mouth At Bedtime       ASPIRIN NOT PRESCRIBED, INTENTIONAL, by Other route continuous prn. 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    0     hydrALAZINE (APRESOLINE) 100 MG tablet Take 1 tablet (100 mg) by mouth 3 times daily       magnesium oxide (MAG-OX) 400 MG tablet Take 1 tablet (400 mg) by mouth daily       polyethylene glycol (MIRALAX/GLYCOLAX) packet Take 17 g by mouth daily       QUEtiapine (SEROQUEL) 25 MG tablet Take 1 tablet (25 mg) by mouth At Bedtime       rOPINIRole (REQUIP) 0.25 MG tablet Take 1 tablet (0.25 mg) by mouth At Bedtime       ROS: Limited secondary to cognitive impairment but today pt reports the above and 4 point ROS including Respiratory, CV, GI and , other than that noted in the HPI, is negative.    Objective:  BP (!) 166/83   Pulse 69   Temp 97.6  F (36.4  C)   Resp 18   Wt 64.7 kg (142 lb 9.6 oz)   SpO2 98%   BMI 23.73 kg/m    GENERAL APPEARANCE: Alert, in no distress, cooperative.   EYES/ENT: EOM normal on camera, hearing acuity [place].  RESP: Respiratory effort appears unlabored over video screen, no respiratory distress apparent on video, On RA.   SKIN: Skin appears baseline w/ video inspection. No wounds/rashes noted.    NEURO: CN II-XII at patient's baseline, TORRES at baseline on video. Sensation baseline PPS.  PSYCH: Insight, judgement, and memory are baseline impaired, affect and mood are sad/flat.    Laboratory/Diagnostics: Reviewed in Epic by provider today.     Assessment/Plan:  Dementia with behavioral  disturbance, unspecified dementia type (H)  Major Depressive Disorder (H)  Essential hypertension with goal blood pressure less than 130/80  CKD (chronic kidney disease) stage 4, GFR 15-29 ml/min (H)  Primary osteoarthritis, unspecified site  Bilateral leg pain  Frail elderly  Acute-on-chronic. Ongoing.    HTN is ongoing. We will increase Norvasc.    Noting addition of Requip may have been effective, we will continue this at low-dose given her renal impairment.   Follow-up routinely or as needed.    Orders:  1. Increase Norvasc from 2.5mg to 5mg PO at bedtime. Dx: HTN.    Video-Visit Details  Type of service:  Video Visit  Video Start Time: 1106  Video End Time (time video stopped): 1112  Originating Location (pt. Location): Long term Care  Distant Location (provider location):  [facility]   Mode of Communication:  Video Conference.    Electronically signed by:  ALBA Del Rosario CNP DNP          Sincerely,        ALBA Jolly CNP

## 2020-07-06 NOTE — PROGRESS NOTES
"[facility] Regulatory   Smiley Acuña is being evaluated via a billable video visit due to the restrictions of the Covid-19 pandemic.   The patient has been notified of following:  \"This video visit will be conducted via a call between you and your provider. We have found that certain health care needs can be provided without the need for an in-person physical exam.  This service lets us provide the care you need with a video conversation. If during the course of the call the provider feels a video visit is not appropriate, you will not be charged for this service.\"   The provider has received verbal consent for a Video Visit from the patient or first contact? Yes  Patient or facility staff would like the video invitation sent by: N/A   Video Start Time: 09:44  [facility] Medical Record Number:  [id number]  Place of Location at the time of visit: [facility]  Chief Complaint   Patient presents with     Video Visit     Regulatory     HPI:    Smiley Acuña  is 84 year old (1935), who is being seen today for a federally mandated E/M visit.  HPI information obtained from: facility staff,  and [facility] Epic chart review.     Today's concerns are:  - Pt seen in the presence of RN who graciously assisted with the virtual visit  - RN and DNP have no concern  --------------------------------  - Past Medical, social, family histories, medications, and allergies reviewed and updated  - Medications reviewed: in the chart and EHR.   - Case Management:   I have reviewed the care plan and MDS and do agree with the plan. Patient's desire to return to the community is not present.  Information reviewed:  Medications, vital signs, orders, and nursing notes.    MEDICATIONS:  Current Outpatient Medications   Medication Sig Dispense Refill     acetaminophen (TYLENOL) 500 MG tablet Take 2 tablets (1,000 mg) by mouth 3 times daily       amLODIPine (NORVASC) 2.5 MG tablet Take 2 tablets (5 mg) by " mouth At Bedtime       ASPIRIN NOT PRESCRIBED, INTENTIONAL, by Other route continuous prn. 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    0     hydrALAZINE (APRESOLINE) 100 MG tablet Take 1 tablet (100 mg) by mouth 3 times daily       magnesium oxide (MAG-OX) 400 MG tablet Take 1 tablet (400 mg) by mouth daily       polyethylene glycol (MIRALAX/GLYCOLAX) packet Take 17 g by mouth daily       QUEtiapine (SEROQUEL) 25 MG tablet Take 1 tablet (25 mg) by mouth At Bedtime       rOPINIRole (REQUIP) 0.25 MG tablet Take 1 tablet (0.25 mg) by mouth At Bedtime       ROS: unobtainable 2/2 to impaired memory.     Vitals:  BP (!) 166/83   Pulse 69   Temp 97.9  F (36.6  C)   Resp 18   Wt 64.7 kg (142 lb 9.6 oz)   SpO2 96%   BMI 23.73 kg/m    Body mass index is 23.73 kg/m .  Exam:  GENERAL APPEARANCE:  in no distress  RESP:  unlabored breathing  M/S:   no joint deformity noted  SKIN:  no rash noted  NEURO:   no purposeful movement in upper and lower extremities  PSYCH:  affect and mood normal, impaired memory, pleasantly confused.     Lab/Diagnostic data: no new data to review.     ASSESSMENT/PLAN  ----------------------------------  Dementia, likely Alzheimer disease, without behavioral disturbance (H)  - Continue to anticipate needs. Chronic condition, ongoing decline expected.   -  Continue to provide redirection and reassurance as needed. Maintain safe living situation with goals focused on comfort.  - on Seroquel.       Diet controlled. Diabetes mellitus type 2 (H)      Anemia due to stage 4 chronic kidney disease (H)  CKD (chronic kidney disease) stage 4, GFR 19 ml/min (H)  - Avoid nephrotoxic drugs  - Renal dose the medications.        Query Secondary hypertension : BP w/i acceptable range in this patient with advanced dementia.     Hyperlipidemia: LDL 69. Off Statin.      Primary osteoarthritis, unspecified site: analgesia optimal.      Frailty: Significant  Deficits requiring NH placement. Requiring extensive  assistance from nursing. Up for meals only o/w spends the day resting in bed        Order: See above, otherwise, continue the rest of the current POC.     Electronically signed by:  Trino Traore MD          Limited visit exam done given COVID-19 precautions.     Video-Visit Details  Type of service:  Video Visit  Video End Time (time video stopped): 09:45  Distant Location (provider location):  [facility]

## 2020-07-06 NOTE — LETTER
"    7/6/2020        RE: Smiley Acuña  C/o Theresa Massey  [facility] [id number] [place] [address] GERIATRIC SERVICES Regulatory   Smiley Acuña is being evaluated via a billable video visit due to the restrictions of the Covid-19 pandemic.   The patient has been notified of following:  \"This video visit will be conducted via a call between you and your provider. We have found that certain health care needs can be provided without the need for an in-person physical exam.  This service lets us provide the care you need with a video conversation. If during the course of the call the provider feels a video visit is not appropriate, you will not be charged for this service.\"   The provider has received verbal consent for a Video Visit from the patient or first contact? Yes  Patient or facility staff would like the video invitation sent by: N/A   Video Start Time: 09:44  [facility] Medical Record Number:  [id number]  Place of Location at the time of visit: [facility]  Chief Complaint   Patient presents with     Video Visit     Regulatory     HPI:    Smiley Acuña  is 84 year old (1935), who is being seen today for a federally mandated E/M visit.  HPI information obtained from: facility staff,  and [facility] Epic chart review.     Today's concerns are:  - Pt seen in the presence of RN who graciously assisted with the virtual visit  - RN and DNP have no concern  --------------------------------  - Past Medical, social, family histories, medications, and allergies reviewed and updated  - Medications reviewed: in the chart and EHR.   - Case Management:   I have reviewed the care plan and MDS and do agree with the plan. Patient's desire to return to the community is not present.  Information reviewed:  Medications, vital signs, orders, and nursing notes.    MEDICATIONS:  Current Outpatient Medications   Medication Sig Dispense Refill     acetaminophen (TYLENOL) 500 MG " tablet Take 2 tablets (1,000 mg) by mouth 3 times daily       amLODIPine (NORVASC) 2.5 MG tablet Take 2 tablets (5 mg) by mouth At Bedtime       ASPIRIN NOT PRESCRIBED, INTENTIONAL, by Other route continuous prn. 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    0     hydrALAZINE (APRESOLINE) 100 MG tablet Take 1 tablet (100 mg) by mouth 3 times daily       magnesium oxide (MAG-OX) 400 MG tablet Take 1 tablet (400 mg) by mouth daily       polyethylene glycol (MIRALAX/GLYCOLAX) packet Take 17 g by mouth daily       QUEtiapine (SEROQUEL) 25 MG tablet Take 1 tablet (25 mg) by mouth At Bedtime       rOPINIRole (REQUIP) 0.25 MG tablet Take 1 tablet (0.25 mg) by mouth At Bedtime       ROS: unobtainable 2/2 to impaired memory.     Vitals:  BP (!) 166/83   Pulse 69   Temp 97.9  F (36.6  C)   Resp 18   Wt 64.7 kg (142 lb 9.6 oz)   SpO2 96%   BMI 23.73 kg/m    Body mass index is 23.73 kg/m .  Exam:  GENERAL APPEARANCE:  in no distress  RESP:  unlabored breathing  M/S:   no joint deformity noted  SKIN:  no rash noted  NEURO:   no purposeful movement in upper and lower extremities  PSYCH:  affect and mood normal, impaired memory, pleasantly confused.     Lab/Diagnostic data: no new data to review.     ASSESSMENT/PLAN  ----------------------------------  Dementia, likely Alzheimer disease, without behavioral disturbance (H)  - Continue to anticipate needs. Chronic condition, ongoing decline expected.   -  Continue to provide redirection and reassurance as needed. Maintain safe living situation with goals focused on comfort.  - on Seroquel.       Diet controlled. Diabetes mellitus type 2 (H)      Anemia due to stage 4 chronic kidney disease (H)  CKD (chronic kidney disease) stage 4, GFR 19 ml/min (H)  - Avoid nephrotoxic drugs  - Renal dose the medications.        Query Secondary hypertension : BP w/i acceptable range in this patient with advanced dementia.     Hyperlipidemia: LDL 69. Off Statin.      Primary  osteoarthritis, unspecified site: analgesia optimal.      Frailty: Significant  Deficits requiring NH placement. Requiring extensive assistance from nursing. Up for meals only o/w spends the day resting in bed        Order: See above, otherwise, continue the rest of the current POC.     Electronically signed by:  Trino Traore MD          Limited visit exam done given COVID-19 precautions.     Video-Visit Details  Type of service:  Video Visit  Video End Time (time video stopped): 09:45  Distant Location (provider location):  [facility] [facility]       Sincerely,        Trino Traore MD

## 2020-09-04 NOTE — PROGRESS NOTES
"[facility] Geriatrics Video Visit  Smiley Acuña is a 85 year old female who is being evaluated via a billable video visit due to the restrictions of the COVID19 pandemic.The patient has been notified of following: \"This video visit will be conducted via a call between you and your provider. We have found that certain health care needs can be provided without the need for an in-person physical exam.  This service lets us provide the care you need with a video conversation.  If a prescription is necessary we can send it directly to your pharmacy.  If lab work is needed we can place an order for that and you can then stop by our lab to have the test done at a later time. If during the course of the call the provider feels a video visit is not appropriate, you will not be charged for this service.\"     Proivder has received verbal consent for a Video Visit from the patient? Yes    Patient/facility would like the video invitation sent by: Send to e-mail at: [email or web address]    This visit took place virtually, with the patient located at [facility].  ________________________________________________  Video Start Time: 1050  Chief Complaint   Patient presents with     Video Visit     Annual [facility] Medical Record Number:  [id number]. HPI: Smiley Acuña  is a 85 year old  (1935), who is being seen today for an annual comprehensive visit. HPI information obtained from: facility chart records, facility staff, patient report, [facility] chart review and Care Everywhere [facility] chart review.  Today's concerns are:  Charlene seen today before lunch. She was resting in her room. Today, she states that her BLE give her trouble because \"that left leg just don't work.\" While she moves all extremities at baseline, we note her significant neuropathy. She denies headaches, dizziness, vision changes. She denies nausea, constipation/diarrhea. She denies SOB, CP, " cough. She states sometimes there's swelling in her legs. She doesn't know where she is, or what age she is. She states her appetite is good and she sleeps well. We note her vaccinations are up to date. Most cancer screenings are not recommended in her age/condition. Her last PHQ9 was 4/27 in June 2020. BIMS 5/15 in June 2020.     ALLERGIES: Lisinopril and Lisinopril PAST MEDICAL HISTORY:  has a past medical history of Abnormality of gait, Anemia, unspecified, Chronic ischemic heart disease, unspecified, Closed fracture of patella (06/21/10), Coronary artery disease, Degenerative disc disease, Depressive disorder, not elsewhere classified, History of blood transfusion, Other and unspecified hyperlipidemia, Renal failure, unspecified, Septicemia due to Escherichia coli (E. coli)(038.42) (H) (05/03/2007), Syncope and collapse (4/8/2005), Syncope and collapse (05/26/10), Traumatic subdural hematomas (05/27/10), Type II or unspecified type diabetes mellitus without mention of complication, not stated as uncontrolled, Unspecified essential hypertension, Unspecified sleep apnea, and Urinary tract infection, site not specified (4/4/2008). She also has no past medical history of Asthma, Congestive heart failure, unspecified, COPD (chronic obstructive pulmonary disease) (H), Malignant neoplasm (H), Thyroid disease, or Unspecified cerebral artery occlusion with cerebral infarction.PAST SURGICAL HISTORY:  has a past surgical history that includes AMPUTATION TOE,MT-P JT (2000); APPENDECTOMY; ANESTH,UPPER LEG SURGERY; UPPER GI ENDOSCOPY,EXAM (04/27/2005); Colonoscopy w/wo Brush **Performed** (12/05/2005); UGI ENDOSCOPY DIAG W OR W/O BRUSH/WASH (12/05/2005); BX SYNOVIUM INTERPHALANG JT; craniotomy (05/28/10); REPAIR ROTATOR CUFF,ACUTE (11/21/2002); EXCISION LESION/TENDON-SHEATH/CAPSULE, FOOT (04/30/07); EXCISION LESION/TENDON-SHEATH/CAPSULE, FOOT (8/14/2007); bunionectomy rt/lt (10/24/07); and OPEN TX FEMORAL SUPRACONDYLAR  FRACTURE W EXTENSION (12/14/12).  IMMUNIZATIONS:  Immunization History   Administered Date(s) Administered     HepB 01/21/2004, 02/18/2004, 07/20/2004     Influenza (High Dose) 3 valent vaccine 11/19/2010, 10/26/2011, 09/11/2012, 11/05/2013, 10/15/2014, 10/21/2015, 09/07/2016, 11/08/2017, 09/19/2018     Influenza (IIV3) PF 11/19/1997, 10/19/1998, 10/04/1999, 11/24/2000, 11/06/2001, 10/28/2002, 10/31/2003, 10/20/2004, 11/03/2005, 11/14/2006, 11/19/2007, 11/19/2008, 12/02/2009     Pneumo Conj 13-V (2010&after) 12/08/2017     Pneumococcal 23 valent 01/21/2004     TD (ADULT, 7+) 07/19/1999   Above immunizations pulled from [facility]. [facility] and facility records also reconciled. Outstanding information sent to  to update [facility].  Future immunizations are not needed at this point as all recommended immunizations are up to date.   Current Outpatient Medications   Medication Sig Dispense Refill     acetaminophen (TYLENOL) 500 MG tablet Take 2 tablets (1,000 mg) by mouth 3 times daily       amLODIPine (NORVASC) 2.5 MG tablet Take 2 tablets (5 mg) by mouth At Bedtime       ASPIRIN NOT PRESCRIBED, INTENTIONAL, by Other route continuous prn. 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    0     hydrALAZINE (APRESOLINE) 100 MG tablet Take 1 tablet (100 mg) by mouth 3 times daily       magnesium oxide (MAG-OX) 400 MG tablet Take 1 tablet (400 mg) by mouth daily       polyethylene glycol (MIRALAX/GLYCOLAX) packet Take 17 g by mouth daily       QUEtiapine (SEROQUEL) 25 MG tablet take 1/2 tablet in the morning and 1 tablet(25mg) at bedtime 45 tablet 3     rOPINIRole (REQUIP) 0.25 MG tablet Take 1 tablet (0.25 mg) by mouth At Bedtime       Case Management: I have reviewed the facility/SNF care plan/MDS, including the falls risk, nutrition and pain screening. I also reviewed the current immunizations, and preventive care. .Future cancer screening is not clinically indicated secondary to age/goals of care  Patient's desire to return to the community is not assessible due to cognitive impairment. Current Level of Care is appropriate.    Advance Directive Discussion: I reviewed the current advanced directives as reflected in EPIC, the POLST and the facility chart, and verified the congruency of orders. I contacted the first party Phyllis (daughter) and discussed the plan of Care.  I did not due to cognitive impairment review the advance directives with the resident.     Team Discussion: I communicated with the appropriate disciplines involved with the Plan of Care: Nursing   Patient's goal is unobtainable secondary to cognitive impairment. Information reviewed:  Medications, vital signs, orders, and nursing notes.    ROS: Limited secondary to cognitive impairment but today pt reports the above and 10 point ROS of systems including Constitutional, Eyes, Respiratory, Cardiovascular, Gastroenterology, Genitourinary, Integumentary, Musculoskeletal, Psychiatric were all negative except for pertinent positives noted in my HPI.    Vitals:  BP (!) 146/71   Pulse 73   Temp 98.1  F (36.7  C)   Resp 16   Wt 65 kg (143 lb 3.2 oz)   SpO2 97%   BMI 23.83 kg/m   Body mass index is 23.83 kg/m .  Exam:  GENERAL APPEARANCE: Alert, in no distress, cooperative  ENT: Mouth and posterior oropharynx normal, moist mucous membranes, [place].   EYES:Conjunctiva and lids normal, EOM at baseline as can be visualized on camera.  RESP: no respiratory distress, on RA, no SOB.  CV: Peripheral edema 1+ in BLE, color is pale throughout.   ABDOMEN:  rounded, no distension  M/S: Gait and station are baseline, Digits and nails normal  SKIN: Inspection of skin and subcutaneous tissue baseline, fragility and laxity present.  NEURO: Oriented to self, TORRES at baseline, sensation is baseline PPS.   PSYCH: insight and judgement impaired, memory impaired , affect and mood normal.     Lab/Diagnostic data:   Recent labs in [facility] reviewed by me today.      ASSESSMENT/PLAN  Annual physical exam  Dementia with behavioral disturbance, unspecified dementia type (H)  Recurrent major depressive disorder, in partial remission (H)  Essential hypertension with goal blood pressure less than 130/80  CKD (chronic kidney disease) stage 4, GFR 15-29 ml/min (H)  Diet-controlled diabetes mellitus (H)  Primary osteoarthritis involving multiple joints  Neuropathy  Frail elderly  Chronic. Ongoing. Progressive. Charlene is doing well at baseline. We contacted NOK, daughter Phyllis, to discussion ACP. After our discussion, we will renew DNR/DNI, we will not pursue mammography, colonoscopy, or other types of cancer screenings. We will obtain blood work for routine screening for DM control, thyroid, lipid abnormalities. We note her baseline anemia and renal disease, and therefore we will trend CBC, CMP. Daughter agreeable to this POC. Provider coordinated care with nursing. And daughter. Follow-up w/ blood work results or as needed.     Orders:  1. CBC, CMP, TSH, a1c, lipid panel x1 on 9/18.  No cancer screenings per family.  Not due for vaccinations.   Remain DNR/DNI.    Total time spent with patient visit was 35 min including patient visit, review of past records and phone call to patient contact (Phyllis). Greater than 50% of total time spent with counseling and coordinating care due to phone call with daughter and video conference w/ nursing2.     Video-Visit Details  Type of service:  Video Visit  Video Start Time: 1050  Video End Time (time video stopped): 1056  Originating Location (pt. Location): Long term Care  Distant Location (provider location):  [facility]   Mode of Communication:  Video Conference.    Electronically signed by:  ALBA Del Rosario CNP DNP

## 2020-09-04 NOTE — LETTER
"    9/4/2020        RE: Smiley Acuña  C/o Theresa Massey  [address] [place] [address] [id number] [place] [id number] Geriatrics Video Visit  Smiley Acuña is a 85 year old female who is being evaluated via a billable video visit due to the restrictions of the COVID19 pandemic.The patient has been notified of following: \"This video visit will be conducted via a call between you and your provider. We have found that certain health care needs can be provided without the need for an in-person physical exam.  This service lets us provide the care you need with a video conversation.  If a prescription is necessary we can send it directly to your pharmacy.  If lab work is needed we can place an order for that and you can then stop by our lab to have the test done at a later time. If during the course of the call the provider feels a video visit is not appropriate, you will not be charged for this service.\"     Proivder has received verbal consent for a Video Visit from the patient? Yes    Patient/facility would like the video invitation sent by: Send to e-mail at: [email or web address]    This visit took place virtually, with the patient located at [facility].  ________________________________________________  Video Start Time: 1050  Chief Complaint   Patient presents with     Video Visit     Annual [facility] Medical Record Number:  [id number]. HPI: Smiley Acuña  is a 85 year old  (1935), who is being seen today for an annual comprehensive visit. HPI information obtained from: facility chart records, facility staff, patient report, [facility] chart review and Care Everywhere [facility] chart review.  Today's concerns are:  Charlene seen today before lunch. She was resting in her room. Today, she states that her BLE give her trouble because \"that left leg just don't work.\" While she moves all extremities at baseline, we note her significant " neuropathy. She denies headaches, dizziness, vision changes. She denies nausea, constipation/diarrhea. She denies SOB, CP, cough. She states sometimes there's swelling in her legs. She doesn't know where she is, or what age she is. She states her appetite is good and she sleeps well. We note her vaccinations are up to date. Most cancer screenings are not recommended in her age/condition. Her last PHQ9 was 4/27 in June 2020. BIMS 5/15 in June 2020.     ALLERGIES: Lisinopril and Lisinopril PAST MEDICAL HISTORY:  has a past medical history of Abnormality of gait, Anemia, unspecified, Chronic ischemic heart disease, unspecified, Closed fracture of patella (06/21/10), Coronary artery disease, Degenerative disc disease, Depressive disorder, not elsewhere classified, History of blood transfusion, Other and unspecified hyperlipidemia, Renal failure, unspecified, Septicemia due to Escherichia coli (E. coli)(038.42) (H) (05/03/2007), Syncope and collapse (4/8/2005), Syncope and collapse (05/26/10), Traumatic subdural hematomas (05/27/10), Type II or unspecified type diabetes mellitus without mention of complication, not stated as uncontrolled, Unspecified essential hypertension, Unspecified sleep apnea, and Urinary tract infection, site not specified (4/4/2008). 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  IMMUNIZATIONS:  Immunization History   Administered Date(s) Administered     HepB 01/21/2004, 02/18/2004, 07/20/2004     Influenza (High Dose) 3 valent vaccine 11/19/2010, 10/26/2011, 09/11/2012, 11/05/2013, 10/15/2014, 10/21/2015, 09/07/2016, 11/08/2017, 09/19/2018     Influenza (IIV3) PF 11/19/1997, 10/19/1998, 10/04/1999, 11/24/2000, 11/06/2001, 10/28/2002, 10/31/2003, 10/20/2004, 11/03/2005, 11/14/2006, 11/19/2007, 11/19/2008, 12/02/2009     Pneumo Conj 13-V (2010&after) 12/08/2017     Pneumococcal 23 valent 01/21/2004     TD (ADULT, 7+) 07/19/1999   Above immunizations pulled from [facility]. [facility] and facility records also reconciled. Outstanding information sent to  to update [facility].  Future immunizations are not needed at this point as all recommended immunizations are up to date.   Current Outpatient Medications   Medication Sig Dispense Refill     acetaminophen (TYLENOL) 500 MG tablet Take 2 tablets (1,000 mg) by mouth 3 times daily       amLODIPine (NORVASC) 2.5 MG tablet Take 2 tablets (5 mg) by mouth At Bedtime       ASPIRIN NOT PRESCRIBED, INTENTIONAL, by Other route continuous prn. 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    0     hydrALAZINE (APRESOLINE) 100 MG tablet Take 1 tablet (100 mg) by mouth 3 times daily       magnesium oxide (MAG-OX) 400 MG tablet Take 1 tablet (400 mg) by mouth daily       polyethylene glycol (MIRALAX/GLYCOLAX) packet Take 17 g by mouth daily       QUEtiapine (SEROQUEL) 25 MG tablet take 1/2 tablet in the morning and 1 tablet(25mg) at bedtime 45 tablet 3     rOPINIRole (REQUIP) 0.25 MG tablet Take 1 tablet (0.25 mg) by mouth At Bedtime       Case Management: I have reviewed the facility/SNF care plan/MDS, including the falls risk, nutrition and pain screening. I also reviewed the current  immunizations, and preventive care. .Future cancer screening is not clinically indicated secondary to age/goals of care Patient's desire to return to the community is not assessible due to cognitive impairment. Current Level of Care is appropriate.    Advance Directive Discussion: I reviewed the current advanced directives as reflected in EPIC, the POLST and the facility chart, and verified the congruency of orders. I contacted the first party Phyllis (daughter) and discussed the plan of Care.  I did not due to cognitive impairment review the advance directives with the resident.     Team Discussion: I communicated with the appropriate disciplines involved with the Plan of Care: Nursing   Patient's goal is unobtainable secondary to cognitive impairment. Information reviewed:  Medications, vital signs, orders, and nursing notes.    ROS: Limited secondary to cognitive impairment but today pt reports the above and 10 point ROS of systems including Constitutional, Eyes, Respiratory, Cardiovascular, Gastroenterology, Genitourinary, Integumentary, Musculoskeletal, Psychiatric were all negative except for pertinent positives noted in my HPI.    Vitals:  BP (!) 146/71   Pulse 73   Temp 98.1  F (36.7  C)   Resp 16   Wt 65 kg (143 lb 3.2 oz)   SpO2 97%   BMI 23.83 kg/m   Body mass index is 23.83 kg/m .  Exam:  GENERAL APPEARANCE: Alert, in no distress, cooperative  ENT: Mouth and posterior oropharynx normal, moist mucous membranes, [place].   EYES:Conjunctiva and lids normal, EOM at baseline as can be visualized on camera.  RESP: no respiratory distress, on RA, no SOB.  CV: Peripheral edema 1+ in BLE, color is pale throughout.   ABDOMEN:  rounded, no distension  M/S: Gait and station are baseline, Digits and nails normal  SKIN: Inspection of skin and subcutaneous tissue baseline, fragility and laxity present.  NEURO: Oriented to self, TORRES at baseline, sensation is baseline PPS.   PSYCH: insight and judgement impaired, memory  impaired , affect and mood normal.     Lab/Diagnostic data:   Recent labs in EPIC reviewed by me today.     ASSESSMENT/PLAN  Annual physical exam  Dementia with behavioral disturbance, unspecified dementia type (H)  Recurrent major depressive disorder, in partial remission (H)  Essential hypertension with goal blood pressure less than 130/80  CKD (chronic kidney disease) stage 4, GFR 15-29 ml/min (H)  Diet-controlled diabetes mellitus (H)  Primary osteoarthritis involving multiple joints  Neuropathy  Frail elderly  Chronic. Ongoing. Progressive. Charlene is doing well at baseline. We contacted VINOD, daughter Phyllis, to discussion ACP. After our discussion, we will renew DNR/DNI, we will not pursue mammography, colonoscopy, or other types of cancer screenings. We will obtain blood work for routine screening for DM control, thyroid, lipid abnormalities. We note her baseline anemia and renal disease, and therefore we will trend CBC, CMP. Daughter agreeable to this POC. Provider coordinated care with nursing. And daughter. Follow-up w/ blood work results or as needed.     Orders:  1. CBC, CMP, TSH, a1c, lipid panel x1 on 9/18.  No cancer screenings per family.  Not due for vaccinations.   Remain DNR/DNI.    Total time spent with patient visit was 35 min including patient visit, review of past records and phone call to patient contact (Phyllis). Greater than 50% of total time spent with counseling and coordinating care due to phone call with daughter and video conference w/ nursing2.     Video-Visit Details  Type of service:  Video Visit  Video Start Time: 1050  Video End Time (time video stopped): 1056  Originating Location (pt. Location): Long term Care  Distant Location (provider location):  [facility]   Mode of Communication:  Video Conference.    Electronically signed by:  ALBA Del Rosario CNP [facility]          Sincerely,        ALBA Jolly CNP

## 2020-09-05 PROBLEM — N30.00 ACUTE CYSTITIS WITHOUT HEMATURIA: Status: RESOLVED | Noted: 2019-03-31 | Resolved: 2020-01-01

## 2020-09-05 PROBLEM — R33.9 URINARY RETENTION: Status: RESOLVED | Noted: 2019-04-06 | Resolved: 2020-01-01

## 2020-09-05 PROBLEM — R41.82 ALTERED MENTAL STATUS: Status: RESOLVED | Noted: 2019-03-31 | Resolved: 2020-01-01

## 2020-09-05 PROBLEM — K59.09 OTHER CONSTIPATION: Status: RESOLVED | Noted: 2019-04-09 | Resolved: 2020-01-01

## 2020-09-05 PROBLEM — R79.89 ELEVATED LACTIC ACID LEVEL: Status: RESOLVED | Noted: 2019-03-31 | Resolved: 2020-01-01

## 2020-10-21 NOTE — LETTER
"    10/21/2020        RE: Smiley Acuña  C/o Theresa Massey  [facility]  [address] [place] [id number]        [facility] Geriatrics Video Visit  Smiley Acuña is a 85 year old female who is being evaluated via a billable video visit due to the restrictions of the COVID19 pandemic.The patient has been notified of following: \"This video visit will be conducted via a call between you and your provider. We have found that certain health care needs can be provided without the need for an in-person physical exam.  This service lets us provide the care you need with a video conversation.  If a prescription is necessary we can send it directly to your pharmacy.  If lab work is needed we can place an order for that and you can then stop by our lab to have the test done at a later time. If during the course of the call the provider feels a video visit is not appropriate, you will not be charged for this service.\"     Proivder has received verbal consent for a Video Visit from the patient? Yes    Patient/facility would like the video invitation sent by: Send to e-mail at: [email or web address]    This visit took place virtually, with the patient located at [facility].  ________________________________________________  Video Start Time: 1118  LTC DISCHARGE SUMMARY  PATIENT'S NAME: Smiley Acuña. : 1935. MRN: [id number]. PRIMARY CARE PROVIDER AND CLINIC RESPONSIBLE AFTER TRANSFER: ALBA Reed CNP, [address] [place] [address] / [facility] [id number]. [facility] Provider     Transferring providers: ALBA Jara CNP, DNP & Trino Traore MD.  Recent LTC Stay:  [facility] TCU --> LTC/MCU stay 2019 to 10/23/20.  Date of TCU Admission:   Date of LTC (anticipated) Discharge:   Discharged to: [facility] skilled nursing facility [facility].   Cognitive Scores: BIMS: 4/15, SLUMS (2019) .   Physical Function: " Ambulating 50 ft with 2ww and 1-2p assistance.  DME: Wheelchair and Walker to be obtained from next facility.   CODE STATUS/ADVANCE DIRECTIVES DISCUSSION: DNR/DNI.  ALLERGIES: Lisinopril and Lisinopril    DISCHARGE DIAGNOSIS/NURSING FACILITY COURSE:   Dementia with behavioral disturbance, unspecified dementia type (H)  Recurrent major depressive disorder, in partial remission (H)  Psychophysiological insomnia  CKD (chronic kidney disease) stage 4, GFR 15-29 ml/min (H)  Anemia due to stage 4 chronic kidney disease (H)  Diet-controlled diabetes mellitus (H)  Essential hypertension with goal blood pressure less than 130/80  Neuropathy  Primary osteoarthritis involving multiple joints    Charlene initially presented to TCU in April 2019 s/p a hospitalization for hypovolemia, hyperglycemia, and sxs of sepsis. She recovered well, but her underlying dementia, CKD, and many other chronic conditions required long-term care. She later transferred to the memory care unit. COVID has made family separation difficult, and she will transfer to another facility her son works at, in order to have some more family time.     During her stay, she has been supported w/ PT/OT, and all other on-site services (dental, , speech, etc). She had several mild-acute illnesses during her stay which we treated and resolved: UTI (January 2020), viral URI's, GI bugs, etc. Her most pertinent health issues to manage moving forward are CKD, DM, HTN, Dementia. Her most recent BG readings, labs and VS are included below.   BGs:  10/19/2020 07:11 118 mg/dL   10/12/2020 07:13 163 mg/dL   10/05/2020 09:04 112 mg/dL   09/28/2020 07:33 137 mg/dL   09/21/2020 07:27 153 mg/dL   09/14/2020 07:28 130 mg/dL   09/07/2020 08:34 108 mg/dL   08/31/2020 07:29 133 mg/dL   08/24/2020 07:49 117 mg/dL   08/17/2020 07:20 118 mg/dL   08/10/2020 07:20 158 mg/dL     Notes to next PCP:    DM = diet-controlled.    CKD = severe, renally dose.      Dementia: GDR trials  of Seroquel have not been successful.     Neuropathy: Her most bothersome sxs and underlying OA as well. We tried gabapentin (even with renal impairment) and it was not particularly helpful, plus added to edema. Requip dose has been slightly helpful.     Last annual physical dose September 2020.     Past Medical History:  has a past medical history of Abnormality of gait, Anemia, unspecified, Chronic ischemic heart disease, unspecified, Closed fracture of patella (06/21/10), Coronary artery disease, Degenerative disc disease, Depressive disorder, not elsewhere classified, History of blood transfusion, Other and unspecified hyperlipidemia, Renal failure, unspecified, Septicemia due to Escherichia coli (E. coli)(038.42) (H) (05/03/2007), Syncope and collapse (4/8/2005), Syncope and collapse (05/26/10), Traumatic subdural hematomas (05/27/10), Type II or unspecified type diabetes mellitus without mention of complication, not stated as uncontrolled, Unspecified essential hypertension, Unspecified sleep apnea, and Urinary tract infection, site not specified (4/4/2008). She also has no past medical history of Asthma, Congestive heart failure, unspecified, COPD (chronic obstructive pulmonary disease) (H), Malignant neoplasm (H), Thyroid disease, or Unspecified cerebral artery occlusion with cerebral infarction.  Discharge Medications:  Current Outpatient Medications   Medication Sig Dispense Refill     acetaminophen (TYLENOL) 500 MG tablet Take 2 tablets (1,000 mg) by mouth 3 times daily       amLODIPine (NORVASC) 2.5 MG tablet Take 2 tablets (5 mg) by mouth At Bedtime       ASPIRIN NOT PRESCRIBED, INTENTIONAL, by Other route continuous prn. 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    0     hydrALAZINE (APRESOLINE) 100 MG tablet Take 1 tablet (100 mg) by mouth 3 times daily       magnesium oxide (MAG-OX) 400 MG tablet Take 1 tablet (400 mg) by mouth daily       polyethylene glycol (MIRALAX/GLYCOLAX) packet Take 17  g by mouth daily       QUEtiapine (SEROQUEL) 25 MG tablet take 1/2 tablet in the morning and 1 tablet(25mg) at bedtime 45 tablet 3     rOPINIRole (REQUIP) 0.25 MG tablet Take 1 tablet (0.25 mg) by mouth At Bedtime       Medication Changes/Rationale:     Discontinued:    Colace.    IM Zyprexa.    GDR of Zocor.    Tums.    Sliding scale insulin.    MVI.    Lantus.    Many changes.     Controlled medications sent with patient:   not applicable/none     ROS: Limited secondary to cognitive impairment but today pt reports the above and 4 point ROS including Respiratory, CV, GI and , other than that noted in the HPI, is negative.    Physical Exam:   Vitals: BP (!) 168/68   Pulse 62   Temp 97.6  F (36.4  C)   Resp 16   Wt 64.5 kg (142 lb 3.2 oz)   SpO2 96%   BMI 23.66 kg/m    BMI= Body mass index is 23.66 kg/m .  GENERAL APPEARANCE: Alert, in no distress, cooperative.   EYES/ENT: EOM normal on camera, hearing acuity [place].  RESP: Respiratory effort appears unlabored over video screen, no respiratory distress apparent on video, On RA.   ABDOMEN: Appears non-distended, rounded.  SKIN: Skin appears baseline w/ video inspection. No wounds/rashes noted.   NEURO: CN II-XII at patient's baseline, TORRES at baseline on video. Sensation baseline PPS.  PSYCH: Insight, judgement, and memory are baseline impaired, affect and mood are happy/engaged.    Facility Labs:   CBC RESULTS:   Recent Labs   Lab Test 09/21/20 04/11/19  0528   WBC 6.3 9.2   RBC 3.47* 3.20*   HGB 10.9* 9.9*   HCT 34.4* 31.0*   MCV 99 97   MCH 31.4 30.9   MCHC 31.7* 31.9   RDW 11.9 13.0    201   Last Basic Metabolic Panel:  Recent Labs   Lab Test 09/21/20 06/19/20    141   POTASSIUM 4.8 4.7   CHLORIDE 107 107   KEANU 9.2 9.0   CO2 25 29   BUN 54* 51*   CR 2.34* 2.23*   GLC 87 106   Liver Function Studies -   Recent Labs   Lab Test 09/21/20 11/15/19   PROTTOTAL 6.9 7.4   ALBUMIN 3.5 3.5   BILITOTAL 0.3 0.3   ALKPHOS 58 58   AST 15 16   ALT <9 <9     TSH    Date Value Ref Range Status   04/19/2019 1.20 0.30 - 5.00 UIU/mL Final   09/19/2018 1.41 0.40 - 4.00 mU/L Final     Lab Results   Component Value Date    A1C 7.5 09/21/2020    A1C 6.8 11/15/2019     DISCHARGE PLAN:    Follow up labs: No labs orders/due    Medical Follow Up: Follow up with PCP in 2-4 weeks.    MTM referral needed and placed by this provider: No    Current [place] scheduled appointments: None.    Discharge Services: Patient to be evaluated by ongoing PT/OT/SLP team and to utilize ongoing facility wheelchair and walker.     TOTAL DISCHARGE TIME:   Greater than 30 minutes  ____________________________________    Documentation of Face-to-Face and Certification for Home Health Services   Patient: Smiley Acuña   YOB: 1935  MR Number: [id number]  Today's Date: 10/21/2020  I certify that patient: Smiley Acuña is under my care and that I, or a nurse practitioner or physician's assistant working with me, had a face-to-face encounter that meets the physician face-to-face encounter requirements with this patient on: 10/21/2020.    This encounter with the patient was in whole, or in part, for the following medical condition, which is the primary reason for home health care: Dementia, neuropathy, impaired mobility.    I certify that, based on my findings, the following services are medically necessary home health services: Occupational Therapy, Physical Therapy and Speech Language Therapy.    My clinical findings support the need for the above services because: Occupational Therapy Services are needed to assess and treat cognitive ability and address ADL safety due to impairment in mobility., Physical Therapy Services are needed to assess and treat the following functional impairments: mobility. and Speech Therapy Services are needed to assess and treat impairments in language and/or swallow functions due to mobility.    Further, I certify that my clinical findings support that  this patient is homebound (i.e. absences from home require considerable and taxing effort and are for medical reasons or Spiritism services or infrequently or of short duration when for other reasons) because: Requires assistance of another person or specialized equipment to access medical services because patient: Is prone to wander/get lost without assistance...    Based on the above findings. I certify that this patient is confined to the home and needs intermittent skilled nursing care, physical therapy and/or speech therapy.  The patient is under my care, and I have initiated the establishment of the plan of care.  This patient will be followed by a provider who will periodically review the plan of care.    Provider to give follow up care: ALBA Reed CNP.     Responsible Medicare certified PECOS Provider: ALBA Del Rosario CNP, DNP  Provider Signature: See electronic signature associated with these discharge orders.  Date: 10/21/2020    Video-Visit Details  Type of service:  Video Visit  Video Start Time: 1118  Video End Time (time video stopped): 1120  Originating Location (pt. Location): Long term Care  Distant Location (provider location):  [facility] GERIATRIC SERVICES   Mode of Communication:  Video Conference.    Electronically signed by:  ALBA Del Rosario CNP, DNP            Sincerely,        ALBA Jolly CNP

## 2020-10-21 NOTE — LETTER
"    10/21/2020        RE: Smiley Acuña  C/o Theresa Massey  [facility]  [address] [place] [id number]        [facility] Geriatrics Video Visit  Smiley Acuña is a 85 year old female who is being evaluated via a billable video visit due to the restrictions of the COVID19 pandemic.The patient has been notified of following: \"This video visit will be conducted via a call between you and your provider. We have found that certain health care needs can be provided without the need for an in-person physical exam.  This service lets us provide the care you need with a video conversation.  If a prescription is necessary we can send it directly to your pharmacy.  If lab work is needed we can place an order for that and you can then stop by our lab to have the test done at a later time. If during the course of the call the provider feels a video visit is not appropriate, you will not be charged for this service.\"     Proivder has received verbal consent for a Video Visit from the patient? Yes    Patient/facility would like the video invitation sent by: Send to e-mail at: [email or web address]    This visit took place virtually, with the patient located at [facility].  ________________________________________________  Video Start Time: 1118  LTC DISCHARGE SUMMARY  PATIENT'S NAME: Smiley Acuña. : 1935. MRN: [id number]. PRIMARY CARE PROVIDER AND CLINIC RESPONSIBLE AFTER TRANSFER: ALBA Reed CNP, [address] [place] [address] / [facility] [address]. [facility] Provider     Transferring providers: ALBA Jara CNP, DNP & Trino Traore MD.  Recent LTC Stay:  [facility] TCU --> LTC/MCU stay 2019 to 10/23/20.  Date of TCU Admission:   Date of LTC (anticipated) Discharge:   Discharged to: [facility] skilled nursing facility [facility].   Cognitive Scores: BIMS: 4/15, SLUMS (2019) .   Physical Function: " Ambulating 50 ft with 2ww and 1-2p assistance.  DME: Wheelchair and Walker to be obtained from next facility.   CODE STATUS/ADVANCE DIRECTIVES DISCUSSION: DNR/DNI.  ALLERGIES: Lisinopril and Lisinopril    DISCHARGE DIAGNOSIS/NURSING FACILITY COURSE:   Dementia with behavioral disturbance, unspecified dementia type (H)  Recurrent major depressive disorder, in partial remission (H)  Psychophysiological insomnia  CKD (chronic kidney disease) stage 4, GFR 15-29 ml/min (H)  Anemia due to stage 4 chronic kidney disease (H)  Diet-controlled diabetes mellitus (H)  Essential hypertension with goal blood pressure less than 130/80  Neuropathy  Primary osteoarthritis involving multiple joints    Charlene initially presented to TCU in April 2019 s/p a hospitalization for hypovolemia, hyperglycemia, and sxs of sepsis. She recovered well, but her underlying dementia, CKD, and many other chronic conditions required long-term care. She later transferred to the memory care unit. COVID has made family separation difficult, and she will transfer to another facility her son works at, in order to have some more family time.     During her stay, she has been supported w/ PT/OT, and all other on-site services (dental, , speech, etc). She had several mild-acute illnesses during her stay which we treated and resolved: UTI (January 2020), viral URI's, GI bugs, etc.     Today, Charlene feels good and says her  is going to be jealous of her move to her new place. She denies pain, has a good appetite, and is sleeping well. She denies fever/chills, SOB, cough. Her most pertinent health issues to manage moving forward are CKD, DM, HTN, Dementia. Her most recent BG readings, labs and VS are included below.   BGs:  10/19/2020 07:11 118 mg/dL   10/12/2020 07:13 163 mg/dL   10/05/2020 09:04 112 mg/dL   09/28/2020 07:33 137 mg/dL   09/21/2020 07:27 153 mg/dL   09/14/2020 07:28 130 mg/dL   09/07/2020 08:34 108 mg/dL   08/31/2020 07:29  133 mg/dL   08/24/2020 07:49 117 mg/dL   08/17/2020 07:20 118 mg/dL   08/10/2020 07:20 158 mg/dL     Notes to next PCP:    DM = diet-controlled.    CKD = severe, renally dose.      Dementia: GDR trials of Seroquel have not been successful.     Neuropathy: Her most bothersome sxs and underlying OA as well. We tried gabapentin (even with renal impairment) and it was not particularly helpful, plus added to edema. Requip dose has been slightly helpful.     Last annual physical dose September 2020.     Past Medical History:  has a past medical history of Abnormality of gait, Anemia, unspecified, Chronic ischemic heart disease, unspecified, Closed fracture of patella (06/21/10), Coronary artery disease, Degenerative disc disease, Depressive disorder, not elsewhere classified, History of blood transfusion, Other and unspecified hyperlipidemia, Renal failure, unspecified, Septicemia due to Escherichia coli (E. coli)(038.42) (H) (05/03/2007), Syncope and collapse (4/8/2005), Syncope and collapse (05/26/10), Traumatic subdural hematomas (05/27/10), Type II or unspecified type diabetes mellitus without mention of complication, not stated as uncontrolled, Unspecified essential hypertension, Unspecified sleep apnea, and Urinary tract infection, site not specified (4/4/2008). She also has no past medical history of Asthma, Congestive heart failure, unspecified, COPD (chronic obstructive pulmonary disease) (H), Malignant neoplasm (H), Thyroid disease, or Unspecified cerebral artery occlusion with cerebral infarction.  Discharge Medications:  Current Outpatient Medications   Medication Sig Dispense Refill     acetaminophen (TYLENOL) 500 MG tablet Take 2 tablets (1,000 mg) by mouth 3 times daily       amLODIPine (NORVASC) 2.5 MG tablet Take 2 tablets (5 mg) by mouth At Bedtime       ASPIRIN NOT PRESCRIBED, INTENTIONAL, by Other route continuous prn. 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    0     hydrALAZINE (APRESOLINE)  100 MG tablet Take 1 tablet (100 mg) by mouth 3 times daily       magnesium oxide (MAG-OX) 400 MG tablet Take 1 tablet (400 mg) by mouth daily       polyethylene glycol (MIRALAX/GLYCOLAX) packet Take 17 g by mouth daily       QUEtiapine (SEROQUEL) 25 MG tablet take 1/2 tablet in the morning and 1 tablet(25mg) at bedtime 45 tablet 3     rOPINIRole (REQUIP) 0.25 MG tablet Take 1 tablet (0.25 mg) by mouth At Bedtime       Medication Changes/Rationale:     Discontinued:    Colace.    IM Zyprexa.    GDR of Zocor.    Tums.    Sliding scale insulin.    MVI.    Lantus.    Many changes.     Controlled medications sent with patient:   not applicable/none     ROS: Limited secondary to cognitive impairment but today pt reports the above and 4 point ROS including Respiratory, CV, GI and , other than that noted in the HPI, is negative.    Physical Exam: Vitals: /68   Pulse 62   Temp 97.6  F (36.4  C)   Resp 16   Wt 64.5 kg (142 lb 3.2 oz)   SpO2 96%   BMI 23.66 kg/m   BMI= Body mass index is 23.66 kg/m .  GENERAL APPEARANCE: Alert, in no distress, cooperative.   EYES/ENT: EOM normal on camera, hearing acuity [place].  RESP: Respiratory effort appears unlabored over video screen, no respiratory distress apparent on video, On RA.   ABDOMEN: Appears non-distended, rounded.  SKIN: Skin appears baseline w/ video inspection. No wounds/rashes noted.   NEURO: CN II-XII at patient's baseline, TORRES at baseline on video. Sensation baseline PPS.  PSYCH: Insight, judgement, and memory are baseline impaired, affect and mood are happy/engaged.    Facility Labs:   CBC RESULTS:   Recent Labs   Lab Test 09/21/20 04/11/19  0528   WBC 6.3 9.2   RBC 3.47* 3.20*   HGB 10.9* 9.9*   HCT 34.4* 31.0*   MCV 99 97   MCH 31.4 30.9   MCHC 31.7* 31.9   RDW 11.9 13.0    201   Last Basic Metabolic Panel:  Recent Labs   Lab Test 09/21/20 06/19/20    141   POTASSIUM 4.8 4.7   CHLORIDE 107 107   KEANU 9.2 9.0   CO2 25 29   BUN 54* 51*   CR 2.34*  2.23*   GLC 87 106   Liver Function Studies -   Recent Labs   Lab Test 09/21/20 11/15/19   PROTTOTAL 6.9 7.4   ALBUMIN 3.5 3.5   BILITOTAL 0.3 0.3   ALKPHOS 58 58   AST 15 16   ALT <9 <9     TSH   Date Value Ref Range Status   04/19/2019 1.20 0.30 - 5.00 UIU/mL Final   09/19/2018 1.41 0.40 - 4.00 mU/L Final     Lab Results   Component Value Date    A1C 7.5 09/21/2020    A1C 6.8 11/15/2019     DISCHARGE PLAN:    Follow up labs: No labs orders/due    Medical Follow Up: Follow up with PCP in 2-4 weeks.    MTM referral needed and placed by this provider: No    Current [place] scheduled appointments: None.    Discharge Services: Patient to be evaluated by ongoing PT/OT/SLP team and to utilize ongoing facility wheelchair and walker.     TOTAL DISCHARGE TIME:   Greater than 30 minutes  ____________________________________    Documentation of Face-to-Face and Certification for Home Health Services   Patient: Smiley Acuña   YOB: 1935  MR Number: [id number]  Today's Date: 10/21/2020  I certify that patient: Smiley Acuña is under my care and that I, or a nurse practitioner or physician's assistant working with me, had a face-to-face encounter that meets the physician face-to-face encounter requirements with this patient on: 10/21/2020.    This encounter with the patient was in whole, or in part, for the following medical condition, which is the primary reason for home health care: Dementia, neuropathy, impaired mobility.    I certify that, based on my findings, the following services are medically necessary home health services: Occupational Therapy, Physical Therapy and Speech Language Therapy.    My clinical findings support the need for the above services because: Occupational Therapy Services are needed to assess and treat cognitive ability and address ADL safety due to impairment in mobility., Physical Therapy Services are needed to assess and treat the following functional impairments:  mobility. and Speech Therapy Services are needed to assess and treat impairments in language and/or swallow functions due to mobility.    Further, I certify that my clinical findings support that this patient is homebound (i.e. absences from home require considerable and taxing effort and are for medical reasons or Yazdanism services or infrequently or of short duration when for other reasons) because: Requires assistance of another person or specialized equipment to access medical services because patient: Is prone to wander/get lost without assistance...    Based on the above findings. I certify that this patient is confined to the home and needs intermittent skilled nursing care, physical therapy and/or speech therapy.  The patient is under my care, and I have initiated the establishment of the plan of care.  This patient will be followed by a provider who will periodically review the plan of care.    Provider to give follow up care: ALBA Reed CNP.     Responsible Medicare certified PECOS Provider: ALBA Del Rosario CNP, DNP  Provider Signature: See electronic signature associated with these discharge orders.  Date: 10/21/2020    Video-Visit Details  Type of service:  Video Visit  Video Start Time: 1118  Video End Time (time video stopped): 1120  Originating Location (pt. Location): Long term Care  Distant Location (provider location):  [facility] GERIATRIC SERVICES   Mode of Communication:  Video Conference.    Electronically signed by:  ALBA Del Rosario CNP, DNP            Sincerely,        ALBA Jolly CNP

## 2020-10-23 NOTE — PROGRESS NOTES
"[facility] Geriatrics Video Visit  Simley Acuña is a 85 year old female who is being evaluated via a billable video visit due to the restrictions of the COVID19 pandemic.The patient has been notified of following: \"This video visit will be conducted via a call between you and your provider. We have found that certain health care needs can be provided without the need for an in-person physical exam.  This service lets us provide the care you need with a video conversation.  If a prescription is necessary we can send it directly to your pharmacy.  If lab work is needed we can place an order for that and you can then stop by our lab to have the test done at a later time. If during the course of the call the provider feels a video visit is not appropriate, you will not be charged for this service.\"     Proivder has received verbal consent for a Video Visit from the patient? Yes    Patient/facility would like the video invitation sent by: Send to e-mail at: [email or web address]    This visit took place virtually, with the patient located at [facility].  ________________________________________________  Video Start Time: 8  LTC DISCHARGE SUMMARY  PATIENT'S NAME: Smiley Acuña. : 1935. MRN: [id number]. PRIMARY CARE PROVIDER AND CLINIC RESPONSIBLE AFTER TRANSFER: ALBA Reed CNP, [address] [place] [address] / [facility] [address]. [facility]     Transferring providers: ALBA Jara CNP, DNP & Trino Traore MD.  Recent LTC Stay:  [facility] TCU --> LTC/MCU stay 2019 to 10/23/20.  Date of TCU Admission:   Date of LTC (anticipated) Discharge:   Discharged to: new skilled nursing facility [facility].   Cognitive Scores: BIMS: 15, SLUMS (2019) .   Physical Function: Ambulating 50 ft with 2ww and 1-2p assistance.  DME: Wheelchair and Walker to be obtained from next facility.   CODE " STATUS/ADVANCE DIRECTIVES DISCUSSION: DNR/DNI.  ALLERGIES: Lisinopril and Lisinopril    DISCHARGE DIAGNOSIS/NURSING FACILITY COURSE:   Dementia with behavioral disturbance, unspecified dementia type (H)  Recurrent major depressive disorder, in partial remission (H)  Psychophysiological insomnia  CKD (chronic kidney disease) stage 4, GFR 15-29 ml/min (H)  Anemia due to stage 4 chronic kidney disease (H)  Diet-controlled diabetes mellitus (H)  Essential hypertension with goal blood pressure less than 130/80  Neuropathy  Primary osteoarthritis involving multiple joints    Charlene initially presented to TCU in April 2019 s/p a hospitalization for hypovolemia, hyperglycemia, and sxs of sepsis. She recovered well, but her underlying dementia, CKD, and many other chronic conditions required long-term care. She later transferred to the memory care unit. COVID has made family separation difficult, and she will transfer to another facility her son works at, in order to have some more family time.     During her stay, she has been supported w/ PT/OT, and all other on-site services (dental, , speech, etc). She had several mild-acute illnesses during her stay which we treated and resolved: UTI (January 2020), viral URI's, GI bugs, etc.     Today, Charlene feels good and says her  is going to be jealous of her move to her new place. She denies pain, has a good appetite, and is sleeping well. She denies fever/chills, SOB, cough. Her most pertinent health issues to manage moving forward are CKD, DM, HTN, Dementia. Her most recent BG readings, labs and VS are included below.   BGs:  10/19/2020 07:11 118 mg/dL   10/12/2020 07:13 163 mg/dL   10/05/2020 09:04 112 mg/dL   09/28/2020 07:33 137 mg/dL   09/21/2020 07:27 153 mg/dL   09/14/2020 07:28 130 mg/dL   09/07/2020 08:34 108 mg/dL   08/31/2020 07:29 133 mg/dL   08/24/2020 07:49 117 mg/dL   08/17/2020 07:20 118 mg/dL   08/10/2020 07:20 158 mg/dL     Notes to next  PCP:    DM = diet-controlled.    CKD = severe, renally dose.      Dementia: GDR trials of Seroquel have not been successful.     Neuropathy: Her most bothersome sxs and underlying OA as well. We tried gabapentin (even with renal impairment) and it was not particularly helpful, plus added to edema. Requip dose has been slightly helpful.     Last annual physical dose September 2020.     Past Medical History:  has a past medical history of Abnormality of gait, Anemia, unspecified, Chronic ischemic heart disease, unspecified, Closed fracture of patella (06/21/10), Coronary artery disease, Degenerative disc disease, Depressive disorder, not elsewhere classified, History of blood transfusion, Other and unspecified hyperlipidemia, Renal failure, unspecified, Septicemia due to Escherichia coli (E. coli)(038.42) (H) (05/03/2007), Syncope and collapse (4/8/2005), Syncope and collapse (05/26/10), Traumatic subdural hematomas (05/27/10), Type II or unspecified type diabetes mellitus without mention of complication, not stated as uncontrolled, Unspecified essential hypertension, Unspecified sleep apnea, and Urinary tract infection, site not specified (4/4/2008). She also has no past medical history of Asthma, Congestive heart failure, unspecified, COPD (chronic obstructive pulmonary disease) (H), Malignant neoplasm (H), Thyroid disease, or Unspecified cerebral artery occlusion with cerebral infarction.  Discharge Medications:  Current Outpatient Medications   Medication Sig Dispense Refill     acetaminophen (TYLENOL) 500 MG tablet Take 2 tablets (1,000 mg) by mouth 3 times daily       amLODIPine (NORVASC) 2.5 MG tablet Take 2 tablets (5 mg) by mouth At Bedtime       ASPIRIN NOT PRESCRIBED, INTENTIONAL, by Other route continuous prn. 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    0     hydrALAZINE (APRESOLINE) 100 MG tablet Take 1 tablet (100 mg) by mouth 3 times daily       magnesium oxide (MAG-OX) 400 MG tablet Take 1  tablet (400 mg) by mouth daily       polyethylene glycol (MIRALAX/GLYCOLAX) packet Take 17 g by mouth daily       QUEtiapine (SEROQUEL) 25 MG tablet take 1/2 tablet in the morning and 1 tablet(25mg) at bedtime 45 tablet 3     rOPINIRole (REQUIP) 0.25 MG tablet Take 1 tablet (0.25 mg) by mouth At Bedtime       Medication Changes/Rationale:     Discontinued:    Colace.    IM Zyprexa.    GDR of Zocor.    Tums.    Sliding scale insulin.    MVI.    Lantus.    Many changes.     Controlled medications sent with patient:   not applicable/none     ROS: Limited secondary to cognitive impairment but today pt reports the above and 4 point ROS including Respiratory, CV, GI and , other than that noted in the HPI, is negative.    Physical Exam: Vitals: /68   Pulse 62   Temp 97.6  F (36.4  C)   Resp 16   Wt 64.5 kg (142 lb 3.2 oz)   SpO2 96%   BMI 23.66 kg/m   BMI= Body mass index is 23.66 kg/m .  GENERAL APPEARANCE: Alert, in no distress, cooperative.   EYES/ENT: EOM normal on camera, hearing acuity [place].  RESP: Respiratory effort appears unlabored over video screen, no respiratory distress apparent on video, On RA.   ABDOMEN: Appears non-distended, rounded.  SKIN: Skin appears baseline w/ video inspection. No wounds/rashes noted.   NEURO: CN II-XII at patient's baseline, TORRES at baseline on video. Sensation baseline PPS.  PSYCH: Insight, judgement, and memory are baseline impaired, affect and mood are happy/engaged.    Facility Labs:   CBC RESULTS:   Recent Labs   Lab Test 09/21/20 04/11/19  0528   WBC 6.3 9.2   RBC 3.47* 3.20*   HGB 10.9* 9.9*   HCT 34.4* 31.0*   MCV 99 97   MCH 31.4 30.9   MCHC 31.7* 31.9   RDW 11.9 13.0    201   Last Basic Metabolic Panel:  Recent Labs   Lab Test 09/21/20 06/19/20    141   POTASSIUM 4.8 4.7   CHLORIDE 107 107   KEANU 9.2 9.0   CO2 25 29   BUN 54* 51*   CR 2.34* 2.23*   GLC 87 106   Liver Function Studies -   Recent Labs   Lab Test 09/21/20 11/15/19   PROTTOTAL 6.9 7.4    ALBUMIN 3.5 3.5   BILITOTAL 0.3 0.3   ALKPHOS 58 58   AST 15 16   ALT <9 <9     TSH   Date Value Ref Range Status   04/19/2019 1.20 0.30 - 5.00 UIU/mL Final   09/19/2018 1.41 0.40 - 4.00 mU/L Final     Lab Results   Component Value Date    A1C 7.5 09/21/2020    A1C 6.8 11/15/2019     DISCHARGE PLAN:    Follow up labs: No labs orders/due    Medical Follow Up: Follow up with PCP in 2-4 weeks.    MTM referral needed and placed by this provider: No    Current [place] scheduled appointments: None.    Discharge Services: Patient to be evaluated by ongoing PT/OT/SLP team and to utilize ongoing facility wheelchair and walker.     TOTAL DISCHARGE TIME:   Greater than 30 minutes  ____________________________________    Documentation of Face-to-Face and Certification for Home Health Services   Patient: Smiley Acuña   YOB: 1935  MR Number: [id number]  Today's Date: 10/21/2020  I certify that patient: Smiley Acuña is under my care and that I, or a nurse practitioner or physician's assistant working with me, had a face-to-face encounter that meets the physician face-to-face encounter requirements with this patient on: 10/21/2020.    This encounter with the patient was in whole, or in part, for the following medical condition, which is the primary reason for home health care: Dementia, neuropathy, impaired mobility.    I certify that, based on my findings, the following services are medically necessary home health services: Occupational Therapy, Physical Therapy and Speech Language Therapy.    My clinical findings support the need for the above services because: Occupational Therapy Services are needed to assess and treat cognitive ability and address ADL safety due to impairment in mobility., Physical Therapy Services are needed to assess and treat the following functional impairments: mobility. and Speech Therapy Services are needed to assess and treat impairments in language and/or swallow  functions due to mobility.    Further, I certify that my clinical findings support that this patient is homebound (i.e. absences from home require considerable and taxing effort and are for medical reasons or [employer] services or infrequently or of short duration when for other reasons) because: Requires assistance of another person or specialized equipment to access medical services because patient: Is prone to wander/get lost without assistance...    Based on the above findings. I certify that this patient is confined to the home and needs intermittent skilled nursing care, physical therapy and/or speech therapy.  The patient is under my care, and I have initiated the establishment of the plan of care.  This patient will be followed by a provider who will periodically review the plan of care.    Provider to give follow up care: ALBA Reed CNP.     Responsible Medicare certified [employer] Provider: ALBA Del Rosario CNP, DNP  Provider Signature: See electronic signature associated with these discharge orders.  Date: 10/21/2020    Video-Visit Details  Type of service:  Video Visit  Video Start Time: 1118  Video End Time (time video stopped): 1120  Originating Location (pt. Location): Long term Care  Distant Location (provider location):  [facility] GERIATRIC SERVICES   Mode of Communication:  Video Conference.    Electronically signed by:  ALBA Del Rosario CNP, DNP

## 2020-10-26 NOTE — LETTER
"    10/26/2020        RE: Smiley Acuña  C/o Theresa Massey  [address] [address]         [place] [facility]  Smiley Acuña is being evaluated via a billable video.   The patient has been notified of following:  \"This video visit will be conducted via a call between you and your provider. We have found that certain health care needs can be provided without the need for an in-person physical exam.  This service lets us provide the care you need with a video conversation. If during the course of the call the provider feels a video visit is not appropriate, you will not be charged for this service.\"   The provider has received verbal consent for a Video Visit from the patient and or first contact? Yes  Patient/facility staff would like the video invitation sent by: N/A   Video Start Time: 1:39pm  Which Facility the Patient is at during the time of visit: [facility]    PRIMARY CARE PROVIDER AND CLINIC:  Zuleyka Dejesus, APRN [facility], [address] [address] / [facility] [address]  Chief Complaint   Patient presents with     [facility] Medical Record Number:  [id number]  Smiley Acuña  is a 85 year old  (1935), admitted to the above facility from [facility] TCU on 10/23/20..  Admitted to this facility for  medical management and nursing care.    HPI:    HPI information obtained from: facility chart records, facility staff, patient report and [facility] chart review.     Updates on Status Since Skilled nursing Admission:   Patient is a challenging historian. She continually asks about her glasses that she has misplaced. She rambles on about various subjects and does not answer any questions directly. Staff do not have any acute medical concerns that they have noted.     CODE STATUS/ADVANCE DIRECTIVES DISCUSSION:   DNR / DNI  Patient's living condition: lives in a skilled nursing facility  ALLERGIES: Lisinopril  PAST MEDICAL " HISTORY:  has a past medical history of Abnormality of gait, Anemia, unspecified, Chronic ischemic heart disease, unspecified, Closed fracture of patella (06/21/10), Coronary artery disease, Degenerative disc disease, Depressive disorder, not elsewhere classified, History of blood transfusion, Other and unspecified hyperlipidemia, Renal failure, unspecified, Septicemia due to Escherichia coli (E. coli)(038.42) (H) (05/03/2007), Syncope and collapse (4/8/2005), Syncope and collapse (05/26/10), Traumatic subdural hematomas (05/27/10), Type II or unspecified type diabetes mellitus without mention of complication, not stated as uncontrolled, Unspecified essential hypertension, Unspecified sleep apnea, and Urinary tract infection, site not specified (4/4/2008). She also has no past medical history of Asthma, Congestive heart failure, unspecified, COPD (chronic obstructive pulmonary disease) (H), Malignant neoplasm (H), Thyroid disease, or Unspecified cerebral artery occlusion with cerebral infarction.  PAST SURGICAL HISTORY:   has a past surgical history that includes AMPUTATION TOE,MT-P JT (2000); APPENDECTOMY; ANESTH,UPPER LEG SURGERY; UPPER GI ENDOSCOPY,EXAM (04/27/2005); Colonoscopy w/wo Brush **Performed** (12/05/2005); UGI ENDOSCOPY DIAG W OR W/O BRUSH/WASH (12/05/2005); BX SYNOVIUM INTERPHALANG JT; craniotomy (05/28/10); REPAIR ROTATOR CUFF,ACUTE (11/21/2002); EXCISION LESION/TENDON-SHEATH/CAPSULE, FOOT (04/30/07); EXCISION LESION/TENDON-SHEATH/CAPSULE, FOOT (8/14/2007); bunionectomy rt/lt (10/24/07); and OPEN TX FEMORAL SUPRACONDYLAR FRACTURE W EXTENSION (12/14/12).  FAMILY HISTORY: family history includes C.A.D. in her brother; Diabetes in her brother, brother, and mother; Osteoporosis in her mother.  SOCIAL HISTORY:   reports that she has never smoked. She has never used smokeless tobacco. She reports that she does not drink alcohol or use drugs.  Current Outpatient Medications   Medication Sig Dispense Refill  "    acetaminophen (TYLENOL) 500 MG tablet Take 2 tablets (1,000 mg) by mouth 3 times daily       amLODIPine (NORVASC) 2.5 MG tablet Take 2 tablets (5 mg) by mouth At Bedtime       ASPIRIN NOT PRESCRIBED, INTENTIONAL, by Other route continuous prn. 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    0     hydrALAZINE (APRESOLINE) 100 MG tablet Take 1 tablet (100 mg) by mouth 3 times daily       magnesium oxide (MAG-OX) 400 MG tablet Take 1 tablet (400 mg) by mouth daily       polyethylene glycol (MIRALAX/GLYCOLAX) packet Take 17 g by mouth daily       QUEtiapine (SEROQUEL) 25 MG tablet take 1/2 tablet in the morning and 1 tablet(25mg) at bedtime 45 tablet 3     rOPINIRole (REQUIP) 0.25 MG tablet Take 1 tablet (0.25 mg) by mouth At Bedtime        patient was not discharged from an inpatient facility    ROS: Limited secondary to cognitive impairment     Vitals:/69   Pulse 76   Temp 97.5  F (36.4  C)   Resp 18   Ht 1.575 m (5' 2\")   Wt 63.5 kg (140 lb)   BMI 25.61 kg/m     Limited Visit Exam done given COVID-19 precautions:  GENERAL APPEARANCE:  Alert, in no distress  EYES:  Conjunctiva and lids normal  RESP:  no respiratory distress  PSYCH:  oriented to self, insight and judgement impaired, memory impaired , affect and mood normal    Lab/Diagnostic data:    Most Recent 3 CBC's:  Recent Labs   Lab Test 09/21/20 04/11/19  0528 04/10/19  0626   WBC 6.3 9.2 10.1   HGB 10.9* 9.9* 10.4*   MCV 99 97 95    201 220     Most Recent 3 BMP's:  Recent Labs   Lab Test 09/21/20 06/19/20 01/31/20    141 140   POTASSIUM 4.8 4.7 4.5   CHLORIDE 107 107 105   CO2 25 29 25   BUN 54* 51* 48*   CR 2.34* 2.23* 2.41*   ANIONGAP 7 5 10   KEANU 9.2 9.0 9.2   GLC 87 106 96     Most Recent Hemoglobin A1c:  Recent Labs   Lab Test 09/21/20   A1C 7.5*       ASSESSMENT/PLAN:  (N18.4) CKD (chronic kidney disease) stage 4, GFR 15-29 ml/min (H)  (primary encounter diagnosis)  Comment: Chronic Kidney Disease due to: Diabetes  and " Hypertension.  Plan: Avoid nephrotoxic medications and adjust medications per renal function. Monitor renal function every 6 months. Refer to plan of care for Diabetes  and Hypertension.    (F03.91) Dementia with behavioral disturbance, unspecified dementia type (H)  Comment: Chronic, progressive. Needs 24 hour skilled nursing care. HPI/ROS difficult to obtain with cognitive impairment. Expect further functional and cognitive decline. Expect weight loss.  Plan: Continue 24 hour care. Monitor weight. Monitor functional status. Monitor for behavioral disturbances.    (N18.4,  D63.1) Anemia due to stage 4 chronic kidney disease (H)  Comment: Hgb within acceptable range  Plan: Monitor CBC every 6 months    (E11.9) Diet-controlled diabetes mellitus (H)  Comment: Controlled. HgA1c <8% not recommended in elderly due to risk of hypoglycemia. Risk outweighs benefit of tighter control.   Plan: Continue current POC with no changes at this time and adjustments as needed.    (I10) Essential hypertension  Comment: Controlled. To avoid risk of hypotension, falls, dizziness and tissue hypoperfusion, recommend  BP goal is < 150/90mmHg.  Plan: Continue current POC with no changes at this time and adjustments as needed.    (G62.9) Neuropathy  Comment: Unable to assess with patient today  Plan: Monitor for c/o pain. Monitor skin for open areas.      Electronically signed by:  ALBA Glez CNP   [facility]  Phone: [phone]      Video-Visit Details  Type of service:  Video Visit  Video End Time (time video stopped): 1:41pm  [facility]

## 2020-10-26 NOTE — PROGRESS NOTES
" [facility]  Smiley Acuña is being evaluated via a billable video.   The patient has been notified of following:  \"This video visit will be conducted via a call between you and your provider. We have found that certain health care needs can be provided without the need for an in-person physical exam.  This service lets us provide the care you need with a video conversation. If during the course of the call the provider feels a video visit is not appropriate, you will not be charged for this service.\"   The provider has received verbal consent for a Video Visit from the patient and or first contact? Yes  Patient/facility staff would like the video invitation sent by: N/A   Video Start Time: 1:39pm  Which Facility the Patient is at during the time of visit: [facility]    PRIMARY CARE PROVIDER AND CLINIC:  Zuleyka Dejesus, ALBA CNP, [address] / [place] [address]  Chief Complaint   Patient presents with     [facility]     [place] Medical Record Number:  [id number]  Smiley Acuña  is a 85 year old  (1935), admitted to the above facility from [facility] on 10/23/20..  Admitted to this facility for  medical management and nursing care.    HPI:    HPI information obtained from: facility chart records, facility staff, patient report and [facility] chart review.     Updates on Status Since Skilled nursing Admission:   Patient is a challenging historian. She continually asks about her glasses that she has misplaced. She rambles on about various subjects and does not answer any questions directly. Staff do not have any acute medical concerns that they have noted.     CODE STATUS/ADVANCE DIRECTIVES DISCUSSION:   DNR / DNI  Patient's living condition: lives in a skilled nursing facility  ALLERGIES: Lisinopril  PAST MEDICAL HISTORY:  has a past medical history of Abnormality of gait, Anemia, unspecified, Chronic ischemic heart disease, unspecified, " Closed fracture of patella (06/21/10), Coronary artery disease, Degenerative disc disease, Depressive disorder, not elsewhere classified, History of blood transfusion, Other and unspecified hyperlipidemia, Renal failure, unspecified, Septicemia due to Escherichia coli (E. coli)(038.42) (H) (05/03/2007), Syncope and collapse (4/8/2005), Syncope and collapse (05/26/10), Traumatic subdural hematomas (05/27/10), Type II or unspecified type diabetes mellitus without mention of complication, not stated as uncontrolled, Unspecified essential hypertension, Unspecified sleep apnea, and Urinary tract infection, site not specified (4/4/2008). She also has no past medical history of Asthma, Congestive heart failure, unspecified, COPD (chronic obstructive pulmonary disease) (H), Malignant neoplasm (H), Thyroid disease, or Unspecified cerebral artery occlusion with cerebral infarction.  PAST SURGICAL HISTORY:   has a past surgical history that includes AMPUTATION TOE,MT-P JT (2000); APPENDECTOMY; ANESTH,UPPER LEG SURGERY; UPPER GI ENDOSCOPY,EXAM (04/27/2005); Colonoscopy w/wo Brush **Performed** (12/05/2005); UGI ENDOSCOPY DIAG W OR W/O BRUSH/WASH (12/05/2005); BX SYNOVIUM INTERPHALANG JT; craniotomy (05/28/10); REPAIR ROTATOR CUFF,ACUTE (11/21/2002); EXCISION LESION/TENDON-SHEATH/CAPSULE, FOOT (04/30/07); EXCISION LESION/TENDON-SHEATH/CAPSULE, FOOT (8/14/2007); bunionectomy rt/lt (10/24/07); and OPEN TX FEMORAL SUPRACONDYLAR FRACTURE W EXTENSION (12/14/12).  FAMILY HISTORY: family history includes C.A.D. in her brother; Diabetes in her brother, brother, and mother; Osteoporosis in her mother.  SOCIAL HISTORY:   reports that she has never smoked. She has never used smokeless tobacco. She reports that she does not drink alcohol or use drugs.  Current Outpatient Medications   Medication Sig Dispense Refill     acetaminophen (TYLENOL) 500 MG tablet Take 2 tablets (1,000 mg) by mouth 3 times daily       amLODIPine (NORVASC) 2.5 MG  "tablet Take 2 tablets (5 mg) by mouth At Bedtime       ASPIRIN NOT PRESCRIBED, INTENTIONAL, by Other route continuous prn. 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    0     hydrALAZINE (APRESOLINE) 100 MG tablet Take 1 tablet (100 mg) by mouth 3 times daily       magnesium oxide (MAG-OX) 400 MG tablet Take 1 tablet (400 mg) by mouth daily       polyethylene glycol (MIRALAX/GLYCOLAX) packet Take 17 g by mouth daily       QUEtiapine (SEROQUEL) 25 MG tablet take 1/2 tablet in the morning and 1 tablet(25mg) at bedtime 45 tablet 3     rOPINIRole (REQUIP) 0.25 MG tablet Take 1 tablet (0.25 mg) by mouth At Bedtime        patient was not discharged from an inpatient facility    ROS: Limited secondary to cognitive impairment     Vitals:/69   Pulse 76   Temp 97.5  F (36.4  C)   Resp 18   Ht 1.575 m (5' 2\")   Wt 63.5 kg (140 lb)   BMI 25.61 kg/m     Limited Visit Exam done given COVID-19 precautions:  GENERAL APPEARANCE:  Alert, in no distress  EYES:  Conjunctiva and lids normal  RESP:  no respiratory distress  PSYCH:  oriented to self, insight and judgement impaired, memory impaired , affect and mood normal    Lab/Diagnostic data:    Most Recent 3 CBC's:  Recent Labs   Lab Test 09/21/20 04/11/19  0528 04/10/19  0626   WBC 6.3 9.2 10.1   HGB 10.9* 9.9* 10.4*   MCV 99 97 95    201 220     Most Recent 3 BMP's:  Recent Labs   Lab Test 09/21/20 06/19/20 01/31/20    141 140   POTASSIUM 4.8 4.7 4.5   CHLORIDE 107 107 105   CO2 25 29 25   BUN 54* 51* 48*   CR 2.34* 2.23* 2.41*   ANIONGAP 7 5 10   KEANU 9.2 9.0 9.2   GLC 87 106 96     Most Recent Hemoglobin A1c:  Recent Labs   Lab Test 09/21/20   A1C 7.5*       ASSESSMENT/PLAN:  (N18.4) CKD (chronic kidney disease) stage 4, GFR 15-29 ml/min (H)  (primary encounter diagnosis)  Comment: Chronic Kidney Disease due to: Diabetes  and Hypertension.  Plan: Avoid nephrotoxic medications and adjust medications per renal function. Monitor renal function every 6 " months. Refer to plan of care for Diabetes  and Hypertension.    (F03.91) Dementia with behavioral disturbance, unspecified dementia type (H)  Comment: Chronic, progressive. Needs 24 hour skilled nursing care. HPI/ROS difficult to obtain with cognitive impairment. Expect further functional and cognitive decline. Expect weight loss.  Plan: Continue 24 hour care. Monitor weight. Monitor functional status. Monitor for behavioral disturbances.    (N18.4,  D63.1) Anemia due to stage 4 chronic kidney disease (H)  Comment: Hgb within acceptable range  Plan: Monitor CBC every 6 months    (E11.9) Diet-controlled diabetes mellitus (H)  Comment: Controlled. HgA1c <8% not recommended in elderly due to risk of hypoglycemia. Risk outweighs benefit of tighter control.   Plan: Continue current POC with no changes at this time and adjustments as needed.    (I10) Essential hypertension  Comment: Controlled. To avoid risk of hypotension, falls, dizziness and tissue hypoperfusion, recommend  BP goal is < 150/90mmHg.  Plan: Continue current POC with no changes at this time and adjustments as needed.    (G62.9) Neuropathy  Comment: Unable to assess with patient today  Plan: Monitor for c/o pain. Monitor skin for open areas.      Electronically signed by:  ALBA Glez CNP   [facility]  Phone: [phone]      Video-Visit Details  Type of service:  Video Visit  Video End Time (time video stopped): 1:41pm  Distant Location (provider location):  [facility]

## 2020-11-13 NOTE — TELEPHONE ENCOUNTER
Temp 100.5 and SaO2 88%    Her covid screening test from 11/9 came back negative, but there are several patients and staff on her floor that were positive this week. This is most likely COVID-19    Plan:  Tylenol prn for fever  Oxygen to keep SaO2 > 90%  Retest for covid  Isolation precautions

## 2020-11-17 NOTE — PROGRESS NOTES
"[place] [facility]   Smiley Acuña is being evaluated via a billable video visit.   The patient has been notified of following:  \"This video visit will be conducted via a call between you and your provider. We have found that certain health care needs can be provided without the need for an in-person physical exam.  This service lets us provide the care you need with a video conversation. If during the course of the call the provider feels a video visit is not appropriate, you will not be charged for this service.\"   The provider has received verbal consent for a Video Visit from the patient or first contact? Yes  Patient  or facility staff would like the video invitation sent by: N/A   Video Start Time: 1:48pm    [place] Medical Record Number:  [id number]  Place of Location at the time of visit: [facility]  Chief Complaint   Patient presents with     RECHECK     FVP Care Coordination - Initial Contact/New Enrollment       HPI:  Smiley Acuña  is a 85 year old (1935), who is being seen today for a visit.  HPI information obtained from: facility chart records, facility staff and patient report.     Today's concern is:  COVID-19 virus infection  On 11/13 patient developed fever and later tested positive for COVID-19. She continues to have a fever around 100 degrees continuously. She is on supplemental oxygen. Staff are reporting diarrhea and very little intake of food since diagnosis. Her family has completed a POLST for comfort care. Smiley is having difficulty answering questions, says she is tired.    CKD (chronic kidney disease) stage 4, GFR 15-29 ml/min (H)  Creatinine   Date Value Ref Range Status   09/21/2020 2.34 (H) 0.60 - 1.10 mg/dL Final     Dementia with behavioral disturbance, unspecified dementia type (H)  Provider has met patient only once before, she just moved to the facility. She was not able to give an accurate HPI/ROS, but was very happy, smiling, joking. Today " "she is sleepy, flat affect, mumbling some responses. She was apparently taking her clothes off yesterday.    Anemia due to stage 4 chronic kidney disease (H)  Hemoglobin   Date Value Ref Range Status   09/21/2020 10.9 (L) 12.0 - 16.0 g/dL Final   04/11/2019 9.9 (L) 11.7 - 15.7 g/dL Final     Diet-controlled diabetes mellitus (H)  BG     Essential hypertension  -130s/60-70s      The health plan new enrollment has happened. I have reviewed the  MDS, the preventative needs,  and facility care plan. The level of care is appropriate. I have reviewed the code status/advanced directives.     Past Medical and Surgical History reviewed in Epic today.  MEDICATIONS:    Current Outpatient Medications   Medication Sig Dispense Refill     acetaminophen (TYLENOL) 500 MG tablet Take 2 tablets (1,000 mg) by mouth 3 times daily       amLODIPine (NORVASC) 2.5 MG tablet Take 2 tablets (5 mg) by mouth At Bedtime       ASPIRIN NOT PRESCRIBED, INTENTIONAL, by Other route continuous prn. 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    0     hydrALAZINE (APRESOLINE) 100 MG tablet Take 1 tablet (100 mg) by mouth 3 times daily       magnesium oxide (MAG-OX) 400 MG tablet Take 1 tablet (400 mg) by mouth daily       polyethylene glycol (MIRALAX/GLYCOLAX) packet Take 17 g by mouth daily       QUEtiapine (SEROQUEL) 25 MG tablet take 1/2 tablet in the morning and 1 tablet(25mg) at bedtime 45 tablet 3     rOPINIRole (REQUIP) 0.25 MG tablet Take 1 tablet (0.25 mg) by mouth At Bedtime       REVIEW OF SYSTEMS: Unobtainable secondary to cognitive impairment    Objective: /76   Pulse 68   Temp 99.1  F (37.3  C)   Resp 19   Ht 1.575 m (5' 2\")   Wt 67.4 kg (148 lb 8 oz)   SpO2 92%   BMI 27.16 kg/m    Limited visit exam done given COVID-19 precautions.   GENERAL APPEARANCE:  Drowsy, does not open eyes, minimal participation in visit. Does not appear in distress, but appears very tired  RESP:  no respiratory distress  PSYCH:  " insight and judgement impaired, memory impaired      Labs:   Recent labs in EPIC reviewed by me today.     ASSESSMENT/PLAN:  (U07.1) COVID-19 virus infection  (primary encounter diagnosis)  Comment: Patient is quite ill with several symptoms, but not in acute distress. Given her goals of care, would not send to the hospital. Plan is to see patient again on 11/19 to determine if she should enroll in hospice.  Plan: Monitor respiratory and GI symptoms. Imodium for diarrhea. Monitor vitals. Monitor intake.    (N18.4) CKD (chronic kidney disease) stage 4, GFR 15-29 ml/min (H)  Comment: Chronic Kidney Disease due to: Diabetes  and Hypertension.  Plan: Avoid nephrotoxic medications and adjust medications per renal function. Refer to plan of care for Diabetes  and Hypertension.    (F03.91) Dementia with behavioral disturbance, unspecified dementia type (H)  Comment: Severe impairment. No sign of delirium in today's visit. If she were to have agitation, delusions, hallucinations, etc, would recommend haldol  Plan: Monitor mood, behavior. Adjust medication as clinically indicated.    (N18.4,  D63.1) Anemia due to stage 4 chronic kidney disease (H)  Comment: Due to comfort care, would not recommend checking labs    (E11.9) Diet-controlled diabetes mellitus (H)  Comment: Controlled  Plan: Continue current POC with no changes at this time and adjustments as needed.    (I10) Essential hypertension  Comment: BP is more tightly controlled than necessary. Hydralazine could be contributing to her fatigue. Will stop this medication. If her BP becomes uncontrolled, would increase amlodipine. To avoid risk of hypotension, falls, dizziness and tissue hypoperfusion, recommend  BP goal is < 150/90mmHg.  Plan: Discontinue hydralazine    Orders:  Discontinue hydralazine  Imodium 2mg q 2h prn  Electronically signed by:  ALBA Glez [facility]  Phone: [phone]      Video-Visit Details  Type of service:   Video Visit  Video End Time (time video stopped): 1:50pm  Distant Location (provider location):  [facility]

## 2020-11-17 NOTE — LETTER
"    11/17/2020        RE: Smiley Acuña  C/o Theresa Massey  [address] [place] [address] GERIATRIC SERVICES   Smiley Acuña is being evaluated via a billable video visit.   The patient has been notified of following:  \"This video visit will be conducted via a call between you and your provider. We have found that certain health care needs can be provided without the need for an in-person physical exam.  This service lets us provide the care you need with a video conversation. If during the course of the call the provider feels a video visit is not appropriate, you will not be charged for this service.\"   The provider has received verbal consent for a Video Visit from the patient or first contact? Yes  Patient  or facility staff would like the video invitation sent by: N/A   Video Start Time: 1:48pm    [place] Medical Record Number:  [id number]  Place of Location at the time of visit: [facility]  Chief Complaint   Patient presents with     RECHECK     FVP Care Coordination - Initial Contact/New Enrollment       HPI:  Smiley Acuña  is a 85 year old (1935), who is being seen today for a visit.  HPI information obtained from: facility chart records, facility staff and patient report.     Today's concern is:  COVID-19 virus infection  On 11/13 patient developed fever and later tested positive for COVID-19. She continues to have a fever around 100 degrees continuously. She is on supplemental oxygen. Staff are reporting diarrhea and very little intake of food since diagnosis. Her family has completed a POLST for comfort care. Smiley is having difficulty answering questions, says she is tired.    CKD (chronic kidney disease) stage 4, GFR 15-29 ml/min (H)  Creatinine   Date Value Ref Range Status   09/21/2020 2.34 (H) 0.60 - 1.10 mg/dL Final     Dementia with behavioral disturbance, unspecified dementia type (H)  Provider has met patient only once before, " "she just moved to the facility. She was not able to give an accurate HPI/ROS, but was very happy, smiling, joking. Today she is sleepy, flat affect, mumbling some responses. She was apparently taking her clothes off yesterday.    Anemia due to stage 4 chronic kidney disease (H)  Hemoglobin   Date Value Ref Range Status   09/21/2020 10.9 (L) 12.0 - 16.0 g/dL Final   04/11/2019 9.9 (L) 11.7 - 15.7 g/dL Final     Diet-controlled diabetes mellitus (H)  BG     Essential hypertension  -130s/60-70s      The health plan new enrollment has happened. I have reviewed the  MDS, the preventative needs,  and facility care plan. The level of care is appropriate. I have reviewed the code status/advanced directives.     Past Medical and Surgical History reviewed in Epic today.  MEDICATIONS:    Current Outpatient Medications   Medication Sig Dispense Refill     acetaminophen (TYLENOL) 500 MG tablet Take 2 tablets (1,000 mg) by mouth 3 times daily       amLODIPine (NORVASC) 2.5 MG tablet Take 2 tablets (5 mg) by mouth At Bedtime       ASPIRIN NOT PRESCRIBED, INTENTIONAL, by Other route continuous prn. 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    0     hydrALAZINE (APRESOLINE) 100 MG tablet Take 1 tablet (100 mg) by mouth 3 times daily       magnesium oxide (MAG-OX) 400 MG tablet Take 1 tablet (400 mg) by mouth daily       polyethylene glycol (MIRALAX/GLYCOLAX) packet Take 17 g by mouth daily       QUEtiapine (SEROQUEL) 25 MG tablet take 1/2 tablet in the morning and 1 tablet(25mg) at bedtime 45 tablet 3     rOPINIRole (REQUIP) 0.25 MG tablet Take 1 tablet (0.25 mg) by mouth At Bedtime       REVIEW OF SYSTEMS: Unobtainable secondary to cognitive impairment    Objective: /76   Pulse 68   Temp 99.1  F (37.3  C)   Resp 19   Ht 1.575 m (5' 2\")   Wt 67.4 kg (148 lb 8 oz)   SpO2 92%   BMI 27.16 kg/m    Limited visit exam done given COVID-19 precautions.   GENERAL APPEARANCE:  Drowsy, does not open eyes, " minimal participation in visit. Does not appear in distress, but appears very tired  RESP:  no respiratory distress  PSYCH:  insight and judgement impaired, memory impaired      Labs:   Recent labs in EPIC reviewed by me today.     ASSESSMENT/PLAN:  (U07.1) COVID-19 virus infection  (primary encounter diagnosis)  Comment: Patient is quite ill with several symptoms, but not in acute distress. Given her goals of care, would not send to the hospital. Plan is to see patient again on 11/19 to determine if she should enroll in hospice.  Plan: Monitor respiratory and GI symptoms. Imodium for diarrhea. Monitor vitals. Monitor intake.    (N18.4) CKD (chronic kidney disease) stage 4, GFR 15-29 ml/min (H)  Comment: Chronic Kidney Disease due to: Diabetes  and Hypertension.  Plan: Avoid nephrotoxic medications and adjust medications per renal function. Refer to plan of care for Diabetes  and Hypertension.    (F03.91) Dementia with behavioral disturbance, unspecified dementia type (H)  Comment: Severe impairment. No sign of delirium in today's visit. If she were to have agitation, delusions, hallucinations, etc, would recommend haldol  Plan: Monitor mood, behavior. Adjust medication as clinically indicated.    (N18.4,  D63.1) Anemia due to stage 4 chronic kidney disease (H)  Comment: Due to comfort care, would not recommend checking labs    (E11.9) Diet-controlled diabetes mellitus (H)  Comment: Controlled  Plan: Continue current POC with no changes at this time and adjustments as needed.    (I10) Essential hypertension  Comment: BP is more tightly controlled than necessary. Hydralazine could be contributing to her fatigue. Will stop this medication. If her BP becomes uncontrolled, would increase amlodipine. To avoid risk of hypotension, falls, dizziness and tissue hypoperfusion, recommend  BP goal is < 150/90mmHg.  Plan: Discontinue hydralazine    Orders:  Discontinue hydralazine  Imodium 2mg q 2h prn  Electronically signed  by:  ALBA Glez [facility]  Phone: [phone]      Video-Visit Details  Type of service:  Video Visit  Video End Time (time video stopped): 1:50pm  Distant Location (provider location):  [facility]

## 2020-11-19 NOTE — PROGRESS NOTES
"[place] [facility]   Smiley Acuña is being evaluated via a billable video visit.   The patient has been notified of following:  \"This video visit will be conducted via a call between you and your provider. We have found that certain health care needs can be provided without the need for an in-person physical exam.  This service lets us provide the care you need with a video conversation. If during the course of the call the provider feels a video visit is not appropriate, you will not be charged for this service.\"   The provider has received verbal consent for a Video Visit from the patient or first contact? Yes  Patient  or facility staff would like the video invitation sent by: N/A   Video Start Time: 1:51pm      [place] Medical Record Number:  [id number]  Place of Location at the time of visit: [facility]  Chief Complaint   Patient presents with     RECHECK     HPI:  Smiley Acuña  is a 85 year old (1935), who is being seen today for a visit.  HPI information obtained from: facility chart records and facility staff.     Today's concern is:  COVID-19 virus infection  CKD (chronic kidney disease) stage 4, GFR 15-29 ml/min (H)  Dementia with behavioral disturbance, unspecified dementia type (H)  Patient has been doing poorly for several days, fever, hypoxia, not eating and drinking, very lethargic. Her son works in the building and has told the nurse manager that they want to focus on comfort care. He wasn't sure about hospice yet, but does not want hospitalization, just symptom management. Smiley has not been able to take any oral medications today, but is complaining of back pain.      Past Medical and Surgical History reviewed in Epic today.  MEDICATIONS:    Current Outpatient Medications   Medication Sig Dispense Refill     acetaminophen (TYLENOL) 500 MG tablet Take 2 tablets (1,000 mg) by mouth 3 times daily       amLODIPine (NORVASC) 2.5 MG tablet Take 2 tablets (5 mg) by " "mouth At Bedtime       ASPIRIN NOT PRESCRIBED, INTENTIONAL, by Other route continuous prn. 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    0     capsaicin (ZOSTRIX) 0.075 % cream Apply topically 4 times daily Apply to BLEs       hydrALAZINE (APRESOLINE) 100 MG tablet Take 100 mg by mouth 3 times daily       ibuprofen (ADVIL/MOTRIN) 400 MG tablet Take 400 mg by mouth every 6 hours       magnesium oxide (MAG-OX) 400 MG tablet Take 1 tablet (400 mg) by mouth daily       polyethylene glycol (MIRALAX/GLYCOLAX) packet Take 17 g by mouth daily       QUEtiapine (SEROQUEL) 25 MG tablet take 1/2 tablet in the morning and 1 tablet(25mg) at bedtime 45 tablet 3     rOPINIRole (REQUIP) 0.25 MG tablet Take 1 tablet (0.25 mg) by mouth At Bedtime       REVIEW OF SYSTEMS: Unobtainable secondary to cognitive impairment/somnolence    Objective: /76   Pulse 61   Temp 98.5  F (36.9  C)   Resp 16   Ht 1.575 m (5' 2\")   Wt 67.4 kg (148 lb 8 oz)   SpO2 92%   BMI 27.16 kg/m    Limited visit exam done given COVID-19 precautions.   GENERAL APPEARANCE:  somnolent  RESP:  no respiratory distress     Labs:   none    ASSESSMENT/PLAN:  (U07.1) COVID-19 virus infection  (primary encounter diagnosis)  (N18.4) CKD (chronic kidney disease) stage 4, GFR 15-29 ml/min (H)  (F03.91) Dementia with behavioral disturbance, unspecified dementia type (H)  Comment: Patient is severely ill and unlikely to survive. Due to her advanced dementia and CKD, comfort care is very appropriate. Hospitalization would not be of benefit and would likely only cause suffering. Will stop all medications and use only comfort meds.   Plan: Morphine sulfate 20mg/mL 2.5mg TID and q2h prn. Haldol 2mg/mL 0.5mg q6h prn for agitation. Bisacodyl supp daily prn. Acetaminophen supp 650mg SD q4h prn fever. Continue supplement oxygen      Electronically signed by:  ALBA Glez [facility]  Phone: [phone]      Video-Visit Details  Type of " service:  Video Visit  Video End Time (time video stopped): 1:52pm  Distant Location (provider location):  [facility]

## 2020-11-19 NOTE — LETTER
"    11/19/2020        RE: Smiley Acuña  C/o Theresa Massey  [address] [place] [address] GERIATRIC SERVICES   Smiley Acuña is being evaluated via a billable video visit.   The patient has been notified of following:  \"This video visit will be conducted via a call between you and your provider. We have found that certain health care needs can be provided without the need for an in-person physical exam.  This service lets us provide the care you need with a video conversation. If during the course of the call the provider feels a video visit is not appropriate, you will not be charged for this service.\"   The provider has received verbal consent for a Video Visit from the patient or first contact? Yes  Patient  or facility staff would like the video invitation sent by: N/A   Video Start Time: 1:51pm      [place] Medical Record Number:  [id number]  Place of Location at the time of visit: [facility]  Chief Complaint   Patient presents with     RECHECK     HPI:  Smiley Acuña  is a 85 year old (1935), who is being seen today for a visit.  HPI information obtained from: facility chart records and facility staff.     Today's concern is:  COVID-19 virus infection  CKD (chronic kidney disease) stage 4, GFR 15-29 ml/min (H)  Dementia with behavioral disturbance, unspecified dementia type (H)  Patient has been doing poorly for several days, fever, hypoxia, not eating and drinking, very lethargic. Her son works in the building and has told the nurse manager that they want to focus on comfort care. He wasn't sure about hospice yet, but does not want hospitalization, just symptom management. Smiley has not been able to take any oral medications today, but is complaining of back pain.      Past Medical and Surgical History reviewed in Epic today.  MEDICATIONS:    Current Outpatient Medications   Medication Sig Dispense Refill     acetaminophen (TYLENOL) 500 MG " "tablet Take 2 tablets (1,000 mg) by mouth 3 times daily       amLODIPine (NORVASC) 2.5 MG tablet Take 2 tablets (5 mg) by mouth At Bedtime       ASPIRIN NOT PRESCRIBED, INTENTIONAL, by Other route continuous prn. 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    0     capsaicin (ZOSTRIX) 0.075 % cream Apply topically 4 times daily Apply to BLEs       hydrALAZINE (APRESOLINE) 100 MG tablet Take 100 mg by mouth 3 times daily       ibuprofen (ADVIL/MOTRIN) 400 MG tablet Take 400 mg by mouth every 6 hours       magnesium oxide (MAG-OX) 400 MG tablet Take 1 tablet (400 mg) by mouth daily       polyethylene glycol (MIRALAX/GLYCOLAX) packet Take 17 g by mouth daily       QUEtiapine (SEROQUEL) 25 MG tablet take 1/2 tablet in the morning and 1 tablet(25mg) at bedtime 45 tablet 3     rOPINIRole (REQUIP) 0.25 MG tablet Take 1 tablet (0.25 mg) by mouth At Bedtime       REVIEW OF SYSTEMS: Unobtainable secondary to cognitive impairment/somnolence    Objective: /76   Pulse 61   Temp 98.5  F (36.9  C)   Resp 16   Ht 1.575 m (5' 2\")   Wt 67.4 kg (148 lb 8 oz)   SpO2 92%   BMI 27.16 kg/m    Limited visit exam done given COVID-19 precautions.   GENERAL APPEARANCE:  somnolent  RESP:  no respiratory distress     Labs:   none    ASSESSMENT/PLAN:  (U07.1) COVID-19 virus infection  (primary encounter diagnosis)  (N18.4) CKD (chronic kidney disease) stage 4, GFR 15-29 ml/min (H)  (F03.91) Dementia with behavioral disturbance, unspecified dementia type (H)  Comment: Patient is severely ill and unlikely to survive. Due to her advanced dementia and CKD, comfort care is very appropriate. Hospitalization would not be of benefit and would likely only cause suffering. Will stop all medications and use only comfort meds.   Plan: Morphine sulfate 20mg/mL 2.5mg TID and q2h prn. Haldol 2mg/mL 0.5mg q6h prn for agitation. Bisacodyl supp daily prn. Acetaminophen supp 650mg NE q4h prn fever. Continue supplement oxygen      Electronically " signed by:  ALBA Glez CNP   [facility]  Phone: [phone]      Video-Visit Details  Type of service:  Video Visit  Video End Time (time video stopped): 1:52pm  Distant Location (provider location):  [facility]

## 2020-11-24 NOTE — LETTER
"    11/24/2020        RE: Smiley Acuña  C/o Theresa Massey  [address] [place] [address] [id number] [place] [address] GERIATRIC SERVICES   Smiley Acuña is being evaluated via a billable video visit.   The patient has been notified of following:  \"This video visit will be conducted via a call between you and your provider. We have found that certain health care needs can be provided without the need for an in-person physical exam.  This service lets us provide the care you need with a video conversation. If during the course of the call the provider feels a video visit is not appropriate, you will not be charged for this service.\"   The provider has received verbal consent for a Video Visit from the patient or first contact? Yes  Patient  or facility staff would like the video invitation sent by: N/A   Video Start Time: 10:44am    [place] Medical Record Number:  [id number]  Place of Location at the time of visit: [facility]  Chief Complaint   Patient presents with     RECHECK     HPI:  Smiley Acñua  is a 85 year old (1935), who is being seen today for a visit.  HPI information obtained from: facility chart records and facility staff.     Today's concern is:  Infection due to 2019 novel coronavirus  Patient recently enrolled in [facility] due to rapid decline from COVID-19 infection. She is no longer eating or drinking, mostly sleeping. She is not able to answer any questions.      Past Medical and Surgical History reviewed in Epic today.  MEDICATIONS:    Current Outpatient Medications   Medication Sig Dispense Refill     acetaminophen (TYLENOL) 650 MG suppository Place 1 suppository (650 mg) rectally every 4 hours as needed for fever       ASPIRIN NOT PRESCRIBED, INTENTIONAL, by Other route continuous prn. 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    0     capsaicin (ZOSTRIX) 0.075 % cream Apply topically 4 times daily Apply to BLEs       haloperidol (HALDOL) 2 MG/ML " (HIGH CONC) solution Take 0.25 mLs (0.5 mg) by mouth every 6 hours as needed for agitation       morphine sulfate, high concentrate, (ROXANOL-CONCENTRATED) 20 MG/ML concentrated solution Take 0.125 mLs (2.5 mg) by mouth 3 times daily. May also take 0.125 mLs (2.5 mg) every 2 hours as needed for shortness of breath / dyspnea or pain. 30 mL 0     REVIEW OF SYSTEMS: Unobtainable secondary to somnolence    Objective: Pulse 94   Temp 98.5  F (36.9  C)   Resp 20   Limited visit exam done given COVID-19 precautions.   GENERAL APPEARANCE:  in no distress, somnolent  RESP:  slightly labored breathing, rate normal     Labs:   none    ASSESSMENT/PLAN:  (U07.1) Infection due to 2019 novel coronavirus  (primary encounter diagnosis)  Comment: Patient actively dying. Continue comfort care/symptom management    Electronically signed by:  ALBA Glez CNP     Video-Visit Details  Type of service:  Video Visit  Video End Time (time video stopped): 10:45am  Distant Location (provider location):  [facility]

## 2020-11-25 NOTE — PROGRESS NOTES
"[place] [facility]   Smiley Acuña is being evaluated via a billable video visit.   The patient has been notified of following:  \"This video visit will be conducted via a call between you and your provider. We have found that certain health care needs can be provided without the need for an in-person physical exam.  This service lets us provide the care you need with a video conversation. If during the course of the call the provider feels a video visit is not appropriate, you will not be charged for this service.\"   The provider has received verbal consent for a Video Visit from the patient or first contact? Yes  Patient  or facility staff would like the video invitation sent by: N/A   Video Start Time: 10:44am    [place] Medical Record Number:  [id number]  Place of Location at the time of visit: [facility]  Chief Complaint   Patient presents with     RECHECK     HPI:  Smiley Acuña  is a 85 year old (1935), who is being seen today for a visit.  HPI information obtained from: facility chart records and facility staff.     Today's concern is:  Infection due to 2019 novel coronavirus  Patient recently enrolled in [facility] due to rapid decline from COVID-19 infection. She is no longer eating or drinking, mostly sleeping. She is not able to answer any questions.      Past Medical and Surgical History reviewed in Epic today.  MEDICATIONS:    Current Outpatient Medications   Medication Sig Dispense Refill     acetaminophen (TYLENOL) 650 MG suppository Place 1 suppository (650 mg) rectally every 4 hours as needed for fever       ASPIRIN NOT PRESCRIBED, INTENTIONAL, by Other route continuous prn. 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    0     capsaicin (ZOSTRIX) 0.075 % cream Apply topically 4 times daily Apply to BLEs       haloperidol (HALDOL) 2 MG/ML (HIGH CONC) solution Take 0.25 mLs (0.5 mg) by mouth every 6 hours as needed for agitation       morphine sulfate, high " concentrate, (ROXANOL-CONCENTRATED) 20 MG/ML concentrated solution Take 0.125 mLs (2.5 mg) by mouth 3 times daily. May also take 0.125 mLs (2.5 mg) every 2 hours as needed for shortness of breath / dyspnea or pain. 30 mL 0     REVIEW OF SYSTEMS: Unobtainable secondary to somnolence    Objective: Pulse 94   Temp 98.5  F (36.9  C)   Resp 20   Limited visit exam done given COVID-19 precautions.   GENERAL APPEARANCE:  in no distress, somnolent  RESP:  slightly labored breathing, rate normal     Labs:   none    ASSESSMENT/PLAN:  (U07.1) Infection due to 2019 novel coronavirus  (primary encounter diagnosis)  Comment: Patient actively dying. Continue comfort care/symptom management    Electronically signed by:  ALBA Glez CNP     Video-Visit Details  Type of service:  Video Visit  Video End Time (time video stopped): 10:45am  Distant Location (provider location):  [facility]
